# Patient Record
Sex: FEMALE | Race: BLACK OR AFRICAN AMERICAN
[De-identification: names, ages, dates, MRNs, and addresses within clinical notes are randomized per-mention and may not be internally consistent; named-entity substitution may affect disease eponyms.]

---

## 2018-03-19 ENCOUNTER — HOSPITAL ENCOUNTER (EMERGENCY)
Dept: HOSPITAL 97 - ER | Age: 43
Discharge: HOME | End: 2018-03-19
Payer: COMMERCIAL

## 2018-03-19 VITALS — SYSTOLIC BLOOD PRESSURE: 117 MMHG | DIASTOLIC BLOOD PRESSURE: 78 MMHG

## 2018-03-19 VITALS — OXYGEN SATURATION: 99 %

## 2018-03-19 VITALS — TEMPERATURE: 98.3 F

## 2018-03-19 DIAGNOSIS — F41.9: ICD-10-CM

## 2018-03-19 DIAGNOSIS — F31.9: ICD-10-CM

## 2018-03-19 DIAGNOSIS — G89.21: Primary | ICD-10-CM

## 2018-03-19 LAB
BUN BLD-MCNC: 7 MG/DL (ref 6–20)
GLUCOSE SERPLBLD-MCNC: 111 MG/DL (ref 65–120)
HCT VFR BLD CALC: 39.3 % (ref 36–45)
LYMPHOCYTES # SPEC AUTO: 1.3 K/UL (ref 0.7–4.9)
MCH RBC QN AUTO: 24.8 PG (ref 27–35)
MCV RBC: 77.8 FL (ref 80–100)
MORPHOLOGY BLD-IMP: (no result)
PMV BLD: 9 FL (ref 7.6–11.3)
POTASSIUM SERPL-SCNC: 3.4 MEQ/L (ref 3.6–5)
RBC # BLD: 5.05 M/UL (ref 3.86–4.86)

## 2018-03-19 PROCEDURE — 80048 BASIC METABOLIC PNL TOTAL CA: CPT

## 2018-03-19 PROCEDURE — 96374 THER/PROPH/DIAG INJ IV PUSH: CPT

## 2018-03-19 PROCEDURE — 36415 COLL VENOUS BLD VENIPUNCTURE: CPT

## 2018-03-19 PROCEDURE — 96372 THER/PROPH/DIAG INJ SC/IM: CPT

## 2018-03-19 PROCEDURE — 96375 TX/PRO/DX INJ NEW DRUG ADDON: CPT

## 2018-03-19 PROCEDURE — 85025 COMPLETE CBC W/AUTO DIFF WBC: CPT

## 2018-03-19 PROCEDURE — 93005 ELECTROCARDIOGRAM TRACING: CPT

## 2018-03-19 PROCEDURE — 99284 EMERGENCY DEPT VISIT MOD MDM: CPT

## 2018-03-19 NOTE — EKG
Test Date:    2018-03-19               Test Time:    11:19:15

Technician:   SHELLY                                     

                                                     

MEASUREMENT RESULTS:                                       

Intervals:                                           

Rate:         114                                    

CA:           120                                    

QRSD:         66                                     

QT:           348                                    

QTc:          479                                    

Axis:                                                

P:            77                                     

CA:           120                                    

QRS:          43                                     

T:            49                                     

                                                     

INTERPRETIVE STATEMENTS:                                       

                                                     

Sinus tachycardia

Otherwise normal ECG

Compared to ECG 06/03/2017 12:35:41

Atrial abnormality no longer present

Left ventricular hypertrophy no longer present

ST (T wave) deviation no longer present



Electronically Signed On 03-19-18 15:37:16 CDT by Doug Ren
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
## 2018-08-19 ENCOUNTER — HOSPITAL ENCOUNTER (EMERGENCY)
Dept: HOSPITAL 97 - ER | Age: 43
Discharge: ANOTHER HOSPITAL | End: 2018-08-19
Payer: COMMERCIAL

## 2018-08-19 VITALS — OXYGEN SATURATION: 100 % | TEMPERATURE: 98.2 F

## 2018-08-19 DIAGNOSIS — T81.31XA: Primary | ICD-10-CM

## 2018-08-19 DIAGNOSIS — F10.129: ICD-10-CM
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
## 2018-08-19 LAB
ALBUMIN SERPL BCP-MCNC: 2.8 G/DL (ref 3.4–5)
ALCOHOL SERUM/PLASMA: 148 MG/DL (ref ?–3)
ALP SERPL-CCNC: 172 U/L (ref 45–117)
ALT SERPL W P-5'-P-CCNC: 50 U/L (ref 12–78)
AST SERPL W P-5'-P-CCNC: 101 U/L (ref 15–37)
BUN BLD-MCNC: 3 MG/DL (ref 7–18)
GLUCOSE SERPLBLD-MCNC: 134 MG/DL (ref 74–106)
HCT VFR BLD CALC: 28.7 % (ref 36–45)
INR BLD: 1.18
LYMPHOCYTES # SPEC AUTO: 4.4 K/UL (ref 0.7–4.9)
MCH RBC QN AUTO: 26.2 PG (ref 27–35)
MCV RBC: 81.9 FL (ref 80–100)
PMV BLD: 7.7 FL (ref 7.6–11.3)
POTASSIUM SERPL-SCNC: 3.7 MMOL/L (ref 3.5–5.1)
RBC # BLD: 3.5 M/UL (ref 3.86–4.86)

## 2018-08-19 PROCEDURE — 80048 BASIC METABOLIC PNL TOTAL CA: CPT

## 2018-08-19 PROCEDURE — 80329 ANALGESICS NON-OPIOID 1 OR 2: CPT

## 2018-08-19 PROCEDURE — 85025 COMPLETE CBC W/AUTO DIFF WBC: CPT

## 2018-08-19 PROCEDURE — 80076 HEPATIC FUNCTION PANEL: CPT

## 2018-08-19 PROCEDURE — 36415 COLL VENOUS BLD VENIPUNCTURE: CPT

## 2018-08-19 PROCEDURE — 85610 PROTHROMBIN TIME: CPT

## 2018-08-19 PROCEDURE — 85730 THROMBOPLASTIN TIME PARTIAL: CPT

## 2018-08-19 PROCEDURE — 80320 DRUG SCREEN QUANTALCOHOLS: CPT

## 2018-08-19 PROCEDURE — 99285 EMERGENCY DEPT VISIT HI MDM: CPT

## 2018-08-19 NOTE — XMS REPORT
Summary of Care

 Created on:May 31, 2018



Patient:BLAYNE MEDLEY

Sex:Female

:1975

External Reference #:[id number]





Demographics







 Address  [address] [address]

 

 Phone  Unavailable

 

 Preferred Language  English

 

 Marital Status  Unknown

 

 [employer] Affiliation  Unknown

 

 Race  Other Race

 

 Ethnic Group  Not  or 









Author







 Name  Tamra Amado M.A.

 

 Address  Unavailable



   Unavailable



   ,









Care Team Providers







 Name  Role  Phone

 

 WILMER SALINAS  Unavailable  Unavailable

 

 ANANT CHAVES, NAILA  Unavailable  Unavailable

 

 JR COLUNGA, KALYN  Unavailable  Unavailable

 

 Unavailable  Unavailable  Unavailable









Functional Status







 Name  Dates  Details

 

 Functional status health issues are not documented    Status:









 Name  Dates  Details

 

 Cognitive status health issues are not documented    Status:







Problems







 Name  Dates  Details

 

 Closed fracture of neck of right talus (825.21, S92.111A)    Status: Active

 

 Closed displaced fracture of navicular bone of left foot, initial encounter (
825.22, S92.252A)    Status: Active

 

 Closed fracture of superior ramus of left pubis, initial encounter (808.2, 
S32.512A)    Status: Active

 

 Closed fracture of right superior pubic ramus (808.2, S32.511A)    Status: 
Active

 

 Closed nondisplaced fracture of body of right calcaneus, initial encounter (
825.0, S92.014A)    Status: Active

 

 Closed fracture of tibial plateau with routine healing, left (V54.16, S82.142D
)    Status: Active

 

 Closed displaced fracture of second metatarsal bone of right foot, initial 
encounter (825.25, S92.321A)    Status: Active

 

 Closed nondisplaced fracture of fourth metatarsal bone of right foot (825.25, 
S92.344A)    Status: Active

 

 Closed nondisplaced fracture of third metatarsal bone of right foot (825.25, 
S92.334A)    Status: Active

 

 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

 

 Displaced supracondylar fracture with intracondylar extension of lower end of 
right femur, subsequent encounter for open fracture type IIIA, IIIB, or IIIC 
with routine healing (V54.15, S72.461F)    Status: Active

 

 Laceration of knee with foreign body, left, subsequent encounter (V58.89, 
S81.022D)    Status: Active

 

 Avulsion fracture of lateral malleolus of right fibula, open type III, with 
routine healing, subsequent encounter (V54.19, S82.61XF)    Status: Active

 

 Laceration of lower leg with foreign body, right, subsequent encounter (V58.89
, S81.821D)    Status: Active

 

 Fracture of right inferior pubic ramus, closed, initial encounter (808.2, 
S32.591A)    Status: Active

 

 Closed avulsion fracture of navicular bone of foot, right, initial encounter (
825.22, S92.251A)    Status: Active

 

 Fracture of left inferior pubic ramus, closed, initial encounter (808.2, 
S32.592A)    Status: Active

 

 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    
Status: Active







Medications







 Name  Dates  Details









 Gabapentin 300 MG Oral Capsule



 TAKE 1 CAPSULE 3 TIMES DAILY









    Quantity: 60   Refills: 0







ANANT M.D.NAILA





  Start : 30-Mar-2017



Active

Methocarbamol 500 MG Oral Tablet

TAKE 1 TABLET 3 TIMES DAILY PRN muscle spasm







  Quantity: 30   Refills: 0







ANANT M.D.NAILA





  Start : 30-Mar-2017



Active

Acetaminophen-Codeine #3 300-30 MG Oral Tablet

TAKE 1 TO 2 TABLETS EVERY 6 HOURS AS NEEDED FOR PAIN.







  Quantity: 50   Refills: 0







ANANT M.D.NAILA





  Start : 3-May-2017



Active

TraMADol HCl - 50 MG Oral Tablet

TAKE 1 TO 2 TABLETS EVERY 6 HOURS AS NEEDED FOR PAIN.







  Quantity: 50   Refills: 0







ANANT M.D.NAILA





  Start : 3-May-2017



Active

Vitamin D (Ergocalciferol) 21034 UNIT Oral Capsule

TAKE 1 CAPSULE WEEKLY.







  Quantity: 8   Refills: 0







BROWN N.P., KALYN





  Start : 2-Aug-2017



Active

Methocarbamol 500 MG Oral Tablet

TAKE 1 TABLET TWICE DAILY







  Quantity: 30   Refills: 0







WILMER SALINAS





  Start : 2018



Active

TraMADol HCl - 50 MG Oral Tablet

TAKE 1 TABLET ONCE







  Quantity: 30   Refills: 0







WILMER SALINAS





  Start : 2018



Active





Allergies and Adverse Reactions







 Name  Dates  Details

 

 No Known Drug Allergies (Allergy)    Status: Active







Past Medical History







 Name  Dates  Details

 

 History of backache (V13.59, Z87.39)    Status: Resolved

 

 History of depression (V11.8, Z86.59)    Status: Resolved

 

 History of hepatitis (V12.09, Z86.19)    Status: Resolved







Procedures







 Procedure  Dates  Details

 

 [U] XRAY FEMUR 2 VWS RIGHT 06060  Date: 11-May-2018  







Immunization







 Name  Dates  Details

 

 Immunizations not documented    







Family History







 Name  Dates  Details

 

 Family history of cardiac disorder (V17.49, Z82.49)    Comments: Family History



     Status: Active

 

 Family history of essential hypertension (V17.49, Z82.49)    Comments: Family 
History



     Status: Active

 

 Family history of arthritis (V17.7, Z82.61)    Comments: Family History



     Status: Active

 

 Family history of malignant neoplasm (V16.9, Z80.9)    Comments: Family History



     Status: Active







Social History







 Name  Dates  Details

 

 Unknown if ever smoked    







Vital Signs







 Date  Test  Result  Details

 

       

 

   No Known Vitals to report    



       







Results







 Date  Description  Value  Details

 

   Results not documented    



       

 

       







Plan of Care







 Name  Dates  Details









 Planned Observations









 Planned Goals not documented    









 Planned Encounters









 Appointment; WILMER MUJICA PA  On: 2018 12:45







Instructions







 Name  Dates  Details

 

 Instructions not documented    







Encounters







 Appointment; NAILA NY M.D.  On: 2017 12:30



 Encounter Diagnosis: Problem not documented  

 

 Appointment; NAILA NY M.D.  On: 3-May-2017 8:30



 Encounter Diagnosis: Problem not documented  

 

 Appointment; ABIMAEL BULLARD M.D.  On: 3-May-2017 10:00



 Encounter Diagnosis: Problem not documented  

 

 Appointment; NAILA NY M.D.  On: 2017 11:15



 Encounter Diagnosis: Problem not documented  

 

 Appointment; KALYN NORRIS NP  On: 2-Aug-2017 10:15



 Encounter Diagnosis: Problem not documented  

 

 Appointment; ABIMAEL BULLARD M.D.  On: 2-Aug-2017 11:00



 Encounter Diagnosis: Problem not documented  

 

 Appointment; KALYN NORRIS NP  On: 18-Oct-2017 12:45



 Encounter Diagnosis: Problem not documented  

 

 Appointment; EZEKIEL ONEAL NP  On: 18-Oct-2017 13:30



 Encounter Diagnosis: Problem not documented  

 

 Appointment; WILMER MUJICA PA  On: 2018 9:45



 Encounter Diagnosis: Problem not documented  

 

 Appointment; WILMER MUJICA PA  On: 21-May-2018 9:45



 Encounter Diagnosis: Problem not documented
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
## 2018-08-19 NOTE — RAD REPORT
EXAM DESCRIPTION:  RAD - Femur Right - 8/19/2018 9:52 pm

 

CLINICAL HISTORY:  Recent trauma, chronic draining wound

 

COMPARISON:  CT study February 2018

 

FINDINGS:  No acute fracture changes are present. Extensive surgical hardware in place within the int
ramedullary canal and along the lateral femur. No fracture of hardware. Chronic ununited superior and
 inferior pubic rami fractures are noted similar to February. Extensive remodeling of the bone of the
 distal femur. No clearly destructive bone process seen. Soft tissue wound is evident anterior to the
 knee and along the lateral thigh. No foreign body. Air is seen in the soft tissues along the muscle 
fat interface of the lateral thigh.

 

IMPRESSION:  Soft tissue wound changes are present with air density at the lateral thigh.

 

No acute bone or hardware finding identifiable.
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
## 2018-08-20 VITALS — DIASTOLIC BLOOD PRESSURE: 75 MMHG | SYSTOLIC BLOOD PRESSURE: 105 MMHG

## 2019-10-02 ENCOUNTER — HOSPITAL ENCOUNTER (INPATIENT)
Dept: HOSPITAL 97 - ER | Age: 44
LOS: 2 days | Discharge: HOME | DRG: 190 | End: 2019-10-04
Attending: FAMILY MEDICINE | Admitting: FAMILY MEDICINE
Payer: COMMERCIAL

## 2019-10-02 VITALS — BODY MASS INDEX: 45.7 KG/M2

## 2019-10-02 DIAGNOSIS — J44.1: ICD-10-CM

## 2019-10-02 DIAGNOSIS — E66.01: ICD-10-CM

## 2019-10-02 DIAGNOSIS — J18.9: ICD-10-CM

## 2019-10-02 DIAGNOSIS — B18.2: ICD-10-CM

## 2019-10-02 DIAGNOSIS — G89.29: ICD-10-CM

## 2019-10-02 DIAGNOSIS — F31.9: ICD-10-CM

## 2019-10-02 DIAGNOSIS — J44.0: Primary | ICD-10-CM

## 2019-10-02 DIAGNOSIS — F17.210: ICD-10-CM

## 2019-10-02 LAB
ALBUMIN SERPL BCP-MCNC: 3 G/DL (ref 3.4–5)
ALP SERPL-CCNC: 120 U/L (ref 45–117)
ALT SERPL W P-5'-P-CCNC: 83 U/L (ref 12–78)
AST SERPL W P-5'-P-CCNC: 62 U/L (ref 15–37)
BUN BLD-MCNC: 6 MG/DL (ref 7–18)
GLUCOSE SERPLBLD-MCNC: 106 MG/DL (ref 74–106)
HCT VFR BLD CALC: 38.3 % (ref 36–45)
INR BLD: 1.15
LYMPHOCYTES # SPEC AUTO: 1.3 K/UL (ref 0.7–4.9)
MAGNESIUM SERPL-MCNC: 1.6 MG/DL (ref 1.8–2.4)
METHAMPHET UR QL SCN: NEGATIVE
NT-PROBNP SERPL-MCNC: 182 PG/ML (ref ?–125)
PMV BLD: 8.6 FL (ref 7.6–11.3)
POTASSIUM SERPL-SCNC: 4 MMOL/L (ref 3.5–5.1)
RBC # BLD: 5.13 M/UL (ref 3.86–4.86)
THC SERPL-MCNC: POSITIVE NG/ML
TROPONIN (EMERG DEPT USE ONLY): < 0.02 NG/ML (ref 0–0.04)
TSH SERPL DL<=0.05 MIU/L-ACNC: 0.96 UIU/ML (ref 0.36–3.74)
UA DIPSTICK W REFLEX MICRO PNL UR: (no result)

## 2019-10-02 PROCEDURE — 96375 TX/PRO/DX INJ NEW DRUG ADDON: CPT

## 2019-10-02 PROCEDURE — 87205 SMEAR GRAM STAIN: CPT

## 2019-10-02 PROCEDURE — 80053 COMPREHEN METABOLIC PANEL: CPT

## 2019-10-02 PROCEDURE — 80307 DRUG TEST PRSMV CHEM ANLYZR: CPT

## 2019-10-02 PROCEDURE — 96365 THER/PROPH/DIAG IV INF INIT: CPT

## 2019-10-02 PROCEDURE — 84443 ASSAY THYROID STIM HORMONE: CPT

## 2019-10-02 PROCEDURE — 93005 ELECTROCARDIOGRAM TRACING: CPT

## 2019-10-02 PROCEDURE — 93306 TTE W/DOPPLER COMPLETE: CPT

## 2019-10-02 PROCEDURE — 36415 COLL VENOUS BLD VENIPUNCTURE: CPT

## 2019-10-02 PROCEDURE — 84145 PROCALCITONIN (PCT): CPT

## 2019-10-02 PROCEDURE — 85610 PROTHROMBIN TIME: CPT

## 2019-10-02 PROCEDURE — 96367 TX/PROPH/DG ADDL SEQ IV INF: CPT

## 2019-10-02 PROCEDURE — 94640 AIRWAY INHALATION TREATMENT: CPT

## 2019-10-02 PROCEDURE — 87077 CULTURE AEROBIC IDENTIFY: CPT

## 2019-10-02 PROCEDURE — 99285 EMERGENCY DEPT VISIT HI MDM: CPT

## 2019-10-02 PROCEDURE — 71275 CT ANGIOGRAPHY CHEST: CPT

## 2019-10-02 PROCEDURE — 81003 URINALYSIS AUTO W/O SCOPE: CPT

## 2019-10-02 PROCEDURE — 83735 ASSAY OF MAGNESIUM: CPT

## 2019-10-02 PROCEDURE — 84439 ASSAY OF FREE THYROXINE: CPT

## 2019-10-02 PROCEDURE — 80048 BASIC METABOLIC PNL TOTAL CA: CPT

## 2019-10-02 PROCEDURE — 83880 ASSAY OF NATRIURETIC PEPTIDE: CPT

## 2019-10-02 PROCEDURE — 80076 HEPATIC FUNCTION PANEL: CPT

## 2019-10-02 PROCEDURE — 87804 INFLUENZA ASSAY W/OPTIC: CPT

## 2019-10-02 PROCEDURE — 84484 ASSAY OF TROPONIN QUANT: CPT

## 2019-10-02 PROCEDURE — 87186 SC STD MICRODIL/AGAR DIL: CPT

## 2019-10-02 PROCEDURE — 85025 COMPLETE CBC W/AUTO DIFF WBC: CPT

## 2019-10-02 PROCEDURE — 87040 BLOOD CULTURE FOR BACTERIA: CPT

## 2019-10-02 PROCEDURE — 71046 X-RAY EXAM CHEST 2 VIEWS: CPT

## 2019-10-02 RX ADMIN — QUETIAPINE FUMARATE SCH MG: 100 TABLET, FILM COATED ORAL at 13:05

## 2019-10-02 RX ADMIN — Medication SCH ML: at 20:28

## 2019-10-02 RX ADMIN — HYDROCODONE BITARTRATE AND ACETAMINOPHEN PRN TAB: 7.5; 325 TABLET ORAL at 10:06

## 2019-10-02 RX ADMIN — HYDROCODONE BITARTRATE AND ACETAMINOPHEN PRN TAB: 7.5; 325 TABLET ORAL at 18:13

## 2019-10-02 RX ADMIN — PREGABALIN SCH MG: 50 CAPSULE ORAL at 20:28

## 2019-10-02 RX ADMIN — QUETIAPINE FUMARATE SCH MG: 100 TABLET, FILM COATED ORAL at 16:18

## 2019-10-02 RX ADMIN — TRAMADOL HYDROCHLORIDE PRN MG: 50 TABLET, COATED ORAL at 22:25

## 2019-10-02 RX ADMIN — BENZONATATE PRN MG: 100 CAPSULE ORAL at 13:05

## 2019-10-02 RX ADMIN — FAMOTIDINE SCH MG: 20 TABLET, FILM COATED ORAL at 08:28

## 2019-10-02 RX ADMIN — QUETIAPINE FUMARATE SCH MG: 100 TABLET, FILM COATED ORAL at 20:28

## 2019-10-02 RX ADMIN — TRAMADOL HYDROCHLORIDE PRN MG: 50 TABLET, COATED ORAL at 16:13

## 2019-10-02 RX ADMIN — BENZONATATE PRN MG: 100 CAPSULE ORAL at 04:17

## 2019-10-02 RX ADMIN — ARFORMOTEROL TARTRATE SCH MCG: 15 SOLUTION RESPIRATORY (INHALATION) at 20:05

## 2019-10-02 RX ADMIN — ENOXAPARIN SODIUM SCH MG: 40 INJECTION SUBCUTANEOUS at 08:28

## 2019-10-02 RX ADMIN — IPRATROPIUM BROMIDE PRN MG: 0.5 SOLUTION RESPIRATORY (INHALATION) at 20:05

## 2019-10-02 RX ADMIN — ALBUTEROL SULFATE PRN MG: 2.5 SOLUTION RESPIRATORY (INHALATION) at 20:05

## 2019-10-02 RX ADMIN — NICOTINE SCH MG: 21 PATCH TRANSDERMAL at 08:28

## 2019-10-02 RX ADMIN — ARFORMOTEROL TARTRATE SCH MCG: 15 SOLUTION RESPIRATORY (INHALATION) at 10:55

## 2019-10-02 RX ADMIN — TRAMADOL HYDROCHLORIDE PRN MG: 50 TABLET, COATED ORAL at 08:33

## 2019-10-02 RX ADMIN — GUAIFENESIN SCH MG: 600 TABLET, EXTENDED RELEASE ORAL at 08:29

## 2019-10-02 RX ADMIN — HYDROCODONE BITARTRATE AND ACETAMINOPHEN PRN TAB: 7.5; 325 TABLET ORAL at 04:18

## 2019-10-02 RX ADMIN — FAMOTIDINE SCH MG: 20 TABLET, FILM COATED ORAL at 20:27

## 2019-10-02 RX ADMIN — Medication SCH ML: at 08:29

## 2019-10-02 RX ADMIN — GUAIFENESIN SCH MG: 600 TABLET, EXTENDED RELEASE ORAL at 20:28
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
## 2019-10-02 NOTE — RAD REPORT
EXAM DESCRIPTION:  Manoj Sheikh (2 Views)10/2/2019 1:24 am

 

CLINICAL HISTORY:  Cough

 

COMPARISON:  2018

 

FINDINGS:   The lungs appear clear of acute infiltrate. The heart is normal size

 

IMPRESSION:   No acute abnormalities displayed
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
## 2019-10-02 NOTE — ECHO
HEIGHT: 5 ft 2 in   WEIGHT: 250 lb 0.067 oz   DATE OF STUDY: 10/2/19   REFER DR: Timothy Vickers DO

2-DIMENSIONAL: YES

     M.MODE: YES

 DOPPLER: YES

COLOR FLOW: YES



                    TDS:  NO

PORTABLE: NO

 DEFINITY:  NO

BUBBLE STUDY: NO





DIAGNOSIS:  SHORTNESS OF BREATH



CARDIAC HISTORY:  

CATHERIZATION: 

SURGERY: 

PROSTHETIC VALVE: 

PACEMAKER: 





MEASUREMENTS (cm)

    DIASTOLIC (NORMALS)                 SYSTOLIC (NORMALS)

IVSd                 1.0 (0.6-1.2)                    LA Diam 2.9 (1.9-4.0)     LVEF       
  60-69%  

LVIDd               4.0 (3.5-5.7)                        LVIDs      2.6 (2.0-3.5)     %FS  
               %

LVPWd             1.2 (0.6-1.2)

Ao Diam           2.4 (2.0-3.7)



2 DIMENSIONAL ASSESSMENT:

RIGHT ATRIUM:                   NORMAL

LEFT ATRIUM:       NORMAL



RIGHT VENTRICLE:            NORMAL

LEFT VENTRICLE: NORMAL



TRICUSPID VALVE:             NORMAL

MITRAL VALVE:     NORMAL



PULMONIC VALVE:             NORMAL

AORTIC VALVE:     NORMAL



PERICARDIAL EFFUSION: NONE

AORTIC ROOT:      NORMAL





LEFT VENTRICULAR WALL MOTION:     NORMAL.



DOPPLER/COLOR FLOW:     MILD TRICUSPID REGURGITATION. NORMAL RIGHT VENTRICULAR SYSTOLIC 
PRESSURE.



COMMENTS:      NORMAL 2D ECHO. MILD TRICUSPID REGURGITATION.



TECHNOLOGIST:   JEAN CARLOS CARCAMO
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
## 2019-10-02 NOTE — EKG
Test Date:    2019-10-02               Test Time:    01:34:47

Technician:   NICOLE                                    

                                                     

MEASUREMENT RESULTS:                                       

Intervals:                                           

Rate:         106                                    

AR:           148                                    

QRSD:         66                                     

QT:           320                                    

QTc:          425                                    

Axis:                                                

P:            66                                     

AR:           148                                    

QRS:          17                                     

T:            42                                     

                                                     

INTERPRETIVE STATEMENTS:                                       

                                                     

Sinus tachycardia

Possible Left atrial enlargement

Borderline ECG

Compared to ECG 03/19/2018 11:19:15

No significant changes



Electronically Signed On 10-02-19 11:37:33 CDT by Doug Ren
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
## 2019-10-02 NOTE — RAD REPORT
EXAM DESCRIPTION:  CT - Chest For Pe Angio - 10/2/2019 5:17 am

 

CLINICAL HISTORY:  44-year-old female with cough, dyspnea and COPD.

 

TECHNIQUE:  Following the administration of intravenous contrast, multiple high-resolution axial imag
es of the chest were performed followed by sagittal and coronal reconstructed images. The CT study is
 performed according to ALARA (as low as reasonably achievable) or ALARA/IMAGE GENTLY, with automatic
 adjustment of mA and/or kV according to patient size.

Performed on: 10/2/2019 at 3:43 AM

 

COMPARISON:  None

 

FINDINGS:  There is   satisfactory visualization and contrast opacification of pulmonary arteries. No
 definite intra-arterial filling defects are identified to suggest acute or chronic pulmonary embolis
m. The thoracic aorta is normal in caliber and contour without evidence of aneurysm or dissection.

The lungs are well expanded. There is mild parenchymal opacification in the anterior aspects of both 
upper lobes, greater on the left which may be due to fibrosis. There are also a few scattered centril
obular nodules as well as scattered tree-in-bud opacities predominantly involving the superior segmen
ts of both lower lobes and inferior left upper lobe. An infectious or inflammatory bronchiolitis is n
ot excluded. There is no evidence of a pneumothorax. There are no pleural effusions.

The heart is normal in size. There is no pericardial effusion.

There is no evidence of hilar, mediastinal or axillary lymphadenopathy. There are a few calcified rig
ht hilar lymph nodes and there is a calcified granuloma in the right lower lobe consistent with prior
 granulomatous disease

No acute osseous abnormality is identified.

The visualized upper abdominal structures are unremarkable.

The gallbladder is surgically absent.

 

IMPRESSION:  1. No CT evidence to suggest acute or chronic pulmonary embolism, aortic aneurysm or aor
tic dissection.

2. There are a few scattered centrilobular nodules and scattered tree-in-bud opacities as described a
sanford suggesting an infectious or inflammatory bronchiolitis.

3. Evidence of prior granulomatous disease.

 

Electronically signed by:   Tootie Espinoza DO   10/2/2019 4:12 AM CDT Workstation: [id number]

 

 

 

Due to temporary technical issues with the PACS/Fluency reporting system, reports are being signed by
 the in house radiologist as a courtesy to ensure prompt reporting. The interpreting radiologist is f
ully responsible for the content of the report.

## 2019-10-03 LAB
ALBUMIN SERPL BCP-MCNC: 2.6 G/DL (ref 3.4–5)
ALP SERPL-CCNC: 96 U/L (ref 45–117)
ALT SERPL W P-5'-P-CCNC: 59 U/L (ref 12–78)
AST SERPL W P-5'-P-CCNC: 41 U/L (ref 15–37)
BUN BLD-MCNC: 11 MG/DL (ref 7–18)
GLUCOSE SERPLBLD-MCNC: 129 MG/DL (ref 74–106)
HCT VFR BLD CALC: 34.6 % (ref 36–45)
LYMPHOCYTES # SPEC AUTO: 2 K/UL (ref 0.7–4.9)
MAGNESIUM SERPL-MCNC: 2.1 MG/DL (ref 1.8–2.4)
PMV BLD: 8.6 FL (ref 7.6–11.3)
POTASSIUM SERPL-SCNC: 4.4 MMOL/L (ref 3.5–5.1)
RBC # BLD: 4.55 M/UL (ref 3.86–4.86)

## 2019-10-03 RX ADMIN — ARFORMOTEROL TARTRATE SCH MCG: 15 SOLUTION RESPIRATORY (INHALATION) at 20:00

## 2019-10-03 RX ADMIN — GUAIFENESIN SCH MG: 600 TABLET, EXTENDED RELEASE ORAL at 20:28

## 2019-10-03 RX ADMIN — ENOXAPARIN SODIUM SCH MG: 40 INJECTION SUBCUTANEOUS at 10:14

## 2019-10-03 RX ADMIN — HYDROCODONE BITARTRATE AND ACETAMINOPHEN PRN TAB: 10; 325 TABLET ORAL at 21:59

## 2019-10-03 RX ADMIN — SODIUM CHLORIDE SCH MLS: 9 INJECTION, SOLUTION INTRAVENOUS at 13:27

## 2019-10-03 RX ADMIN — IPRATROPIUM BROMIDE PRN MG: 0.5 SOLUTION RESPIRATORY (INHALATION) at 19:30

## 2019-10-03 RX ADMIN — HYDROCODONE BITARTRATE AND ACETAMINOPHEN PRN TAB: 7.5; 325 TABLET ORAL at 13:26

## 2019-10-03 RX ADMIN — QUETIAPINE FUMARATE SCH MG: 100 TABLET, FILM COATED ORAL at 20:28

## 2019-10-03 RX ADMIN — HYDROCODONE BITARTRATE AND ACETAMINOPHEN PRN TAB: 7.5; 325 TABLET ORAL at 00:24

## 2019-10-03 RX ADMIN — IPRATROPIUM BROMIDE PRN MG: 0.5 SOLUTION RESPIRATORY (INHALATION) at 08:35

## 2019-10-03 RX ADMIN — QUETIAPINE FUMARATE SCH MG: 100 TABLET, FILM COATED ORAL at 17:05

## 2019-10-03 RX ADMIN — QUETIAPINE FUMARATE SCH MG: 100 TABLET, FILM COATED ORAL at 10:16

## 2019-10-03 RX ADMIN — ARFORMOTEROL TARTRATE SCH MCG: 15 SOLUTION RESPIRATORY (INHALATION) at 08:35

## 2019-10-03 RX ADMIN — TRAMADOL HYDROCHLORIDE PRN MG: 50 TABLET, COATED ORAL at 04:25

## 2019-10-03 RX ADMIN — Medication SCH ML: at 20:29

## 2019-10-03 RX ADMIN — FAMOTIDINE SCH MG: 20 TABLET, FILM COATED ORAL at 20:28

## 2019-10-03 RX ADMIN — QUETIAPINE FUMARATE SCH MG: 100 TABLET, FILM COATED ORAL at 13:23

## 2019-10-03 RX ADMIN — Medication SCH ML: at 10:17

## 2019-10-03 RX ADMIN — PREGABALIN SCH MG: 50 CAPSULE ORAL at 20:29

## 2019-10-03 RX ADMIN — HYDROCODONE BITARTRATE AND ACETAMINOPHEN PRN TAB: 7.5; 325 TABLET ORAL at 09:33

## 2019-10-03 RX ADMIN — IPRATROPIUM BROMIDE PRN MG: 0.5 SOLUTION RESPIRATORY (INHALATION) at 14:35

## 2019-10-03 RX ADMIN — ALBUTEROL SULFATE PRN MG: 2.5 SOLUTION RESPIRATORY (INHALATION) at 08:35

## 2019-10-03 RX ADMIN — FAMOTIDINE SCH MG: 20 TABLET, FILM COATED ORAL at 10:16

## 2019-10-03 RX ADMIN — NICOTINE SCH MG: 21 PATCH TRANSDERMAL at 10:16

## 2019-10-03 RX ADMIN — GUAIFENESIN SCH MG: 600 TABLET, EXTENDED RELEASE ORAL at 10:15

## 2019-10-03 RX ADMIN — HYDROCODONE BITARTRATE AND ACETAMINOPHEN PRN TAB: 10; 325 TABLET ORAL at 17:42

## 2019-10-03 RX ADMIN — PREGABALIN SCH MG: 50 CAPSULE ORAL at 10:16

## 2019-10-03 RX ADMIN — TRAMADOL HYDROCHLORIDE PRN MG: 50 TABLET, COATED ORAL at 21:50

## 2019-10-03 RX ADMIN — ALBUTEROL SULFATE PRN MG: 2.5 SOLUTION RESPIRATORY (INHALATION) at 14:35

## 2019-10-03 NOTE — P.PN
Subjective


Date of Service: 10/03/19


Primary Care Provider: Reji Castro; BERTHA


Chief Complaint: COPD exacerbation





Patient is still short of breath complaining of coughing and wheezing slightly 

better from yesterday





Review of Systems


General: Weakness


Respiratory: Cough, Shortness of Breath





Physical Examination





- Vital Signs


Temperature: 97.2 F


Blood Pressure: 157/86


Pulse: 84


Respirations: 18


Pulse Ox (%): 97





- Physical Exam


General: Alert, Mild distress


Respiratory: Expiratory wheezes


Cardiovascular: No edema, Regular rate/rhythm





Assessment & Plan





- Problems (Diagnosis)


(1) COPD exacerbation


Current Visit: Yes   Status: Acute   


Plan: 


Patient admitted with COPD exacerbation doing slightly better vital signs are 

stable oxygenation satisfactory patient does not qualify for home O2 recommend 

discharge tomorrow on prednisone 10 mg twice a day for 10 days and Advair 251 

puff twice a day to follow up with me in 2 weeks patient has been advised to 

quit smoking patient's echocardiogram is normal white count mildly elevated 

blood cultures likely contaminant

## 2019-10-03 NOTE — RAD REPORT
EXAM DESCRIPTION:  RAD - Chest Pa And Lat (2 Views) - 10/3/2019 6:48 am

 

CLINICAL HISTORY:  follow up pneumonia

Chest pain.

 

COMPARISON:  Chest Pa And Lat (2 Views) dated 10/2/2019; Chest Single View dated 3/20/2018; CHEST SIN
GLE VIEW dated 1/27/2016; CHEST SINGLE VIEW dated 7/8/2011; Chest For Pe Angio dated 10/2/2019

 

FINDINGS:  Mild interstitial prominence is seen in the right lower lung. No focal consolidation typic
al of pneumonia seen. The heart is upper limit of normal in size. No displaced fractures.

## 2019-10-04 VITALS — OXYGEN SATURATION: 97 %

## 2019-10-04 VITALS — DIASTOLIC BLOOD PRESSURE: 74 MMHG | TEMPERATURE: 98.2 F | SYSTOLIC BLOOD PRESSURE: 118 MMHG

## 2019-10-04 RX ADMIN — IPRATROPIUM BROMIDE PRN MG: 0.5 SOLUTION RESPIRATORY (INHALATION) at 01:21

## 2019-10-04 RX ADMIN — HYDROCODONE BITARTRATE AND ACETAMINOPHEN PRN TAB: 10; 325 TABLET ORAL at 12:39

## 2019-10-04 RX ADMIN — PREGABALIN SCH MG: 50 CAPSULE ORAL at 09:11

## 2019-10-04 RX ADMIN — ALBUTEROL SULFATE PRN MG: 2.5 SOLUTION RESPIRATORY (INHALATION) at 07:53

## 2019-10-04 RX ADMIN — BENZONATATE PRN MG: 100 CAPSULE ORAL at 12:39

## 2019-10-04 RX ADMIN — ENOXAPARIN SODIUM SCH MG: 40 INJECTION SUBCUTANEOUS at 09:10

## 2019-10-04 RX ADMIN — HYDROCODONE BITARTRATE AND ACETAMINOPHEN PRN TAB: 10; 325 TABLET ORAL at 05:16

## 2019-10-04 RX ADMIN — ARFORMOTEROL TARTRATE SCH MCG: 15 SOLUTION RESPIRATORY (INHALATION) at 07:53

## 2019-10-04 RX ADMIN — HYDROCODONE BITARTRATE AND ACETAMINOPHEN PRN TAB: 10; 325 TABLET ORAL at 09:09

## 2019-10-04 RX ADMIN — IPRATROPIUM BROMIDE PRN MG: 0.5 SOLUTION RESPIRATORY (INHALATION) at 07:53

## 2019-10-04 RX ADMIN — SODIUM CHLORIDE SCH MLS: 9 INJECTION, SOLUTION INTRAVENOUS at 12:36

## 2019-10-04 RX ADMIN — QUETIAPINE FUMARATE SCH MG: 100 TABLET, FILM COATED ORAL at 09:10

## 2019-10-04 RX ADMIN — ALBUTEROL SULFATE PRN MG: 2.5 SOLUTION RESPIRATORY (INHALATION) at 13:26

## 2019-10-04 RX ADMIN — GUAIFENESIN SCH MG: 600 TABLET, EXTENDED RELEASE ORAL at 09:10

## 2019-10-04 RX ADMIN — IPRATROPIUM BROMIDE PRN MG: 0.5 SOLUTION RESPIRATORY (INHALATION) at 13:26

## 2019-10-04 RX ADMIN — FAMOTIDINE SCH MG: 20 TABLET, FILM COATED ORAL at 09:10

## 2019-10-04 RX ADMIN — HYDROCODONE BITARTRATE AND ACETAMINOPHEN PRN TAB: 10; 325 TABLET ORAL at 01:32

## 2019-10-04 RX ADMIN — NICOTINE SCH MG: 21 PATCH TRANSDERMAL at 09:10

## 2019-10-04 RX ADMIN — Medication SCH ML: at 09:11

## 2019-10-04 RX ADMIN — QUETIAPINE FUMARATE SCH MG: 100 TABLET, FILM COATED ORAL at 12:36

## 2019-10-04 RX ADMIN — SODIUM CHLORIDE SCH MLS: 9 INJECTION, SOLUTION INTRAVENOUS at 01:32

## 2019-10-04 RX ADMIN — ALBUTEROL SULFATE PRN MG: 2.5 SOLUTION RESPIRATORY (INHALATION) at 01:21
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
## 2019-12-02 ENCOUNTER — HOSPITAL ENCOUNTER (EMERGENCY)
Dept: HOSPITAL 97 - ER | Age: 44
Discharge: HOME | End: 2019-12-02
Payer: COMMERCIAL

## 2019-12-02 VITALS — TEMPERATURE: 98.2 F | OXYGEN SATURATION: 99 %

## 2019-12-02 VITALS — SYSTOLIC BLOOD PRESSURE: 130 MMHG | DIASTOLIC BLOOD PRESSURE: 72 MMHG

## 2019-12-02 DIAGNOSIS — F41.9: ICD-10-CM

## 2019-12-02 DIAGNOSIS — M79.604: Primary | ICD-10-CM

## 2019-12-02 DIAGNOSIS — F31.9: ICD-10-CM

## 2019-12-02 PROCEDURE — 96372 THER/PROPH/DIAG INJ SC/IM: CPT

## 2019-12-02 PROCEDURE — 99284 EMERGENCY DEPT VISIT MOD MDM: CPT

## 2019-12-02 PROCEDURE — 72100 X-RAY EXAM L-S SPINE 2/3 VWS: CPT

## 2019-12-02 NOTE — RAD REPORT
EXAM DESCRIPTION:  RAD - Knee Right 3 View - 12/2/2019 11:13 am

 

CLINICAL HISTORY:  PAIN

 

COMPARISON:  Knee Right 3 View dated 3/20/2018

 

FINDINGS:  Hardware is present involving the distal femur. No hardware loosening or infection. Arthri
tic changes are present tiny. No joint effusion.

 

IMPRESSION:  No acute finding is evident.

## 2019-12-02 NOTE — RAD REPORT
EXAM DESCRIPTION:  RAD - Lumbar Spine 3 Views - 12/2/2019 11:13 am

 

CLINICAL HISTORY:  PAIN

Radiculopathy

 

COMPARISON:  No comparisons

 

FINDINGS:  Vertebral body heights appear maintained. No compression fracture noted. Disc thinning wit
h small posterior osteophytes noted at L4-5 and L5-S1 compatible with mild spondylosis. No spondyloly
sis or spondylolisthesis.

 

 

IMPRESSION:  Mild lower lumbar spondylosis.

## 2019-12-02 NOTE — XMS REPORT
Patient Summary Document

 Created on:2019



Patient:BLAYNE MEDLEY

Sex:Female

:1975

External Reference #:[id number]





Demographics







 Address   [place] [address]

 

 Home Phone  [phone]

 

 Email Address  NONE

 

 Preferred Language  Unknown

 

 Marital Status  Unknown

 

 Adventist Affiliation  Unknown

 

 Race  Unknown

 

 Additional Race(s)  Unavailable

 

 Ethnic Group  Unknown









Author







 Organization  [facility]

 

 Address  [address] [place] [address] Dr. Dickerson [address] [place] [id number]

 

 Phone  [phone]









Care Team Providers







 Name  Role  Phone

 

 Unavailable  Unavailable  Unavailable









Problems

This patient has no known problems.



Allergies, Adverse Reactions, Alerts

This patient has no known allergies or adverse reactions.



Medications

This patient has no known medications.

## 2019-12-02 NOTE — RAD REPORT
EXAM DESCRIPTION:  RAD - Hip Right 2 View - 12/2/2019 11:13 am

 

CLINICAL HISTORY:  fall;Pain

 

COMPARISON:  No comparisons

 

FINDINGS:  Ununited fractures of the right pubic rami seen. Left pubic rami fractures also present, i
ncompletely visualized. Hardware is present in the proximal right femur. No hip fracture evident.
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
## 2020-03-09 ENCOUNTER — HOSPITAL ENCOUNTER (EMERGENCY)
Dept: HOSPITAL 97 - ER | Age: 45
Discharge: HOME | End: 2020-03-09
Payer: COMMERCIAL

## 2020-03-09 VITALS — TEMPERATURE: 97.8 F

## 2020-03-09 VITALS — OXYGEN SATURATION: 97 %

## 2020-03-09 VITALS — SYSTOLIC BLOOD PRESSURE: 156 MMHG | DIASTOLIC BLOOD PRESSURE: 98 MMHG

## 2020-03-09 DIAGNOSIS — F41.9: ICD-10-CM

## 2020-03-09 DIAGNOSIS — G89.21: Primary | ICD-10-CM
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
## 2020-03-09 PROCEDURE — 99284 EMERGENCY DEPT VISIT MOD MDM: CPT

## 2020-03-09 PROCEDURE — 96372 THER/PROPH/DIAG INJ SC/IM: CPT

## 2020-03-09 PROCEDURE — 73502 X-RAY EXAM HIP UNI 2-3 VIEWS: CPT

## 2020-03-09 NOTE — RAD REPORT
EXAM DESCRIPTION:  RAD - Hip Right 2 View - 3/9/2020 5:53 pm

 

CLINICAL HISTORY:  Right hip pain

 

FINDINGS:  Old ununited fractures involve the pubic bones.

 

No acute fracture or dislocation is seen

## 2020-03-09 NOTE — XMS REPORT
Summary of Care

 Created on:2020



Patient:BLAYNE PERLA

Sex:Female

:1975

External Reference #:[id number]





Demographics







 Address   [place] [address]

 

 Phone  Unavailable

 

 Preferred Language  English

 

 Marital Status  Unknown

 

 Adventism Affiliation  Unknown

 

 Race  Other Race

 

 Ethnic Group  Not  or 









Author







 Name  Jayme JOHNSTON Arcelia

 

 Address  Unavailable



   Unavailable



   ,









Care Team Providers







 Name  Role  Phone

 

 SIL CAMEJO, WILMER  Unavailable  Unavailable

 

 ANANT CHAVES, NAILA  Unavailable  Unavailable

 

 JR HOLCOMB.GRUPO, KALYN  Unavailable  Unavailable

 

 SAMMY CHAVES, ARGENIS  Unavailable  Unavailable

 

 JEANNIE CHAVES, KULWINDER  Unavailable  Unavailable

 

 Samaria ARANA, Abdiel  Unavailable  Unavailable

 

 SIL CAMEJO, WILMER  Unavailable  Unavailable

 

 FERN ARANA, TONY  Unavailable  Unavailable

 

 Unavailable  Unavailable  Unavailable









Functional Status







 Name  Dates  Details

 

 Functional status health issues are not documented    Status:









 Name  Dates  Details

 

 Cognitive status health issues are not documented    Status:







Problems







 Name  Dates  Details

 

 Closed fracture of neck of right talus (825.21, S92.111A)    Status: Active

 

 Closed displaced fracture of navicular bone of left foot, initial encounter (
825.22, S92.252A)    Status: Active

 

 Closed fracture of superior ramus of left pubis, initial encounter (808.2, 
S32.512A)    Status: Active

 

 Closed fracture of tibial plateau with routine healing, left (V54.16, S82.142D
)    Status: Active

 

 Closed nondisplaced fracture of body of right calcaneus, initial encounter (
825.0, S92.014A)    Status: Active

 

 Closed displaced fracture of second metatarsal bone of right foot, initial 
encounter (825.25, S92.321A)    Status: Active

 

 Closed nondisplaced fracture of third metatarsal bone of right foot (825.25, 
S92.334A)    Status: Active

 

 Closed nondisplaced fracture of fourth metatarsal bone of right foot (825.25, 
S92.344A)    Status: Active

 

 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

 

 Displaced supracondylar fracture with intracondylar extension of lower end of 
right femur, subsequent encounter for open fracture type IIIA, IIIB, or IIIC 
with routine healing (V54.15, S72.461F)    Status: Active

 

 Laceration of knee with foreign body, left, subsequent encounter (V58.89, 
S81.022D)    Status: Active

 

 Avulsion fracture of lateral malleolus of right fibula, open type III, with 
routine healing, subsequent encounter (V54.19, S82.61XF)    Status: Active

 

 Laceration of lower leg with foreign body, right, subsequent encounter (V58.89
, S81.821D)    Status: Active

 

 Fracture of right inferior pubic ramus, closed, initial encounter (808.2, 
S32.591A)    Status: Active

 

 Closed avulsion fracture of navicular bone of foot, right, initial encounter (
825.22, S92.251A)    Status: Active

 

 Fracture of left inferior pubic ramus, closed, initial encounter (808.2, 
S32.592A)    Status: Active

 

 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    
Status: Active

 

 Dehiscence of external operation wound (998.32, T81.31XA)    Status: Active

 

 Avulsion of leg, right, initial encounter (894.0, S81.801A)    Status: Active

 

 Cellulitis of drainage site, post-operative (998.59, T81.49XA)    Status: 
Active

 

 Infection and inflammatory reaction due to internal fixation device of right 
femur, initial encounter (996.67, T84.620A)    Status: Active

 

 Closed comminuted intra-articular fracture of distal end of femur with nonunion
, right (733.82, S72.491K)    Status: Active

 

 Other fracture of shaft of unspecified tibia, initial encounter for closed 
fracture (823.20, S82.299A)    Status: Active

 

 Bipolar disorder, in partial remission, most recent episode mixed (296.65, 
F31.77)    Status: Active

 

 Anxiety (300.00, F41.9)    Status: Active

 

 Closed fracture of right superior pubic ramus (808.2, S32.511A)    Status: 
Active







Medications







 Name  Dates  Details









 Gabapentin 300 MG Oral Capsule



 TAKE 1 CAPSULE 3 TIMES DAILY









    Quantity: 60   Refills: 0







NAILA NY M.D.





  Start : 30-Mar-2017



Active

Methocarbamol 500 MG Oral Tablet

TAKE 1 TABLET 3 TIMES DAILY PRN muscle spasm







  Quantity: 30   Refills: 0







NAILA NY M.D.





  Start : 30-Mar-2017



Active

Acetaminophen-Codeine #3 300-30 MG Oral Tablet

TAKE 1 TO 2 TABLETS EVERY 6 HOURS AS NEEDED FOR PAIN.







  Quantity: 50   Refills: 0







NAILA NY M.D.





  Start : 3-May-2017



Active

traMADol HCl - 50 MG Oral Tablet

TAKE 1 TO 2 TABLETS EVERY 6 HOURS AS NEEDED FOR PAIN.







  Quantity: 50   Refills: 0







NAILA NY M.D.





  Start : 3-May-2017



Active

Vitamin D (Ergocalciferol) 1.25 MG (70444 UT) Oral Capsule

TAKE 1 CAPSULE WEEKLY.







  Quantity: 8   Refills: 0







JR N.PKALYN Carroll





  Start : 2-Aug-2017



Active

traMADol HCl - 50 MG Oral Tablet

TAKE 1 TABLET ONCE







  Quantity: 30   Refills: 0







WILMER SALINAS





  Start : 2018



Active

Methocarbamol 500 MG Oral Tablet

TAKE 1 TABLET TWICE DAILY







  Quantity: 30   Refills: 0







WILMER SALINAS





  Start : 2018



Active

Methocarbamol 750 MG Oral Tablet

TAKE 1 TABLET TWICE DAILY.







  Quantity: 30   Refills: 0







WILMER SALINAS





  Start : 2018



Active

Acetaminophen-Codeine #3 300-30 MG Oral Tablet

TAKE 1 TABLET EVERY 6 HOURS







  Quantity: 30   Refills: 0







WILMER SALINAS





  Start : 21-Sep-2018



Active

Melatonin 5 MG Oral Tablet

TAKE 1 TABLET BEDTIME







  Quantity: 60   Refills: 2







KULWINDER DENNIS M.D.





  Start : 2018



Active

Sertraline HCl - 100 MG Oral Tablet

TAKE 1 TABLET DAILY.







  Quantity: 30   Refills: 0







ARGENIS CHRISTIANSON M.D.





  Start : 2018



Active

Propranolol HCl - 10 MG Oral Tablet

TAKE 1 TABLET TWICE DAILY.







  Quantity: 60   Refills: 0







ARGENIS CHRISTIANSON M.D.





  Start : 2019



Active

QUEtiapine Fumarate 100 MG Oral Tablet

TAKE 1 TABLET 3 TIMES DAILY







  Quantity: 90   Refills: 0







ARGENIS CHRISTIANSON M.D.





  Start : 2018



Active

QUEtiapine Fumarate 400 MG Oral Tablet

TAKE 1 TABLET AT BEDTIME.







  Quantity: 30   Refills: 0







ARGENIS CHRISTIANSON M.D.





  Start : 2018



Active

Cyclobenzaprine HCl - 10 MG Oral Tablet

TAKE 1 TABLET TWICE DAILY







  Quantity: 30   Refills: 0







WILMER SALINAS





  Start : 2020



Active





Allergies and Adverse Reactions







 Name  Dates  Details

 

 No Known Drug Allergies (Allergy)    Status: Active







Past Medical History







 Name  Dates  Details

 

 History of backache (V13.59, Z87.39)    Status: Resolved

 

 History of depression (V11.8, Z86.59)    Status: Resolved

 

 History of hepatitis (V12.09, Z86.19)    Status: Resolved







Procedures







 Procedure  Dates  Details

 

 CT Femur without contrast 44058  Date: 23-Dec-2019  

 

 CT Pelvis wo contrast [id number]  Date: 23-Dec-2019  

 

 History of Leg surgery    Completed







Immunization







 Name  Dates  Details

 

 Immunizations not documented    







Family History







 Name  Dates  Details

 

 Family history of cardiac disorder (V17.49, Z82.49)    Comments: Family History



     Status: Active

 

 Family history of essential hypertension (V17.49, Z82.49)    Comments: Family 
History



     Status: Active

 

 Family history of arthritis (V17.7, Z82.61)    Comments: Family History



     Status: Active

 

 Family history of malignant neoplasm (V16.9, Z80.9)    Comments: Family History



     Status: Active







Social History







 Name  Dates  Details

 

 -    Status:









 Name  Dates  Details

 

 Current every day smoker    







Vital Signs







 Date  Test  Result  Details

 

       

 

   No Known Vitals to report    



       







Results







 Date  Description  Value  Details









 23-Dec-201914:24  [U] XRAY FEMUR 2 VWS RIGHT [id number]  









   XR FEMUR 2 VWS RIGHT  Images acquired, not reported on this accession 
number.  



       







Plan of Care







 Name  Dates  Details









 Planned Observations









 Planned Goals not documented    









 Planned Encounters









 Appointment; EZEKIEL ONEAL NP  On: 2020 11:30







Interventions Provided

Medication ChangesCyclobenzaprine HCl - 10 MG Oral Tablet - Start



Instructions







 Name  Dates  Details

 

 Instructions not documented    







Encounters







 Appointment; WLIMER MUJICA PA  On: 2018 9:45



 Encounter Diagnosis: Problem not documented  

 

 Appointment; WILMER MUJICA PA  On: 21-May-2018 9:45



 Encounter Diagnosis: Problem not documented  

 

 Appointment; WILMER MUJICA PA  On: 2018 12:30



 Encounter Diagnosis: Problem not documented  

 

 Appointment; WILMER MUJICA PA  On: 2018 12:00



 Encounter Diagnosis: Problem not documented  

 

 Appointment; WILMER MUJICA PA  On: 2018 12:00



 Encounter Diagnosis: Problem not documented  

 

 Appointment; Tony Hendricks M.D.  On: 9-Aug-2018 8:00



 Encounter Diagnosis: Problem not documented  

 

 Appointment; WILMER MUJICA PA  On: 27-Aug-2018 11:30



 Encounter Diagnosis: Problem not documented  

 

 Appointment; WILMER MUJICA PA  On: 8-Oct-2018 11:45



 Encounter Diagnosis: Problem not documented  

 

 Appointment; WILMER MUJICA PA  On: 29-Oct-2018 13:30



 Encounter Diagnosis: Problem not documented  

 

 Appointment; KULWINDER DENNIS M.D.  On: 2018 10:00



 Encounter Diagnosis: Problem not documented  

 

 Appointment; WILMER MUJICA PA  On: 10-Dec-2018 11:00



 Encounter Diagnosis: Problem not documented  

 

 Appointment; KULWINDER DENNIS M.D.  On: 2019 11:00



 Encounter Diagnosis: Problem not documented  

 

 Appointment; KULWINDER DENNIS M.D.  On: 2019 10:15



 Encounter Diagnosis: Problem not documented  

 

 Appointment; WILMER MUJICA PA  On: 18-Mar-2019 11:00



 Encounter Diagnosis: Problem not documented  

 

 Appointment; KULWINDER DENNIS M.D.  On: 2019 11:00



 Encounter Diagnosis: Problem not documented  

 

 Appointment; KULWINDER DENNIS M.D.  On: 2019 9:45



 Encounter Diagnosis: Problem not documented  

 

 Appointment; WILMER MUJICA PA  On: 23-Dec-2019 14:15



 Encounter Diagnosis: Problem not documented  

 

 Appointment; WILMER MUJICA PA  On: 2020 11:30



 Encounter Diagnosis: Problem not documented
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
## 2020-03-09 NOTE — XMS REPORT
Summary of Care

 Created on:2020



Patient:BLAYNE PERLA

Sex:Female

:1975

External Reference #:[id number]





Demographics







 [place] [address]

 

 Phone  Unavailable

 

 Preferred Language  English

 

 Marital Status  Unknown

 

 Cheondoism Affiliation  Unknown

 

 Race  Other Race

 

 Ethnic Group  Not  or 









Author







 Name  Kavya Andrew M.A.

 

 Address  Unavailable



   Unavailable



   ,









Care Team Providers







 Name  Role  Phone

 

 SIL CAMEJO, WILMER  Unavailable  Unavailable

 

 ANANT CHAVES, NAILA  Unavailable  Unavailable

 

 KIMMY CRUM, EZEKIEL  Unavailable  Unavailable

 

 SAMMY CHAVES, ARGENIS  Unavailable  Unavailable

 

 JEANNIE CHAVES, KULWINDER  Unavailable  Unavailable

 

 Samaria ARANA, Abdiel  Unavailable  Unavailable

 

 SIL CAMEJO, WILMER  Unavailable  Unavailable

 

 FERN ARANA, TONY  Unavailable  Unavailable

 

 KIMMY HOLCOMB, EZEKIEL H  Unavailable  Unavailable

 

 Unavailable  Unavailable  Unavailable









Functional Status







 Name  Dates  Details

 

 Functional status health issues are not documented    Status:









 Name  Dates  Details

 

 Cognitive status health issues are not documented    Status:







Problems







 Name  Dates  Details

 

 Closed fracture of neck of right talus (825.21, S92.111A)    Status: Active

 

 Closed displaced fracture of navicular bone of left foot, initial encounter (
825.22, S92.252A)    Status: Active

 

 Closed fracture of superior ramus of left pubis, initial encounter (808.2, 
S32.512A)    Status: Active

 

 Closed nondisplaced fracture of body of right calcaneus, initial encounter (
825.0, S92.014A)    Status: Active

 

 Closed fracture of tibial plateau with routine healing, left (V54.16, S82.142D
)    Status: Active

 

 Closed displaced fracture of second metatarsal bone of right foot, initial 
encounter (825.25, S92.321A)    Status: Active

 

 Closed nondisplaced fracture of fourth metatarsal bone of right foot (825.25, 
S92.344A)    Status: Active

 

 Closed nondisplaced fracture of third metatarsal bone of right foot (825.25, 
S92.334A)    Status: Active

 

 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

 

 Displaced supracondylar fracture with intracondylar extension of lower end of 
right femur, subsequent encounter for open fracture type IIIA, IIIB, or IIIC 
with routine healing (V54.15, S72.461F)    Status: Active

 

 Laceration of knee with foreign body, left, subsequent encounter (V58.89, 
S81.022D)    Status: Active

 

 Avulsion fracture of lateral malleolus of right fibula, open type III, with 
routine healing, subsequent encounter (V54.19, S82.61XF)    Status: Active

 

 Laceration of lower leg with foreign body, right, subsequent encounter (V58.89
, S81.821D)    Status: Active

 

 Fracture of right inferior pubic ramus, closed, initial encounter (808.2, 
S32.591A)    Status: Active

 

 Closed avulsion fracture of navicular bone of foot, right, initial encounter (
825.22, S92.251A)    Status: Active

 

 Fracture of left inferior pubic ramus, closed, initial encounter (808.2, 
S32.592A)    Status: Active

 

 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    
Status: Active

 

 Dehiscence of external operation wound (998.32, T81.31XA)    Status: Active

 

 Avulsion of leg, right, initial encounter (894.0, S81.801A)    Status: Active

 

 Cellulitis of drainage site, post-operative (998.59, T81.49XA)    Status: 
Active

 

 Infection and inflammatory reaction due to internal fixation device of right 
femur, initial encounter (996.67, T84.620A)    Status: Active

 

 Closed comminuted intra-articular fracture of distal end of femur with nonunion
, right (733.82, S72.491K)    Status: Active

 

 Other fracture of shaft of unspecified tibia, initial encounter for closed 
fracture (823.20, S82.299A)    Status: Active

 

 Bipolar disorder, in partial remission, most recent episode mixed (296.65, 
F31.77)    Status: Active

 

 Anxiety (300.00, F41.9)    Status: Active

 

 Closed fracture of right superior pubic ramus (808.2, S32.511A)    Status: 
Active







Medications







 Name  Dates  Details









 Gabapentin 300 MG Oral Capsule



 TAKE 1 CAPSULE 3 TIMES DAILY









    Quantity: 60   Refills: 0







NAILA [place] M.D.





  Start : 30-Mar-2017



Active

Methocarbamol 500 MG Oral Tablet

TAKE 1 TABLET 3 TIMES DAILY PRN muscle spasm







  Quantity: 30   Refills: 0







NAILA [place] M.D.





  Start : 30-Mar-2017



Active

Acetaminophen-Codeine #3 300-30 MG Oral Tablet

TAKE 1 TO 2 TABLETS EVERY 6 HOURS AS NEEDED FOR PAIN.







  Quantity: 50   Refills: 0







NAILA [place] M.D.





  Start : 3-May-2017



Active

traMADol HCl - 50 MG Oral Tablet

TAKE 1 TO 2 TABLETS EVERY 6 HOURS AS NEEDED FOR PAIN.







  Quantity: 50   Refills: 0







ANANT CHAVES NAILA





  Start : 3-May-2017



Active

Vitamin D (Ergocalciferol) 1.25 MG (95235 UT) Oral Capsule

Take 1 capsule twice a week, Tuesday and Thursday







  Quantity: 24   Refills: 0







KIMMY APRZHANGEZEKIEL





  Start : 2-Aug-2017



Active

Methocarbamol 500 MG Oral Tablet

TAKE 1 TABLET TWICE DAILY







  Quantity: 30   Refills: 0







WILMER SALINAS





  Start : 2018



Active

traMADol HCl - 50 MG Oral Tablet

TAKE 1 TABLET ONCE







  Quantity: 30   Refills: 0







WILMER SALINAS





  Start : 2018



Active

Methocarbamol 750 MG Oral Tablet

TAKE 1 TABLET TWICE DAILY.







  Quantity: 30   Refills: 0







WILMER SALINAS





  Start : 2018



Active

Acetaminophen-Codeine #3 300-30 MG Oral Tablet

TAKE 1 TABLET EVERY 6 HOURS







  Quantity: 30   Refills: 0







WILMER SALINAS





  Start : 21-Sep-2018



Active

Melatonin 5 MG Oral Tablet

TAKE 1 TABLET BEDTIME







  Quantity: 60   Refills: 2







KULWINDER DENNIS M.D.





  Start : 2018



Active

Sertraline HCl - 100 MG Oral Tablet

TAKE 1 TABLET DAILY.







  Quantity: 30   Refills: 0







ARGENIS CHRISTIANSON M.D.





  Start : 2018



Active

QUEtiapine Fumarate 400 MG Oral Tablet

TAKE 1 TABLET AT BEDTIME.







  Quantity: 30   Refills: 0







ARGENIS CHRISTIANSON M.D.





  Start : 2018



Active

QUEtiapine Fumarate 100 MG Oral Tablet

TAKE 1 TABLET 3 TIMES DAILY







  Quantity: 90   Refills: 0







ARGENIS CHRISTIANSON M.D.





  Start : 2018



Active

Propranolol HCl - 10 MG Oral Tablet

TAKE 1 TABLET TWICE DAILY.







  Quantity: 60   Refills: 0







ARGENIS CHRISTIANSON M.D.





  Start : 2019



Active

Cyclobenzaprine HCl - 10 MG Oral Tablet

TAKE 1 TABLET TWICE DAILY







  Quantity: 30   Refills: 0







WILMER SALINAS





  Start : 2020



Active

Naproxen 500 MG Oral Tablet

TAKE 1 TABLET TWICE DAILY WITH MEALS.







  Quantity: 60   Refills: 0







EZEKIEL MIJARES





  Start : 2020



Active





Allergies and Adverse Reactions







 Name  Dates  Details

 

 No Known Drug Allergies (Allergy)    Status: Active







Past Medical History







 Name  Dates  Details

 

 History of backache (V13.59, Z87.39)    Status: Resolved

 

 History of depression (V11.8, Z86.59)    Status: Resolved

 

 History of hepatitis (V12.09, Z86.19)    Status: Resolved







Procedures







 Procedure  Dates  Details

 

 CT Femur without contrast 86493  Date: 23-Dec-2019  

 

 CT Pelvis wo contrast 87629  Date: 23-Dec-2019  

 

 History of Leg surgery    Completed







Immunization







 Name  Dates  Details

 

 Immunizations not documented    







Family History







 Name  Dates  Details

 

 Family history of cardiac disorder (V17.49, Z82.49)    Comments: Family History



     Status: Active

 

 Family history of essential hypertension (V17.49, Z82.49)    Comments: Family 
History



     Status: Active

 

 Family history of arthritis (V17.7, Z82.61)    Comments: Family History



     Status: Active

 

 Family history of malignant neoplasm (V16.9, Z80.9)    Comments: Family History



     Status: Active







Social History







 Name  Dates  Details

 

 -    Status:









 Name  Dates  Details

 

 Current every day smoker    







Vital Signs







 Date  Test  Result  Details

 

       

 

   No Known Vitals to report    



       







Results







 Date  Description  Value  Details

 

   Results not documented    



       

 

       







Plan of Care







 Name  Dates  Details









 Planned Observations









 Planned Goals not documented    









 Planned Encounters









 Appointment; EZEKIEL ONEAL APRN  On: 15-Apr-2020 11:00







Interventions Provided

Medication ChangesNaproxen 500 MG Oral Tablet - StartVitamin D (Ergocalciferol) 
1.25 MG (37180 UT) Oral Capsule - Renew with Changes



Instructions







 Name  Dates  Details

 

 Instructions not documented    







Encounters







 Appointment; WILMER MUJICA PA  On: 2018 9:45



 Encounter Diagnosis: Problem not documented  

 

 Appointment; WILMER MUJICA PA  On: 21-May-2018 9:45



 Encounter Diagnosis: Problem not documented  

 

 Appointment; WILMER MUJICA PA  On: 2018 12:30



 Encounter Diagnosis: Problem not documented  

 

 Appointment; WILMER MUJICA PA  On: 2018 12:00



 Encounter Diagnosis: Problem not documented  

 

 Appointment; WILMER MUJICA PA  On: 2018 12:00



 Encounter Diagnosis: Problem not documented  

 

 Appointment; Tony Hendricks M.D.  On: 9-Aug-2018 8:00



 Encounter Diagnosis: Problem not documented  

 

 Appointment; WILMER MUJICA PA  On: 27-Aug-2018 11:30



 Encounter Diagnosis: Problem not documented  

 

 Appointment; WILMER MUJICA PA  On: 8-Oct-2018 11:45



 Encounter Diagnosis: Problem not documented  

 

 Appointment; WILMER MUJICA PA  On: 29-Oct-2018 13:30



 Encounter Diagnosis: Problem not documented  

 

 Appointment; KULWINDER DENNIS M.D.  On: 2018 10:00



 Encounter Diagnosis: Problem not documented  

 

 Appointment; WILMER MUJICA PA  On: 10-Dec-2018 11:00



 Encounter Diagnosis: Problem not documented  

 

 Appointment; KULWINDER DENNIS M.D.  On: 2019 11:00



 Encounter Diagnosis: Problem not documented  

 

 Appointment; KULWINDER DENNIS M.D.  On: 2019 10:15



 Encounter Diagnosis: Problem not documented  

 

 Appointment; WILMER MUJICA PA  On: 18-Mar-2019 11:00



 Encounter Diagnosis: Problem not documented  

 

 Appointment; KULWINDER DENNIS M.D.  On: 2019 11:00



 Encounter Diagnosis: Problem not documented  

 

 Appointment; KULWINDER DENNIS M.D.  On: 2019 9:45



 Encounter Diagnosis: Problem not documented  

 

 Appointment; WILMER MUJICA PA  On: 23-Dec-2019 14:15



 Encounter Diagnosis: Problem not documented  

 

 Appointment; WILMER MUJICA PA  On: 2020 11:30



 Encounter Diagnosis: Problem not documented  

 

 Appointment; EZEKIEL ONEAL APRN  On: 2020 11:30



 Encounter Diagnosis: Problem not documented  

 

 Appointment; EZEKIEL ONEAL APRN  On: 2020 11:00



 Encounter Diagnosis: Problem not documented

## 2020-03-09 NOTE — XMS REPORT
Patient Summary Document

 Created on:2020



Patient:BLAYNE MEDLEY

Sex:Female

:1975

External Reference #:[id number]





Demographics







 Address   [place] [address]

 

 Home Phone  [phone]

 

 Preferred Language  Unknown

 

 Marital Status  Unknown

 

 Moravian Affiliation  Unknown

 

 Race  Unknown

 

 Additional Race(s)  Unavailable

 

 Ethnic Group  Unknown









Author







 Organization  [facility]

 

 Address  [address] [place] [address] Dr. Dickerson [address] [place] [id number]

 

 Phone  [phone]









Care Team Providers







 Name  Role  Phone

 

 Unavailable  Unavailable  Unavailable









Problems

This patient has no known problems.



Allergies, Adverse Reactions, Alerts

This patient has no known allergies or adverse reactions.



Medications

This patient has no known medications.

## 2020-07-31 ENCOUNTER — HOSPITAL ENCOUNTER (EMERGENCY)
Dept: HOSPITAL 97 - ER | Age: 45
Discharge: HOME | End: 2020-07-31
Payer: COMMERCIAL

## 2020-07-31 VITALS — TEMPERATURE: 97.2 F | DIASTOLIC BLOOD PRESSURE: 78 MMHG | SYSTOLIC BLOOD PRESSURE: 120 MMHG | OXYGEN SATURATION: 98 %

## 2020-07-31 DIAGNOSIS — F31.9: ICD-10-CM

## 2020-07-31 DIAGNOSIS — S32.502A: ICD-10-CM

## 2020-07-31 DIAGNOSIS — Z02.89: Primary | ICD-10-CM

## 2020-07-31 DIAGNOSIS — S32.501A: ICD-10-CM

## 2020-07-31 PROCEDURE — 81025 URINE PREGNANCY TEST: CPT

## 2020-07-31 PROCEDURE — 72170 X-RAY EXAM OF PELVIS: CPT

## 2020-07-31 PROCEDURE — 81003 URINALYSIS AUTO W/O SCOPE: CPT

## 2020-07-31 PROCEDURE — 99284 EMERGENCY DEPT VISIT MOD MDM: CPT

## 2020-07-31 NOTE — RAD REPORT
EXAM DESCRIPTION:  RAD - Femur Right - 7/31/2020 8:11 am

 

CLINICAL HISTORY:  Leg pain

 

FINDINGS:  Intramedullary chrissy, sideplate and screws affix an old right femoral fracture. No acute fem
oral fracture noted

 

No bony destructive lesions seen the femur. Heterotopic bone formation is present within distal femur
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
## 2020-07-31 NOTE — XMS REPORT
Summary of Care

                            Created on:2020



Patient:BLAYNE MEDLEY

Sex:Female

:1975

External Reference #:[id number]





Demographics







                          [place] [address]

 

                          Phone                     Unavailable

 

                          Preferred Language        English

 

                          Marital Status            Unknown

 

                          [employer] Affiliation     Unknown

 

                          Race                      Other Race

 

                          Ethnic Group              Not  or 









Author







                          Name                      William

 

                          Address                   Unavailable



                                                    Unavailable



                                                    ,









Care Team Providers







                    Name                Role                Phone

 

                    SIL CAMEJO           Unavailable         Unavailable

 

                    ANANT CHAVES           Unavailable         Unavailable

 

                    KIMMY CRUM      Unavailable         Unavailable

 

                    SAMMY CHAVES        Unavailable         Unavailable

 

                    JEANNIE CHAVES       Unavailable         Unavailable

 

                    William           Unavailable         Kurt Mera MD              Unavailable         Unavailable

 

                    SIL CAMEJO           Unavailable         Kurt SHIRLEY MD            Unavailable         Unavailable

 

                    KIMMY FNP       Unavailable         Unavailable

 

                    COURTNEY HERRERA MD         Unavailable         Unavailable

 

                    Unavailable         Unavailable         Unavailable









Functional Status







                    Name                Dates               Details

 

                    Functional status health issues are not documented          

           Status:









                    Name                Dates               Details

 

                    Cognitive status health issues are not documented           

          Status:







Problems







                    Name                Dates               Details

 

                    Closed fracture of neck of right talus (825.21, S92.111A)   

                  Status: Active

 

                                        Closed displaced fracture of navicular b

one of left foot, initial encounter 

(825.22, S92.252A)                                  Status: Active

 

                                        Closed fracture of superior ramus of lef

t pubis, initial encounter (808.2, 

S32.512A)                                           Status: Active

 

                                        Closed nondisplaced fracture of body of 

right calcaneus, initial encounter 

(825.0, S92.014A)                                   Status: Active

 

                          Closed fracture of tibial plateau with routine healing

, left (V54.16, S82.142D) 

                                        Status: Active

 

                                        Closed displaced fracture of second meta

tarsal bone of right foot, initial 

encounter (825.25, S92.321A)                           Status: Active

 

                                        Closed nondisplaced fracture of fourth m

etatarsal bone of right foot (825.25, 

S92.344A)                                           Status: Active

 

                                        Closed nondisplaced fracture of third me

tatarsal bone of right foot (825.25, 

S92.334A)                                           Status: Active

 

                    Blues (311, F32.9)                      Status: Active

 

                                        Displaced supracondylar fracture with in

tracondylar extension of lower end of 

right femur, subsequent encounter for open fracture type IIIA, IIIB, or IIIC 
with routine healing (V54.15, S72.461F)                           Status: Active

 

                                        Laceration of knee with foreign body, le

ft, subsequent encounter (V58.89, 

S81.022D)                                           Status: Active

 

                                        Avulsion fracture of lateral malleolus o

f right fibula, open type III, with 

routine healing, subsequent encounter (V54.19, S82.61XF)                        

   Status: Active

 

                                        Laceration of lower leg with foreign bod

y, right, subsequent encounter (V58.89, 

S81.821D)                                           Status: Active

 

                                        Fracture of right inferior pubic ramus, 

closed, initial encounter (808.2, 

S32.591A)                                           Status: Active

 

                                        Closed avulsion fracture of navicular mariola

ne of foot, right, initial encounter 

(825.22, S92.251A)                                  Status: Active

 

                                        Fracture of left inferior pubic ramus, c

losed, initial encounter (808.2, 

S32.592A)                                           Status: Active

 

                    Fracture of distal end of right femur with nonunion (733.82,

 S72.401K)                     Status: 

Active

 

                    Dehiscence of external operation wound (998.32, T81.31XA)   

                  Status: Active

 

                    Avulsion of leg, right, initial encounter (894.0, S81.801A) 

                    Status: Active

 

                    Cellulitis of drainage site, post-operative (998.59, T81.49X

A)                     Status: Active

 

                                        Infection and inflammatory reaction due 

to internal fixation device of right 

femur, initial encounter (996.67, T84.620A)                           Status: Ac

tive

 

                                        Closed comminuted intra-articular fractu

re of distal end of femur with nonunion,

right (733.82, S72.491K)                            Status: Active

 

                                        Other fracture of shaft of unspecified t

ibia, initial encounter for closed 

fracture (823.20, S82.299A)                           Status: Active

 

                                        Bipolar disorder, in partial remission, 

most recent episode mixed (296.65, 

F31.77)                                             Status: Active

 

                    Anxiety (300.00, F41.9)                     Status: Active

 

                    Closed fracture of right superior pubic ramus (808.2, S32.51

1A)                     Status: Active







Medications







                    Name                Dates               Details









                                        Gabapentin 300 MG Oral Capsule



                                        TAKE 1 CAPSULE 3 TIMES DAILY









                                         Quantity: 60        Refills: 0







NAILA NY M.D.





                                         Start : 30-Mar-2017



Active

Methocarbamol 500 MG Oral Tablet

TAKE 1 TABLET 3 TIMES DAILY PRN muscle spasm







                           Quantity: 30              Refills: 0







NAILA NY M.D.





                                         Start : 30-Mar-2017



Active

Acetaminophen-Codeine #3 300-30 MG Oral Tablet

TAKE 1 TO 2 TABLETS EVERY 6 HOURS AS NEEDED FOR PAIN.







                           Quantity: 50              Refills: 0







NAILA NY M.D.





                                         Start : 3-May-2017



Active

traMADol HCl - 50 MG Oral Tablet

TAKE 1 TO 2 TABLETS EVERY 6 HOURS AS NEEDED FOR PAIN.







                           Quantity: 50              Refills: 0







NAILA NY M.D.





                                         Start : 3-May-2017



Active

Vitamin D (Ergocalciferol) 1.25 MG (64594 UT) Oral Capsule

Take 1 capsule twice a week, Tuesday and Thursday







                           Quantity: 24              Refills: 0







KIMMY APRZHANGEZEKIEL





                                         Start : 2-Aug-2017



Active

Methocarbamol 500 MG Oral Tablet

TAKE 1 TABLET TWICE DAILY







                           Quantity: 30              Refills: 0







WILMER SALINAS





                                         Start : 2018



Active

traMADol HCl - 50 MG Oral Tablet

TAKE 1 TABLET ONCE







                           Quantity: 30              Refills: 0







WILMER SALINAS





                                         Start : 2018



Active

Methocarbamol 750 MG Oral Tablet

TAKE 1 TABLET TWICE DAILY.







                           Quantity: 30              Refills: 0







WILMER SALINAS





                                         Start : 2018



Active

Acetaminophen-Codeine #3 300-30 MG Oral Tablet

TAKE 1 TABLET EVERY 6 HOURS







                           Quantity: 30              Refills: 0







WILMER SALINAS





                                         Start : 21-Sep-2018



Active

Melatonin 5 MG Oral Tablet

TAKE 1 TABLET BEDTIME







                           Quantity: 60              Refills: 2







KULWINDER DENNIS M.D.





                                         Start : 2018



Active

Sertraline HCl - 100 MG Oral Tablet

TAKE 1 TABLET DAILY.







                           Quantity: 30              Refills: 0







ARGENIS CHRISTIANSON M.D.





                                         Start : 2018



Active

QUEtiapine Fumarate 400 MG Oral Tablet

TAKE 1 TABLET AT BEDTIME.







                           Quantity: 30              Refills: 0







ARGENIS CHRISTIANSON M.D.





                                         Start : 2018



Active

QUEtiapine Fumarate 100 MG Oral Tablet

TAKE 1 TABLET 3 TIMES DAILY







                           Quantity: 90              Refills: 0







ARGENIS CHRISTIANSON M.D.





                                         Start : 2018



Active

Propranolol HCl - 10 MG Oral Tablet

TAKE 1 TABLET TWICE DAILY.







                           Quantity: 60              Refills: 0







ARGENIS CHRISTIANSON M.D.





                                         Start : 2019



Active

Cyclobenzaprine HCl - 10 MG Oral Tablet

TAKE 1 TABLET TWICE DAILY







                           Quantity: 30              Refills: 0







WILMER SALINAS





                                         Start : 2020



Active

Naproxen 500 MG Oral Tablet

TAKE 1 TABLET TWICE DAILY WITH MEALS.







                           Quantity: 60              Refills: 0







EZEKIEL MIJARES





                                         Start : 2020



Active

Cyclobenzaprine HCl - 5 MG Oral Tablet

TAKE 1 TABLET 3 TIMES DAILY.







                           Quantity: 9               Refills: 0







EZEKIEL MIJARES





                                         Start : 15-Apr-2020



Active

Lidocaine 5 % External Patch

APPLY 1 PATCH TO THE AFFECTED AREA AND LEAVE IN PLACE FOR 12 HOURS, THEN REMOVE 
AND LEAVE OFF FOR 12HOURS.







                           Quantity: 7               Refills: 1







EZEKIEL MIJARES





                                         Start : 2020



Active





Allergies and Adverse Reactions







                    Name                Dates               Details

 

                    No Known Drug Allergies (Allergy)                     Status

: Active







Past Medical History







                    Name                Dates               Details

 

                    History of backache (V13.59, Z87.39)                     Sta

tus: Resolved

 

                    History of depression (V11.8, Z86.59)                     St

atus: Resolved

 

                    History of hepatitis (V12.09, Z86.19)                     St

atus: Resolved







Procedures







                    Procedure           Dates               Details

 

                    History of Leg surgery                     Completed







Immunization







                    Name                Dates               Details

 

                    Immunizations not documented                     







Family History







                    Name                Dates               Details

 

                    Family history of cardiac disorder (V17.49, Z82.49)         

            Comments: Family History



                                                            Status: Active

 

                    Family history of essential hypertension (V17.49, Z82.49)   

                  Comments: Family 

History



                                                            Status: Active

 

                    Family history of arthritis (V17.7, Z82.61)                 

    Comments: Family History



                                                            Status: Active

 

                    Family history of malignant neoplasm (V16.9, Z80.9)         

            Comments: Family History



                                                            Status: Active







Social History







                    Name                Dates               Details

 

                    -                                       Status:









                    Name                Dates               Details

 

                    Smokes tobacco daily (finding)                     







Vital Signs







                Date            Test            Result          Details

 

                                                                

 

                                No Known Vitals to report                 



                                                                







Results







                Date            Description     Value           Details

 

                                Results not documented                 



                                                                

 

                                                                







Plan of Care







                    Name                Dates               Details









                                        Planned Observations









                    Planned Goals not documented                     









                                        Planned Encounters









                          Appointment; EZEKIEL ONEAL APRN On: 2020 8:00







Instructions







                    Name                Dates               Details

 

                    Instructions not documented                     







Encounters







                          Appointment; WILMER MUJICA PA On: 2018 12:30



                          Encounter Diagnosis: Problem not documented 

 

                          Appointment; WILMER MUJICA PA On: 2018 12:00



                          Encounter Diagnosis: Problem not documented 

 

                          Appointment; WILMER MUJICA PA On: 2018 12:00



                          Encounter Diagnosis: Problem not documented 

 

                          Appointment; Tony Shirley M.D. On: 9-Aug-2018 8:00



                          Encounter Diagnosis: Problem not documented 

 

                          Appointment; WILMER MUJICA PA On: 27-Aug-2018 11:30



                          Encounter Diagnosis: Problem not documented 

 

                          Appointment; WILMER MUJICA PA On: 8-Oct-2018 11:45



                          Encounter Diagnosis: Problem not documented 

 

                          Appointment; WILMER MUJICA PA On: 29-Oct-2018 13:30



                          Encounter Diagnosis: Problem not documented 

 

                          Appointment; KULWINDER DENNIS M.D. On: 2018 10:0

0



                          Encounter Diagnosis: Problem not documented 

 

                          Appointment; WILMER MUJICA PA On: 10-Dec-2018 11:00



                          Encounter Diagnosis: Problem not documented 

 

                          Appointment; KULWINDER DENNIS M.D. On: 2019 11:0

0



                          Encounter Diagnosis: Problem not documented 

 

                          Appointment; KULWINDER DENNIS M.D. On: 2019 10:1

5



                          Encounter Diagnosis: Problem not documented 

 

                          Appointment; WILMER MUJICA PA On: 18-Mar-2019 11:00



                          Encounter Diagnosis: Problem not documented 

 

                          Appointment; KULWINDER DENNIS M.D. On: 2019 11:0

0



                          Encounter Diagnosis: Problem not documented 

 

                          Appointment; KULWINDER DENNIS M.D. On: 2019 9:45



                          Encounter Diagnosis: Problem not documented 

 

                          Appointment; WILMER MUJICA PA On: 23-Dec-2019 14:15



                          Encounter Diagnosis: Problem not documented 

 

                          Appointment; WILMER MUJICA PA On: 2020 11:30



                          Encounter Diagnosis: Problem not documented 

 

                          Appointment; EZEKIEL ONEAL APRN On: 2020 11:3

0



                          Encounter Diagnosis: Problem not documented 

 

                          Appointment; EZEKIEL ONEAL APRN On: 2020 11:0

0



                          Encounter Diagnosis: Problem not documented 

 

                          Appointment; EZEKIEL ONEAL APRN On: 15-Apr-2020 11:0

0



                          Encounter Diagnosis: Problem not documented 

 

                          Appointment; EZEKIEL ONEAL APRN On: 15-Apr-2020 11:0

0



                          Encounter Diagnosis: Problem not documented

## 2020-07-31 NOTE — XMS REPORT
Summary of Care

                            Created on:2020



Patient:BLAYNE MEDLEY

Sex:Female

:1975

External Reference #:[id number]





Demographics







                          [place] [address]

 

                          Phone                     Unavailable

 

                          Preferred Language        English

 

                          Marital Status            Unknown

 

                          Christian Affiliation     Unknown

 

                          Race                      Other Race

 

                          Ethnic Group              Not  or 









Author







                          Name                      Lorrie FINNEYGlen

 

                          Address                   Unavailable



                                                    Unavailable



                                                    ,









Care Team Providers







                    Name                Role                Phone

 

                    SIL CAMEJO           Unavailable         Unavailable

 

                    ANANT CHAVES           Unavailable         Unavailable

 

                    KIMMY CRUM      Unavailable         Unavailable

 

                    SAMMY CHAVES        Unavailable         Unavailable

 

                    JEANNIE CHAVES       Unavailable         Kurt Mera MD              Unavailable         Unavailable

 

                    SIL CAMEJO           Unavailable         Unavailable

 

                    FERN ARANA            Unavailable         Unavailable

 

                    KIMMY ARIZMENDIP       Unavailable         Unavailable

 

                    COURTNEY HERRERA MD         Unavailable         Unavailable

 

                    Unavailable         Unavailable         Unavailable









Functional Status







                    Name                Dates               Details

 

                    Functional status health issues are not documented          

           Status:









                    Name                Dates               Details

 

                    Cognitive status health issues are not documented           

          Status:







Problems







                    Name                Dates               Details

 

                    Closed fracture of neck of right talus (825.21, S92.111A)   

                  Status: Active

 

                                        Closed displaced fracture of navicular b

one of left foot, initial encounter 

(825.22, S92.252A)                                  Status: Active

 

                                        Closed fracture of superior ramus of lef

t pubis, initial encounter (808.2, 

S32.512A)                                           Status: Active

 

                                        Closed nondisplaced fracture of body of 

right calcaneus, initial encounter 

(825.0, S92.014A)                                   Status: Active

 

                          Closed fracture of tibial plateau with routine healing

, left (V54.16, S82.142D) 

                                        Status: Active

 

                                        Closed displaced fracture of second meta

tarsal bone of right foot, initial 

encounter (825.25, S92.321A)                           Status: Active

 

                                        Closed nondisplaced fracture of fourth m

etatarsal bone of right foot (825.25, 

S92.344A)                                           Status: Active

 

                                        Closed nondisplaced fracture of third me

tatarsal bone of right foot (825.25, 

S92.334A)                                           Status: Active

 

                    Blues (311, F32.9)                      Status: Active

 

                                        Displaced supracondylar fracture with in

tracondylar extension of lower end of 

right femur, subsequent encounter for open fracture type IIIA, IIIB, or IIIC 
with routine healing (V54.15, S72.461F)                           Status: Active

 

                                        Laceration of knee with foreign body, le

ft, subsequent encounter (V58.89, 

S81.022D)                                           Status: Active

 

                                        Avulsion fracture of lateral malleolus o

f right fibula, open type III, with 

routine healing, subsequent encounter (V54.19, S82.61XF)                        

   Status: Active

 

                                        Laceration of lower leg with foreign bod

y, right, subsequent encounter (V58.89, 

S81.821D)                                           Status: Active

 

                                        Fracture of right inferior pubic ramus, 

closed, initial encounter (808.2, 

S32.591A)                                           Status: Active

 

                                        Closed avulsion fracture of navicular mariola

ne of foot, right, initial encounter 

(825.22, S92.251A)                                  Status: Active

 

                                        Fracture of left inferior pubic ramus, c

losed, initial encounter (808.2, 

S32.592A)                                           Status: Active

 

                    Fracture of distal end of right femur with nonunion (733.82,

 S72.401K)                     Status: 

Active

 

                    Dehiscence of external operation wound (998.32, T81.31XA)   

                  Status: Active

 

                    Avulsion of leg, right, initial encounter (894.0, S81.801A) 

                    Status: Active

 

                    Cellulitis of drainage site, post-operative (998.59, T81.49X

A)                     Status: Active

 

                                        Infection and inflammatory reaction due 

to internal fixation device of right 

femur, initial encounter (996.67, T84.620A)                           Status: Ac

tive

 

                                        Closed comminuted intra-articular fractu

re of distal end of femur with nonunion,

right (733.82, S72.491K)                            Status: Active

 

                                        Other fracture of shaft of unspecified t

ibia, initial encounter for closed 

fracture (823.20, S82.299A)                           Status: Active

 

                                        Bipolar disorder, in partial remission, 

most recent episode mixed (296.65, 

F31.77)                                             Status: Active

 

                    Anxiety (300.00, F41.9)                     Status: Active

 

                    Closed fracture of right superior pubic ramus (808.2, S32.51

1A)                     Status: Active







Medications







                    Name                Dates               Details









                                        Gabapentin 300 MG Oral Capsule



                                        TAKE 1 CAPSULE 3 TIMES DAILY









                                         Quantity: 60        Refills: 0







NAILA NY M.D.





                                         Start : 30-Mar-2017



Active

Methocarbamol 500 MG Oral Tablet

TAKE 1 TABLET 3 TIMES DAILY PRN muscle spasm







                           Quantity: 30              Refills: 0







NAILA NY M.D.





                                         Start : 30-Mar-2017



Active

Acetaminophen-Codeine #3 300-30 MG Oral Tablet

TAKE 1 TO 2 TABLETS EVERY 6 HOURS AS NEEDED FOR PAIN.







                           Quantity: 50              Refills: 0







NAILA NY M.D.





                                         Start : 3-May-2017



Active

traMADol HCl - 50 MG Oral Tablet

TAKE 1 TO 2 TABLETS EVERY 6 HOURS AS NEEDED FOR PAIN.







                           Quantity: 50              Refills: 0







NAILA NY M.D.





                                         Start : 3-May-2017



Active

Vitamin D (Ergocalciferol) 1.25 MG (54998 UT) Oral Capsule

Take 1 capsule twice a week, Tuesday and Thursday







                           Quantity: 24              Refills: 0







KIMMY APRZHANGEZEKIEL





                                         Start : 2-Aug-2017



Active

Methocarbamol 500 MG Oral Tablet

TAKE 1 TABLET TWICE DAILY







                           Quantity: 30              Refills: 0







WILMER SALINAS





                                         Start : 2018



Active

traMADol HCl - 50 MG Oral Tablet

TAKE 1 TABLET ONCE







                           Quantity: 30              Refills: 0







WILMER SALINAS





                                         Start : 2018



Active

Methocarbamol 750 MG Oral Tablet

TAKE 1 TABLET TWICE DAILY.







                           Quantity: 30              Refills: 0







WILMER SALINAS





                                         Start : 2018



Active

Acetaminophen-Codeine #3 300-30 MG Oral Tablet

TAKE 1 TABLET EVERY 6 HOURS







                           Quantity: 30              Refills: 0







WILMER SALINAS





                                         Start : 21-Sep-2018



Active

Melatonin 5 MG Oral Tablet

TAKE 1 TABLET BEDTIME







                           Quantity: 60              Refills: 2







KULWINDER DENNIS M.D.





                                         Start : 2018



Active

Sertraline HCl - 100 MG Oral Tablet

TAKE 1 TABLET DAILY.







                           Quantity: 30              Refills: 0







ARGENIS CHRISTIANSON M.D.





                                         Start : 2018



Active

QUEtiapine Fumarate 400 MG Oral Tablet

TAKE 1 TABLET AT BEDTIME.







                           Quantity: 30              Refills: 0







ARGENIS CHRISTIANSON M.D.





                                         Start : 2018



Active

QUEtiapine Fumarate 100 MG Oral Tablet

TAKE 1 TABLET 3 TIMES DAILY







                           Quantity: 90              Refills: 0







ARGENIS CHRISTIANSON M.D.





                                         Start : 2018



Active

Propranolol HCl - 10 MG Oral Tablet

TAKE 1 TABLET TWICE DAILY.







                           Quantity: 60              Refills: 0







ARGENIS CHRISTIANSON M.D.





                                         Start : 2019



Active

Cyclobenzaprine HCl - 10 MG Oral Tablet

TAKE 1 TABLET TWICE DAILY







                           Quantity: 30              Refills: 0







WILMER SALINAS





                                         Start : 2020



Active

Naproxen 500 MG Oral Tablet

TAKE 1 TABLET TWICE DAILY WITH MEALS.







                           Quantity: 60              Refills: 0







EZEKIEL MIJARES





                                         Start : 2020



Active

Cyclobenzaprine HCl - 5 MG Oral Tablet

TAKE 1 TABLET 3 TIMES DAILY.







                           Quantity: 9               Refills: 0







EZEKIEL MIJARES





                                         Start : 15-Apr-2020



Active

Lidocaine 5 % External Patch

APPLY 1 PATCH TO THE AFFECTED AREA AND LEAVE IN PLACE FOR 12 HOURS, THEN REMOVE 
AND LEAVE OFF FOR 12HOURS.







                           Quantity: 7               Refills: 1







EZEKIEL MIJARES





                                         Start : 2020



Active





Allergies and Adverse Reactions







                    Name                Dates               Details

 

                    No Known Drug Allergies (Allergy)                     Status

: Active







Past Medical History







                    Name                Dates               Details

 

                    History of backache (V13.59, Z87.39)                     Sta

tus: Resolved

 

                    History of depression (V11.8, Z86.59)                     St

atus: Resolved

 

                    History of hepatitis (V12.09, Z86.19)                     St

atus: Resolved







Procedures







                    Procedure           Dates               Details

 

                    [U] XRAY PELVIS MIN 3 VWS [id number] Date: 2020   

 

                    History of Leg surgery                     Completed







Immunization







                    Name                Dates               Details

 

                    Immunizations not documented                     







Family History







                    Name                Dates               Details

 

                    Family history of cardiac disorder (V17.49, Z82.49)         

            Comments: Family History



                                                            Status: Active

 

                    Family history of essential hypertension (V17.49, Z82.49)   

                  Comments: Family 

History



                                                            Status: Active

 

                    Family history of arthritis (V17.7, Z82.61)                 

    Comments: Family History



                                                            Status: Active

 

                    Family history of malignant neoplasm (V16.9, Z80.9)         

            Comments: Family History



                                                            Status: Active







Social History







                    Name                Dates               Details

 

                    -                                       Status:









                    Name                Dates               Details

 

                    Smokes tobacco daily (finding)                     







Vital Signs







                Date            Test            Result          Details

 

                                                                

 

                                No Known Vitals to report                 



                                                                







Results







                Date            Description     Value           Details

 

                                Results not documented                 



                                                                

 

                                                                







Plan of Care







                    Name                Dates               Details









                                        Planned Observations









                    Planned Goals not documented                     









                                        Planned Encounters









                          Appointment; EZEKIEL ONEAL APRN On: 2020 8:00







Interventions Provided

Medication ChangesLidocaine 5 % External Patch - StartVitamin D (Ergocalciferol)
 1.25 MG (54423 UT) Oral Capsule - [facility]/Procedures/Imaging[U] XRAY PELVIS 
MIN 3 VWS 18002; To Be Done: 2020



Instructions







                    Name                Dates               Details

 

                    Instructions not documented                     







Encounters







                          Appointment; WILMER MUJICA PA On: 2018 12:30



                          Encounter Diagnosis: Problem not documented 

 

                          Appointment; WILMER MUJICA PA On: 2018 12:00



                          Encounter Diagnosis: Problem not documented 

 

                          Appointment; WILMER MUJICA PA On: 2018 12:00



                          Encounter Diagnosis: Problem not documented 

 

                          Appointment; Tony Hendricks M.D. On: 9-Aug-2018 8:00



                          Encounter Diagnosis: Problem not documented 

 

                          Appointment; WILMER MUJICA PA On: 27-Aug-2018 11:30



                          Encounter Diagnosis: Problem not documented 

 

                          Appointment; WILMER MUJICA PA On: 8-Oct-2018 11:45



                          Encounter Diagnosis: Problem not documented 

 

                          Appointment; WILMER MUJICA PA On: 29-Oct-2018 13:30



                          Encounter Diagnosis: Problem not documented 

 

                          Appointment; KULWINDER DENNIS M.D. On: 2018 10:0

0



                          Encounter Diagnosis: Problem not documented 

 

                          Appointment; WILMER MUJICA PA On: 10-Dec-2018 11:00



                          Encounter Diagnosis: Problem not documented 

 

                          Appointment; KULWINDER DENNIS M.D. On: 2019 11:0

0



                          Encounter Diagnosis: Problem not documented 

 

                          Appointment; KULWINDER DENNIS M.D. On: 2019 10:1

5



                          Encounter Diagnosis: Problem not documented 

 

                          Appointment; WILMER MUJICA PA On: 18-Mar-2019 11:00



                          Encounter Diagnosis: Problem not documented 

 

                          Appointment; KULWINDER DENNIS M.D. On: 2019 11:0

0



                          Encounter Diagnosis: Problem not documented 

 

                          Appointment; KULWINDER DENNIS M.D. On: 2019 9:45



                          Encounter Diagnosis: Problem not documented 

 

                          Appointment; WILMER MUJICA PA On: 23-Dec-2019 14:15



                          Encounter Diagnosis: Problem not documented 

 

                          Appointment; WILMER MUJICA PA On: 2020 11:30



                          Encounter Diagnosis: Problem not documented 

 

                          Appointment; EZEKIEL ONEAL APRN On: 2020 11:3

0



                          Encounter Diagnosis: Problem not documented 

 

                          Appointment; EZEKIEL ONEAL APRN On: 2020 11:0

0



                          Encounter Diagnosis: Problem not documented 

 

                          Appointment; EZEKIEL ONEAL APRN On: 15-Apr-2020 11:0

0



                          Encounter Diagnosis: Problem not documented 

 

                          Appointment; EZEKIEL ONEAL APRN On: 15-Apr-2020 11:0

0



                          Encounter Diagnosis: Problem not documented 

 

                          Appointment; EZEKIEL ONEAL APRN On: 2020 8:00



                          Encounter Diagnosis: Problem not documented
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
## 2020-07-31 NOTE — RAD REPORT
EXAM DESCRIPTION:  RAD - Pelvis - 7/31/2020 8:11 am

 

CLINICAL HISTORY:  Pelvic pain

 

FINDINGS:  old ununited fractures involve the right superior and inferior pubic rami as well as the l
eft inferior pubic ramus.

 

An old fracture with heterotopic bone formation involves the left superior pubic ramus.

 

The bones are osteoporotic.

 

No acute fracture visualized

 

Chronic erosion involves the right iliac crest

## 2020-07-31 NOTE — RAD REPORT
EXAM DESCRIPTION:  RAD - Hip Right 2 View - 7/31/2020 8:11 am

 

CLINICAL HISTORY:  Right hip pain

 

FINDINGS:  old ununited fractures involve the right superior and inferior pubic rami as well as the l
eft inferior pubic ramus.

 

An old fracture with heterotopic bone formation involves the left superior pubic ramus.

 

The bones are osteoporotic.

 

No acute fracture visualized

 

Chronic erosion involves the right iliac crest

 

Intramedullary chrissy, sideplate and screws affix an old right femoral fracture. No acute femoral fractu
re noted

 

No bony destructive lesions seen the femur. Heterotopic bone formation is present within distal femur

## 2020-07-31 NOTE — XMS REPORT
Continuity of Care Document

                            Created on:2020



Patient:BLAYNE MEDLEY

Sex:Female

:1975

External Reference #:[id number]





Demographics







                          Address                    [address]



                                                    [place] [address]

 

                          Home Phone                [phone]

 

                          Mobile Phone              [phone]

 

                          Email Address             NONE

 

                          Preferred Language        English

 

                          Marital Status            Unknown

 

                          Jain Affiliation     Unknown

 

                          Race                      Unknown

 

                          Additional Race(s)        Unavailable



                                                    Unavailable



                                                    Black or 

 

                          Ethnic Group              Unknown









Author







                          Organization              [facility]

t

 

                          Address                   [address]



                                                    [place] [address]

 

                          Phone                     [phone]









Support







                Name            Relationship    Address         Phone

 

                Callies         Spouse          [address]  +1

[phone]



                                                [place] [address] 

 

                Green           Mother          [address]   [phone]



                                                [place] [address] 









Care Team Providers







                    Name                Role                Phone

 

                    KIMMY           Attending Clinician Unavailable

 

                    SIL              Attending Clinician Unavailable

 

                    Sanjay HOLCOMB         Attending Clinician [phone]

 

                    JEANNIE            Attending Clinician Unavailable

 

                    Ruthie               Attending Clinician Unavailable

 

                    KIMMY           Attending Clinician Unavailable

 

                    JR               Attending Clinician Unavailable

 

                    ALEAH               Attending Clinician Unavailable

 

                    ANANT                Attending Clinician Unavailable









Problems







       Condition Condition Condition Status Onset  Resolution Last   Treating Co

mments 

Source



       Name   Details Category        Date   Date   Treatment Clinician        



                                                 Date                 

 

       History of History of Problem Resolve                                    

[facility]



       backache backache        d                                         ity of



                                                                      [place]



                                                                      [facility]



                                                                      ans

 

       History of History of Problem Resolve                                    

[facility]



       depression depression        d                                         it

y of



                                                                      [place]



                                                                      [facility]



                                                                      ans

 

       History of History of Problem Resolve                                    

[facility]



       hepatitis hepatitis        d                                         ity 

of



                                                                      [place]



                                                                      [facility]



                                                                      ans

 

       Closed Closed Problem Active                                    [facility]



       fracture fracture                                                  ity of



       of neck of of neck of                                                  [place]

[place]



       right  right                                                   [facility]



       talus  talus                                                   ans

 

       Closed Closed Problem Active                                    [facility]



       displaced displaced                                                  ity 

of



       fracture fracture                                                  [place]



       of     of                                                      [facility]



       navicular navicular                                                  ans



       bone of bone of                                                  



       left foot, left foot,                                                  



       initial initial                                                  



       encounter encounter                                                  

 

       Closed Closed Problem Active                                    [facility]



       fracture fracture                                                  ity of



       of     of                                                      [place]



       superior superior                                                  Physic

i



       ramus of ramus of                                                  ans



       left   left                                                    



       pubis, pubis,                                                  



       initial initial                                                  



       encounter encounter                                                  

 

       Closed Closed Problem Active                                    [facility]



       nondisplac nondisplac                                                  it

y of



       ed     ed                                                      [place]



       fracture fracture                                                  Physic

i



       of body of of body of                                                  an

s



       right  right                                                   



       calcaneus, calcaneus,                                                  



       initial initial                                                  



       encounter encounter                                                  

 

       Closed Closed Problem Active                                    [facility]



       fracture fracture                                                  ity of



       of tibial of tibial                                                  [place]

[place]



       plateau plateau                                                  [facility]



       with   with                                                    ans



       routine routine                                                  



       healing, healing,                                                  



       left   left                                                    

 

       Closed Closed Problem Active                                    [facility]



       displaced displaced                                                  ity 

of



       fracture fracture                                                  [place]



       of second of second                                                  Phys

ici



       metatarsal metatarsal                                                  an

s



       bone of bone of                                                  



       right  right                                                   



       foot,  foot,                                                   



       initial initial                                                  



       encounter encounter                                                  

 

       Closed Closed Problem Active                                    [facility]



       nondisplac nondisplac                                                  it

y of



       ed     ed                                                      [place]



       fracture fracture                                                  Physic

i



       of fourth of fourth                                                  ans



       metatarsal metatarsal                                                  



       bone of bone of                                                  



       right foot right foot                                                  

 

       Closed Closed Problem Active                                    [facility]



       nondisplac nondisplac                                                  it

y of



       ed     ed                                                      [place]



       fracture fracture                                                  Physic

i



       of third of third                                                  ans



       metatarsal metatarsal                                                  



       bone of bone of                                                  



       right foot right foot                                                  

 

       Blues  Blues  Problem Active                                    [facility]



                                                                      ity of



                                                                      [place]



                                                                      [facility]



                                                                      ans

 

       Displaced Displaced Problem Active                                    Uni

vers



       supracondy supracondy                                                  it

y of



       lar    lar                                                     [place]



       fracture fracture                                                  Physic

i



       with   with                                                    ans



       intracondy intracondy                                                  



       lar    lar                                                     



       extension extension                                                  



       of lower of lower                                                  



       end of end of                                                  



       right  right                                                   



       femur, femur,                                                  



       subsequent subsequent                                                  



       encounter encounter                                                  



       for open for open                                                  



       fracture fracture                                                  



       type IIIA, type IIIA,                                                  



       IIIB, or IIIB, or                                                  



       IIIC with IIIC with                                                  



       routine routine                                                  



       healing healing                                                  

 

       Laceration Laceration Problem Active                                    U

nivers



       of knee of knee                                                  ity of



       with   with                                                    [place]



       foreign foreign                                                  Physici



       body,  body,                                                   ans



       left,  left,                                                   



       subsequent subsequent                                                  



       encounter encounter                                                  

 

       Avulsion Avulsion Problem Active                                    Unive

rs



       fracture fracture                                                  ity of



       of lateral of lateral                                                  Te

[place]



       malleolus malleolus                                                  Phys

ici



       of right of right                                                  ans



       fibula, fibula,                                                  



       open type open type                                                  



       III, with III, with                                                  



       routine routine                                                  



       healing, healing,                                                  



       subsequent subsequent                                                  



       encounter encounter                                                  

 

       Laceration Laceration Problem Active                                    U

nivers



       of lower of lower                                                  ity of



       leg with leg with                                                  [place]



       foreign foreign                                                  Physici



       body,  body,                                                   ans



       right, right,                                                  



       subsequent subsequent                                                  



       encounter encounter                                                  

 

       Fracture Fracture Problem Active                                    Unive

rs



       of right of right                                                  ity of



       inferior inferior                                                  [place]



       pubic  pubic                                                   Physici



       ramus, ramus,                                                  ans



       closed, closed,                                                  



       initial initial                                                  



       encounter encounter                                                  

 

       Closed Closed Problem Active                                    [facility]



       avulsion avulsion                                                  ity of



       fracture fracture                                                  [place]



       of     of                                                      Physici



       navicular navicular                                                  ans



       bone of bone of                                                  



       foot,  foot,                                                   



       right, right,                                                  



       initial initial                                                  



       encounter encounter                                                  

 

       Fracture Fracture Problem Active                                    Unive

rs



       of left of left                                                  ity of



       inferior inferior                                                  [place]



       pubic  pubic                                                   Physici



       ramus, ramus,                                                  ans



       closed, closed,                                                  



       initial initial                                                  



       encounter encounter                                                  

 

       Fracture Fracture Problem Active                                    Unive

rs



       of distal of distal                                                  ity 

of



       end of end of                                                  [place]



       right  right                                                   [facility]



       femur with femur with                                                  an

s



       nonunion nonunion                                                  

 

       Dehiscence Dehiscence Problem Active                                    U

nivers



       of     of                                                      ity of



       external external                                                  [place]



       operation operation                                                  Phys

ici



       wound  wound                                                   ans

 

       Avulsion Avulsion Problem Active                                    Unive

rs



       of leg, of leg,                                                  ity of



       right, right,                                                  [place]



       initial initial                                                  Physici



       encounter encounter                                                  ans

 

       Cellulitis Cellulitis Problem Active                                    U

nivers



       of     of                                                      ity of



       drainage drainage                                                  [place]



       site,  site,                                                   Physici



       post-opera post-opera                                                  an

s



       tive   tive                                                    

 

       Infection Infection Problem Active                                    Uni

vers



       and    and                                                     ity of



       inflammato inflammato                                                  [place]

[place]



       ry     ry                                                      [facility]



       reaction reaction                                                  ans



       due to due to                                                  



       internal internal                                                  



       fixation fixation                                                  



       device of device of                                                  



       right  right                                                   



       femur, femur,                                                  



       initial initial                                                  



       encounter encounter                                                  

 

       Closed Closed Problem Active                                    [facility]



       comminuted comminuted                                                  it

y of



       intra-varun intra-varun                                                  [place]

[place]



       cular  cular                                                   Physici



       fracture fracture                                                  ans



       of distal of distal                                                  



       end of end of                                                  



       femur with femur with                                                  



       nonunion, nonunion,                                                  



       right  right                                                   

 

       Other  Other  Problem Active                                    [facility]



       fracture fracture                                                  ity of



       of shaft of shaft                                                  [place]



       of                                                           Physici



       unspecifie unspecifie                                                  an

s



       d tibia, d tibia,                                                  



       initial initial                                                  



       encounter encounter                                                  



       for closed for closed                                                  



       fracture fracture                                                  

 

       Bipolar Bipolar Problem Active                                    [facility]



       disorder, disorder,                                                  ity 

of



       in partial in partial                                                  [place]

[place]



       remission, remission,                                                  Ph

ysici



       most   most                                                    ans



       recent recent                                                  



       episode episode                                                  



       mixed  mixed                                                   

 

       Anxiety Anxiety Problem Active                                    [facility]



                                                                      ity of



                                                                      [place]



                                                                      [facility]



                                                                      ans

 

       Closed Closed Problem Active                                    [facility]



       fracture fracture                                                  ity of



       of right of right                                                  [place]



       superior superior                                                  Physic

i



       pubic  pubic                                                   ans



       ramus  ramus                                                   







Allergies, Adverse Reactions, Alerts

This patient has no known allergies or adverse reactions.



Family History







           Family Member Diagnosis  Comments   Start Date Stop Date  Source

 

           Unknown Family Family history of Family History                      

 [facility]



           Member     cardiac disorder                                  [place] [facility]

[facility]

 

           Unknown Family Family history of Family History                      

 [facility]



           Member     essential                                   [place] [facility]

ns



                      hypertension                                  

 

           Unknown Family Family history of Family History                      

 [facility]



           Member     arthritis                                   [place] [facility]

ns

 

           Unknown Family Family history of Family History                      

 [facility]



           Member     malignant neoplasm                                  [place] 

[facility]







Social History







                Smoking Status  Start Date      Stop Date       Source

 

                [facility] daily (finding)                                 [facility]







Medications







       Ordered Filled Start  Stop   Current Ordering Indication Dosage Frequency

 Signature

                    Comments            Components          Source



     Medication Medication Date Date Medication? Clinician                (SIG) 

          



     Name Name                                                   

 

     Lidocaine 5 Lidocaine 5 2020-0      Yes  EZEKIEL                 APPLY 1       

    [facility]



     % External % External 6-17           KIMMY                PATCH TO     

      ity of



     Patch Patch 00:00:           APRN                THE            [place]



               00                                 AFFECTED           [facility]



                                                  AREA AND           ans



                                                  LEAVE IN           



                                                  PLACE FOR           



                                                  12 HOURS,           



                                                  THEN           



                                                  REMOVE AND           



                                                  LEAVE OFF           



                                                  FOR 12           



                                                  HOURS.           

 

     Cyclobenzap Cyclobenzap       Yes  EZEKIEL            Q0.3333D TAKE 1    

       [facility]



     rine HCl - rine HCl - 4-15           KIMMY                TABLET 3     

      ity of



     5 MG Oral 5 MG Oral 00:00:           APRN                TIMES           [place]

[place]



     Tablet Tablet 00                                 DAILY.           Physici



                                                                 ans

 

     Naproxen Naproxen       Yes  EZEKIEL            Q0.5D TAKE 1           Un

david



     500 MG Oral 500 MG Oral 2-19           KIMMY                TABLET     

      ity of



     Tablet Tablet 00:00:           APRN                TWICE           [place]



               00                                 DAILY WITH           Physici



                                                  MEALS.           ans

 

     Cyclobenzap Cyclobenzap       Yes  WILMER      1    Q0.5D TAKE 1     

      [facility]



     rine HCl - rine HCl - 1-13           MUJICA PA                TABLET       

    ity of



     10 MG Oral 10 MG Oral 00:00:                               TWICE           

[place]



     Tablet Tablet 00                                 DAILY           Physici



                                                                 ans

 

     Propranolol Propranolol       Yes  ARGENIS           Q0.5D TAKE 1   

        [facility]



     HCl - 10 MG HCl - 10 MG 2-13           SAMMY                TABLET       

    ity of



     Oral Tablet Oral Tablet 00:00:           M.D.                TWICE         

  [place]



               00                                 DAILY.           Physici



                                                                 ans

 

     Melatonin 5 Melatonin 5 2018      Yes  KULWINDER      1         TAKE 1        

   Univers



     MG Oral MG Oral 1-14           DENNIS                TABLET           ity

 of



     Tablet Tablet 00:00:           M.D.                BEDTIME           [place]



               00                                                Physici



                                                                 ans

 

     Sertraline Sertraline 2018      Yes  ARGENIS      1    QD   TAKE 1      

     Univers



     HCl - 100 HCl - 100 1-14           SAMMY                TABLET           

ity of



     MG Oral MG Oral 00:00:           M.D.                DAILY.           [place]



     Tablet Tablet 00                                                Physici



                                                                 ans

 

     QUEtiapine QUEtiapine 2018      Yes  ARGENIS      1         TAKE 1      

     [facility]



     Fumarate Fumarate 1-14           SAMMY                TABLET AT          

 ity of



     400 MG Oral 400 MG Oral 00:00:           M.D.                BEDTIME.      

     [place]



     Tablet Tablet 00                                                Physici



                                                                 ans

 

     QUEtiapine QUEtiapine 2018      Yes  ARGENIS      1    Q0.3333D TAKE 1  

         Univers



     Fumarate Fumarate 1-14           SAMMY                TABLET 3           

ity of



     100 MG Oral 100 MG Oral 00:00:           M.D.                TIMES         

  [place]



     Tablet Tablet 00                                 DAILY           Physici



                                                                 ans

 

     Acetaminoph Acetaminoph       Yes  WILMER      1    Q6H  TAKE 1      

     Univers



     en-Codeine en-Codeine 9-21           MUJICA PA                TABLET       

    ity of



     #3 300-30 #3 300-30 00:00:                               EVERY 6           

[place]



     MG Oral MG Oral 00                                 HOURS           Physici



     Tablet Tablet                                                   ans

 

     Methocarbam Methocarbam       Yes  WILMER           Q0.5D TAKE 1     

      [facility]



     ol 750 MG ol 750 MG 7-30           MUJICA PA                TABLET         

  ity of



     Oral Tablet Oral Tablet 00:00:                               TWICE         

  [place]



               00                                 DAILY.           Physici



                                                                 ans

 

     Methocarbam Methocarbam       Yes  WILMER      1    Q0.5D TAKE 1     

      Univers



     ol 500 MG ol 500 MG 4-09           MUJICA PA                TABLET         

  ity of



     Oral Tablet Oral Tablet 00:00:                               TWICE         

  [place]



               00                                 DAILY           Physici



                                                                 ans

 

     traMADol traMADol       Yes  WILMER      1         TAKE 1           U

nivers



     HCl - 50 MG HCl - 50 MG 4-09           MUJICA PA                TABLET     

      ity of



     Oral Tablet Oral Tablet 00:00:                               ONCE          

 [place]



               00                                                Physici



                                                                 ans

 

     Vitamin D Vitamin D       Yes  EZEKIEL                 Take 1           U

nivers



     (Ergocalcif (Ergocalcif 8-02           KIMMY                capsule    

       ity of



     alisia) 1.25 alisia) 1.25 00:00:           APRN                twice a         

  [place]



     MG (84338 MG (41036 00                                 week,           Phys

ici



     UT) Oral UT) Oral                                    Tuesday           ans



     Capsule Capsule                                    and            



                                                  Thursday           

 

     Acetaminoph Acetaminoph       Yes  NAILA                TAKE 1 TO   

        Univers



     en-Codeine en-Codeine 5-03           ANANT M.D.                2 TABLETS    

       ity of



     #3 300-30 #3 300-30 00:00:                               EVERY 6           

[place]



     MG Oral MG Oral 00                                 HOURS AS           Physi

ci



     Tablet Tablet                                    NEEDED FOR           ans



                                                  PAIN.           

 

     traMADol traMADol       Yes  NAILA                TAKE 1 TO         

  Univers



     HCl - 50 MG HCl - 50 MG 5-03           ANANT M.D.                2 TABLETS  

         ity of



     Oral Tablet Oral Tablet 00:00:                               EVERY 6       

    [place]



               00                                 HOURS AS           Physici



                                                  NEEDED FOR           ans



                                                  PAIN.           

 

     Gabapentin Gabapentin       Yes  NAILA      1    Q0.3333D TAKE 1    

       Univers



     300 MG Oral 300 MG Oral 3-30           NAANT M.D.                CAPSULE 3  

         ity of



     Capsule Capsule 00:00:                               TIMES           [place]



               00                                 DAILY           Physici



                                                                 ans

 

     Methocarbam Methocarbam       Yes  NAILA      1    Q0.3333D TAKE 1  

         Univers



     ol 500 MG ol 500 MG 3-30           ANANT M.D.                TABLET 3       

    ity of



     Oral Tablet Oral Tablet 00:00:                               TIMES         

  [place]



               00                                 DAILY PRN           Physici



                                                  muscle           ans



                                                  spasm           







Procedures







                Procedure       Date / Time Performed Performing Clinician Sourc

e

 

                [U] XRAY PELVIS MIN 3 2020 00:00:00                 [facility] [id number]                                       Physicians

 

                CT Femur without 2019 00:00:00                 [facility] 

[facility] [place]



                contrast [id number]                                  Physicians

 

                CT Pelvis wo contrast 2019 00:00:00                 [facility]

[facility] [place]



                [id number]                                           Physicians

 

                [U] XRAY FEMUR 2 [facility] 2018 00:00:00                 Univers

ity [facility]



                RIGHT [id number]                                     Physicians

 

                [U] XRAY FEMUR 2 [facility] 2018 00:00:00                 Univers

ity [facility]



                RIGHT [id number]                                     Physicians

 

                History of Leg                                  [facility]

[place]



                surgery                                         Physicians







Plan of Care







             Planned Activity Planned Date Details      Comments     Source

 

             Future Appointment 2020   RADAMES FALCON                 [facility]



                          08:00:00     KIMMY,                Physicians







Encounters







        Start   End     Encounter Admission Attending Care    Care    Encounter 

Source



        Date/Time Date/Time Type    Type    Clinicians Facility Department ID   

   

 

        2020 Appointdinesh ONEAL [facility]     Orthopedics 

[id number] Univers



        08:00:00 08:00:00 t;              RADAMES FALCON         Trauma          ity 

of



                        [facility]

s



                        RADAMES FALCON                         [place]           Physic





                                                        Medical         ans



                                                        Center          

 

        2020-04-15 2020-04-15 Appointmen         KIMMY, [facility]     Orthopedics 

[id number] Univers



        11:00:00 11:00:00 t;              RADAMES FALCON         Trauma          ity 

of



                        [facility]

s



                        RADAMES FALCON                         [place]           Physic





                                                        Medical         ans



                                                        Center          

 

        2020-04-15 2020[facility]04-15 [id number]

[id number] Univers



        11:00:00 11:00:00 t;              RADAEMS FALCON                         ity 

[place], [place]



                        RADAMES FALCON                                         Physic

i



                                                                        ans

 

        [facility]     Orthopedics 

[id number] Univers



        11:00:00 11:00:00 t;              RADAMES FALCON         Trauma          ity 

of



                        [facility]

s



                        RADAMES FALCON                         [facility]



                                                        Medical         ans



                                                        Center          

 

        2020 [facility]     [id number]

[id number] Univers



        11:30:00 11:30:00 t;              RADAMES FALCON                         ity 

[place], [place]



                        RADAMES FALCON                                         Physic

i



                                                                        ans

 

        2020 Appointdinesh MUJICA [facility]     Orthopedics [id number]

[id number] Univers



        11:30:00 11:30:00 t; WILMER MUJICA PA         Trauma          

ity of



                        NELL GUILLEN                         [facility]          

 

        2019 AppointEUSEBIO [place]     Orthopedics [id number]

[id number] Univers



        14:15:00 14:15:00 t; WILMER MUJICA PA         Trauma          

ity of



                        NELL GUILLEN                         [facility]          

 

        2019 Emergency         Vincent, TRAUMA  [id number]

[id number] 



        15:23:02 17:14:00                 [facility]  [id number]         



                                                        [id number]         



                                                        [id number]         



                                                        [id number]             

 

        2019 Appointdinesh DENNIS [facility]     [id number]

716 Univers



        09:45:00 09:45:00 t;              ANDIE MIRAMONTES

 of



                        [place], [place]



                        ANDIE MIRAMONTES                                         Physi

ci



                                                                        ans

 

        [address] [facility]     [id number]

102 Univers



        11:00:00 11:00:00 t;              ANDIE MIRAMONTES

 of



                        [place], [place]



                        ANDIE MIRAMONTES                                         Physi

ci



                                                                        ans

 

        2019 Rupa MUJICA [facility]     [id number]

9 Univers



        11:00:00 11:00:00 t; WILMER MUJICA PA                         

ity of



                        NELL GUILLEN                                         [place]



                                                                        Physici



                                                                        ans

 

        [facility]     [id number]

206 Univers



        10:15:00 10:15:00 t;              ANDIE MIRAMONTES

 [place], [place]



                        ANDIE MIRAMONTES                                         Physi

ci



                                                                        ans

 

        [facility]     [id number]

762 Univers



        11:00:00 11:00:00 t;              ANDIE MIRAMONTES

 of



                        [place], [place]



                        ANDIE MIRAMONTES                                         Physi

ci



                                                                        ans

 

        2018-12-10 2018-12-10 AppointEUSEBIO [place]     UTP     [id number]

6 Univers



        11:00:00 11:00:00 t; WILMER MUJICA PA                         

ity of



                        NELL GUILLEN                                         [place]



                                                                        Physici



                                                                        ans

 

        2018 [facility]     [id number]

036 Univers



        10:00:00 10:00:00 t;              ANDIE MIRAMONTES [place] OMAR, M.D.                                         Physi

ci



                                                                        ans

 

        2018-10-29 2018-10-29 Appointmen         [facility]     [id number]

1 Univers



        13:30:00 13:30:00 t; WILMER MUJICA PA                         

ity of



                        WILMER, PA                                         [place]



                                                                        Physici



                                                                        ans

 

        2018-10-08 2018-10-08 Appointmen         [facility]     [id number]

2 Univers



        11:45:00 11:45:00 t; WILMER MUJICA PA                         

ity of



                        WILMER, PA                                         [place]



                                                                        Physici



                                                                        ans

 

        2018 [facility]ointmen         [facility]     [id number]

1 Univers



        11:30:00 11:30:00 t; WILMER MUJICA PA                         

ity of



                        [place], PA                                         [place]



                                                                        Physici



                                                                        ans

 

        2018 [facility]ointmen         [facility]     [id number]

3 Univers



        08:00:00 08:00:00 t; Tony Hendricks,                         itANDIE Ricks M.D.                                            Physici



                                                                        ans

 

        2018 Appointmen         [facility]     [id number]

0 Univers



        12:00:00 12:00:00 t; WILMER MUJICA PA                         

ity of



                        W[place], PA                                         [place]



                                                                        Physici



                                                                        ans

 

        2018 Appointmen         [facility]     [id number]

6 Univers



        12:00:00 12:00:00 t; WILMER MUJICA PA                         

ity of



                        [place], PA                                         [place]



                                                                        Physici



                                                                        ans

 

        [facility]     [id number]

2 Univers



        12:30:00 12:30:00 t; WILMER MUJICA PA                         

ity of



                        WILMER, PA                                         [place]



                                                                        Physici



                                                                        ans

 

        2018 Appointmen         SIL [facility]     Orthopedics [id number]

[id number] Univers



        09:45:00 09:45:00 t; WILMER MUJICA PA                         

ity of



                        WILMER, PA                                         [place]



                                                                        Physici



                                                                        ans

 

        2018 Appointmen         SIL [facility]     Orthopedics [id number]

[id number] Univers



        09:45:00 09:45:00 t; MUJICA,         WILMER, PA                         

ity of



                        [place], PA                                         [place]



                                                                        Physici



                                                                        ans

 

        2017-10-18 2017-10-18 Appointmen         KIMMY, [facility]     UTP     [id number]

6551 Univers



        13:30:00 13:30:00 t;              DARCIE FALCON                         ity [place]



                        [place]



                        EZEKIEL, NP                                         Physici



                                                                        ans

 

        2017-10-18 2017-10-18 Appointmen         JR,  [facility]     UTP     [id number]

9 Univers



        12:45:00 12:45:00 t; KALYN NORRIS NP                         ity

 of



                        KALYN, NP                                         [place]



                                                                        Physici



                                                                        ans

 

        2017 Appointmen         ALEAH,  [facility]     UTP     [id number]

8 Univers



        11:00:00 11:00:00 t; ABIMAEL BULLARD ity of JOCELYN, M.D.                            [place]



                        M.D.                                            Physici



                                                                        ans

 

        2017 Appointmen         JR,  [facility]     UTP     [id number]

9 Univers



        10:15:00 10:15:00 t; KALYN NORRIS NP                         ity

 of



                        KALYN, NP                                         [place]



                                                                        Physici



                                                                        ans

 

        2017 Appointmen         ANANT,   [facility]     UTP     [id number]

3 Univers



        11:15:00 11:15:00 t; NAILA [place] ity o f ANDREW, M.D.                            [place]



                        M.D.                                            Physici



                                                                        ans

 

        2017 Appointmen         ALEAH,  [facility]     UTP     [id number]

1 Univers



        10:00:00 10:00:00 t; ABIMAEL BULLARD ity of JOCELYN, M.D.                            [place]



                        M.D.                                            Physici



                                                                        ans

 

        2017 Appointmen         ANANT,   [facility]     UTP     [id number]

2 Univers



        08:30:00 08:30:00 t; NAILA [place] ity o f ANDREW, M.D.                            [place]



                        M.D.                                            Physici



                                                                        ans

 

        2017 Appointmen         ANANT,   [facility]     UTP     [id number]

6 Univers



        12:30:00 12:30:00 t; NAILA [place] ity o f ANDREW, M.D.                            [place]



                        M.D.                                            Physici



                                                                        ans







Results







           Test Description Test Time  Test Comments Results    Result     Sour

e



                                                       Comments   

 

           [U] XRAY FEMUR 2 2019            Images                Universi

ty of



           S RIGHT [id number] 14:24:00              acquired, not            [place]



                                            reported on            Physicians



                                            this accession            



                                            number.

## 2021-01-02 ENCOUNTER — HOSPITAL ENCOUNTER (INPATIENT)
Dept: HOSPITAL 97 - ER | Age: 46
LOS: 13 days | Discharge: HOME | DRG: 871 | End: 2021-01-15
Attending: HOSPITALIST | Admitting: FAMILY MEDICINE
Payer: COMMERCIAL

## 2021-01-02 VITALS — BODY MASS INDEX: 33.3 KG/M2

## 2021-01-02 DIAGNOSIS — G93.41: ICD-10-CM

## 2021-01-02 DIAGNOSIS — Z20.822: ICD-10-CM

## 2021-01-02 DIAGNOSIS — B96.3: ICD-10-CM

## 2021-01-02 DIAGNOSIS — Z16.29: ICD-10-CM

## 2021-01-02 DIAGNOSIS — Z79.899: ICD-10-CM

## 2021-01-02 DIAGNOSIS — G00.0: ICD-10-CM

## 2021-01-02 DIAGNOSIS — Z88.8: ICD-10-CM

## 2021-01-02 DIAGNOSIS — N39.0: ICD-10-CM

## 2021-01-02 DIAGNOSIS — B96.89: ICD-10-CM

## 2021-01-02 DIAGNOSIS — E87.6: ICD-10-CM

## 2021-01-02 DIAGNOSIS — Z79.52: ICD-10-CM

## 2021-01-02 DIAGNOSIS — I10: ICD-10-CM

## 2021-01-02 DIAGNOSIS — F31.9: ICD-10-CM

## 2021-01-02 DIAGNOSIS — A41.3: Primary | ICD-10-CM

## 2021-01-02 LAB
ALBUMIN SERPL BCP-MCNC: 3.4 G/DL (ref 3.4–5)
ALP SERPL-CCNC: 146 U/L (ref 45–117)
ALT SERPL W P-5'-P-CCNC: 105 U/L (ref 12–78)
AST SERPL W P-5'-P-CCNC: 98 U/L (ref 15–37)
BLD SMEAR INTERP: (no result)
BUN BLD-MCNC: 5 MG/DL (ref 7–18)
GLUCOSE SERPLBLD-MCNC: 113 MG/DL (ref 74–106)
HCT VFR BLD CALC: 39 % (ref 36–45)
INR BLD: 1.16
LYMPHOCYTES # SPEC AUTO: 1.1 K/UL (ref 0.7–4.9)
METHAMPHET UR QL SCN: NEGATIVE
MORPHOLOGY BLD-IMP: (no result)
PMV BLD: 9.1 FL (ref 7.6–11.3)
POTASSIUM SERPL-SCNC: 3.2 MMOL/L (ref 3.5–5.1)
RBC # BLD: 5.36 M/UL (ref 3.86–4.86)
THC SERPL-MCNC: POSITIVE NG/ML

## 2021-01-02 PROCEDURE — 80202 ASSAY OF VANCOMYCIN: CPT

## 2021-01-02 PROCEDURE — 85610 PROTHROMBIN TIME: CPT

## 2021-01-02 PROCEDURE — 85025 COMPLETE CBC W/AUTO DIFF WBC: CPT

## 2021-01-02 PROCEDURE — 87088 URINE BACTERIA CULTURE: CPT

## 2021-01-02 PROCEDURE — 83735 ASSAY OF MAGNESIUM: CPT

## 2021-01-02 PROCEDURE — 87070 CULTURE OTHR SPECIMN AEROBIC: CPT

## 2021-01-02 PROCEDURE — 81003 URINALYSIS AUTO W/O SCOPE: CPT

## 2021-01-02 PROCEDURE — 87086 URINE CULTURE/COLONY COUNT: CPT

## 2021-01-02 PROCEDURE — 87205 SMEAR GRAM STAIN: CPT

## 2021-01-02 PROCEDURE — 76705 ECHO EXAM OF ABDOMEN: CPT

## 2021-01-02 PROCEDURE — 87184 SC STD DISK METHOD PER PLATE: CPT

## 2021-01-02 PROCEDURE — 80307 DRUG TEST PRSMV CHEM ANLYZR: CPT

## 2021-01-02 PROCEDURE — 74177 CT ABD & PELVIS W/CONTRAST: CPT

## 2021-01-02 PROCEDURE — 89050 BODY FLUID CELL COUNT: CPT

## 2021-01-02 PROCEDURE — 36569 INSJ PICC 5 YR+ W/O IMAGING: CPT

## 2021-01-02 PROCEDURE — 96372 THER/PROPH/DIAG INJ SC/IM: CPT

## 2021-01-02 PROCEDURE — 80053 COMPREHEN METABOLIC PANEL: CPT

## 2021-01-02 PROCEDURE — 82945 GLUCOSE OTHER FLUID: CPT

## 2021-01-02 PROCEDURE — 70450 CT HEAD/BRAIN W/O DYE: CPT

## 2021-01-02 PROCEDURE — 84132 ASSAY OF SERUM POTASSIUM: CPT

## 2021-01-02 PROCEDURE — 36415 COLL VENOUS BLD VENIPUNCTURE: CPT

## 2021-01-02 PROCEDURE — 80048 BASIC METABOLIC PNL TOTAL CA: CPT

## 2021-01-02 PROCEDURE — 99284 EMERGENCY DEPT VISIT MOD MDM: CPT

## 2021-01-02 PROCEDURE — 51702 INSERT TEMP BLADDER CATH: CPT

## 2021-01-02 PROCEDURE — 80320 DRUG SCREEN QUANTALCOHOLS: CPT

## 2021-01-02 PROCEDURE — 81025 URINE PREGNANCY TEST: CPT

## 2021-01-02 PROCEDURE — 71045 X-RAY EXAM CHEST 1 VIEW: CPT

## 2021-01-02 PROCEDURE — 82550 ASSAY OF CK (CPK): CPT

## 2021-01-02 PROCEDURE — 84145 PROCALCITONIN (PCT): CPT

## 2021-01-02 PROCEDURE — 84157 ASSAY OF PROTEIN OTHER: CPT

## 2021-01-02 PROCEDURE — 93306 TTE W/DOPPLER COMPLETE: CPT

## 2021-01-02 PROCEDURE — 87389 HIV-1 AG W/HIV-1&-2 AB AG IA: CPT

## 2021-01-02 PROCEDURE — 87040 BLOOD CULTURE FOR BACTERIA: CPT

## 2021-01-02 PROCEDURE — 82947 ASSAY GLUCOSE BLOOD QUANT: CPT

## 2021-01-02 PROCEDURE — 85730 THROMBOPLASTIN TIME PARTIAL: CPT

## 2021-01-02 PROCEDURE — 80329 ANALGESICS NON-OPIOID 1 OR 2: CPT

## 2021-01-02 PROCEDURE — 77003 FLUOROGUIDE FOR SPINE INJECT: CPT

## 2021-01-02 PROCEDURE — 80076 HEPATIC FUNCTION PANEL: CPT

## 2021-01-02 PROCEDURE — 81015 MICROSCOPIC EXAM OF URINE: CPT

## 2021-01-02 PROCEDURE — 83605 ASSAY OF LACTIC ACID: CPT

## 2021-01-02 PROCEDURE — 84100 ASSAY OF PHOSPHORUS: CPT

## 2021-01-02 NOTE — XMS REPORT
Continuity of Care Document

                           Created on:2021



Patient:BLAYNE MEDLEY

Sex:Female

:1975

External Reference #:[id number]





Demographics







                          Address                    [address]



                                                    [place] [address]

 

                          Home Phone                [phone]

 

                          Work Phone                [phone]

 

                          Mobile Phone              [phone]

 

                          Email Address             NONE

 

                          Preferred Language        English

 

                          Marital Status            Unknown

 

                          Buddhist Affiliation     Unknown

 

                          Race                      Unknown

 

                          Additional Race(s)        Unavailable



                                                    Black or 

 

                          Ethnic Group              Unknown









Author







                          Organization              [facility]

t

 

                          Address                   [address]



                                                    [place] [address]

 

                          Phone                     [phone]









Support







                Name            Relationship    Address         Phone

 

                Callies         Spouse          [address]  +1

[phone]



                                                [place] [address] 

 

                Green           Mother          [address]   [phone]



                                                [place] [address] 









Care Team Providers







                    Name                Role                Phone

 

                    KIMMY           Attending Clinician Unavailable

 

                    SIL              Attending Clinician Unavailable

 

                    Sanjay HOLCOMB         Attending Clinician [phone]

 

                    JEANNIE            Attending Clinician Unavailable

 

                    Ruthie               Attending Clinician Unavailable

 

                    KIMMY           Attending Clinician Unavailable

 

                    JR               Attending Clinician Unavailable

 

                    ALEAH               Attending Clinician Unavailable

 

                    ANANT                Attending Clinician Unavailable









Problems







       Condition Condition Condition Status Onset  Resolution Last   Treating Co

mments 

Source



       Name   Details Category        Date   Date   Treatment Clinician        



                                                 Date                 

 

       History of History of Problem Resolve                                    

[facility]



       backache backache        d                                         ity of



                                                                      [place]



                                                                      [facility]



                                                                      ans

 

       History of History of Problem Resolve                                    

[facility]



       depression depression        d                                         it

y of



                                                                      [place]



                                                                      [facility]



                                                                      ans

 

       History of History of Problem Resolve                                    

[facility]



       hepatitis hepatitis        d                                         ity 

of



                                                                      [place]



                                                                      [facility]



                                                                      ans

 

       Closed Closed Problem Active                                    [facility]



       fracture fracture                                                  ity of



       of neck of of neck of                                                  [place]

[place]



       right  right                                                   [facility]



       talus  talus                                                   ans

 

       Closed Closed Problem Active                                    [facility]



       displaced displaced                                                  ity 

of



       fracture fracture                                                  [place]



       of     of                                                      [facility]



       navicular navicular                                                  ans



       bone of bone of                                                  



       left foot, left foot,                                                  



       initial initial                                                  



       encounter encounter                                                  

 

       Closed Closed Problem Active                                    [facility]



       fracture fracture                                                  ity of



       of     of                                                      [place]



       superior superior                                                  Physic

i



       ramus of ramus of                                                  ans



       left   left                                                    



       pubis, pubis,                                                  



       initial initial                                                  



       encounter encounter                                                  

 

       Closed Closed Problem Active                                    [facility]



       nondisplac nondisplac                                                  it

y of



       ed     ed                                                      [place]



       fracture fracture                                                  Physic

i



       of body of of body of                                                  an

s



       right  right                                                   



       calcaneus, calcaneus,                                                  



       initial initial                                                  



       encounter encounter                                                  

 

       Closed Closed Problem Active                                    [facility]



       fracture fracture                                                  ity of



       of tibial of tibial                                                  [place]

[place]



       plateau plateau                                                  Physici



       with   with                                                    ans



       routine routine                                                  



       healing, healing,                                                  



       left   left                                                    

 

       Closed Closed Problem Active                                    [facility]



       displaced displaced                                                  ity 

of



       fracture fracture                                                  [place]



       of second of second                                                  Phys

ici



       metatarsal metatarsal                                                  an

s



       bone of bone of                                                  



       right  right                                                   



       foot,  foot,                                                   



       initial initial                                                  



       encounter encounter                                                  

 

       Closed Closed Problem Active                                    [facility]



       nondisplac nondisplac                                                  it

y of



       ed     ed                                                      [place]



       fracture fracture                                                  Physic

i



       of fourth of fourth                                                  ans



       metatarsal metatarsal                                                  



       bone of bone of                                                  



       right foot right foot                                                  

 

       Closed Closed Problem Active                                    [facility]



       nondisplac nondisplac                                                  it

y of



       ed     ed                                                      [place]



       fracture fracture                                                  Physic

i



       of third of third                                                  ans



       metatarsal metatarsal                                                  



       bone of bone of                                                  



       right foot right foot                                                  

 

       Blues  Blues  Problem Active                                    [facility]



                                                                      ity of



                                                                      [place]



                                                                      [facility]



                                                                      ans

 

       Displaced Displaced Problem Active                                    Uni

vers



       supracondy supracondy                                                  it

y of



       lar    lar                                                     [place]



       fracture fracture                                                  Physic

i



       with   with                                                    ans



       intracondy intracondy                                                  



       lar    lar                                                     



       extension extension                                                  



       of lower of lower                                                  



       end of end of                                                  



       right  right                                                   



       femur, femur,                                                  



       subsequent subsequent                                                  



       encounter encounter                                                  



       for open for open                                                  



       fracture fracture                                                  



       type IIIA, type IIIA,                                                  



       IIIB, or IIIB, or                                                  



       IIIC with IIIC with                                                  



       routine routine                                                  



       healing healing                                                  

 

       Laceration Laceration Problem Active                                    U

nivers



       of knee of knee                                                  ity of



       with   with                                                    [place]



       foreign foreign                                                  [facility]ici



       body,  body,                                                   ans



       left,  left,                                                   



       subsequent subsequent                                                  



       encounter encounter                                                  

 

       Avulsion Avulsion Problem Active                                    Unive

rs



       fracture fracture                                                  ity of



       of lateral of lateral                                                  Te

[place]



       malleolus malleolus                                                  Phys

ici



       of right of right                                                  ans



       fibula, fibula,                                                  



       open type open type                                                  



       III, with III, with                                                  



       routine routine                                                  



       healing, healing,                                                  



       subsequent subsequent                                                  



       encounter encounter                                                  

 

       Laceration Laceration Problem Active                                    U

nivers



       of lower of lower                                                  ity of



       leg with leg with                                                  [place]



       foreign foreign                                                  Physici



       body,  body,                                                   ans



       right, right,                                                  



       subsequent subsequent                                                  



       encounter encounter                                                  

 

       Fracture Fracture Problem Active                                    Unive

rs



       of right of right                                                  ity of



       inferior inferior                                                  [place]



       pubic  pubic                                                   Physici



       ramus, ramus,                                                  ans



       closed, closed,                                                  



       initial initial                                                  



       encounter encounter                                                  

 

       Closed Closed Problem Active                                    [facility]



       avulsion avulsion                                                  ity of



       fracture fracture                                                  [place]



       of     of                                                      Physici



       navicular navicular                                                  ans



       bone of bone of                                                  



       foot,  foot,                                                   



       right, right,                                                  



       initial initial                                                  



       encounter encounter                                                  

 

       Fracture Fracture Problem Active                                    Unive

rs



       of left of left                                                  ity of



       inferior inferior                                                  [place]



       pubic  pubic                                                   Physici



       ramus, ramus,                                                  ans



       closed, closed,                                                  



       initial initial                                                  



       encounter encounter                                                  

 

       Fracture Fracture Problem Active                                    Unive

rs



       of distal of distal                                                  ity 

of



       end of end of                                                  [place]



       right  right                                                   [facility]



       femur with femur with                                                  an

s



       nonunion nonunion                                                  

 

       Dehiscence Dehiscence Problem Active                                    U

nivers



       of     of                                                      ity of



       external external                                                  [place]



       operation operation                                                  Phys

ici



       wound  wound                                                   ans

 

       Avulsion Avulsion Problem Active                                    Unive

rs



       of leg, of leg,                                                  ity of



       right, right,                                                  [place]



       initial initial                                                  [facility]ici



       encounter encounter                                                  ans

 

       Cellulitis Cellulitis Problem Active                                    U

nivers



       of     of                                                      ity of



       drainage drainage                                                  [place]



       site,  site,                                                   Physici



       post-opera post-opera                                                  an

s



       tive   tive                                                    

 

       Infection Infection Problem Active                                    Uni

vers



       and    and                                                     ity of



       inflammato inflammato                                                  [place]

[place]



       ry     ry                                                      Phys[facility]



       reaction reaction                                                  ans



       due to due to                                                  



       internal internal                                                  



       fixation fixation                                                  



       device of device of                                                  



       right  right                                                   



       femur, femur,                                                  



       initial initial                                                  



       encounter encounter                                                  

 

       Closed Closed Problem Active                                    [facility]



       comminuted comminuted                                                  it

y of



       intra-varun intra-varun                                                  Te

[place]



       cular  cular                                                   Physici



       fracture fracture                                                  ans



       of distal of distal                                                  



       end of end of                                                  



       femur with femur with                                                  



       nonunion, nonunion,                                                  



       right  right                                                   

 

       Other  Other  Problem Active                                    [facility]



       fracture fracture                                                  ity of



       of shaft of shaft                                                  [place]



       of     of                                                      Physici



       unspecifie unspecifie                                                  an

s



       d tibia, d tibia,                                                  



       initial initial                                                  



       encounter encounter                                                  



       for closed for closed                                                  



       fracture fracture                                                  

 

       Bipolar Bipolar Problem Active                                    [facility]



       disorder, disorder,                                                  ity 

of



       in partial in partial                                                  Te

[place]



       remission, remission,                                                  Ph

ysici



       most   most                                                    ans



       recent recent                                                  



       episode episode                                                  



       mixed  mixed                                                   

 

       Anxiety Anxiety Problem Active                                    [facility]



                                                                      ity of



                                                                      [place]



                                                                      [facility]



                                                                      ans

 

       Closed Closed Problem Active                                    [facility]



       fracture fracture                                                  ity of



       of right of right                                                  [place]



       superior superior                                                  Physic

i



       pubic  pubic                                                   ans



       ramus  ramus                                                   







Allergies, Adverse Reactions, Alerts

This patient has no known allergies or adverse reactions.



Family History







           Family Member Diagnosis  Comments   Start Date Stop Date  Source

 

           Unknown Family Family history of Family History                      

 [facility]



           Member     cardiac disorder                                  [place] [facility]

[facility]

 

           Unknown Family Family history of Family History                      

 [facility]



           Member     essential                                   [place] [facility]

ns



                      hypertension                                  

 

           Unknown Family Family history of Family History                      

 [facility]



           Member     arthritis                                   [place] [facility]

ns

 

           Unknown Family Family history of Family History                      

 [facility]



           Member     malignant neoplasm                                  [place] 

[facility]







Social History







                Smoking Status  Start Date      Stop Date       Source

 

                Smokes tobacco daily (finding)                                 [facility]







Medications







       Ordered Filled Start  Stop   Current Ordering Indication Dosage Frequency

 Signature

                    Comments            Components          Source



     Medication Medication Date Date Medication? Clinician                (SIG) 

          



     Name Name                                                   

 

     Lidocaine 5 Lidocaine 5 2020-0      Yes  EZEKIEL                 APPLY 1       

    [facility]



     % External % External 6-17           KIMMY                PATCH TO     

      ity of



     Patch Patch 00:00:           APRN                THE            [place]



               00                                 AFFECTED           [facility]



                                                  AREA AND           ans



                                                  LEAVE IN           



                                                  PLACE FOR           



                                                  12 HOURS,           



                                                  THEN           



                                                  REMOVE AND           



                                                  LEAVE OFF           



                                                  FOR 12           



                                                  HOURS.           

 

     Cyclobenzap Cyclobenzap -0      Yes  EZEKIEL            Q0.3333D TAKE 1    

       [facility]



     rine HCl - rine HCl - 4-15           KIMMY                TABLET 3     

      ity of



     5 MG Oral 5 MG Oral 00:00:           APRN                TIMES           [place]



     Tablet Tablet 00                                 DAILY.           Physici



                                                                 ans

 

     Naproxen Naproxen       Yes  EZEKIEL            Q0.5D TAKE 1           Un

david



     500 MG Oral 500 MG Oral 2-19           KIMMY                TABLET     

      ity of



     Tablet Tablet 00:00:           APRN                TWICE           [place]



               00                                 DAILY WITH           Physici



                                                  MEALS.           ans

 

     Cyclobenzap Cyclobenzap       Yes  WILMER      1    Q0.5D TAKE 1     

      [facility]



     rine HCl - rine HCl - 1-13           MUJICA PA                TABLET       

    ity of



     10 MG Oral 10 MG Oral 00:00:                               TWICE           

[place]



     Tablet Tablet 00                                 DAILY           Physici



                                                                 ans

 

     Propranolol Propranolol -0      Yes  ARGENIS           Q0.5D TAKE 1   

        [facility]



     HCl - 10 MG HCl - 10 MG 2-13           SAMMY                TABLET       

    ity of



     Oral Tablet Oral Tablet 00:00:           M.D.                TWICE         

  [place]



               00                                 DAILY.           Physici



                                                                 ans

 

     Melatonin 5 Melatonin 5 2018      Yes  KULWINDER      1         TAKE 1        

   [facility]



     MG Oral MG Oral 1-14           DENNIS                TABLET           ity

 of



     Tablet Tablet 00:00:           M.D.                BEDTIME           [place]



               00                                                Physici



                                                                 ans

 

     Sertraline Sertraline 2018      Yes  ARGENIS      1    QD   TAKE 1      

     [facility]



     HCl - 100 HCl - 100 1-14           SAMMY                TABLET           

ity of



     MG Oral MG Oral 00:00:           M.D.                DAILY.           [place]



     Tablet Tablet 00                                                Physici



                                                                 ans

 

     QUEtiapine QUEtiapine 2018      Yes  ARGENIS      1         TAKE 1      

     [facility]



     Fumarate Fumarate 1-14           SAMMY                TABLET AT          

 ity of



     400 MG Oral 400 MG Oral 00:00:           M.D.                BEDTIME.      

     [place]



     Tablet Tablet 00                                                Physici



                                                                 ans

 

     QUEtiapine QUEtiapine 2018      Yes  ARGENIS      1    Q0.3333D TAKE 1  

         [facility]



     Fumarate Fumarate 1-14           SAMMY                TABLET 3           

ity of



     100 MG Oral 100 MG Oral 00:00:           M.D.                TIMES         

  [place]



     Tablet Tablet 00                                 DAILY           Physici



                                                                 ans

 

     Acetaminoph Acetaminoph       Yes  WILMER      1    Q6H  TAKE 1      

     [facility]



     en-Codeine en-Codeine 9-21           MUJICA PA                TABLET       

    ity of



     #3 300-30 #3 300-30 00:00:                               EVERY 6           

[place]



     MG Oral MG Oral 00                                 HOURS           Physici



     Tablet Tablet                                                   ans

 

     Methocarbam Methocarbam       Yes  WILMER           Q0.5D TAKE 1     

      [facility]



     ol 750 MG ol 750 MG 7-30           MUJICA PA                TABLET         

  ity of



     Oral Tablet Oral Tablet 00:00:                               TWICE         

  [place]



               00                                 DAILY.           Physici



                                                                 ans

 

     Methocarbam Methocarbam       Yes  WILMER      1    Q0.5D TAKE 1     

      [facility]



     ol 500 MG ol 500 MG 4-09           MUJICA PA                TABLET         

  ity of



     Oral Tablet Oral Tablet 00:00:                               TWICE         

  [place]



               00                                 DAILY           Physici



                                                                 ans

 

     traMADol traMADol       Yes  WILMER      1         TAKE 1           U

nivers



     HCl - 50 MG HCl - 50 MG 4-09           MUJICA PA                TABLET     

      ity of



     Oral Tablet Oral Tablet 00:00:                               ONCE          

 [place]



               00                                                Physici



                                                                 ans

 

     Vitamin D Vitamin D       Yes  EZEKIEL                 Take 1           U

nivers



     (Ergocalcif (Ergocalcif 8-02           IKMMY                capsule    

       ity of



     alisia) 1.25 alisia) 1.25 00:00:           APRN                twice a         

  [place]



     MG (30170 MG (23512 00                                 week,           Phys

ici



     UT) Oral UT) Oral                                    Tuesday           ans



     Capsule Capsule                                    and            



                                                  Thursday           

 

     Acetaminoph Acetaminoph       Yes  NAILA                TAKE 1 TO   

        [facility]



     en-Codeine en-Codeine 5-03           ANANT M.D.                2 TABLETS    

       ity of



     #3 300-30 #3 300-30 00:00:                               EVERY 6           

[place]



     MG Oral MG Oral 00                                 HOURS AS           Physi

ci



     Tablet Tablet                                    NEEDED FOR           ans



                                                  PAIN.           

 

     traMADol traMADol       Yes  NAILA                TAKE 1 TO         

  [facility]



     HCl - 50 MG HCl - 50 MG 5-03           ANANT M.D.                2 TABLETS  

         ity of



     Oral Tablet Oral Tablet 00:00:                               EVERY 6       

    [place]



               00                                 HOURS AS           Physici



                                                  NEEDED FOR           ans



                                                  PAIN.           

 

     Gabapentin Gabapentin       Yes  NAILA      1    Q0.3333D TAKE 1    

       Univers



     300 MG Oral 300 MG Oral 3-30           ANANT M.D.                CAPSULE 3  

         ity of



     Capsule Capsule 00:00:                               TIMES           [place]



               00                                 DAILY           Physici



                                                                 ans

 

     Methocarbam Methocarbam       Yes  NAILA      1    Q0.3333D TAKE 1  

         [facility]



     ol 500 MG ol 500 MG 3-30           ANANT M.D.                TABLET 3       

    ity of



     Oral Tablet Oral Tablet 00:00:                               TIMES         

  [place]



               00                                 DAILY PRN           Physici



                                                  muscle           ans



                                                  spasm           







Procedures







                Procedure       Date / Time Performed Performing Clinician Sourc

e

 

                [U] XRAY PELVIS MIN 3 2020 00:00:00                 [facility] [id number]                                       Physicians

 

                CT Femur without 2019 00:00:00                 [facility] [place]



                contrast [id number]                                  Physicians

 

                CT Pelvis wo contrast 2019 00:00:00                 [facility]

[facility] [place]



                [id number]                                           Physicians

 

                [U] XRAY FEMUR 2 [facility] 2018 00:00:00                 [facility]



                [facility] [id number]                                     Physicians

 

                [U] XRAY FEMUR 2 [facility] 2018 00:00:00                 Univers

[facility]



                RIGHT [id number]                                     Physicians

 

                History of Leg                                  [facility]



                surgery                                         Physicians







Encounters







        Start   End     Encounter Admission Attending Care    Care    Encounter 

Source



        Date/Time Date/Time Type    Type    Clinicians Facility Department ID   

   

 

        2020 AppointEUSEBIO D[facility]     Orthopedics 

[id number] Univers



        08:00:00 08:00:00 t;              RADAMES FALCON         Trauma          ity 

of



                        [facility]

s



                        RADAMES FALCON                         [place]           Physic





                                                        Medical         ans



                                                        Center          

 

        2020-04-15 2020-04-15 Appointdinesh ONEAL [facility]     Orthopedics 

[id number] Univers



        11:00:00 11:00:00 t;              RADAMES FALCON         Trauma          ity 

of



                        [facility]

s



                        RADAMES FALCON                         [place]           Physic





                                                        Medical         ans



                                                        Center          

 

        2020-04-15 2020-04-15 Appointdinesh ONEAL [facility]     [id number]

[id number] Univers



        11:00:00 11:00:00 t;              ANA FALCONN                         ity 

of



                        [place], [place]



                        RADAMES FALCON                                         Physic

i



                                                                        ans

 

        2020 Appointdinesh ONEAL [facility]     Orthopedics 

[id number] Univers



        11:00:00 11:00:00 t;              RADAMES FALCON         Trauma          ity 

of



                        [facility]

s



                        RADAMES FALCON                         [place]           Physic





                                                        Medical         ans



                                                        Center          

 

        2020 Appointdinesh ONEAL [facility]     [id number]

[id number] Univers



        11:30:00 11:30:00 t;              ANA FALCONN                         ity 

of



                        [place], [place]



                        ANA FALCONN                                         Physic

i



                                                                        ans

 

        2020 AppointEUSEBIO [place]     Orthopedics [id number]

[id number] Univers



        11:30:00 11:30:00 t; WILMER MUJICA PA         Trauma          

ity of



                        NELL GUILLEN                         [facility]          

 

        2019 AppointEUSEBIO [place]     Orthopedics [id number]

[id number] Univers



        14:15:00 14:15:00 t; WILMER MUJICA PA         Trauma          

ity of



                        NELL GUILLEN                         [facility]          

 

        2019 Emergency         Vincent, TRAUMA  [id number]

[id number] 



        15:23:02 17:14:00                 [facility]  [id number]         



                                                        [id number]         



                                                        [id number]         



                                                        [id number]             

 

        2019 Appointdinesh DENNIS [facility]     [id number]

716 Univers



        09:45:00 09:45:00 t;              ANDIE MIRAMONTES

 of



                        [place], [place]



                        ANDIE MIRAMONTES                                         Physi

ci



                                                                        ans

 

        2019 Rupa DENNIS [facility]     UTP     [id number]

102 Univers



        11:00:00 11:00:00 t;              ANDIE MIRAMONTES

 of



                        [place], [place]



                        ANDIE MIRAMONTES                                         Physi

ci



                                                                        ans

 

        2019 Rupa MUJICA [facility]     [id number]

9 Univers



        11:00:00 11:00:00 t; WILMER MUJICA PA                         

ity of



                        NELL GUILLEN                                         [place]



                                                                        Physici



                                                                        ans

 

        2019 Appointdinesh DENNIS, [facility]     [id number]

206 Univers



        10:15:00 10:15:00 t;              ANDIE MIRAMONTES

 of



                        [place], [place]



                        ANDIE MIRAMONTES                                         Physi

ci



                                                                        ans

 

        [facility]     [id number]

[facility] Univers



        11:00:00 11:00:00 t;              ANDIE MIRAMONTES

 of



                        [place], [place]



                        ANDIE MIRAMONTES                                         Physi

ci



                                                                        ans

 

        2018-12-10 2018-12-10 AppointEUSEBIO [place]     UTP     [id number]

6 Univers



        11:00:00 11:00:00 t; WILMER MUJICA PA                         

ity of



                        NELL GUILLEN                                         [place]



                                                                        Physici



                                                                        ans

 

        2018 Appointdinesh DENNIS [facility]     [id number]

036 Univers



        10:00:00 10:00:00 t;              ANDIE MIRAMONTES

 of



                        [place], [place]



                        ANDIE MIRAMONTES                                         Physi

ci



                                                                        ans

 

        2018-10-29 2018-10-29 Appointmen         SIL, [facility]     [id number]

1 Univers



        13:30:00 13:30:00 t; WILMER MUJICA PA                         

ity of



                        WILMER, PA                                         [place]



                                                                        Physici



                                                                        ans

 

        2018-10-08 2018-10-08 Appointmen         SIL, [facility]     [id number]

2 Univers



        11:45:00 11:45:00 t; WILMER MUJICA PA                         

ity of



                        WILMER, PA                                         [place]



                                                                        Physici



                                                                        ans

 

        2018 Appointmen         SIL, [facility]     [id number]

1 Univers



        11:30:00 11:30:00 t; WILMER MUJICA PA                         

ity of



                        [place], [place]                                         [place]



                                                                        Physici



                                                                        ans

 

        2018 Appointmen         Ruthie  [facility]     [id number]

3 Univers



        08:00:00 08:00:00 t; Tony Hendricks,                         ity

 dai Jimenez M.D.                            [place]



                        M.D.                                            Physici



                                                                        ans

 

        2018 Appointmen         SIL, [facility]     [id number]

0 Univers



        12:00:00 12:00:00 t; WILMER MUJICA PA                         

ity of



                        [place], [place]                                         [place]



                                                                        Physici



                                                                        ans

 

        2018 Appointmen         SIL, [facility]     [id number]

6 Univers



        12:00:00 12:00:00 t; WILMER MUJICA PA                         

ity of



                        [place], PA                                         [place]



                                                                        Physici



                                                                        ans

 

        2018 [facility]     [id number]

2 Univers



        12:30:00 12:30:00 t; WILMER MUJICA PA                         

ity of



                        [place], PA                                         [place]



                                                                        Physici



                                                                        ans

 

        2018 Appointmen         SIL [facility]     Orthopedics [id number]

[id number] Univers



        09:45:00 09:45:00 t; WILMER MUJICA PA                         

ity of



                        [place], PA                                         [place]



                                                                        Physici



                                                                        ans

 

        2018 Appointmen         SIL [facility]     Orthopedics [id number]

[id number] Univers



        09:45:00 09:45:00 t; WILMER UMJICA PA                         

ity of



                        [place], PA                                         [place]



                                                                        Physici



                                                                        ans

 

        2017-10-18 2017-10-18 Appointmen         KIMMY, [facility]     [id number]

[id number] Univers



        13:30:00 13:30:00 t;              EZEKIEL, NP                         ity of



                        [place], [place]



                        EZEKIEL, NP                                         Physici



                                                                        ans

 

        2017-10-18 2017-10-18 Appointmen         EUSEBIO NORRIS     UTP     [id number]

9 Univers



        12:45:00 12:45:00 t; KALYN NORRIS, NP                         ity

 of



                        KALYN, NP                                         [place]



                                                                        Physici



                                                                        ans

 

        2017 Appointmen         EUSEBIO BULLARD     UTP     [id number]

8 Univers



        11:00:00 11:00:00 t; ABIMAEL BULLARD ity of JOCELYN, M.D.                            [place]



                        M.D.                                            Physici



                                                                        ans

 

        2017 Appointmen         EUSEBIO NORRIS     UTP     [id number]

9 Univers



        10:15:00 10:15:00 t; KALYN NORRIS, NP                         ity

 of



                        KALYN, NP                                         [place]



                                                                        Physici



                                                                        ans

 

        2017 Appointmen         EUSEBIO [place]     UTP     [id number]

3 Univers



        11:15:00 11:15:00 t; NAILA [place] ity o f ANDREW, M.D.                            [place]



                        M.D.                                            Physici



                                                                        ans

 

        2017 Appointmen         ALEAH,  EUSEBIO     UTP     [id number]

1 Univers



        10:00:00 10:00:00 t; ABIMAEL BULLARD ity of JOCELYN, M.D.                            [place]



                        M.D.                                            Physici



                                                                        ans

 

        2017 Appointmen         EUSEBIO [place]     UTP     [id number]

2 Univers



        08:30:00 08:30:00 t; NAILA [place] ity o f ANDREW, M.D.                            [place]



                        M.D.                                            Physici



                                                                        ans

 

        2017 Appointmen         EUSEBIO [place]     UTP     [id number]

6 Univers



        12:30:00 12:30:00 t; NAILA [place] ity o f ANDREW, M.D.                            [place]



                        M.D.                                            Physici



                                                                        ans







Results







           Test Description Test Time  Test Comments Results    Result     [facility]

e



                                                       Comments   

 

           [U] XRAY FEMUR 2 2019            Images                Universi

ty of



           VWS RIGHT [id number] 14:24:00              acquired, not            [place]



                                            reported on            Physicians



                                            this accession            



                                            number.

## 2021-01-03 RX ADMIN — Medication SCH ML: at 21:00

## 2021-01-03 RX ADMIN — Medication SCH ML: at 09:00

## 2021-01-03 RX ADMIN — CEFTRIAXONE SCH MLS: 1 INJECTION, SOLUTION INTRAVENOUS at 23:00

## 2021-01-03 NOTE — RAD REPORT
EXAM DESCRIPTION:  Head Brain Wo Cont

 

CLINICAL HISTORY:  45-year-old female with mental status change.

 

COMPARISON:  None.

 

TECHNIQUE:  CT brain without contrast. This exam was performed according to our departmental dose opt
imization program which includes use of automated exposure control, adjustment of the mA and/or kV ac
cording to patient size and/or use of iterative reconstruction technique.

 

FINDINGS:  The ventricles, sulci, and cisterns are within normal limits.   The gray-white matter diff
erentiation is preserved.   There is no mass effect, midline shift, intra- or extra-axial fluid colle
ction/acute hemorrhage.   The osseous structures are unremarkable.   The paranasal sinuses and mastoi
d air cells are clear.

 

IMPRESSION:  No acute intracranial abnormalities.

 

Electronically signed by:   Marybel Hinton MD   1/2/2021 11:48 PM CST Workstation: [id number]

 

Due to temporary technical issues with the PACS/Fluency reporting system, reports are being signed by
 the in house radiologists without review as a courtesy to insure prompt reporting. The interpreting 
radiologist is fully responsible for the content of the report.
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
## 2021-01-03 NOTE — EDPHYS
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                             

01:41 00:52 2021 00:28 Hospitalization Ordered by TimothyPattie CAZARES for Inpatient          

      Admission. Preliminary diagnosis is Altered mental status, unspecified; Urinary tract       

      infection, site not specified; Other sepsis. Bed requested for Telemetry/MedSurg            

      (observation). Status is Inpatient Admission. Condition is Stable. Problem is new.          

      Symptoms are unchanged.                                                                   

13:18 00:55 Data reviewed: vital signs, nurses notes, lab test result(s), radiologic studies, cp  

      CT scan, and as a result, I will cp                                                         

                                                                                             

16:53 01 01:41 2021 00:28 Hospitalization Ordered by Timothy Vickers DO for Inpatient    

      Admission. Preliminary diagnosis is Altered mental status, unspecified; Urinary tract       

      infection, site not specified; Other sepsis. Bed requested for [facility]. Status is      

      Inpatient Admission. Condition is Stable. Problem is new. Symptoms are unchanged.         

                                                                                             

18:53 16:53 2021 00:28 Hospitalization Ordered by Timothy Alee CAZARES for Inpatient        [id number] 

      Admission. Preliminary diagnosis is Altered mental status, unspecified; Urinary tract       

      infection, site not specified; Other sepsis. Bed requested for Telemetry/MedSurg            

      (Inpatient). Status is Inpatient Admission. Condition is Stable. Problem is new.            

      Symptoms are unchanged.                                                                   

                                                                                                  

**************************************************************************************************
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
## 2021-01-03 NOTE — P.PN
Date of Service: 01/03/21


Patient was obtunded and I saw her this morning.


Vital signs are stable.


Urine culture is pending.


Blood culture results are pending.





Plan:


Continue antibiotics.


IV Ativan and p.r.n. for agitation.


Follow cultures.


Psychiatric consult once stable.

## 2021-01-04 RX ADMIN — OXYCODONE AND ACETAMINOPHEN PRN TAB: 5; 325 TABLET ORAL at 10:33

## 2021-01-04 RX ADMIN — QUETIAPINE FUMARATE SCH MG: 100 TABLET, FILM COATED ORAL at 21:42

## 2021-01-04 RX ADMIN — CEFTRIAXONE SCH MLS: 1 INJECTION, SOLUTION INTRAVENOUS at 21:42

## 2021-01-04 RX ADMIN — OXYCODONE AND ACETAMINOPHEN PRN TAB: 5; 325 TABLET ORAL at 00:40

## 2021-01-04 RX ADMIN — OXYCODONE AND ACETAMINOPHEN PRN TAB: 5; 325 TABLET ORAL at 18:58

## 2021-01-04 RX ADMIN — Medication SCH: at 09:00

## 2021-01-04 RX ADMIN — PREGABALIN SCH MG: 50 CAPSULE ORAL at 21:42

## 2021-01-04 RX ADMIN — Medication SCH ML: at 21:43

## 2021-01-04 NOTE — P.PN
Subjective


Date of Service: 01/04/21


Chief Complaint: UTI, AMS.


Patient is more awake and interactive.


She is emotionally labile.











Physical Examination





- Vital Signs


Temperature: 99.8 F


Blood Pressure: 144/74


Pulse: 74


Respirations: 18


Pulse Ox (%): 98





- Physical Exam


General: Oriented x3


HEENT: Mucous membr. moist/pink


Respiratory: Clear to auscultation bilaterally, Normal air movement


Cardiovascular: No edema, Regular rate/rhythm, Normal S1 S2


Gastrointestinal: Normal bowel sounds, Soft and benign, No tenderness


Musculoskeletal: No swelling, No tenderness


Integumentary: No rashes, No erythema


Neurological: Normal speech, Normal strength at 5/5 x4 extr





- Studies


Microbiology Data (last 24 hrs): 








01/02/21 23:56   Blood  - Blood   Anaerobic Blood Culture - Final


01/03/21 00:03   Blood  - Blood   Anaerobic Blood Culture - Final








Assessment And Plan





- Current Problems (Diagnosis)


(1) UTI (urinary tract infection)


Current Visit: Yes   Status: Acute   





(2) Metabolic encephalopathy


Current Visit: Yes   Status: Acute   





(3) Acute delirium


Onset Date: 06/05/17   Current Visit: No   Status: Acute   





(4) Tetrahydrocannabinol (THC) use disorder, mild, abuse


Onset Date: 06/05/17   Current Visit: No   Status: Acute   





(5) Bipolar disorder


Current Visit: Yes   Status: Acute   





- Plan


Urine culture still pending.


Both blood culture bottles in a set are growing Gram negative coccobacilli.


Noted elevated pro calcitonin.


Continue IV Rocephin


Contain IV hydration


Pain management as needed.


Follow urine culture.


Repeat blood cultures.


I suspect patient has psychotic features along with her bipolar disorder.


Resume home medications

## 2021-01-05 LAB
ALBUMIN SERPL BCP-MCNC: 2.7 G/DL (ref 3.4–5)
ALP SERPL-CCNC: 104 U/L (ref 45–117)
ALT SERPL W P-5'-P-CCNC: 168 U/L (ref 12–78)
ANISOCYTOSIS BLD QL: SLIGHT
AST SERPL W P-5'-P-CCNC: 210 U/L (ref 15–37)
BUN BLD-MCNC: 7 MG/DL (ref 7–18)
GLUCOSE SERPLBLD-MCNC: 99 MG/DL (ref 74–106)
HCT VFR BLD CALC: 34.2 % (ref 36–45)
HYPOCHROMIA BLD QL: (no result)
MAGNESIUM SERPL-MCNC: 2 MG/DL (ref 1.8–2.4)
MORPHOLOGY BLD-IMP: (no result)
PMV BLD: 9.5 FL (ref 7.6–11.3)
POTASSIUM SERPL-SCNC: 3.3 MMOL/L (ref 3.5–5.1)
RBC # BLD: 4.79 M/UL (ref 3.86–4.86)
TARGETS BLD QL SMEAR: (no result)

## 2021-01-05 RX ADMIN — QUETIAPINE FUMARATE SCH MG: 100 TABLET, FILM COATED ORAL at 20:14

## 2021-01-05 RX ADMIN — OXYCODONE AND ACETAMINOPHEN PRN TAB: 5; 325 TABLET ORAL at 14:00

## 2021-01-05 RX ADMIN — SODIUM CHLORIDE SCH MLS: 9 INJECTION, SOLUTION INTRAVENOUS at 20:13

## 2021-01-05 RX ADMIN — QUETIAPINE FUMARATE SCH MG: 100 TABLET, FILM COATED ORAL at 17:40

## 2021-01-05 RX ADMIN — QUETIAPINE FUMARATE SCH MG: 100 TABLET, FILM COATED ORAL at 13:57

## 2021-01-05 RX ADMIN — PREGABALIN SCH MG: 50 CAPSULE ORAL at 20:14

## 2021-01-05 RX ADMIN — QUETIAPINE FUMARATE SCH MG: 100 TABLET, FILM COATED ORAL at 11:01

## 2021-01-05 RX ADMIN — OXYCODONE AND ACETAMINOPHEN PRN TAB: 5; 325 TABLET ORAL at 22:07

## 2021-01-05 RX ADMIN — NICOTINE SCH MG: 21 PATCH TRANSDERMAL at 11:00

## 2021-01-05 RX ADMIN — PREGABALIN SCH MG: 50 CAPSULE ORAL at 11:01

## 2021-01-05 RX ADMIN — Medication SCH PATCH: at 10:59

## 2021-01-05 RX ADMIN — Medication SCH ML: at 20:14

## 2021-01-05 RX ADMIN — Medication SCH ML: at 11:02

## 2021-01-05 NOTE — P.PN
Subjective


Date of Service: 01/05/21


Chief Complaint: UTI, AMS.


Subjective: No new changes (feels slightly better. review of symptoms - pt has 

been having moderate-severe headache over the past few days, +fever, blood 

culture: h flu. denies sinusitis, +dental cavities. denies urinary complaints)





Review of Systems


10-point ROS is otherwise unremarkable





Physical Examination





- Vital Signs


Temperature: 97 F


Blood Pressure: 117/75


Pulse: 98


Respirations: 18


Pulse Ox (%): 94





- Physical Exam


General: Alert, In no apparent distress, Oriented x3


HEENT: Other (dental caries), Sclerae nonicteric


Neck: Other (slight TTP in posterior neck)


Respiratory: Clear to auscultation bilaterally


Cardiovascular: No edema, Regular rate/rhythm


Gastrointestinal: Soft and benign, Non-distended, No tenderness


Integumentary: No rashes, No significant lesion


Neurological: Normal speech, Normal strength at 5/5 x4 extr, Cranial nerves 3-12

intact, Normal affect





- Studies


Microbiology Data (last 24 hrs): 








01/02/21 23:56   Blood  - Blood   Anaerobic Blood Culture - Final








Assessment & Plan


Physician Review Additional Text: 





H. Flu bacteremia


Metabolic encephalopathy


Acute delirium


THC use


Bipolar disorder








blood cx: h. flu, urine cx: mixed anju. 


pt denies urinary symptoms, unclear if truly has UTI


given symptoms and bacteremia, concern for possible meningitis


CT head negative in ED, ID consulted


given rise in LFTs, will switch rocephin to meropenem


will obtain LP, CSF labs ordered, has already received antibiotics x 48hrs


gallbladder noted to be thickened on CT, will f/u with RUQ U/S


toradol for headache x 1 today


Pain management as needed.


procal downtrending


repeat blood cultures





Dispo: pending further workup, anticipate dc home in ~2 days





Time Spent Managing Pts Care (In Minutes): 45

## 2021-01-06 LAB
ALBUMIN SERPL BCP-MCNC: 2.7 G/DL (ref 3.4–5)
ALP SERPL-CCNC: 99 U/L (ref 45–117)
ALT SERPL W P-5'-P-CCNC: 176 U/L (ref 12–78)
AST SERPL W P-5'-P-CCNC: 178 U/L (ref 15–37)
BUN BLD-MCNC: 15 MG/DL (ref 7–18)
COLOR FLD: (no result)
COLOR FLD: (no result)
GLUCOSE SERPLBLD-MCNC: 112 MG/DL (ref 74–106)
HCT VFR BLD CALC: 35.8 % (ref 36–45)
LYMPHOCYTES # SPEC AUTO: 3.1 K/UL (ref 0.7–4.9)
MAGNESIUM SERPL-MCNC: 2.1 MG/DL (ref 1.8–2.4)
PMV BLD: 9.1 FL (ref 7.6–11.3)
POTASSIUM SERPL-SCNC: 2.9 MMOL/L (ref 3.5–5.1)
RBC # BLD: 4.92 M/UL (ref 3.86–4.86)
SPECIMEN VOL FLD: 7 ML

## 2021-01-06 PROCEDURE — 009U3ZX DRAINAGE OF SPINAL CANAL, PERCUTANEOUS APPROACH, DIAGNOSTIC: ICD-10-PCS

## 2021-01-06 RX ADMIN — POTASSIUM CHLORIDE SCH MLS: 200 INJECTION, SOLUTION INTRAVENOUS at 20:23

## 2021-01-06 RX ADMIN — QUETIAPINE FUMARATE SCH MG: 100 TABLET, FILM COATED ORAL at 20:24

## 2021-01-06 RX ADMIN — PREGABALIN SCH: 50 CAPSULE ORAL at 09:00

## 2021-01-06 RX ADMIN — Medication SCH: at 09:00

## 2021-01-06 RX ADMIN — POTASSIUM CHLORIDE SCH MLS: 200 INJECTION, SOLUTION INTRAVENOUS at 10:38

## 2021-01-06 RX ADMIN — POTASSIUM CHLORIDE SCH MLS: 200 INJECTION, SOLUTION INTRAVENOUS at 07:21

## 2021-01-06 RX ADMIN — Medication SCH ML: at 20:26

## 2021-01-06 RX ADMIN — PREGABALIN SCH MG: 50 CAPSULE ORAL at 20:24

## 2021-01-06 RX ADMIN — OXYCODONE AND ACETAMINOPHEN PRN TAB: 5; 325 TABLET ORAL at 18:01

## 2021-01-06 RX ADMIN — Medication SCH ML: at 09:39

## 2021-01-06 RX ADMIN — SODIUM CHLORIDE SCH MLS: 9 INJECTION, SOLUTION INTRAVENOUS at 01:32

## 2021-01-06 RX ADMIN — SODIUM CHLORIDE SCH MLS: 9 INJECTION, SOLUTION INTRAVENOUS at 18:01

## 2021-01-06 RX ADMIN — QUETIAPINE FUMARATE SCH: 100 TABLET, FILM COATED ORAL at 12:00

## 2021-01-06 RX ADMIN — OXYCODONE AND ACETAMINOPHEN PRN TAB: 5; 325 TABLET ORAL at 05:48

## 2021-01-06 RX ADMIN — QUETIAPINE FUMARATE SCH MG: 100 TABLET, FILM COATED ORAL at 18:01

## 2021-01-06 RX ADMIN — SODIUM CHLORIDE SCH MLS: 9 INJECTION, SOLUTION INTRAVENOUS at 09:38

## 2021-01-06 RX ADMIN — NICOTINE SCH MG: 21 PATCH TRANSDERMAL at 09:38

## 2021-01-06 RX ADMIN — Medication SCH PATCH: at 09:38

## 2021-01-06 RX ADMIN — QUETIAPINE FUMARATE SCH: 100 TABLET, FILM COATED ORAL at 08:00

## 2021-01-06 NOTE — RAD REPORT
EXAM DESCRIPTION:  RAD - Lumbar Puncture For Dx - 1/6/2021 1:33 pm

 

CLINICAL HISTORY:  Meningitis, headache

 

COMPARISON:  Lumbar spine December 2, 2019, CT abdomen January 3, 2021

 

TECHNIQUE:  The procedure, risks and alternatives to the procedure were discussed with the patient in
 detail. After answering all questions, both oral and written consent were obtained.  Time-out proced
ure was performed.

 

The patient was placed in an oblique prone position on the fluoroscopic table. The skin of the lower 
back was prepped and draped in the usual sterile fashion. After anesthetizing the skin and deeper sof
t tissues with 1% lidocaine, a 7 inch long 22 gauge needle was advanced into the thecal sac at the L3
 level.

 

Intrathecal placement was confirmed with clear colorless CSF observed. Approximately 8-9 mm of CSF ob
tained.

 

At the conclusion of the procedure, the needle was withdrawn and a sterile bandage placed over the pu
ncture site.  The patient tolerated the procedure well without immediate complications.  Post-procedu
re care and precaution instructions were discussed with the patient before the LP procedure. Patient 
was transferred back to the floor for continued care.

 

Lateral lumbar spine film obtained along with retaining a single fluoroscopic spot image.

Fluoro time was 2.4 minutes.

 

IMPRESSION:  Successful fluoroscopic guided lumbar puncture.  All obtained fluid was sent to the lab 
for studies requested by the referring physician.

## 2021-01-06 NOTE — P.PN
Subjective


Date of Service: 01/06/21


Chief Complaint: UTI, AMS.


Subjective: No new changes (feeling about the same, continues with headache, 

with some r sided neck pain as well)





Review of Systems


10-point ROS is otherwise unremarkable





Physical Examination





- Vital Signs


Temperature: 98.0 F


Blood Pressure: 104/70


Pulse: 96


Respirations: 18


Pulse Ox (%): 95





- Physical Exam


General: Alert, Oriented x3


HEENT: Sclerae nonicteric


Neck: Other (TTP along R SCM, no tenderness along spine. no nuchal rigidity)


Respiratory: Clear to auscultation bilaterally


Cardiovascular: No edema, Regular rate/rhythm


Gastrointestinal: Soft and benign, No tenderness


Integumentary: No rashes


Neurological: Normal speech, Cranial nerves 3-12 intact, Normal affect





- Studies


Microbiology Data (last 24 hrs): 








01/02/21 23:10   Clean Catch Urine   [place] Count - Final


                            >100,000 CFU/ML.


01/02/21 23:10   Clean Catch Urine    - Final


                            MIXED ANJU.


01/03/21 00:03   Blood  - Blood   Aerobic Blood Culture - Final


                            Haemophilus Influenza Iv


01/03/21 00:03   Blood  - Blood   Blood Culture Gram Stain - Final


01/03/21 00:03   Blood  - Blood   Anaerobic Blood Culture - Final


01/02/21 23:56   Blood  - Blood   Aerobic Blood Culture - Final


                            Haemophilus Influenza Iv


01/02/21 23:56   Blood  - Blood   Blood Culture Gram Stain - Final


01/02/21 23:56   Blood  - Blood   Anaerobic Blood Culture - Final








Assessment & Plan


Physician Review Additional Text: 





H. Flu bacteremia


Metabolic encephalopathy


Acute delirium


THC use


Bipolar disorder


hypokalemia








blood cx: h. flu, urine cx: mixed anju. 


pt reported having some urinary frequency on day prior to admission


concern for meningitis, to have LP today, radiology unavailable yesterday


CT head neg in ED, ID consulted, switch to meropenem on 1/5 due to rise in LFTs 

(was on rocephin)


RUQ U/S ok


continue home pain management, PDMP reviewed - for chronic pain, no red flags


repeat blood cultures


replace potassium





Dispo: pending further workup, anticipate hospitalization > 2 days





Time Spent Managing Pts Care (In Minutes): 35

## 2021-01-06 NOTE — RAD REPORT
EXAM DESCRIPTION:  US - Abdomen Exam Limited - 1/6/2021 12:07 pm

 

CLINICAL HISTORY:  eval gallbladder, thickened on CT, bacteremia

 

COMPARISON:  ABDOMINAL EXAM LIMITED dated 1/27/2016

 

FINDINGS:  The gallbladder demonstrates significant contraction, limiting assessment. Grossly no ston
es are visualized. No pericholecystic fluid or gallbladder wall thickening. The common bile duct is n
ormal measuring 2 mm.

 

The liver demonstrates no findings of intrahepatic biliary dilatation.

 

IMPRESSION:  Contracted gallbladder limiting evaluation. No gross gallstones evident.

## 2021-01-06 NOTE — PN
Subjective:  Patient lying in bed, continue to have headaches.  Otherwise, no 
other complaints.



Objective:  Vital Signs:  Temperature 98.8, pulse 90, respirations 16, blood 
pressure 95/55. 

Lungs:  Clear to auscultation. 

Heart:  S1, S2.  Regular. 

Abdomen:  Soft.  Bowel sounds present. 

Extremities:  No edema.



Laboratory Data:  Shows WBC 9, hemoglobin 11.2, platelets normal.  Chemistry 
shows sodium of 140, potassium 2.9, chloride 105, bicarb 30, BUN 15, creatinine 
0.8, glucose 112.  Blood cultures growing Hemophilus influenza.  Repeat cultures
done yesterday are negative up-to-date.  Lumbar puncture is pending.



Assessment And Plan:  Bacteremia secondary to Hemophilus influenza.  Urinary 
tract infection with headaches.  Awaiting lumbar puncture.  We will follow the 
patient as needed.  Patient is afebrile.  Continue empiric antibiotic with 
meropenem.





NF/MODL

DD:  01/06/2021 12:36:06   Voice ID:  [id number]

DT:  01/06/2021 12:53:44   Report ID:  [id number]

[facility]

## 2021-01-07 LAB
ALBUMIN SERPL BCP-MCNC: 2.2 G/DL (ref 3.4–5)
ALP SERPL-CCNC: 82 U/L (ref 45–117)
ALT SERPL W P-5'-P-CCNC: 118 U/L (ref 12–78)
ANISOCYTOSIS BLD QL: (no result)
AST SERPL W P-5'-P-CCNC: 96 U/L (ref 15–37)
BUN BLD-MCNC: 8 MG/DL (ref 7–18)
GLUCOSE SERPLBLD-MCNC: 90 MG/DL (ref 74–106)
HCT VFR BLD CALC: 31.4 % (ref 36–45)
LYMPHOCYTES # SPEC AUTO: 2.5 K/UL (ref 0.7–4.9)
MAGNESIUM SERPL-MCNC: 2.2 MG/DL (ref 1.8–2.4)
MORPHOLOGY BLD-IMP: (no result)
PMV BLD: 8.8 FL (ref 7.6–11.3)
POTASSIUM SERPL-SCNC: 3.6 MMOL/L (ref 3.5–5.1)
RBC # BLD: 4.4 M/UL (ref 3.86–4.86)
TARGETS BLD QL SMEAR: (no result)

## 2021-01-07 RX ADMIN — Medication SCH ML: at 10:05

## 2021-01-07 RX ADMIN — CEFTRIAXONE SCH MLS: 1 INJECTION, SOLUTION INTRAVENOUS at 09:57

## 2021-01-07 RX ADMIN — Medication SCH PATCH: at 09:59

## 2021-01-07 RX ADMIN — Medication SCH ML: at 20:48

## 2021-01-07 RX ADMIN — QUETIAPINE FUMARATE SCH MG: 100 TABLET, FILM COATED ORAL at 11:25

## 2021-01-07 RX ADMIN — QUETIAPINE FUMARATE SCH MG: 100 TABLET, FILM COATED ORAL at 20:48

## 2021-01-07 RX ADMIN — MORPHINE SULFATE PRN MG: 4 INJECTION, SOLUTION INTRAMUSCULAR; INTRAVENOUS at 13:00

## 2021-01-07 RX ADMIN — PREGABALIN SCH MG: 50 CAPSULE ORAL at 20:48

## 2021-01-07 RX ADMIN — OXYCODONE AND ACETAMINOPHEN PRN TAB: 5; 325 TABLET ORAL at 10:03

## 2021-01-07 RX ADMIN — MORPHINE SULFATE PRN MG: 4 INJECTION, SOLUTION INTRAMUSCULAR; INTRAVENOUS at 20:56

## 2021-01-07 RX ADMIN — CEFTRIAXONE SCH MLS: 1 INJECTION, SOLUTION INTRAVENOUS at 20:48

## 2021-01-07 RX ADMIN — OXYCODONE AND ACETAMINOPHEN PRN TAB: 5; 325 TABLET ORAL at 01:54

## 2021-01-07 RX ADMIN — NICOTINE SCH MG: 21 PATCH TRANSDERMAL at 09:58

## 2021-01-07 RX ADMIN — PREGABALIN SCH MG: 50 CAPSULE ORAL at 09:58

## 2021-01-07 RX ADMIN — SODIUM CHLORIDE SCH MLS: 9 INJECTION, SOLUTION INTRAVENOUS at 23:34

## 2021-01-07 RX ADMIN — QUETIAPINE FUMARATE SCH MG: 100 TABLET, FILM COATED ORAL at 17:15

## 2021-01-07 RX ADMIN — SODIUM CHLORIDE SCH MLS: 9 INJECTION, SOLUTION INTRAVENOUS at 11:26

## 2021-01-07 RX ADMIN — QUETIAPINE FUMARATE SCH MG: 100 TABLET, FILM COATED ORAL at 09:59

## 2021-01-07 NOTE — P.PN
Subjective


Date of Service: 01/07/21


Chief Complaint: UTI, AMS.


Subjective: Other (continues wit headache, neck pain, R leg pain, doesn't feel 

home pain medications are helping much. no appetite. walking ok, voiding ok, no 

diarrhea)





Review of Systems


10-point ROS is otherwise unremarkable





Physical Examination





- Vital Signs


Temperature: 97.1 F


Blood Pressure: 124/68


Pulse: 79


Respirations: 16


Pulse Ox (%): 97





- Physical Exam


General: Alert, In no apparent distress, Oriented x3


HEENT: PERRLA, Sclerae nonicteric


Respiratory: Clear to auscultation bilaterally


Cardiovascular: No edema, Regular rate/rhythm


Gastrointestinal: Soft and benign, No tenderness


Musculoskeletal: Tenderness (RLE, more at L lower hip / lateral thigh)


Integumentary: No rashes


Neurological: Normal speech, Normal strength at 5/5 x4 extr, Normal affect





- Studies


Microbiology Data (last 24 hrs): 








01/02/21 23:10   Clean Catch Urine   [place] Count - Final


                            >100,000 CFU/ML.


01/02/21 23:10   Clean Catch Urine    - Final


                            MIXED ANJU.


01/03/21 00:03   Blood  - Blood   Aerobic Blood Culture - Final


                            Haemophilus Influenza Iv


01/03/21 00:03   Blood  - Blood   Blood Culture Gram Stain - Final


01/03/21 00:03   Blood  - Blood   Anaerobic Blood Culture - Final


01/02/21 23:56   Blood  - Blood   Aerobic Blood Culture - Final


                            Haemophilus Influenza Iv


01/02/21 23:56   Blood  - Blood   Blood Culture Gram Stain - Final


01/02/21 23:56   Blood  - Blood   Anaerobic Blood Culture - Final








Assessment & Plan


Physician Review Additional Text: 





H. Flu bacteremia


Metabolic encephalopathy secondary to bacterial meningitis


UTI


THC use


Bipolar disorder


hypokalemia








blood cx: h. flu, urine cx: mixed anju. 


LP done yesterday - consistent with bacterial meningitis, awaiting gram 

stain/culture - however pt was on abx for ~3 days prior to LP


pt reported having some urinary frequency on day prior to admission


CT head neg in ED, ID consulted, switch to meropenem on 1/5 due to rise in LFTs 

(was on rocephin), due to concern for meningitis, switched back to rocephin and 

dose increased to 2g BID 


RUQ U/S ok


continue home pain management, PDMP reviewed - for chronic pain, no red flags


add morphine for breakthrough


repeat blood culture (1/5): no growth so far


replace potassium





Dispo: hospitalization for several days for bacterial meningitis - isolation 

precautions





Time Spent Managing Pts Care (In Minutes): 35

## 2021-01-08 LAB
BUN BLD-MCNC: 7 MG/DL (ref 7–18)
GLUCOSE SERPLBLD-MCNC: 88 MG/DL (ref 74–106)
POTASSIUM SERPL-SCNC: 4 MMOL/L (ref 3.5–5.1)

## 2021-01-08 RX ADMIN — QUETIAPINE FUMARATE SCH MG: 100 TABLET, FILM COATED ORAL at 19:55

## 2021-01-08 RX ADMIN — Medication SCH ML: at 08:28

## 2021-01-08 RX ADMIN — CEFTRIAXONE SCH MLS: 1 INJECTION, SOLUTION INTRAVENOUS at 08:28

## 2021-01-08 RX ADMIN — OXYCODONE AND ACETAMINOPHEN PRN TAB: 5; 325 TABLET ORAL at 16:48

## 2021-01-08 RX ADMIN — SODIUM CHLORIDE SCH MLS: 0.9 INJECTION, SOLUTION INTRAVENOUS at 19:54

## 2021-01-08 RX ADMIN — MEPERIDINE HYDROCHLORIDE SCH MG: 25 INJECTION, SOLUTION INTRAMUSCULAR; INTRAVENOUS; SUBCUTANEOUS at 19:56

## 2021-01-08 RX ADMIN — PREGABALIN SCH MG: 50 CAPSULE ORAL at 19:55

## 2021-01-08 RX ADMIN — SODIUM CHLORIDE SCH MLS: 9 INJECTION, SOLUTION INTRAVENOUS at 22:48

## 2021-01-08 RX ADMIN — QUETIAPINE FUMARATE SCH MG: 100 TABLET, FILM COATED ORAL at 16:48

## 2021-01-08 RX ADMIN — MORPHINE SULFATE PRN MG: 4 INJECTION, SOLUTION INTRAMUSCULAR; INTRAVENOUS at 12:54

## 2021-01-08 RX ADMIN — PREGABALIN SCH MG: 50 CAPSULE ORAL at 08:27

## 2021-01-08 RX ADMIN — NICOTINE SCH MG: 21 PATCH TRANSDERMAL at 08:26

## 2021-01-08 RX ADMIN — CEFTRIAXONE SCH MLS: 1 INJECTION, SOLUTION INTRAVENOUS at 19:54

## 2021-01-08 RX ADMIN — SODIUM CHLORIDE SCH MLS: 9 INJECTION, SOLUTION INTRAVENOUS at 11:58

## 2021-01-08 RX ADMIN — TOPIRAMATE SCH MG: 25 TABLET, COATED ORAL at 19:55

## 2021-01-08 RX ADMIN — Medication SCH ML: at 19:56

## 2021-01-08 RX ADMIN — OXYCODONE AND ACETAMINOPHEN PRN TAB: 5; 325 TABLET ORAL at 08:27

## 2021-01-08 RX ADMIN — QUETIAPINE FUMARATE SCH MG: 100 TABLET, FILM COATED ORAL at 11:58

## 2021-01-08 RX ADMIN — MORPHINE SULFATE PRN MG: 4 INJECTION, SOLUTION INTRAMUSCULAR; INTRAVENOUS at 05:07

## 2021-01-08 RX ADMIN — Medication SCH PATCH: at 08:26

## 2021-01-08 RX ADMIN — QUETIAPINE FUMARATE SCH MG: 100 TABLET, FILM COATED ORAL at 08:27

## 2021-01-08 NOTE — P.PN
Subjective


Date of Service: 01/08/21


Chief Complaint: UTI, AMS.


Subjective: Other (Pt reports feeling slightly worse today, headache is worse, 

neck pain and stiffness is slightly worse as well)





Review of Systems


10-point ROS is otherwise unremarkable





Physical Examination





- Vital Signs


Temperature: 97.3 F


Blood Pressure: 138/65


Pulse: 73


Respirations: 18


Pulse Ox (%): 96





- Physical Exam


General: Alert, Mild distress


HEENT: PERRLA, Sclerae nonicteric


Respiratory: Clear to auscultation bilaterally


Cardiovascular: No edema, Regular rate/rhythm


Gastrointestinal: Soft and benign, No tenderness


Musculoskeletal: Other (R neck tenderness along SCM)


Integumentary: No rashes


Neurological: Normal speech, Normal strength at 5/5 x4 extr





Assessment & Plan


Physician Review Additional Text: 





H. Flu bacteremia


Metabolic encephalopathy secondary to bacterial meningitis


UTI


THC use


Bipolar disorder


hypokalemia








blood cx: h. flu, urine cx: mixed anju. CSF cx: no growth (was on Abx for 3 

days)


LP done 1/6 - consistent with bacterial meningitis, gram stain/ cx negative - 

however was on abx for 3 days


CT head neg in ED, ID consulted, switch to meropenem on 1/5 due to rise in LFTs 

(was on rocephin), however due to concern for meningitis, switched back to 

rocephin and dose increased to 2g BID on 1/7


HIV ag ordered, echo ordered


continue home pain management, PDMP reviewed - for chronic pain, no red flags


continues with significant pain - headache, back pain, R thigh pain


neurology consulted given worsening of symptoms


add scheduled demerol, topamax


repeat blood culture (1/5): no growth so far





Dispo: hospitalization for several days for bacterial meningitis - isolation 

precautions, 5-7 days on appropriate therapy





Time Spent Managing Pts Care (In Minutes): 35

## 2021-01-09 RX ADMIN — OXYCODONE AND ACETAMINOPHEN PRN TAB: 5; 325 TABLET ORAL at 15:44

## 2021-01-09 RX ADMIN — SODIUM CHLORIDE SCH MLS: 9 INJECTION, SOLUTION INTRAVENOUS at 11:11

## 2021-01-09 RX ADMIN — PREGABALIN SCH MG: 50 CAPSULE ORAL at 08:32

## 2021-01-09 RX ADMIN — SODIUM CHLORIDE SCH MLS: 9 INJECTION, SOLUTION INTRAVENOUS at 22:50

## 2021-01-09 RX ADMIN — Medication SCH ML: at 08:33

## 2021-01-09 RX ADMIN — CEFTRIAXONE SCH MLS: 1 INJECTION, SOLUTION INTRAVENOUS at 08:32

## 2021-01-09 RX ADMIN — MEPERIDINE HYDROCHLORIDE SCH MG: 25 INJECTION, SOLUTION INTRAMUSCULAR; INTRAVENOUS; SUBCUTANEOUS at 19:14

## 2021-01-09 RX ADMIN — NICOTINE SCH MG: 21 PATCH TRANSDERMAL at 08:33

## 2021-01-09 RX ADMIN — TOPIRAMATE SCH MG: 25 TABLET, COATED ORAL at 20:43

## 2021-01-09 RX ADMIN — TOPIRAMATE SCH MG: 25 TABLET, COATED ORAL at 08:32

## 2021-01-09 RX ADMIN — Medication SCH PATCH: at 08:33

## 2021-01-09 RX ADMIN — DICLOFENAC SODIUM SCH MG: 75 TABLET, DELAYED RELEASE ORAL at 11:58

## 2021-01-09 RX ADMIN — SODIUM CHLORIDE SCH MLS: 9 INJECTION, SOLUTION INTRAVENOUS at 23:55

## 2021-01-09 RX ADMIN — MEPERIDINE HYDROCHLORIDE SCH MG: 25 INJECTION, SOLUTION INTRAMUSCULAR; INTRAVENOUS; SUBCUTANEOUS at 11:11

## 2021-01-09 RX ADMIN — SODIUM CHLORIDE SCH MLS: 0.9 INJECTION, SOLUTION INTRAVENOUS at 19:13

## 2021-01-09 RX ADMIN — QUETIAPINE FUMARATE SCH MG: 100 TABLET, FILM COATED ORAL at 11:57

## 2021-01-09 RX ADMIN — CEFTRIAXONE SCH MLS: 1 INJECTION, SOLUTION INTRAVENOUS at 20:42

## 2021-01-09 RX ADMIN — QUETIAPINE FUMARATE SCH MG: 100 TABLET, FILM COATED ORAL at 17:04

## 2021-01-09 RX ADMIN — SODIUM CHLORIDE SCH: 0.9 INJECTION, SOLUTION INTRAVENOUS at 02:00

## 2021-01-09 RX ADMIN — QUETIAPINE FUMARATE SCH MG: 100 TABLET, FILM COATED ORAL at 20:44

## 2021-01-09 RX ADMIN — Medication SCH ML: at 22:36

## 2021-01-09 RX ADMIN — QUETIAPINE FUMARATE SCH MG: 100 TABLET, FILM COATED ORAL at 08:32

## 2021-01-09 RX ADMIN — OXYCODONE AND ACETAMINOPHEN PRN TAB: 5; 325 TABLET ORAL at 08:42

## 2021-01-09 RX ADMIN — PREGABALIN SCH MG: 50 CAPSULE ORAL at 20:41

## 2021-01-09 RX ADMIN — MEPERIDINE HYDROCHLORIDE SCH MG: 25 INJECTION, SOLUTION INTRAMUSCULAR; INTRAVENOUS; SUBCUTANEOUS at 03:14

## 2021-01-09 NOTE — P.PN
Subjective


Date of Service: 01/09/21


Chief Complaint: UTI, AMS.


Subjective: Other (feels about the same, continues with moderate-severe 

headache, neck pain, lower back pain. frustrated that she didn't get good sleep 

last night. lots of beeping noises worsening her headache)





Review of Systems


10-point ROS is otherwise unremarkable





Physical Examination





- Vital Signs


Temperature: 97.2 F


Blood Pressure: 121/72


Pulse: 71


Respirations: 18


Pulse Ox (%): 98





- Physical Exam


General: Alert, In no apparent distress, Oriented x3


HEENT: Sclerae nonicteric


Respiratory: Clear to auscultation bilaterally


Cardiovascular: No edema, Regular rate/rhythm


Gastrointestinal: Soft and benign, No tenderness


Musculoskeletal: Tenderness (R neck, R upper extremity, R flank)


Integumentary: No rashes, No significant lesion


Neurological: Normal speech, Cranial nerves 3-12 intact, Normal affect





Assessment & Plan


Physician Review Additional Text: 





H. Flu bacteremia


Metabolic encephalopathy secondary to bacterial meningitis


UTI


THC use


Bipolar disorder


hypokalemia








blood cx: h. flu, urine cx: mixed anju. CSF cx: no growth (was on Abx for 3 

days)


LP done 1/6 - consistent with bacterial meningitis, gram stain/ cx negative - 

however was on abx for 3 days


CT head neg in ED, ID consulted, was on rocephin 1g daily for UtI but switched 

to meropenem on 1/5 due to rise in LFTs and to cover for possible meningitis(was

on rocephin), however due to confirmation of meningitis, switched back to 

rocephin and dose increased to 2g BID on 1/7


HIV ag ordered, echo ordered


continue home pain management, PDMP reviewed - for chronic pain, no red flags


continues with significant pain - headache, back pain, R thigh pain


neurology consulted given worsening of symptoms


add scheduled demerol, topamax (increased dose), will add diclofenac


repeat blood culture (1/5): no growth





Dispo: hospitalization for several days for bacterial meningitis - isolation 

precautions, 5-7 days on appropriate therapy





Time Spent Managing Pts Care (In Minutes): 35

## 2021-01-10 LAB
ALBUMIN SERPL BCP-MCNC: 2.4 G/DL (ref 3.4–5)
ALP SERPL-CCNC: 94 U/L (ref 45–117)
ALT SERPL W P-5'-P-CCNC: 89 U/L (ref 12–78)
AST SERPL W P-5'-P-CCNC: 75 U/L (ref 15–37)
BUN BLD-MCNC: 8 MG/DL (ref 7–18)
GLUCOSE SERPLBLD-MCNC: 94 MG/DL (ref 74–106)
HCT VFR BLD CALC: 32.1 % (ref 36–45)
LYMPHOCYTES # SPEC AUTO: 1.8 K/UL (ref 0.7–4.9)
MAGNESIUM SERPL-MCNC: 2.1 MG/DL (ref 1.8–2.4)
PMV BLD: 8.9 FL (ref 7.6–11.3)
POTASSIUM SERPL-SCNC: 4 MMOL/L (ref 3.5–5.1)
RBC # BLD: 4.46 M/UL (ref 3.86–4.86)
UA DIPSTICK W REFLEX MICRO PNL UR: (no result)

## 2021-01-10 RX ADMIN — OXYCODONE AND ACETAMINOPHEN PRN TAB: 5; 325 TABLET ORAL at 17:53

## 2021-01-10 RX ADMIN — CEFTRIAXONE SCH MLS: 1 INJECTION, SOLUTION INTRAVENOUS at 09:23

## 2021-01-10 RX ADMIN — MEPERIDINE HYDROCHLORIDE SCH: 25 INJECTION, SOLUTION INTRAMUSCULAR; INTRAVENOUS; SUBCUTANEOUS at 03:00

## 2021-01-10 RX ADMIN — SODIUM CHLORIDE SCH: 9 INJECTION, SOLUTION INTRAVENOUS at 11:00

## 2021-01-10 RX ADMIN — CEFTRIAXONE SCH MLS: 1 INJECTION, SOLUTION INTRAVENOUS at 20:13

## 2021-01-10 RX ADMIN — SODIUM CHLORIDE SCH: 9 INJECTION, SOLUTION INTRAVENOUS at 20:42

## 2021-01-10 RX ADMIN — DOCUSATE SODIUM SCH: 100 CAPSULE, LIQUID FILLED ORAL at 20:14

## 2021-01-10 RX ADMIN — PREGABALIN SCH MG: 50 CAPSULE ORAL at 20:17

## 2021-01-10 RX ADMIN — QUETIAPINE FUMARATE SCH MG: 100 TABLET, FILM COATED ORAL at 12:28

## 2021-01-10 RX ADMIN — SODIUM CHLORIDE SCH MLS: 9 INJECTION, SOLUTION INTRAVENOUS at 12:29

## 2021-01-10 RX ADMIN — Medication SCH ML: at 20:15

## 2021-01-10 RX ADMIN — QUETIAPINE FUMARATE SCH MG: 100 TABLET, FILM COATED ORAL at 17:16

## 2021-01-10 RX ADMIN — MEPERIDINE HYDROCHLORIDE SCH MG: 25 INJECTION, SOLUTION INTRAMUSCULAR; INTRAVENOUS; SUBCUTANEOUS at 12:30

## 2021-01-10 RX ADMIN — MORPHINE SULFATE PRN MG: 4 INJECTION, SOLUTION INTRAMUSCULAR; INTRAVENOUS at 07:57

## 2021-01-10 RX ADMIN — TOPIRAMATE SCH MG: 25 TABLET, COATED ORAL at 09:23

## 2021-01-10 RX ADMIN — SODIUM CHLORIDE SCH: 9 INJECTION, SOLUTION INTRAVENOUS at 22:55

## 2021-01-10 RX ADMIN — SODIUM CHLORIDE SCH MLS: 9 INJECTION, SOLUTION INTRAVENOUS at 15:00

## 2021-01-10 RX ADMIN — DICLOFENAC SODIUM SCH MG: 75 TABLET, DELAYED RELEASE ORAL at 09:22

## 2021-01-10 RX ADMIN — MEPERIDINE HYDROCHLORIDE SCH MG: 25 INJECTION, SOLUTION INTRAMUSCULAR; INTRAVENOUS; SUBCUTANEOUS at 04:21

## 2021-01-10 RX ADMIN — Medication SCH PATCH: at 09:22

## 2021-01-10 RX ADMIN — PREGABALIN SCH MG: 50 CAPSULE ORAL at 09:23

## 2021-01-10 RX ADMIN — Medication SCH ML: at 09:23

## 2021-01-10 RX ADMIN — QUETIAPINE FUMARATE SCH MG: 100 TABLET, FILM COATED ORAL at 09:23

## 2021-01-10 RX ADMIN — NICOTINE SCH MG: 21 PATCH TRANSDERMAL at 09:22

## 2021-01-10 RX ADMIN — MEPERIDINE HYDROCHLORIDE SCH MG: 25 INJECTION, SOLUTION INTRAMUSCULAR; INTRAVENOUS; SUBCUTANEOUS at 20:13

## 2021-01-10 RX ADMIN — QUETIAPINE FUMARATE SCH MG: 100 TABLET, FILM COATED ORAL at 20:14

## 2021-01-10 RX ADMIN — TOPIRAMATE SCH MG: 25 TABLET, COATED ORAL at 20:15

## 2021-01-10 NOTE — P.PN
Subjective


Date of Service: 01/10/21


Chief Complaint: UTI, AMS.


Subjective: Improving (awake/alert, continues to report headache and neck pain, 

states she slept about 4 hr, remains afebrile)





Review of Systems


10-point ROS is otherwise unremarkable





Physical Examination





- Vital Signs


Temperature: 97.6 F


Blood Pressure: 108/77


Pulse: 83


Respirations: 20


Pulse Ox (%): 98





- Physical Exam


General: Alert, Oriented x3


HEENT: PERRLA, Sclerae nonicteric


Respiratory: Clear to auscultation bilaterally


Cardiovascular: No edema, Regular rate/rhythm


Gastrointestinal: Soft and benign, No tenderness


Musculoskeletal: No tenderness, Other (R neck tenderness. +nuchal rigidity)


Integumentary: No significant lesion


Neurological: Normal speech, Other (+photophobia)





Assessment & Plan


Physician Review Additional Text: 





H. Flu bacteremia


Metabolic encephalopathy secondary to bacterial meningitis


UTI


THC use


Bipolar disorder


hypokalemia








blood cx: h. flu, urine cx: mixed anju. CSF cx: no growth (was on Abx for 3 

days)


LP done 1/6 - consistent with bacterial meningitis, gram stain/ cx negative - 

however was on abx for 3 days


started rocephin at 2g BID on 1/7 - was previously on lower dose and on merrem 

for a day due to elevated LFTs on rocephin


HIV ag ordered, echo ordered


continue home pain management, PDMP reviewed - for chronic pain, no red flags


continues with significant pain - headache, back pain, R thigh pain


neurology consulted given worsening of symptoms


continue scheduled demerol, topamax, diclofenac


repeat blood culture (1/5): no growth





Dispo: hospitalization for several days for bacterial meningitis - isolation 

precautions, 5-7 days on appropriate therapy


anticipate at least another 2 days





Time Spent Managing Pts Care (In Minutes): 35

## 2021-01-11 RX ADMIN — OXYCODONE AND ACETAMINOPHEN PRN TAB: 5; 325 TABLET ORAL at 09:10

## 2021-01-11 RX ADMIN — Medication SCH ML: at 09:13

## 2021-01-11 RX ADMIN — PREGABALIN SCH MG: 50 CAPSULE ORAL at 21:14

## 2021-01-11 RX ADMIN — QUETIAPINE FUMARATE SCH MG: 100 TABLET, FILM COATED ORAL at 09:13

## 2021-01-11 RX ADMIN — NICOTINE SCH MG: 21 PATCH TRANSDERMAL at 09:13

## 2021-01-11 RX ADMIN — MEPERIDINE HYDROCHLORIDE SCH MG: 25 INJECTION, SOLUTION INTRAMUSCULAR; INTRAVENOUS; SUBCUTANEOUS at 06:08

## 2021-01-11 RX ADMIN — SODIUM CHLORIDE SCH: 9 INJECTION, SOLUTION INTRAVENOUS at 21:00

## 2021-01-11 RX ADMIN — CEFTRIAXONE SCH MLS: 1 INJECTION, SOLUTION INTRAVENOUS at 21:10

## 2021-01-11 RX ADMIN — QUETIAPINE FUMARATE SCH MG: 100 TABLET, FILM COATED ORAL at 21:13

## 2021-01-11 RX ADMIN — PREGABALIN SCH MG: 50 CAPSULE ORAL at 09:12

## 2021-01-11 RX ADMIN — MEPERIDINE HYDROCHLORIDE SCH MG: 25 INJECTION, SOLUTION INTRAMUSCULAR; INTRAVENOUS; SUBCUTANEOUS at 21:23

## 2021-01-11 RX ADMIN — SODIUM CHLORIDE SCH MLS: 9 INJECTION, SOLUTION INTRAVENOUS at 08:00

## 2021-01-11 RX ADMIN — DOCUSATE SODIUM SCH MG: 100 CAPSULE, LIQUID FILLED ORAL at 09:13

## 2021-01-11 RX ADMIN — MEPERIDINE HYDROCHLORIDE SCH: 25 INJECTION, SOLUTION INTRAMUSCULAR; INTRAVENOUS; SUBCUTANEOUS at 03:00

## 2021-01-11 RX ADMIN — QUETIAPINE FUMARATE SCH MG: 100 TABLET, FILM COATED ORAL at 16:18

## 2021-01-11 RX ADMIN — MEPERIDINE HYDROCHLORIDE SCH MG: 25 INJECTION, SOLUTION INTRAMUSCULAR; INTRAVENOUS; SUBCUTANEOUS at 13:00

## 2021-01-11 RX ADMIN — QUETIAPINE FUMARATE SCH MG: 100 TABLET, FILM COATED ORAL at 12:26

## 2021-01-11 RX ADMIN — TOPIRAMATE SCH MG: 25 TABLET, COATED ORAL at 09:13

## 2021-01-11 RX ADMIN — Medication SCH ML: at 21:12

## 2021-01-11 RX ADMIN — DICLOFENAC SODIUM SCH MG: 75 TABLET, DELAYED RELEASE ORAL at 09:13

## 2021-01-11 RX ADMIN — CEFTRIAXONE SCH MLS: 1 INJECTION, SOLUTION INTRAVENOUS at 09:14

## 2021-01-11 RX ADMIN — TOPIRAMATE SCH MG: 25 TABLET, COATED ORAL at 21:13

## 2021-01-11 RX ADMIN — OXYCODONE AND ACETAMINOPHEN PRN TAB: 5; 325 TABLET ORAL at 17:24

## 2021-01-11 RX ADMIN — DOCUSATE SODIUM SCH MG: 100 CAPSULE, LIQUID FILLED ORAL at 21:12

## 2021-01-11 RX ADMIN — Medication SCH PATCH: at 09:11

## 2021-01-11 NOTE — ECHO
HEIGHT: 5 ft 5 in   WEIGHT: 200 lb 0 oz   DATE OF STUDY: 01/08/2021   REFER DR: Gonzales Gunderson MD

2-DIMENSIONAL: YES

     M.MODE: YES

 DOPPLER: YES

COLOR FLOW: YES



                    TDS:  NO

PORTABLE: NO

 DEFINITY:  NO

BUBBLE STUDY: NO





DIAGNOSIS:  HISTORY FLU BACTEREMIA, ELAVUATE FOR VEGETATION



CARDIAC HISTORY:  

CATHERIZATION: NO

SURGERY: NO

PROSTHETIC VALVE: NO

PACEMAKER: NO





MEASUREMENTS (cm)

    DIASTOLIC (NORMALS)                 SYSTOLIC (NORMALS)

IVSd                 1.1 (0.6-1.2)                    LA Diam 3.1 (1.9-4.0)     LVEF       
  68%  

LVIDd               4.1 (3.5-5.7)                        LVIDs      2.6 (2.0-3.5)     %FS  
        37%

LVPWd             1.2 (0.6-1.2)

Ao Diam           2.6 (2.0-3.7)



2 DIMENSIONAL ASSESSMENT:

RIGHT ATRIUM:                   NORMAL

LEFT ATRIUM:       NORMAL



RIGHT VENTRICLE:            NORMAL

LEFT VENTRICLE: NORMAL



TRICUSPID VALVE:             NORMAL

MITRAL VALVE:     NORMAL



PULMONIC VALVE:             NORMAL

AORTIC VALVE:     NORMAL



PERICARDIAL EFFUSION: NONE

AORTIC ROOT:      NORMAL





LEFT VENTRICULAR WALL MOTION:     NORMAL



DOPPLER/COLOR FLOW:     NORMAL



COMMENTS:      NORMAL 2D ECHOCARDIOGRAM WITH DOPPLER. NO CLEAR EVIDENCE OF VEGETATION. 
NORMAL WALL MOTION ABNORMALITY. NO EFFUSION.



TECHNOLOGIST:   AMANDA CARCAMO

## 2021-01-11 NOTE — P.PN
Subjective


Date of Service: 01/11/21


Chief Complaint: UTI, AMS.


Subjective: Improving (overall appears to be improving, patient seems more 

comfortable when I've walked by her room. Once entering, she reports severe 

headache / neck pain / back pain. had good BM yesterday)





Review of Systems


10-point ROS is otherwise unremarkable





Physical Examination





- Vital Signs


Temperature: 96.9 F


Blood Pressure: 114/66


Pulse: 86


Respirations: 18


Pulse Ox (%): 94





- Physical Exam


General: Alert, In no apparent distress, Oriented x3


HEENT: Sclerae nonicteric


Respiratory: Clear to auscultation bilaterally


Cardiovascular: No edema, Regular rate/rhythm


Gastrointestinal: Soft and benign, No tenderness


Musculoskeletal: Tenderness (R neck along SCM, R flank, RLE)


Integumentary: No rashes


Neurological: Normal speech





Assessment & Plan


Physician Review Additional Text: 





H. Flu bacteremia


Metabolic encephalopathy secondary to bacterial meningitis


UTI


THC use


Bipolar disorder


hypokalemia








blood cx: h. flu, urine cx: mixed anju. CSF cx: no growth (was on Abx for 3 

days)


LP done 1/6 - consistent with bacterial meningitis, gram stain/ cx negative - 

however was on abx for 3 days


started rocephin at 2g BID on 1/7 - was previously on lower dose and on merrem 

for a day due to elevated LFTs on rocephin


Today will be 5 days of appropriate IV therapy. recommended 5-7 days. Can 

possibly switch to PO tomorrow


HIV ag ordered, echo ordered


continue home pain management, PDMP reviewed - for chronic pain, no red flags


continues with significant pain - headache, back pain, R thigh pain


Patient is possibly using this to get more pain medications - appears more 

comfortable when I walk past room compared to when i go in


morphine dc'd 


continue scheduled demerol, topamax, diclofenac


repeat blood culture (1/5): no growth





Dispo: anticipate dc home in 24-48hrs, pending further improvement of pain. 





hospitalization for several days for bacterial meningitis - isolation 

precautions, 5-7 days on appropriate therapy


anticipate at least another 2 days





Time Spent Managing Pts Care (In Minutes): 35

## 2021-01-11 NOTE — PN
Subjective:  The patient is sitting in bed comfortably, talking on the phone, but when asked question
, complains of severe headaches.  Demerol has not been helping her.



Objective:  Vital Signs:  Temperature 98, pulse 82, respirations 18, blood pressure 140/82. 

Lungs:  Clear to auscultation. 

Heart:  S1, S2.  Regular. 

Abdomen:  Soft, nontender.  Bowel sounds present. 

Extremities:  No edema.



Laboratory Data:  WBC 5.7, hemoglobin 10.1, platelets are 399.  Chemistry shows sodium 140, potassium
 4, chloride 110, bicarb 26, BUN 8, creatinine 0.6, glucose 94, albumin is 2.4.  Micro data, blood cu
ltures, Haemophilus influenzae, lumbar puncture, no growth for any bacteria.



Assessment And Plan:  A 45-year-old female with meningitis, negative bacterial growth, bacteremia sec
ondary to Haemophilus influenzae, urinary tract infection, possible painkiller drug-seeking behavior.
  Continue antibiotic and supportive care.  Consider repeating lumbar puncture if the patient's heada
ches does not resolve and getting a CT scan of the head also.  We will follow the patient as needed.





NF/MODL

DD:  01/11/2021 13:49:15Voice ID:  [id number]

DT:  01/11/2021 14:17:27Report ID:  [id number]

## 2021-01-12 LAB
ALBUMIN SERPL BCP-MCNC: 2.6 G/DL (ref 3.4–5)
ALP SERPL-CCNC: 90 U/L (ref 45–117)
ALT SERPL W P-5'-P-CCNC: 68 U/L (ref 12–78)
ANISOCYTOSIS BLD QL: (no result)
AST SERPL W P-5'-P-CCNC: 51 U/L (ref 15–37)
BLD SMEAR INTERP: (no result)
BUN BLD-MCNC: 10 MG/DL (ref 7–18)
GIANT PLATELETS BLD QL SMEAR: PRESENT
GLUCOSE SERPLBLD-MCNC: 91 MG/DL (ref 74–106)
HCT VFR BLD CALC: 33.7 % (ref 36–45)
LYMPHOCYTES # SPEC AUTO: 2.1 K/UL (ref 0.7–4.9)
MAGNESIUM SERPL-MCNC: 2 MG/DL (ref 1.8–2.4)
MORPHOLOGY BLD-IMP: (no result)
PMV BLD: 8.7 FL (ref 7.6–11.3)
POTASSIUM SERPL-SCNC: 4.1 MMOL/L (ref 3.5–5.1)
RBC # BLD: 4.62 M/UL (ref 3.86–4.86)
TARGETS BLD QL SMEAR: (no result)

## 2021-01-12 RX ADMIN — Medication SCH ML: at 10:42

## 2021-01-12 RX ADMIN — DOCUSATE SODIUM SCH MG: 100 CAPSULE, LIQUID FILLED ORAL at 20:18

## 2021-01-12 RX ADMIN — OXYCODONE AND ACETAMINOPHEN PRN TAB: 5; 325 TABLET ORAL at 10:39

## 2021-01-12 RX ADMIN — Medication SCH ML: at 20:18

## 2021-01-12 RX ADMIN — QUETIAPINE FUMARATE SCH MG: 100 TABLET, FILM COATED ORAL at 10:39

## 2021-01-12 RX ADMIN — DOCUSATE SODIUM SCH MG: 100 CAPSULE, LIQUID FILLED ORAL at 10:39

## 2021-01-12 RX ADMIN — QUETIAPINE FUMARATE SCH MG: 100 TABLET, FILM COATED ORAL at 13:13

## 2021-01-12 RX ADMIN — CEFTRIAXONE SCH MLS: 1 INJECTION, SOLUTION INTRAVENOUS at 20:17

## 2021-01-12 RX ADMIN — QUETIAPINE FUMARATE SCH MG: 100 TABLET, FILM COATED ORAL at 17:10

## 2021-01-12 RX ADMIN — SODIUM CHLORIDE SCH MLS: 9 INJECTION, SOLUTION INTRAVENOUS at 13:12

## 2021-01-12 RX ADMIN — PREGABALIN SCH MG: 50 CAPSULE ORAL at 20:18

## 2021-01-12 RX ADMIN — DICLOFENAC SODIUM SCH MG: 75 TABLET, DELAYED RELEASE ORAL at 09:00

## 2021-01-12 RX ADMIN — OXYCODONE AND ACETAMINOPHEN PRN TAB: 5; 325 TABLET ORAL at 17:53

## 2021-01-12 RX ADMIN — QUETIAPINE FUMARATE SCH MG: 100 TABLET, FILM COATED ORAL at 20:18

## 2021-01-12 RX ADMIN — MEPERIDINE HYDROCHLORIDE SCH MG: 25 INJECTION, SOLUTION INTRAMUSCULAR; INTRAVENOUS; SUBCUTANEOUS at 13:56

## 2021-01-12 RX ADMIN — PREGABALIN SCH MG: 50 CAPSULE ORAL at 10:39

## 2021-01-12 RX ADMIN — Medication SCH PATCH: at 10:38

## 2021-01-12 RX ADMIN — MEPERIDINE HYDROCHLORIDE SCH MG: 25 INJECTION, SOLUTION INTRAMUSCULAR; INTRAVENOUS; SUBCUTANEOUS at 05:45

## 2021-01-12 RX ADMIN — MEPERIDINE HYDROCHLORIDE SCH: 25 INJECTION, SOLUTION INTRAMUSCULAR; INTRAVENOUS; SUBCUTANEOUS at 14:00

## 2021-01-12 RX ADMIN — NICOTINE SCH MG: 21 PATCH TRANSDERMAL at 10:37

## 2021-01-12 RX ADMIN — TOPIRAMATE SCH MG: 25 TABLET, COATED ORAL at 10:39

## 2021-01-12 RX ADMIN — MEPERIDINE HYDROCHLORIDE SCH: 25 INJECTION, SOLUTION INTRAMUSCULAR; INTRAVENOUS; SUBCUTANEOUS at 22:00

## 2021-01-12 RX ADMIN — CEFTRIAXONE SCH MLS: 1 INJECTION, SOLUTION INTRAVENOUS at 13:10

## 2021-01-12 RX ADMIN — TOPIRAMATE SCH MG: 25 TABLET, COATED ORAL at 20:17

## 2021-01-12 RX ADMIN — SODIUM CHLORIDE SCH: 9 INJECTION, SOLUTION INTRAVENOUS at 09:00

## 2021-01-12 NOTE — RAD REPORT
EXAM DESCRIPTION:  CT - Head Brain W/Wo Con - 1/12/2021 1:39 pm

 

CLINICAL HISTORY:  Meningitis/alteration consciousness

 

COMPARISON:  January 2, 2021

 

TECHNIQUE:  Computed axial tomography of the head was obtained. Unenhanced and enhanced images obtain
ed. 50 cc Isovue-300 administered intravenously.

 

All CT scans are performed using dose optimization technique as appropriate and may include automated
 exposure control or mA/KV adjustment according to patient size.

 

FINDINGS:  An intracranial  bleed is not seen .

 

The ventricles are normal in caliber.

 

No extra-axial fluid collection is noted.

 

No abnormal enhancement seen

 

Fluid within the sinuses/ mastoids is not seen.

 

IMPRESSION:  No acute intracranial abnormality is seen. If patient's symptoms persist  MRI of the bra
in would be recommended.

## 2021-01-12 NOTE — P.PN
Subjective


Date of Service: 01/12/21


Chief Complaint: UTI, AMS.


Patient continue to complain of headache and photophobia otherwise feels much 

better.


She consumes all her meals.


She has been afebrile.














Physical Examination





- Vital Signs


Temperature: 97.8 F


Blood Pressure: 114/74


Pulse: 84


Respirations: 18


Pulse Ox (%): 98





- Physical Exam


General: Alert, In no apparent distress, Oriented x3


HEENT: Mucous membr. moist/pink


Neck: Supple, JVD not distended


Respiratory: Clear to auscultation bilaterally, Normal air movement


Cardiovascular: No edema, Regular rate/rhythm, Normal S1 S2


Gastrointestinal: Normal bowel sounds, Soft and benign, Non-distended, No 

tenderness


Musculoskeletal: No swelling, No tenderness


Integumentary: No rashes


Neurological: Normal speech, Normal strength at 5/5 x4 extr, Cranial nerves 3-12

intact





Assessment And Plan





- Current Problems (Diagnosis)


(1) UTI (urinary tract infection)


Current Visit: Yes   Status: Acute   





(2) Metabolic encephalopathy


Current Visit: Yes   Status: Acute   





(3) Acute delirium


Onset Date: 06/05/17   Current Visit: No   Status: Acute   





(4) Tetrahydrocannabinol (THC) use disorder, mild, abuse


Onset Date: 06/05/17   Current Visit: No   Status: Acute   





(5) Bipolar disorder


Current Visit: Yes   Status: Acute   


Physician Review Additional Text: 





H. Flu bacteremia


Metabolic encephalopathy secondary to bacterial meningitis


UTI


THC use


Bipolar disorder


hypokalemia








blood cx: h. flu, urine cx: mixed anju. CSF cx: no growth (was on Abx for 3 

days)


LP done 1/6 - consistent with bacterial meningitis, gram stain/ cx negative - 

however was on abx for 3 days


Continue rocephin at 2g BID on 1/7 and vancomycin.


Patient to complete 7 days of IV antibiotic and switch to PO.


Infectious disease is following.


HIV:  Nonreactive.


continue home pain management, PDMP reviewed - for chronic pain, no red flags


continues with significant pain - headache, back pain, R thigh pain


High risk for aberrant opioid use.


continue topamax, diclofenac.  Discontinue Demerol.


repeat blood culture (1/5): no growth





Dispo: anticipate dc home in 48hrs, pending further improvement of pain.

## 2021-01-13 PROCEDURE — 02HV33Z INSERTION OF INFUSION DEVICE INTO SUPERIOR VENA CAVA, PERCUTANEOUS APPROACH: ICD-10-PCS

## 2021-01-13 RX ADMIN — QUETIAPINE FUMARATE SCH MG: 100 TABLET, FILM COATED ORAL at 18:26

## 2021-01-13 RX ADMIN — MORPHINE SULFATE PRN MG: 2 INJECTION, SOLUTION INTRAMUSCULAR; INTRAVENOUS at 18:26

## 2021-01-13 RX ADMIN — Medication SCH ML: at 20:16

## 2021-01-13 RX ADMIN — QUETIAPINE FUMARATE SCH MG: 100 TABLET, FILM COATED ORAL at 08:33

## 2021-01-13 RX ADMIN — OXYCODONE AND ACETAMINOPHEN PRN TAB: 5; 325 TABLET ORAL at 20:20

## 2021-01-13 RX ADMIN — QUETIAPINE FUMARATE SCH MG: 100 TABLET, FILM COATED ORAL at 12:52

## 2021-01-13 RX ADMIN — DOCUSATE SODIUM SCH MG: 100 CAPSULE, LIQUID FILLED ORAL at 20:16

## 2021-01-13 RX ADMIN — MEPERIDINE HYDROCHLORIDE SCH MG: 25 INJECTION, SOLUTION INTRAMUSCULAR; INTRAVENOUS; SUBCUTANEOUS at 12:52

## 2021-01-13 RX ADMIN — PREGABALIN SCH MG: 50 CAPSULE ORAL at 08:34

## 2021-01-13 RX ADMIN — SODIUM CHLORIDE SCH MLS: 9 INJECTION, SOLUTION INTRAVENOUS at 07:45

## 2021-01-13 RX ADMIN — OXYCODONE AND ACETAMINOPHEN PRN TAB: 5; 325 TABLET ORAL at 08:42

## 2021-01-13 RX ADMIN — TOPIRAMATE SCH MG: 25 TABLET, COATED ORAL at 20:16

## 2021-01-13 RX ADMIN — Medication SCH PATCH: at 08:33

## 2021-01-13 RX ADMIN — CEFTRIAXONE SCH MLS: 1 INJECTION, SOLUTION INTRAVENOUS at 20:15

## 2021-01-13 RX ADMIN — PREGABALIN SCH MG: 50 CAPSULE ORAL at 20:15

## 2021-01-13 RX ADMIN — TOPIRAMATE SCH MG: 25 TABLET, COATED ORAL at 08:36

## 2021-01-13 RX ADMIN — DICLOFENAC SODIUM SCH MG: 75 TABLET, DELAYED RELEASE ORAL at 08:34

## 2021-01-13 RX ADMIN — MEPERIDINE HYDROCHLORIDE SCH MG: 25 INJECTION, SOLUTION INTRAMUSCULAR; INTRAVENOUS; SUBCUTANEOUS at 04:58

## 2021-01-13 RX ADMIN — Medication SCH ML: at 08:35

## 2021-01-13 RX ADMIN — DOCUSATE SODIUM SCH MG: 100 CAPSULE, LIQUID FILLED ORAL at 08:34

## 2021-01-13 RX ADMIN — CEFTRIAXONE SCH MLS: 1 INJECTION, SOLUTION INTRAVENOUS at 11:03

## 2021-01-13 RX ADMIN — NICOTINE SCH MG: 21 PATCH TRANSDERMAL at 08:35

## 2021-01-13 RX ADMIN — QUETIAPINE FUMARATE SCH MG: 100 TABLET, FILM COATED ORAL at 20:15

## 2021-01-13 RX ADMIN — MEPERIDINE HYDROCHLORIDE SCH MG: 25 INJECTION, SOLUTION INTRAMUSCULAR; INTRAVENOUS; SUBCUTANEOUS at 04:50

## 2021-01-13 NOTE — P.PN
Subjective


Date of Service: 01/13/21


Chief Complaint: UTI, AMS.


Patient continue to complain of intermittent headache and photophobia otherwise 

feels much better.


She has been afebrile.














Physical Examination





- Vital Signs


Temperature: 97.8 F


Blood Pressure: 104/59


Pulse: 81


Respirations: 20


Pulse Ox (%): 98





- Physical Exam


General: Alert, In no apparent distress


Neck: Supple


Respiratory: Clear to auscultation bilaterally, Normal air movement


Cardiovascular: No edema, Regular rate/rhythm, Normal S1 S2


Gastrointestinal: Normal bowel sounds, Soft and benign, Non-distended


Musculoskeletal: No swelling, No tenderness


Integumentary: No rashes, No erythema


Neurological: Normal speech, Normal strength at 5/5 x4 extr





Assessment And Plan





- Current Problems (Diagnosis)


(1) UTI (urinary tract infection)


Current Visit: Yes   Status: Acute   





(2) Metabolic encephalopathy


Current Visit: Yes   Status: Acute   





(3) Acute delirium


Onset Date: 06/05/17   Current Visit: No   Status: Acute   





(4) Tetrahydrocannabinol (THC) use disorder, mild, abuse


Onset Date: 06/05/17   Current Visit: No   Status: Acute   





(5) Bipolar disorder


Current Visit: Yes   Status: Acute   


Physician Review Additional Text: 





H. Flu bacteremia


Metabolic encephalopathy secondary to bacterial meningitis


UTI


THC use


Bipolar disorder


hypokalemia








blood cx: h. flu, urine cx: mixed anju. CSF cx: no growth (was on Abx for 3 

days)


LP done 1/6 - consistent with bacterial meningitis, gram stain/ cx negative - 

however was on abx for 3 days


Continue rocephin at 2g BID on 1/7 and vancomycin.  Antibiotics  day 6 today.


Patient to complete 7 days of IV antibiotic and switch to PO.


Discussed with Infectious disease-Dr. Nicholas.  May consider repeat lumbar 

puncture the patient continued to complain of headache.  Not ruling out the fact

that her complains of headache may be related to abberrant opioid use.


HIV:  Nonreactive.


continue home pain management.


continue topamax, diclofenac. 


repeat blood culture (1/5): no growth

## 2021-01-13 NOTE — PN
Subjective:  The patient is sitting in bed without any new complaints, except headache which has been
 going on since from very beginning.  The patient also complains of photophobia, unable to eat food w
ithout any nausea and vomiting.  No diarrhea.  No problems with antibiotic.



Objective:  Vital Signs:  Temperature 97.8, pulse 81, respirations 20, blood pressure 140/59. 

HEENT:  Unremarkable. 

Neck:  Supple. 

Lungs:  Clear to auscultation. 

Heart:  S1, S2.  Regular. 

Abdomen:  Soft.  Bowel sounds present. 

Extremities:  No edema.



Laboratory Data:  WBC 6, hemoglobin 10.3, platelets are 534.  Chemistry shows sodium 142, potassium 4
.1, chloride 109, bicarb 29, BUN 10, creatinine 0.7, glucose is 91.  Albumin level is 2.6.  Procalcit
onin value is normal 2.05.  Repeat blood cultures from January 5th are negative.  The patient is curr
ently being treated with Rocephin and vancomycin.



Assessment And Plan:  Bacteremia secondary to Haemophilus influenzae, meningitis, most likely Hemophi
anita influenza even though cultures are negative.  The patient is resistant to penicillin and Bactrim,
 sensitive to Rocephin and cefuroxime.  We will recommend to continue Rocephin and stop the patient o
ff vancomycin.  We will follow the patient closely.  Total course should be 2 to 3 weeks.  If not imp
roves, repeat lumbar puncture.





NF/MODL

DD:  01/13/2021 13:14:21Voice ID:  [id number]

DT:  01/13/2021 13:26:53Report ID:  [id number]

## 2021-01-13 NOTE — RAD REPORT
EXAM DESCRIPTION:  XR Chest, 1 View

 

CLINICAL HISTORY:  The patient is 45 years old and is Female; PICC LINE PLACEMENT

 

TECHNIQUE:  Single view of the chest.

 

COMPARISON:  No relevant prior studies available.

 

FINDINGS:  Lungs:   Unremarkable.   No consolidation.

   Pleural space:   Unremarkable.   No pneumothorax.

   Heart:   The cardiac silhouette is enlarged versus artifact of AP technique.

   Mediastinum:   Unremarkable.

   Bones/joints:   No acute fracture visualized.

   Tubes, lines and devices:   Right PICC line is in the SVC.

   Upper abdomen:   No free air in the visualized upper abdomen.

 

IMPRESSION:  Right PICC line is in the SVC.

 

Electronically signed by:   Arcelia Smyth MD   1/13/2021 4:24 AM CST Workstation: [id number]

 

 

Due to temporary technical issues with the PACS/Fluency reporting system, reports are being signed by
 the in house radiologist without review as a courtesy to ensure prompt reporting. The interpreting r
adiologist is fully responsible for the content of the report.

## 2021-01-14 RX ADMIN — QUETIAPINE FUMARATE SCH MG: 100 TABLET, FILM COATED ORAL at 16:00

## 2021-01-14 RX ADMIN — QUETIAPINE FUMARATE SCH MG: 100 TABLET, FILM COATED ORAL at 21:28

## 2021-01-14 RX ADMIN — NICOTINE SCH MG: 21 PATCH TRANSDERMAL at 08:23

## 2021-01-14 RX ADMIN — OXYCODONE AND ACETAMINOPHEN PRN TAB: 5; 325 TABLET ORAL at 14:19

## 2021-01-14 RX ADMIN — DOCUSATE SODIUM SCH MG: 100 CAPSULE, LIQUID FILLED ORAL at 21:28

## 2021-01-14 RX ADMIN — PREGABALIN SCH MG: 50 CAPSULE ORAL at 21:29

## 2021-01-14 RX ADMIN — Medication SCH ML: at 21:34

## 2021-01-14 RX ADMIN — QUETIAPINE FUMARATE SCH MG: 100 TABLET, FILM COATED ORAL at 08:22

## 2021-01-14 RX ADMIN — MORPHINE SULFATE PRN MG: 2 INJECTION, SOLUTION INTRAMUSCULAR; INTRAVENOUS at 22:06

## 2021-01-14 RX ADMIN — QUETIAPINE FUMARATE SCH MG: 100 TABLET, FILM COATED ORAL at 12:43

## 2021-01-14 RX ADMIN — PREGABALIN SCH MG: 50 CAPSULE ORAL at 08:22

## 2021-01-14 RX ADMIN — TOPIRAMATE SCH MG: 25 TABLET, COATED ORAL at 08:23

## 2021-01-14 RX ADMIN — Medication SCH PATCH: at 08:24

## 2021-01-14 RX ADMIN — DICLOFENAC SODIUM SCH MG: 75 TABLET, DELAYED RELEASE ORAL at 08:22

## 2021-01-14 RX ADMIN — TOPIRAMATE SCH MG: 25 TABLET, COATED ORAL at 21:29

## 2021-01-14 RX ADMIN — MORPHINE SULFATE PRN MG: 2 INJECTION, SOLUTION INTRAMUSCULAR; INTRAVENOUS at 00:11

## 2021-01-14 RX ADMIN — CEFTRIAXONE SCH MLS: 1 INJECTION, SOLUTION INTRAVENOUS at 21:28

## 2021-01-14 RX ADMIN — Medication SCH ML: at 09:00

## 2021-01-14 RX ADMIN — OXYCODONE AND ACETAMINOPHEN PRN TAB: 5; 325 TABLET ORAL at 06:08

## 2021-01-14 RX ADMIN — CEFTRIAXONE SCH MLS: 1 INJECTION, SOLUTION INTRAVENOUS at 08:22

## 2021-01-14 RX ADMIN — MORPHINE SULFATE PRN MG: 2 INJECTION, SOLUTION INTRAMUSCULAR; INTRAVENOUS at 08:23

## 2021-01-14 RX ADMIN — DOCUSATE SODIUM SCH MG: 100 CAPSULE, LIQUID FILLED ORAL at 08:22

## 2021-01-14 RX ADMIN — MORPHINE SULFATE PRN MG: 2 INJECTION, SOLUTION INTRAMUSCULAR; INTRAVENOUS at 15:59

## 2021-01-14 NOTE — P.PN
Subjective


Date of Service: 01/14/21


Chief Complaint: UTI, AMS.


Patient continue to complain of intermittent headache and photophobia.


She is seen lying in bed comfortably, making a phone calls, consumes all her 

meals.


No observed functional limitation.














Physical Examination





- Vital Signs


Temperature: 97.8 F


Blood Pressure: 107/64


Pulse: 98


Respirations: 20


Pulse Ox (%): 98





- Physical Exam


General: Alert, In no apparent distress, Oriented x3


Neck: Supple


Respiratory: Clear to auscultation bilaterally, Normal air movement


Cardiovascular: No edema, Regular rate/rhythm, Normal S1 S2


Gastrointestinal: Normal bowel sounds, Soft and benign


Musculoskeletal: No swelling


Integumentary: No rashes


Neurological: Normal speech, Normal strength at 5/5 x4 extr, Cranial nerves 3-12

intact





Assessment And Plan





- Current Problems (Diagnosis)


(1) UTI (urinary tract infection)


Current Visit: Yes   Status: Acute   





(2) Metabolic encephalopathy


Current Visit: Yes   Status: Acute   





(3) Acute delirium


Onset Date: 06/05/17   Current Visit: No   Status: Acute   





(4) Tetrahydrocannabinol (THC) use disorder, mild, abuse


Onset Date: 06/05/17   Current Visit: No   Status: Acute   





(5) Bipolar disorder


Current Visit: Yes   Status: Acute   


Physician Review Additional Text: 





H. Flu bacteremia


Metabolic encephalopathy secondary to bacterial meningitis


UTI


THC use


Bipolar disorder


hypokalemia








Blood cx: h. flu, urine cx: mixed anju. CSF cx: no growth (was on Abx for 3 

days)


LP done 1/6 - consistent with bacterial meningitis, gram stain/ cx negative - 

however was on abx for 3 days


Continue rocephin at 2g BID on 1/7 and vancomycin.  Antibiotics  day 7 today.


She will complete 7 days of IV antibiotics today.


Repeat lumbar puncture in a.m. given persistent headache.


Discussed with Infectious disease-Dr. Nicholas. Not ruling out the fact that her 

complains of headache may be related to abberrant opioid use.


HIV:  Nonreactive.


continue home pain management.


continue topamax, diclofenac. 


Repeat blood culture (1/5): no growth

## 2021-01-15 VITALS — SYSTOLIC BLOOD PRESSURE: 120 MMHG | DIASTOLIC BLOOD PRESSURE: 80 MMHG | TEMPERATURE: 97 F

## 2021-01-15 VITALS — OXYGEN SATURATION: 98 %

## 2021-01-15 LAB
BUN BLD-MCNC: 13 MG/DL (ref 7–18)
GLUCOSE SERPLBLD-MCNC: 89 MG/DL (ref 74–106)
HCT VFR BLD CALC: 33.3 % (ref 36–45)
LYMPHOCYTES # SPEC AUTO: 1.7 K/UL (ref 0.7–4.9)
PMV BLD: 8.5 FL (ref 7.6–11.3)
POTASSIUM SERPL-SCNC: 4.3 MMOL/L (ref 3.5–5.1)
RBC # BLD: 4.63 M/UL (ref 3.86–4.86)

## 2021-01-15 RX ADMIN — TOPIRAMATE SCH MG: 25 TABLET, COATED ORAL at 10:56

## 2021-01-15 RX ADMIN — PREGABALIN SCH MG: 50 CAPSULE ORAL at 11:03

## 2021-01-15 RX ADMIN — OXYCODONE AND ACETAMINOPHEN PRN TAB: 5; 325 TABLET ORAL at 11:03

## 2021-01-15 RX ADMIN — DICLOFENAC SODIUM SCH MG: 75 TABLET, DELAYED RELEASE ORAL at 11:05

## 2021-01-15 RX ADMIN — Medication SCH PATCH: at 09:00

## 2021-01-15 RX ADMIN — QUETIAPINE FUMARATE SCH MG: 100 TABLET, FILM COATED ORAL at 10:57

## 2021-01-15 RX ADMIN — MORPHINE SULFATE PRN MG: 2 INJECTION, SOLUTION INTRAMUSCULAR; INTRAVENOUS at 11:48

## 2021-01-15 RX ADMIN — OXYCODONE AND ACETAMINOPHEN PRN TAB: 5; 325 TABLET ORAL at 14:49

## 2021-01-15 RX ADMIN — Medication SCH ML: at 11:03

## 2021-01-15 RX ADMIN — CEFTRIAXONE SCH MLS: 1 INJECTION, SOLUTION INTRAVENOUS at 10:56

## 2021-01-15 RX ADMIN — DOCUSATE SODIUM SCH MG: 100 CAPSULE, LIQUID FILLED ORAL at 10:57

## 2021-01-15 RX ADMIN — NICOTINE SCH MG: 21 PATCH TRANSDERMAL at 10:55

## 2021-01-15 RX ADMIN — QUETIAPINE FUMARATE SCH MG: 100 TABLET, FILM COATED ORAL at 14:47

## 2021-01-15 NOTE — P.DS
Admission Date: 01/04/21


Discharge Date: 01/15/21


Disposition: ROUTINE DISCHARGE


Discharge Condition: FAIR


Reason for Admission: UTI, AMS.





- Problems


(1) UTI (urinary tract infection)


Current Visit: Yes   Status: Acute   





(2) Metabolic encephalopathy


Current Visit: Yes   Status: Acute   





(3) Acute delirium


Onset Date: 06/05/17   Current Visit: No   Status: Acute   





(4) Tetrahydrocannabinol (THC) use disorder, mild, abuse


Onset Date: 06/05/17   Current Visit: No   Status: Acute   





(5) Bipolar disorder


Current Visit: Yes   Status: Acute   





(6) Haemophilus influenzae meningitis


Current Visit: Yes   Status: Acute   





(7) Sepsis due to hemophilus influenzae


Current Visit: Yes   Status: Acute   


Brief History of Present Illness: 


45-year-old  woman with a history of bipolar disorder was 

brought in to the emergency department course patient was agitated and altered. 

No report of fever patient had a CT scan done did not show any acute 

abnormality.  Her urine drug screen was positive for THC and benzodiazepines.  

Her urinalysis suggested the presence of UTI.  Patient was started on IV 

Rocephin and admitted for further management.





Hospital Course: 


Patient admitted to the medical floor and treated with IV Rocephin.  Her urine 

culture yielded mixed growth but her blood culture grew Hemophilus influenza.  

There was a concern for meningitis given her altered mental status.  Lumbar 

puncture was performed and CSF was cloudy with significant amount of WBC 

suggesting meningitis.  CSF culture yielded no growth.  Infectious disease-Dr. Nicholas was consulted, patient was treated with IV Rocephin-meningitis doses for 

Hemophilus influenzae.  She received a total of 7 days of IV antibiotics.  

Patient clinically improved.  She was complaining of headache.  Repeat CT head 

showed no acute abnormality.  Patient with a history of illicit drug use, 

baseline bipolar disorder and high risk for aberrant opioid.  Her pain was 

managed with opioid.  She continued to complain of headache and was requesting 

for IV opioid frequently even though she had no functional limitations.  Althou

gh she was noted to be sleeping soundly or was on the phone every time I see her

for examination, she kept asking for the IV opioids.  She has been adequately 

treated for the meningitis.  Case discussed with Dr. Nicholas.  Patient is deemed 

stable for discharge on oral antibiotics.  He is discharged with 2 more weeks of

Cefdinir.





Vital Signs/Physical Exam: 














Temp Pulse Resp BP Pulse Ox


 


 98.1 F   82   18   112/68   97 


 


 01/15/21 08:00  01/15/21 08:00  01/15/21 11:48  01/15/21 08:00  01/15/21 11:48








General: Alert, In no apparent distress, Oriented x3


HEENT: Mucous membr. moist/pink


Neck: Supple


Respiratory: Clear to auscultation bilaterally, Normal air movement


Cardiovascular: Regular rate/rhythm, Normal S1 S2


Gastrointestinal: Normal bowel sounds, Soft and benign, Non-distended, No 

tenderness


Musculoskeletal: No swelling, No tenderness


Integumentary: No rashes


Neurological: Normal speech, Normal strength at 5/5 x4 extr, Cranial nerves 3-12

intact


Laboratory Data at Discharge: 














WBC  6.4 K/uL (4.3-10.9)   01/15/21  12:02    


 


Hgb  10.5 g/dL (12.0-15.0)  L  01/15/21  12:02    


 


Hct  33.3 % (36.0-45.0)  L  01/15/21  12:02    


 


Plt Count  569 K/uL (152-406)  H  01/15/21  12:02    


 


PT  13.6 SECONDS (9.5-12.5)  H  01/02/21  19:37    


 


INR  1.16   01/02/21  19:37    


 


APTT  24.2 SECONDS (24.3-36.9)  L  01/02/21  19:37    


 


Sodium  140 mmol/L (136-145)   01/15/21  05:35    


 


Potassium  4.3 mmol/L (3.5-5.1)   01/15/21  05:35    


 


BUN  13 mg/dL (7-18)   01/15/21  05:35    


 


Creatinine  0.78 mg/dL (0.55-1.3)   01/15/21  05:35    


 


Glucose  89 mg/dL ()   01/15/21  05:35    


 


Phosphorus  4.3 mg/dL (2.5-4.9)   01/10/21  08:08    


 


Magnesium  2.0 mg/dL (1.8-2.4)   01/12/21  07:23    


 


Total Bilirubin  0.4 mg/dL (0.2-1.0)   01/12/21  07:23    


 


AST  51 U/L (15-37)  H  01/12/21  07:23    


 


ALT  68 U/L (12-78)   01/12/21  07:23    


 


Alkaline Phosphatase  90 U/L ()   01/12/21  07:23    








Home Medications: 








Quetiapine Fumarate [Seroquel] 100 mg PO TID 03/21/18 


Amitriptyline [Elavil*] 25 mg PO BEDTIME 10/02/19 


Oxycodone HCl/Acetaminophen [Oxycodon-Acetaminophen 7.5-325] 2 each PO DAILY 

10/02/19 


Pregabalin [Lyrica*] 50 mg PO BID 10/02/19 


Quetiapine Fumarate [Seroquel] 1 tab PO BEDTIME 10/02/19 


Fluticasone/Salmeterol [Advair 250-50 Diskus] 1 each IH BID #60 blst.w.dev 

10/04/19 


Lidocaine 4% Patch [Lidoderm 5% Patch*] 1 patch TD DAILY #3 patch 10/04/19 


predniSONE [Deltasone*] 10 mg PO BID #20 tab 10/04/19 


Cefdinir [Omnicef] 300 mg PO BID #28 capsule 01/15/21 


Docusate [Colace Cap*] 100 mg PO BID #60 cap 01/15/21 


Pharmacy Consult 1 ea XX DAILYPRN PRN #30 each 01/15/21 


Polyethyl Gly 3350 [Glycolax*] 17 gm PO DAILYPRN PRN #30 packet 01/15/21 





New Medications: 


Docusate [Colace Cap*] 100 mg PO BID #60 cap


Polyethyl Gly 3350 [Glycolax*] 17 gm PO DAILYPRN PRN #30 packet


 PRN Reason: Constipation


Cefdinir [Omnicef] 300 mg PO BID #28 capsule


Pharmacy Consult 1 ea XX DAILYPRN PRN #30 each


 PRN Reason: Constipation


Diet: AHA


Activity: Ad alfredo


Followup: 


Unknown,U [Primary Care Provider] - 1-2 Weeks


Time spent managing pt's care (in minutes): 40

## 2022-02-12 ENCOUNTER — HOSPITAL ENCOUNTER (EMERGENCY)
Dept: HOSPITAL 97 - ER | Age: 47
Discharge: ANOTHER HOSPITAL | End: 2022-02-12
Payer: COMMERCIAL

## 2022-02-12 VITALS — SYSTOLIC BLOOD PRESSURE: 98 MMHG | DIASTOLIC BLOOD PRESSURE: 71 MMHG | OXYGEN SATURATION: 96 %

## 2022-02-12 VITALS — TEMPERATURE: 99 F

## 2022-02-12 DIAGNOSIS — A41.9: Primary | ICD-10-CM

## 2022-02-12 DIAGNOSIS — J12.82: ICD-10-CM

## 2022-02-12 DIAGNOSIS — U07.1: ICD-10-CM

## 2022-02-12 DIAGNOSIS — K72.90: ICD-10-CM

## 2022-02-12 DIAGNOSIS — N17.9: ICD-10-CM

## 2022-02-12 LAB
ALBUMIN SERPL BCP-MCNC: 2.2 G/DL (ref 3.4–5)
ALP SERPL-CCNC: 120 U/L (ref 45–117)
AMYLASE SERPL-CCNC: 16 U/L (ref 25–115)
ANISOCYTOSIS BLD QL: (no result)
AST SERPL W P-5'-P-CCNC: 1976 U/L (ref 15–37)
BUN BLD-MCNC: 63 MG/DL (ref 7–18)
CKMB CREATINE KINASE MB: 2.2 NG/ML (ref 1–3.6)
COHGB MFR BLDA: 1.9 % (ref 0–1.5)
GLUCOSE SERPLBLD-MCNC: 140 MG/DL (ref 74–106)
HCT VFR BLD CALC: 36.5 % (ref 36–45)
HYPOCHROMIA BLD QL: (no result)
INR BLD: 2.57
LIPASE SERPL-CCNC: 40 U/L (ref 73–393)
MACROCYTES BLD QL: (no result)
METHAMPHET UR QL SCN: NEGATIVE
MORPHOLOGY BLD-IMP: (no result)
OXYHGB MFR BLDA: 93.8 % (ref 94–97)
PMV BLD: 8.7 FL (ref 7.6–11.3)
POTASSIUM SERPL-SCNC: 4.9 MMOL/L (ref 3.5–5.1)
RBC # BLD: 4.57 M/UL (ref 3.86–4.86)
SAO2 % BLDA: 97 % (ref 92–98.5)
SARS-COV-2 RNA RESP QL NAA+PROBE: POSITIVE
TARGETS BLD QL SMEAR: (no result)
THC SERPL-MCNC: POSITIVE NG/ML
TROPONIN I SERPL HS-MCNC: 21.6 PG/ML (ref ?–58.9)

## 2022-02-12 PROCEDURE — 72125 CT NECK SPINE W/O DYE: CPT

## 2022-02-12 PROCEDURE — 87077 CULTURE AEROBIC IDENTIFY: CPT

## 2022-02-12 PROCEDURE — 84145 PROCALCITONIN (PCT): CPT

## 2022-02-12 PROCEDURE — 0241U: CPT

## 2022-02-12 PROCEDURE — 93005 ELECTROCARDIOGRAM TRACING: CPT

## 2022-02-12 PROCEDURE — 83690 ASSAY OF LIPASE: CPT

## 2022-02-12 PROCEDURE — 70450 CT HEAD/BRAIN W/O DYE: CPT

## 2022-02-12 PROCEDURE — 85025 COMPLETE CBC W/AUTO DIFF WBC: CPT

## 2022-02-12 PROCEDURE — 80320 DRUG SCREEN QUANTALCOHOLS: CPT

## 2022-02-12 PROCEDURE — 84484 ASSAY OF TROPONIN QUANT: CPT

## 2022-02-12 PROCEDURE — 80048 BASIC METABOLIC PNL TOTAL CA: CPT

## 2022-02-12 PROCEDURE — 87186 SC STD MICRODIL/AGAR DIL: CPT

## 2022-02-12 PROCEDURE — 51702 INSERT TEMP BLADDER CATH: CPT

## 2022-02-12 PROCEDURE — 80076 HEPATIC FUNCTION PANEL: CPT

## 2022-02-12 PROCEDURE — 87088 URINE BACTERIA CULTURE: CPT

## 2022-02-12 PROCEDURE — 87086 URINE CULTURE/COLONY COUNT: CPT

## 2022-02-12 PROCEDURE — 83605 ASSAY OF LACTIC ACID: CPT

## 2022-02-12 PROCEDURE — 99292 CRITICAL CARE ADDL 30 MIN: CPT

## 2022-02-12 PROCEDURE — 82805 BLOOD GASES W/O2 SATURATION: CPT

## 2022-02-12 PROCEDURE — 87205 SMEAR GRAM STAIN: CPT

## 2022-02-12 PROCEDURE — 71250 CT THORAX DX C-: CPT

## 2022-02-12 PROCEDURE — 81015 MICROSCOPIC EXAM OF URINE: CPT

## 2022-02-12 PROCEDURE — 94003 VENT MGMT INPAT SUBQ DAY: CPT

## 2022-02-12 PROCEDURE — 82550 ASSAY OF CK (CPK): CPT

## 2022-02-12 PROCEDURE — 80307 DRUG TEST PRSMV CHEM ANLYZR: CPT

## 2022-02-12 PROCEDURE — 36415 COLL VENOUS BLD VENIPUNCTURE: CPT

## 2022-02-12 PROCEDURE — 87040 BLOOD CULTURE FOR BACTERIA: CPT

## 2022-02-12 PROCEDURE — 85730 THROMBOPLASTIN TIME PARTIAL: CPT

## 2022-02-12 PROCEDURE — 82553 CREATINE MB FRACTION: CPT

## 2022-02-12 PROCEDURE — 94760 N-INVAS EAR/PLS OXIMETRY 1: CPT

## 2022-02-12 PROCEDURE — 82140 ASSAY OF AMMONIA: CPT

## 2022-02-12 PROCEDURE — 31500 INSERT EMERGENCY AIRWAY: CPT

## 2022-02-12 PROCEDURE — 85610 PROTHROMBIN TIME: CPT

## 2022-02-12 PROCEDURE — 94002 VENT MGMT INPAT INIT DAY: CPT

## 2022-02-12 PROCEDURE — 99291 CRITICAL CARE FIRST HOUR: CPT

## 2022-02-12 PROCEDURE — 80329 ANALGESICS NON-OPIOID 1 OR 2: CPT

## 2022-02-12 PROCEDURE — 71045 X-RAY EXAM CHEST 1 VIEW: CPT

## 2022-02-12 PROCEDURE — 82150 ASSAY OF AMYLASE: CPT

## 2022-02-12 NOTE — RAD REPORT
EXAM DESCRIPTION:  RAD - Chest Single View - 2/12/2022 4:17 am

 

CLINICAL HISTORY:  The patient is 46 years old and is Female; post intubation

 

TECHNIQUE:  Single view of the chest.

 

COMPARISON:  February 12, 2022 3:18 AM.

 

FINDINGS:  Lungs:   Diffuse bilateral pulmonary opacities, slightly increased compared with the prior
 exam.

  Pleural space:   Unremarkable.   No pneumothorax.

  Heart:   Cardiomediastinal silhouette is not significantly changed given the differences in techniq
ue.

  Mediastinum:   See above.

  Bones/joints:   The bones and joints are unchanged as visualized.

  Tubes, lines and devices:   ET tube is 1.2 cm above the ena.

  Upper abdomen:   No free air in the visualized upper abdomen.

 

IMPRESSION:  1.   ET tube is 1.2 cm above the ena.

2.   Diffuse bilateral pulmonary opacities, slightly increased compared with the prior exam.

 

Electronically signed by:   Arcelia Smyth MD   2/12/2022 4:32 AM CST Workstation: [id number]

 

 

Due to temporary technical issues with the PACS/Fluency reporting system, reports are being signed by
 the in house radiologists without review as a courtesy to insure prompt reporting. The interpreting 
radiologist is fully responsible for the content of the report.
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
## 2022-02-12 NOTE — RAD REPORT
EXAM DESCRIPTION:  RAD - Chest Single View - 2/12/2022 3:24 am

 

CLINICAL HISTORY:  The patient is 45 years old and is Female; PICC LINE PLACEMENT

 

TECHNIQUE:  Single view of the chest.

 

COMPARISON:  No relevant prior studies available.

 

FINDINGS:  Lungs:   Unremarkable.   No consolidation.

  Pleural space:   Unremarkable.   No pneumothorax.

  Heart:   The cardiac silhouette is enlarged versus artifact of AP technique.

  Mediastinum:   Unremarkable.

  Bones/joints:   No acute fracture visualized.

  Tubes, lines and devices:   Right PICC line is in the SVC.

  Upper abdomen:   No free air in the visualized upper abdomen.

 

IMPRESSION:  Right PICC line is in the SVC.

 

Electronically signed by:   Arcelia Smyth MD   1/13/2021 4:24 AM CST Workstation: [id number]

 

 

 

Due to temporary technical issues with the PACS/Fluency reporting system, reports are being signed by
 the in house radiologists without review as a courtesy to insure prompt reporting. The interpreting 
radiologist is fully responsible for the content of the report.
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
## 2022-02-12 NOTE — RAD REPORT
EXAM DESCRIPTION:  CT - Head C Spine Cap Wo Con - 2/12/2022 7:16 am

******** ADDENDUM #1 ********

THIS REPORT CONTAINS FINDINGS THAT MAY BE CRITICAL TO PATIENT CARE: The findings were verbally discus
sed via telephone conference with Dr. Dante Rodriguez   by Dr. Augustine Boyd on 2/12/2022 6:51 AM 
CST .The results were acknowledged and understood.

Electronically signed by:   Augustine Boyd MD   2/12/2022 7:18 AM CST Workstation: [id number]

*** End of Addendum ***

EXAM:   CT Head and Cervical Spine Without Intravenous Contrast

 

CLINICAL HISTORY:  The patient is 46 years old and is Female; AMS

 

TECHNIQUE:  Axial computed tomography images of the head/brain and cervical spine without intravenous
 contrast.   Sagittal and coronal reformatted images were created and reviewed.   This CT exam was pe
rformed using one or more of the following dose reduction techniques:   automated exposure control, a
djustment of the mA and/or kV according to patient size, and/or use of iterative reconstruction techn
ique.

 

COMPARISON:  No relevant prior studies available.

 

FINDINGS:  Artifacts:   Hyperdensity is noted along the left frontal convexity, image 16 series 201, 
image 20 series 203.

  Brain:   Unremarkable.   No hemorrhage.   No significant white matter disease.   No edema.

  Ventricles:   Unremarkable.   No ventriculomegaly.

  Skull:   No acute fracture.

  Sinuses:   Maxillary sinus mucosal thickening.

  Mastoid air cells:   Unremarkable as visualized.   No mastoid effusion.

  Vertebrae:   Reversal of the normal cervical lordosis.

        No acute fracture.

  Discs/spinal canal/neural foramina:   No acute findings.   No spinal canal stenosis.

  Soft tissues:   Unremarkable.

* A single impression for all exams can be found at the end of this report

_______________________________________________

 

EXAM DESCRIPTION:  CT Chest, Abdomen and Pelvis Without Intravenous Contrast

 

CLINICAL HISTORY:  The patient is 46 years old and is Female; AMS

 

TECHNIQUE:  Axial computed tomography images of the chest, abdomen and pelvis without intravenous con
trast.   Sagittal and coronal reformatted images were created and reviewed.   This CT exam was perfor
med using one or more of the following dose reduction techniques:   automated exposure control, adjus
tment of the mA and/or kV according to patient size, and/or use of iterative reconstruction technique
.

 

COMPARISON:  CT abdomen and pelvis with contrast January 3, 2021.

 

FINDINGS:  CHEST:

  Lungs:   Multifocal consolidation involving the posterior right upper/lower lobe, and posterior lef
t lower lobe.

  Pleural space:   Unremarkable.   No significant effusion.   No pneumothorax.

  Heart:   Unremarkable.   No cardiomegaly.   No significant pericardial effusion.   No significant c
oronary artery calcifications.

  Mediastinum:   Calcified granulomas in the right lung. Calcified right hilar lymph nodes.

ABDOMEN:

  Liver:   Linear metallic density in the liver which may represent metallic coils. Correlate with pr
ocedural history.

  Gallbladder and bile ducts:   Unremarkable.   No calcified stones.   No ductal dilation.

  Pancreas:   Unremarkable.   No ductal dilation.

  Spleen:   Unremarkable.   No splenomegaly.

  Adrenals:   Unremarkable.   No mass.

  Kidneys and ureters:   Unremarkable.   No obstructing stones.   No hydronephrosis.

  Stomach and bowel:   Unremarkable.   No obstruction.   No mucosal thickening.

PELVIS:

  Appendix:   No findings to suggest acute appendicitis.

  Bladder:   Mcclendon catheter in the bladder.

        No stones.

  Reproductive:   Unremarkable as visualized.

CHEST, ABDOMEN and PELVIS:

  Intraperitoneal space:   Unremarkable.   No significant fluid collection.   No free air.

  Bones/joints:   Chronic traumatic changes in the bony pelvis.

        Partially visualized intramedullary chrissy in the right proximal femur.

        No acute fracture.   No dislocation.

  Soft tissues:   Unremarkable.

  Vasculature:   Unremarkable.   No aortic aneurysm.

  Lymph nodes:   See above.

  Tubes, lines and devices:   Endotracheal tube with tip 3.5 cm above the ena.

        Nasogastric tube with tip in stomach.

* A single impression for all exams can be found at the end of this report

_______________________________________________

 

IMPRESSION:  CT Head and Cervical Spine Without Intravenous Contrast:

  Hyperdensity is noted along the left frontal convexity, image 16 series 201, image 20 series 203.  
 Findings may represent artifact due to technique/positioning, but intracranial hemorrhage is not exc
luded. Recommend follow-up head CT.

CT Chest, Abdomen and Pelvis Without Intravenous Contrast:

  Multifocal consolidation involving the posterior right upper/lower lobe, and posterior left lower l
obe.

 

Electronically signed by:   Augustine Boyd MD   2/12/2022 6:46 AM CST Workstation: [id number]

 

 

 

Due to temporary technical issues with the PACS/Fluency reporting system, reports are being signed by
 the in house radiologists without review as a courtesy to insure prompt reporting. The interpreting 
radiologist is fully responsible for the content of the report.

## 2022-02-12 NOTE — XMS REPORT
Continuity of Care Document

                          Created on:2022



Patient:BLAYNE MEDLEY

Sex:Female

:1975

External Reference #:[id number]





Demographics







                          Address                    [address]



                                                    [place] [address]

 

                          Home Phone                [phone]

 

                          Mobile Phone              [phone]

 

                          Email Address             NONE

 

                          Preferred Language        English

 

                          Marital Status            Unknown

 

                          Jew Affiliation     Unknown

 

                          Race                      Unknown

 

                          Additional Race(s)        Black or 



                                                    Unavailable

 

                          Ethnic Group              Unknown









Author







                          Organization              [facility]

t

 

                          Address                   [address]



                                                    [place] [address]

 

                          Phone                     [phone]









Support







                Name            Relationship    Address         Phone

 

                Callies         Spouse          [address]  [address]1

[phone]



                                                [place] [address] 

 

                Green           Mother          [address]   [phone]



                                                [place] [address] 









Care Team Providers







                    Name                Role                Phone

 

                    KIMMY           Attending Clinician Unavailable

 

                    SIL              Attending Clinician Unavailable

 

                    Sanjay HOLCOMB         Attending Clinician [phone]

 

                    JEANNIE            Attending Clinician Unavailable

 

                    Ruthie               Attending Clinician Unavailable

 

                    KIMMY           Attending Clinician Unavailable

 

                    JR               Attending Clinician Unavailable

 

                    ALEAH               Attending Clinician Unavailable

 

                    ANANT                Attending Clinician Unavailable









Payers







           Payer Name Policy Type Policy Number Effective Date Expiration Date S

ource







Problems







       Condition Condition Condition Status Onset  Resolution Last   Treating Co

mments 

Source



       Name   Details Category        Date   Date   Treatment Clinician        



                                                 Date                 

 

       Pre-eclamp Pre-eclamp Disease Active 2016                             U

nivers



       brittany in brittany in               2-12                               ity of



       postpartum postpartum               00:00:                             [place]



       period period                                                [facility]

 

       Liveborn Liveborn Disease Active 2016                             Unive

rs



       infant, of infant, of               -09                               it

y of



       [place]               00[place]00[place]                             [place]

[place]



       pregnancy, pregnancy,               00                                 Me

dical



       born   born                                                    [place]



       outside outside                                                  



       hospital hospital                                                  

 

       Normal Normal Disease Active 2016                             [facility]



       labor  labor                -                               ity of



                                   00:00:                             [place]



                                   00                                 [facility]

 

       Hepatitis Hepatitis Disease Active 2016                             Uni

vers



       C antibody C antibody               1-13                               it

y of



       test   test                 00:00:                             [place]



       positive positive               00                                 Med[facility]

l



                                                                      [facility]

 

       Multiparit Multiparit Disease Active                              U

nivers



       y      y                    5-12                               ity of



                                   00:00:                             [place]



                                   00                                 [facility]

 

       Supervisio Supervisio Disease Active                              U

nivers



       n of   n of                 5-12                               ity of



       high-risk high-risk               00:00:                             [place]

[place]



       pregnancy pregnancy               00                                 Medi

zack



       of elderly of elderly                                                  Br

michele



       multigravi multigravi                                                  



       da     da                                                      

 

       AMA    AMA    Disease Active                              [facility]



       (advanced (advanced               5-12                               ity 

of



       maternal maternal               00:00:                             [place]



       age)   age)                 00                                 Medical



       multigravi multigravi                                                  Br

michele



       da 35+ da 35+                                                  

 

       Tobacco Tobacco Disease Active                              [facility]



       smoking smoking               5-12                               ity of



       affecting affecting               00:00:                             [place]

[place]



       pregnancy pregnancy               00                                 Medi

zack



                                                                      Branch

 

       History of History of Disease Active                              U

nivers



                       5-12                               ity of



       delivery, delivery,               00:00:                             [place]

[place]



       currently currently               00                                 Medi

zack



       pregnant pregnant                                                  [facility]

 

       FH: breast FH: breast Disease Active                       Overview

: [facility]



       cancer in cancer in               3-20                        Mother at i

ty of



       first  first                00:00:                      age 57. [place]



       degree degree               00                                 Medical



       relative relative                                                  Branch

 

       Obesity Obesity Disease Active                              [facility]



       affecting affecting               3-20                               ity 

of



       pregnancy pregnancy               00:00:                             [place]

[place]



                                   00                                 Medical



                                                                      Br[facility]

 

       History of History of Problem Resolve                                    

[facility]



       backache backache        d                                         ity of



                                                                      [place]



                                                                      [facility]



                                                                      ans

 

       History of History of Problem Resolve                                    

[facility]



       depression depression        d                                         it

y of



                                                                      [place]



                                                                      [facility]



                                                                      ans

 

       History of History of Problem Resolve                                    

Univers



       hepatitis hepatitis        d                                         ity 

of



                                                                      [place]



                                                                      [facility]



                                                                      ans

 

       Closed Closed Problem Active                                    [facility]



       fracture fracture                                                  ity of



       of neck of of neck of                                                  [place]

[place]



       right  right                                                   Physici



       talus  talus                                                   ans

 

       Closed Closed Problem Active                                    [facility]



       displaced displaced                                                  ity 

of



       fracture fracture                                                  [place]



       of     of                                                      Physici



       navicular navicular                                                  ans



       bone of bone of                                                  



       left foot, left foot,                                                  



       initial initial                                                  



       encounter encounter                                                  

 

       Closed Closed Problem Active                                    [facility]



       fracture fracture                                                  ity of



       of     of                                                      [place]



       superior superior                                                  Physic

i



       ramus of ramus of                                                  ans



       left   left                                                    



       pubis, pubis,                                                  



       initial initial                                                  



       encounter encounter                                                  

 

       Closed Closed Problem Active                                    [facility]



       nondisplac nondisplac                                                  it

y of



       ed     ed                                                      [place]



       fracture fracture                                                  Physic

i



       of body of of body of                                                  an

s



       right  right                                                   



       calcaneus, calcaneus,                                                  



       initial initial                                                  



       encounter encounter                                                  

 

       Closed Closed Problem Active                                    [facility]



       fracture fracture                                                  ity of



       of tibial of tibial                                                  [place]

[place]



       plateau plateau                                                  Phys[facility]



       with   with                                                    ans



       routine routine                                                  



       healing, healing,                                                  



       left   left                                                    

 

       Closed Closed Problem Active                                    [facility]



       displaced displaced                                                  ity 

of



       fracture fracture                                                  [place]



       of second of second                                                  Phys

ici



       metatarsal metatarsal                                                  an

s



       bone of bone of                                                  



       right  right                                                   



       foot,  foot,                                                   



       initial initial                                                  



       encounter encounter                                                  

 

       Closed Closed Problem Active                                    [facility]



       nondisplac nondisplac                                                  it

y of



       ed     ed                                                      [place]



       fracture fracture                                                  Physic

i



       of fourth of fourth                                                  ans



       metatarsal metatarsal                                                  



       bone of bone of                                                  



       right foot right foot                                                  

 

       Closed Closed Problem Active                                    [facility]



       nondisplac nondisplac                                                  it

y of



       ed     ed                                                      [place]



       fracture fracture                                                  Physic

i



       of third of third                                                  ans



       metatarsal metatarsal                                                  



       bone of bone of                                                  



       right foot right foot                                                  

 

       Blues  Blues  Problem Active                                    [facility]



                                                                      ity of



                                                                      [place]



                                                                      [facility]



                                                                      ans

 

       Displaced Displaced Problem Active                                    Uni

vers



       supracondy supracondy                                                  it

y of



       lar    lar                                                     [place]



       fracture fracture                                                  Physic

i



       with   with                                                    ans



       intracondy intracondy                                                  



       lar    lar                                                     



       extension extension                                                  



       of lower of lower                                                  



       end of end of                                                  



       right  right                                                   



       femur, femur,                                                  



       subsequent subsequent                                                  



       encounter encounter                                                  



       for open for open                                                  



       fracture fracture                                                  



       type IIIA, type IIIA,                                                  



       IIIB, or IIIB, or                                                  



       IIIC with IIIC with                                                  



       routine routine                                                  



       healing healing                                                  

 

       Laceration Laceration Problem Active                                    U

nivers



       of knee of knee                                                  ity of



       with   with                                                    [place]



       foreign foreign                                                  Physici



       body,  body,                                                   ans



       left,  left,                                                   



       subsequent subsequent                                                  



       encounter encounter                                                  

 

       Avulsion Avulsion Problem Active                                    Unive

rs



       fracture fracture                                                  ity of



       of lateral of lateral                                                  [place]

[place]



       malleolus malleolus                                                  Phys

ici



       of right of right                                                  ans



       fibula, fibula,                                                  



       open type open type                                                  



       III, with III, with                                                  



       routine routine                                                  



       healing, healing,                                                  



       subsequent subsequent                                                  



       encounter encounter                                                  

 

       Laceration Laceration Problem Active                                    U

nivers



       of lower of lower                                                  ity of



       leg with leg with                                                  [place]



       foreign foreign                                                  Physici



       body,  body,                                                   ans



       right, right,                                                  



       subsequent subsequent                                                  



       encounter encounter                                                  

 

       Fracture Fracture Problem Active                                    Unive

rs



       of right of right                                                  ity of



       inferior inferior                                                  [place]



       pubic  pubic                                                   Physici



       ramus, ramus,                                                  ans



       closed, closed,                                                  



       initial initial                                                  



       encounter encounter                                                  

 

       Closed Closed Problem Active                                    [facility]



       avulsion avulsion                                                  ity of



       fracture fracture                                                  [place]



       of     of                                                      Physici



       navicular navicular                                                  ans



       bone of bone of                                                  



       foot,  foot,                                                   



       right, right,                                                  



       initial initial                                                  



       encounter encounter                                                  

 

       Fracture Fracture Problem Active                                    Unive

rs



       of left of left                                                  ity of



       inferior inferior                                                  [place]



       pubic  pubic                                                   Physici



       ramus, ramus,                                                  ans



       closed, closed,                                                  



       initial initial                                                  



       encounter encounter                                                  

 

       Fracture Fracture Problem Active                                    Unive

rs



       of distal of distal                                                  ity 

of



       end of end of                                                  [place]



       right  right                                                   Physici



       femur with femur with                                                  an

s



       nonunion nonunion                                                  

 

       Dehiscence Dehiscence Problem Active                                    U

nivers



       of     of                                                      ity of



       external external                                                  [place]



       operation operation                                                  Phys

ici



       wound  wound                                                   ans

 

       Avulsion Avulsion Problem Active                                    Unive

rs



       of leg, of leg,                                                  ity of



       right, right,                                                  [place]



       initial initial                                                  [facility]



       encounter encounter                                                  ans

 

       Cellulitis Cellulitis Problem Active                                    U

nivers



       of     of                                                      ity of



       drainage drainage                                                  [place]



       site,  site,                                                   Physici



       post-opera post-opera                                                  an

s



       tive   tive                                                    

 

       Infection Infection Problem Active                                    Uni

vers



       and    and                                                     ity of



       inflammato inflammato                                                  [place]

[place]



       ry     ry                                                      [facility]



       reaction reaction                                                  ans



       due to due to                                                  



       internal internal                                                  



       fixation fixation                                                  



       device of device of                                                  



       right  right                                                   



       femur, femur,                                                  



       initial initial                                                  



       encounter encounter                                                  

 

       Closed Closed Problem Active                                    [facility]



       comminuted comminuted                                                  it

y of



       intra-varun intra-varun                                                  [place]

[place]



       cular  cular                                                   [facility]



       fracture fracture                                                  ans



       of distal of distal                                                  



       end of end of                                                  



       femur with femur with                                                  



       nonunion, nonunion,                                                  



       right  right                                                   

 

       Other  Other  Problem Active                                    Univers



       fracture fracture                                                  ity of



       of shaft of shaft                                                  [place]



       of     of                                                      Physici



       unspecifie unspecifie                                                  an

s



       d tibia, d tibia,                                                  



       initial initial                                                  



       encounter encounter                                                  



       for closed for closed                                                  



       fracture fracture                                                  

 

       Bipolar Bipolar Problem Active                                    Univers



       disorder, disorder,                                                  ity 

of



       in partial in partial                                                  [place]

[place]



       remission, remission,                                                  Ph

ysici



       most   most                                                    ans



       recent recent                                                  



       episode episode                                                  



       mixed  mixed                                                   

 

       Anxiety Anxiety Problem Active                                    Univers



                                                                      ity of



                                                                      [place]



                                                                      Physici



                                                                      ans

 

       Closed Closed Problem Active                                    Univers



       fracture fracture                                                  ity of



       of right of right                                                  [place]



       superior superior                                                  Physic

i



       pubic  pubic                                                   ans



       ramus  ramus                                                   







Allergies, Adverse Reactions, Alerts

This patient has no known allergies or adverse reactions.



Family History







           Family Member Diagnosis  Comments   Start Date Stop Date  Source

 

           Unknown Family Family history of Family History                      

 [facility]



           Member     cardiac disorder                                  [place] Ph

ysicians

 

           Unknown Family Family history of Family History                      

 [facility]



           Member     essential                                   [place] [facility]

ns



                      hypertension                                  

 

           Unknown Family Family history of Family History                      

 [facility]



           Member     arthritis                                   [place] [facility]

ns

 

           Unknown Family Family history of Family History                      

 [facility]



           Member     malignant neoplasm                                  [place] 

Phys[facility]







Social History







           Social Habit Start Date Stop Date  Quantity   Comments   Source

 

           History of tobacco                       Cigarette Smoker            

[facility] of



           use                                                    [facility]

 

           Alcohol intake                                             [facility] [place] [facility]

 

           Sex Assigned At                                             Universit

y of



           Birth                                                  [facility]

 

           Cigarettes smoked 2019                       Univers

ity of



           current (pack per 00:00:00   00:00:00                         [place] M

edical



           day) - Reported                                             Branch

 

           Cigarette  2019                       University of



           pack-years 00:00:00   00:00:00                         [facility]

 

           Alcohol Comment 2016 Daily- 1-2            Universit

y of



                      00:00:00   00:00:00   beers, quit with            [place] Me

dical



                                            pregnancy             Branch









                Smoking Status  Start Date      Stop Date       Source

 

                Current every day smoker 2019 00:00:00                 Uni

versity [facility]







Medications







       Ordered Filled Start  Stop   Current Ordering Indication Dosage Frequency

 Signature

                    Comments            Components          Source



     Medication Medication Date Date Medication? Clinician                (SIG) 

          



     Name Name                                                   

 

     Lidocaine 5 Lidocaine 5       Yes  EZEKIEL                 APPLY 1       

    Univers



     % External % External 6-17           KIMMY                PATCH TO     

      ity of



     Patch Patch 00:00:           APRN                THE            [place]



               00                                 AFFECTED           Physici



                                                  AREA AND           ans



                                                  LEAVE IN           



                                                  PLACE FOR           



                                                  12 HOURS,           



                                                  THEN           



                                                  REMOVE AND           



                                                  LEAVE OFF           



                                                  FOR 12           



                                                  HOURS.           

 

     Cyclobenzap Cyclobenzap       Yes  EZEKIEL            Q0.3333D TAKE 1    

       Univers



     rine HCl - rine HCl - 4-15           KIMMY                TABLET 3     

      ity of



     5 MG Oral 5 MG Oral 00:00:           APRN                TIMES           Te

[place]



     Tablet Tablet 00                                 DAILY.           Physici



                                                                 ans

 

     Naproxen Naproxen       Yes  EZEKIEL            Q0.5D TAKE 1           Un

david



     500 MG Oral 500 MG Oral 2-19           KIMMY                TABLET     

      ity of



     Tablet Tablet 00:00:           APRN                TWICE           [place]



               00                                 DAILY WITH           Physici



                                                  MEALS.           ans

 

     Cyclobenzap Cyclobenzap       Yes  WILMER      1    Q0.5D TAKE 1     

      Univers



     rine HCl - rine HCl - 1-13           MUJICA PA                TABLET       

    ity of



     10 MG Oral 10 MG Oral 00:00:                               TWICE           

[place]



     Tablet Tablet 00                                 DAILY           Physici



                                                                 ans

 

     quetiapine            Yes                      Take  by           Uni

vers



     fumarate                                     mouth.           ity of



     (SEROQUEL      21:48:                                              [place]



     ORAL)      [address] [place] [address]

 

     amitriptyli            Yes                      Take  by           Un

david



     ne HCl                                     mouth.           ity of



     (AMITRIPTYL      21:48:                                              [place]



     INE ORAL)                                                      [facility]

 

     ketorolac      2019- No             30mg      30 mg,           Unive

rs



     (TORADOL)                                Intramuscu           ity

 of



     injection      21:00: 21:00                          lar, ONCE,           T

[place]



     30 mg      00   :00                           1 dose,           Medical



                                                  Thu            Branch



                                                  19 at           



                                                  1600,           



                                                  ASAP<br>Fa           



                                                  culty           



                                                  member           



                                                  approving           



                                                  Restricted           



                                                  medication           



                                                  : LAKESHIA GRACE           

 

     FENTanyl PF      2019- No             50ug      50 mcg,           Un

david



     (SUBLIMAZE                                Slow IV           ity o

f



     (PF))      20:45: 21:15                          Push,           [place]



     injection      00   :00                           ONCE, 1           Medical



     50 mcg                                         dose, Thu           Branch



                                                  19 at           



                                                  1600, STAT           

 

     naproxen      -      Yes       [id number] 375mg      Take 1           U

nivers



     375 mg      9-19                               tablet by           ity of



     tablet      00:00:                               mouth 2           [place]



               00                                 (two)           Medical



                                                  times           Branch



                                                  daily as           



                                                  needed for           



                                                  Pain           



                                                  (scale           



                                                  4-6).           

 

     acetaminoph            Yes       [id number] 1{tbl}      Take 1        

   Univers



     en-codeine      6-22                               tablet by           ity 

of



     (TYLENOL-CO      00:00:                               mouth           [place]



     DEINE #3)      00                                 every 6           Medical



     300-30 mg                                         (six)           Branch



     tablet                                         hours as           



                                                  needed for           



                                                  Pain           



                                                  (scale           



                                                  4-6) (for           



                                                  cough).           

 

     Propranolol Propranolol       Yes  ARGENIS           Q0.5D TAKE 1   

        Univers



     HCl - 10 MG HCl - 10 MG 2-13           SAMMY                TABLET       

    ity of



     Oral Tablet Oral Tablet 00:00:           M.D.                TWICE         

  [place]



               00                                 DAILY.           Physici



                                                                 ans

 

     Melatonin 5 Melatonin 5 2018      Yes  KULWINDER      1         TAKE 1        

   Univers



     MG Oral MG Oral 1-14           DENNIS                TABLET           ity

 of



     Tablet Tablet 00:00:           M.D.                BEDTIME           [place]



               00                                                Physici



                                                                 ans

 

     Sertraline Sertraline 2018      Yes  ARGENIS      1    QD   TAKE 1      

     Univers



     HCl - 100 HCl - 100 1-14           SAMMY                TABLET           

ity of



     MG Oral MG Oral 00:00:           M.D.                DAILY.           [place]



     Tablet Tablet 00                                                Physici



                                                                 ans

 

     QUEtiapine QUEtiapine 2018      Yes  ARGENIS      1         TAKE 1      

     Univers



     Fumarate Fumarate 1-14           SAMMY                TABLET AT          

 ity of



     400 MG Oral 400 MG Oral 00:00:           M.D.                BEDTIME.      

     [place]



     Tablet Tablet 00                                                Physici



                                                                 ans

 

     QUEtiapine QUEtiapine 2018      Yes  ARGENIS      1    Q0.3333D TAKE 1  

         Univers



     Fumarate Fumarate 1-14           SAMMY                TABLET 3           

ity of



     100 MG Oral 100 MG Oral 00:00:           M.D.                TIMES         

  [place]



     Tablet Tablet 00                                 DAILY           Physici



                                                                 ans

 

     Acetaminoph Acetaminoph       Yes  WILMER      1    Q6H  TAKE 1      

     Univers



     en-Codeine en-Codeine 9-21           MUJICA PA                TABLET       

    ity of



     #3 300-30 #3 300-30 00:00:                               EVERY 6           

[place]



     MG Oral MG Oral 00                                 HOURS           Physici



     Tablet Tablet                                                   ans

 

     Methocarbam Methocarbam       Yes  WILMER           Q0.5D TAKE 1     

      Univers



     ol 750 MG ol 750 MG 7-30           MUJICA PA                TABLET         

  ity of



     Oral Tablet Oral Tablet 00:00:                               TWICE         

  [place]



               00                                 DAILY.           Physici



                                                                 ans

 

     Methocarbam Methocarbam       Yes  WILMER      1    Q0.5D TAKE 1     

      Univers



     ol 500 MG ol 500 MG 4-09           MUJICA PA                TABLET         

  ity of



     Oral Tablet Oral Tablet 00:00:                               TWICE         

  [place]



               00                                 DAILY           Physici



                                                                 ans

 

     traMADol traMADol       Yes  WILMER      1         TAKE 1           U

nivers



     HCl - 50 MG HCl - 50 MG 4-           MUJICA PA                TABLET     

      ity of



     Oral Tablet Oral Tablet 00:00:                               ONCE          

 [place]



               00                                                Physici



                                                                 ans

 

     Vitamin D Vitamin D       Yes  EZEKIEL                 Take 1           U

nivers



     (Ergocalcif (Ergocalcif 8-02           KIMMY                capsule    

       ity of



     alisia) 1.25 alisia) 1.25 00:00:           APRN                twice a         

  [place]



     MG (40407 MG (94250 00                                 week,           Phys

ici



     UT) Oral UT) Oral                                    Tuesday           ans



     Capsule Capsule                                    and            



                                                  Thursday           

 

     Acetaminoph Acetaminoph       Yes  NAILA                TAKE 1 TO   

        Univers



     en-Codeine en-Codeine 5-03           ANANT M.D.                2 TABLETS    

       ity of



     #3 300-30 #3 300-30 00:00:                               EVERY 6           

[place]



     MG Oral MG Oral 00                                 HOURS AS           Physi

ci



     Tablet Tablet                                    NEEDED FOR           ans



                                                  PAIN.           

 

     traMADol traMADol       Yes  NAILA                TAKE 1 TO         

  Univers



     HCl - 50 MG HCl - 50 MG 5-03           ANANT M.D.                2 TABLETS  

         ity of



     Oral Tablet Oral Tablet 00:00:                               EVERY 6       

    [place]



               00                                 HOURS AS           Physici



                                                  NEEDED FOR           ans



                                                  PAIN.           

 

     Gabapentin Gabapentin       Yes  NAILA      1    Q0.3333D TAKE 1    

       Univers



     300 MG Oral 300 MG Oral 3-30           ANANT M.D.                CAPSULE 3  

         ity of



     Capsule Capsule 00:00:                               TIMES           [place]



               00                                 DAILY           Physici



                                                                 ans

 

     Methocarbam Methocarbam       Yes  NAILA      1    Q0.3333D TAKE 1  

         Univers



     ol 500 MG ol 500 MG 3-30           ANANT M.D.                TABLET 3       

    ity of



     Oral Tablet Oral Tablet 00:00:                               TIMES         

  [place]



               00                                 DAILY PRN           Physici



                                                  muscle           ans



                                                  spasm           

 

     hydroCHLORO      2016      Yes            12.5mg      Take 1           Un

david



     thiazide      2-11                               tablet by           ity of



     (HYDRODIURI      00:00:                               mouth           [place]



     L) 12.5 mg      00                                 daily.           Medical



     tablet                                                        Branch

 

     PNV without            Yes       [id number] 1{each}      Take 1        

   Univers



     Ca-Iron      5-25                               Each by           ity of



     PsCmplx-FA      00:00:                               mouth           [place]



     (SELECT-OB,      00                                 daily.           Medica

l



     FOLIC                                                        Branch



     ACID,) 29-1                                                        



     mg Chew                                                        







Immunizations







           Ordered    Filled Immunization Date       Status     Comments   Acosta

e



           Immunization Name Name                                        

 

           Td                    2006 Completed             [facility]



                                 00:00:00                         [facility]







Vital Signs







             Vital Name   Observation Time Observation Value Comments     Source

 

             Systolic blood 2019 22:13:58 128 mm[Hg]                Univer

sity of



             pressure                                            [facility]

 

             Diastolic blood 2019 22:13:58 72 mm[Hg]                 Unive

rsity of



             [facility]

 

             Heart rate   2019 22:13:58 89 /min                   Universi

ty of



                                                                 [facility]

 

             Respiratory rate 2019 22:13:58 18 /min                   Univ

ersity of



                                                                 [facility]

 

             Oxygen saturation in 2019 22:13:58 98 /min                   

University of



             Arterial blood by                                        [place] Medi

zack



             Pulse oximetry                                        Branch

 

             Body temperature 2019 20:22:00 36.78 Tessa                 Univ

ersity of



                                                                 [facility]

 

             Body weight  2019 20:22:00 113.399 kg                Universi

ty of



                                                                 [facility]

 

             BMI          2019 20:22:00 45.73 kg/m2               Universi

ty of



                                                                 [facility]

 

             Systolic blood 2019 22:13:58 128 mm[Hg]                Univer

sity of



             [facility]

 

             Diastolic blood 2019 22:13:58 72 mm[Hg]                 Unive

rsity of



             [facility]

 

             Heart rate   2019 22:13:58 89 /min                   Universi

ty of



                                                                 [facility]

 

             Respiratory rate 2019 22:13:58 18 /min                   Univ

ersity of



                                                                 [facility]



                                                                 Branch

 

             Oxygen saturation in 2019 22:13:58 98 /min                   

University of



             Arterial blood by                                        [place] Medi

zack



             Pulse oximetry                                        Branch

 

             Body temperature 2019 20:22:00 36.78 Tessa                 Univ

ersity of



                                                                 [facility]

 

             Body weight  2019 20:22:00 113.399 kg                Universi

ty of



                                                                 [facility]

 

             BMI          2019 20:22:00 45.73 kg/m2               Universi

[facility]







Procedures







                Procedure       Date / Time Performed Performing Clinician Acosta elliott

 

                [U] XRAY PELVIS MIN 3 2020 00:00:00                 [facility]

[facility]ity of [place]



                VWS [id number]                                       Physicians

 

                CT Femur without 2019 00:00:00                 [facility] 

[facility] [place]



                contrast [id number]                                  Physicians

 

                CT Pelvis wo contrast 2019 00:00:00                 [facility]

[facility] [place]



                [id number]                                           Physicians

 

                XR KNEE 3 VW LEFT 2019 21:12:48 Lakeshia Grace [facility] [place] [facility]

 

                POCT PREGNANCY TEST 2019 21:01:00 [address] [facility]

 

                [U] XRAY FEMUR 2 VWS 2018 00:00:00                 [facility]



                RIGHT [id number]                                     Physicians

 

                [U] XRAY FEMUR 2 VWS 2018 00:00:00                 [facility]



                RIGHT [id number]                                     Physicians

 

                History of Leg                                  [facility]



                surgery                                         Physicians







Encounters







        Start   End     Encounter Admission Attending Care    Care    Encounter 

Source



        Date/Time Date/Time Type    Type    Clinicians Facility Department ID   

   

 

        2020 Appointdinesh ONEAL [facility]     Orthopedics 

[id number] Univers



        08:00:00 08:00:00 t;              RADAMES FALCON         Trauma          ity 

of



                        [facility]

s



                        RADAMES FALCON                         [place]           Physic

i



                                                        Medical         ans



                                                        Center          

 

        2020-04-15 2020[facility]04[facility]15 [facility] 

[id number] [facility]



        11:00:00 11:00:00 t;              RADAMES FALCON         Trauma          ity 

of



                        [facility]

s



                        RADAMES FALCON                         [place]           Physic

i



                                                        Medical         ans



                                                        Center          

 

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

## 2023-06-22 ENCOUNTER — HOSPITAL ENCOUNTER (INPATIENT)
Dept: HOSPITAL 97 - ER | Age: 48
LOS: 6 days | Discharge: NURSING, REHAB OR LONG-TERM CARE | DRG: 558 | End: 2023-06-28
Attending: HOSPITALIST | Admitting: STUDENT IN AN ORGANIZED HEALTH CARE EDUCATION/TRAINING PROGRAM
Payer: COMMERCIAL

## 2023-06-22 VITALS — BODY MASS INDEX: 38.9 KG/M2

## 2023-06-22 DIAGNOSIS — I10: ICD-10-CM

## 2023-06-22 DIAGNOSIS — Z79.899: ICD-10-CM

## 2023-06-22 DIAGNOSIS — F41.9: ICD-10-CM

## 2023-06-22 DIAGNOSIS — F17.200: ICD-10-CM

## 2023-06-22 DIAGNOSIS — F31.9: ICD-10-CM

## 2023-06-22 DIAGNOSIS — M75.100: Primary | ICD-10-CM

## 2023-06-22 DIAGNOSIS — Z86.16: ICD-10-CM

## 2023-06-22 DIAGNOSIS — G89.4: ICD-10-CM

## 2023-06-22 DIAGNOSIS — Z79.52: ICD-10-CM

## 2023-06-22 DIAGNOSIS — B18.2: ICD-10-CM

## 2023-06-22 DIAGNOSIS — D50.9: ICD-10-CM

## 2023-06-22 LAB
ALBUMIN SERPL BCP-MCNC: 2.8 G/DL (ref 3.4–5)
ALP SERPL-CCNC: 99 U/L (ref 45–117)
ALT SERPL W P-5'-P-CCNC: 40 U/L (ref 13–56)
ANISOCYTOSIS BLD QL: (no result)
AST SERPL W P-5'-P-CCNC: 46 U/L (ref 15–37)
BILIRUB INDIRECT SERPL-MCNC: 0.2 MG/DL (ref 0.2–0.8)
BUN BLD-MCNC: 13 MG/DL (ref 7–18)
GLUCOSE SERPLBLD-MCNC: 94 MG/DL (ref 74–106)
HCT VFR BLD CALC: 31.9 % (ref 36–45)
INR BLD: 1.15
MAGNESIUM SERPL-MCNC: 1.5 MG/DL (ref 1.6–2.4)
MCV RBC: 73 FL (ref 80–100)
MORPHOLOGY BLD-IMP: (no result)
PMV BLD: 7.7 FL (ref 7.6–11.3)
POTASSIUM SERPL-SCNC: 3.3 MEQ/L (ref 3.5–5.1)
RBC # BLD: 4.36 M/UL (ref 3.86–4.86)
TARGETS BLD QL SMEAR: (no result)
TROPONIN I SERPL HS-MCNC: 20.4 PG/ML (ref ?–58.9)
TSH SERPL DL<=0.05 MIU/L-ACNC: 2.24 UIU/ML (ref 0.36–3.74)

## 2023-06-22 PROCEDURE — 80307 DRUG TEST PRSMV CHEM ANLYZR: CPT

## 2023-06-22 PROCEDURE — 80053 COMPREHEN METABOLIC PANEL: CPT

## 2023-06-22 PROCEDURE — 97110 THERAPEUTIC EXERCISES: CPT

## 2023-06-22 PROCEDURE — 70450 CT HEAD/BRAIN W/O DYE: CPT

## 2023-06-22 PROCEDURE — 70553 MRI BRAIN STEM W/O & W/DYE: CPT

## 2023-06-22 PROCEDURE — 83735 ASSAY OF MAGNESIUM: CPT

## 2023-06-22 PROCEDURE — 97161 PT EVAL LOW COMPLEX 20 MIN: CPT

## 2023-06-22 PROCEDURE — 70549 MR ANGIOGRAPH NECK W/O&W/DYE: CPT

## 2023-06-22 PROCEDURE — 97116 GAIT TRAINING THERAPY: CPT

## 2023-06-22 PROCEDURE — 94762 N-INVAS EAR/PLS OXIMTRY CONT: CPT

## 2023-06-22 PROCEDURE — 85730 THROMBOPLASTIN TIME PARTIAL: CPT

## 2023-06-22 PROCEDURE — 84439 ASSAY OF FREE THYROXINE: CPT

## 2023-06-22 PROCEDURE — 85610 PROTHROMBIN TIME: CPT

## 2023-06-22 PROCEDURE — 99285 EMERGENCY DEPT VISIT HI MDM: CPT

## 2023-06-22 PROCEDURE — 80048 BASIC METABOLIC PNL TOTAL CA: CPT

## 2023-06-22 PROCEDURE — 85025 COMPLETE CBC W/AUTO DIFF WBC: CPT

## 2023-06-22 PROCEDURE — 96374 THER/PROPH/DIAG INJ IV PUSH: CPT

## 2023-06-22 PROCEDURE — 36415 COLL VENOUS BLD VENIPUNCTURE: CPT

## 2023-06-22 PROCEDURE — 80076 HEPATIC FUNCTION PANEL: CPT

## 2023-06-22 PROCEDURE — 81001 URINALYSIS AUTO W/SCOPE: CPT

## 2023-06-22 PROCEDURE — 97165 OT EVAL LOW COMPLEX 30 MIN: CPT

## 2023-06-22 PROCEDURE — 93005 ELECTROCARDIOGRAM TRACING: CPT

## 2023-06-22 PROCEDURE — 84443 ASSAY THYROID STIM HORMONE: CPT

## 2023-06-22 PROCEDURE — 71045 X-RAY EXAM CHEST 1 VIEW: CPT

## 2023-06-22 PROCEDURE — 84100 ASSAY OF PHOSPHORUS: CPT

## 2023-06-22 PROCEDURE — 84484 ASSAY OF TROPONIN QUANT: CPT

## 2023-06-22 PROCEDURE — 70544 MR ANGIOGRAPHY HEAD W/O DYE: CPT

## 2023-06-22 PROCEDURE — 97530 THERAPEUTIC ACTIVITIES: CPT

## 2023-06-22 PROCEDURE — 94760 N-INVAS EAR/PLS OXIMETRY 1: CPT

## 2023-06-22 RX ADMIN — PREGABALIN SCH MG: 150 CAPSULE ORAL at 20:35

## 2023-06-22 RX ADMIN — PREGABALIN SCH MG: 150 CAPSULE ORAL at 16:42

## 2023-06-22 RX ADMIN — NICOTINE SCH MG: 21 PATCH TRANSDERMAL at 20:45

## 2023-06-22 RX ADMIN — OXYCODONE AND ACETAMINOPHEN PRN TAB: 5; 325 TABLET ORAL at 20:35

## 2023-06-22 RX ADMIN — Medication SCH ML: at 20:36

## 2023-06-22 RX ADMIN — ENOXAPARIN SODIUM SCH MG: 40 INJECTION SUBCUTANEOUS at 16:42
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
## 2023-06-22 NOTE — RAD REPORT
EXAM DESCRIPTION:  CT - Ct Stroke Brain Wo Cont - 6/22/2023 11:25 am

 

CLINICAL HISTORY:  Right arm numbness

 

COMPARISON:  2021

 

TECHNIQUE:  Computed axial tomography of the head was obtained.

 

All CT scans are performed using dose optimization technique as appropriate and may include automated
 exposure control or mA/KV adjustment according to patient size.

 

FINDINGS:  An intracranial  bleed is not seen .

 

The ventricles are normal in caliber.

 

No extra-axial fluid collection is noted.

 

No significant hypodensity within the brain noted

 

Fluid within the sinuses/ mastoids is not seen.

 

IMPRESSION:  No acute intracranial abnormality is seen. If patient's symptoms persist  MRI of the bra
in would be recommended

 

Dr Wong of the emergency room was notified at 11:27 a.m. June 22, 2023

## 2023-06-22 NOTE — XMS REPORT
Continuity of Care Document

                            Created on:2023



Patient:BLAYNE MEDLEY

Sex:Female

:1975

External Reference #:[id number]





Demographics







                          Address                    [address]



                                                    [place] [address]

 

                          Home Phone                [phone]

 

                          Work Phone                [phone]

 

                          Mobile Phone              [phone] )

 

                          Email Address             PH

 

                          Preferred Language        English

 

                          Marital Status            Unknown

 

                          Amish Affiliation     Unknown

 

                          Race                      Unknown

 

                          Additional Race(s)        Unavailable



                                                    Black or 

 

                          Ethnic Group              Unknown









Author







                          Organization              [facility]

t

 

                          Address                   [address]



                                                    [place] [address]

 

                          Phone                     [phone]









Support







                Name            Relationship    Address         Phone

 

                CALLIES, NIURKA P               [address] 

 [phone]



                                                [place] [address] 

 

                Amanda Vergara  Mother          [address]   [phone]



                                                [place] [address] 









Care Team Providers







                    Name                Role                Phone

 

                    Cruz Shin     Primary Care Physician [phone]

 

                    JAIME WHITMAN        Attending Clinician Unavailable

 

                    ARTEMIO KAUFFMAN Attending Clinician Unavailable

 

                    MEENA ERVIN Attending Clinician Unavailable

 

                    CHRISTINE ONEAL APRN Attending Clinician Unavailable

 

                    WILMER MUJICA PA  Attending Clinician Unavailable

 

                    Lakeshia Louie Attending Clinician [phone]

 

                    KULWINDER DENNIS M.D. Attending Clinician Unavailable

 

                    Tony Hendricks M.D. Attending Clinician Unavailable

 

                    CHRISTINE ONEAL NP  Attending Clinician Unavailable

 

                    KALYN NORRIS NP     Attending Clinician Unavailable

 

                    ABIMAEL BULLARD M.D. Attending Clinician Unavailable

 

                    NAILA NY M.D.  Attending Clinician Unavailable

 

                    MEENA ERVIN Admitting Clinician Unavailable









Payers







           Payer Name Policy Type Policy Number Effective Date Expiration Date STANISLAV BAUM             [id number]            



                                            00:00:00              

 

           [employer]            [id number]  [id number]            



                                            00:00:00              







Problems







       Condition Condition Condition Status Onset  Resolution Last   Treating Co

mments 

Source



       Name   Details Category        Date   Date   Treatment Clinician        



                                                 Date                 

 

       Laryngeal Laryngeal Disease Active                              [facility]



       edema  edema                2-21                               [facility]



       determined determined               00:00:                             Me

dical



       by     by                                                    Center



       laryngosco laryngosco                                                  



       py     py                                                      

 

       MSSA   MSSA   Disease Active                              [facility]



       bacteremia bacteremia               2-16                               [facility]

[facility]



                                   00:00:                             Medical



                                   00                                 Center

 

       Chronic Chronic Disease Recurre                              [facility]



       hepatitis hepatitis        nce    2-14                               [facility]

s



       C with C with               00:00:                             Medical



       hepatic hepatic               00                                 Center



       coma   coma                                                    

 

       Acute  Acute  Disease Active                              [facility]



       systolic systolic               2-14                               [facility]



       CHF    CHF                  00:00:                             Medical



       (congestiv (congestiv               00                                 Ce

nter



       e heart e heart                                                  



       failure) failure)                                                  

 

       Pneumonia Pneumonia Disease Active                              [facility]



       of right of right               2-13                               [facility]



       lung due lung due               00:00:                             Medica

l



       to     to                   00                                 Center



       Streptococ Streptococ                                                  



       cus    cus                                                     



       pneumoniae pneumoniae                                                  

 

       Staphyloco Staphyloco Disease Active                              [facility]

[facility]



       ccus   ccus                 2-13                               [facility]



       aureus aureus               00:00:                             Medical



       pneumonia pneumonia               00                                 Cent

er

 

       Syncope Syncope Disease Active                              [facility]



                                   2-12                               [facility]



                                   00:00:                             [facility]



                                   00                                 Center

 

       Acute  Acute  Disease Active                              [facility]



       renal  renal                2-12                               [facility]



       failure failure               00:00:                             Medical



       with   with                 00                                 Center



       tubular tubular                                                  



       necrosis necrosis                                                  

 

       Pneumonia Pneumonia Disease Active                              [facility]



       due to due to               2-12                               [facility]



       infectious infectious               00:00:                             Me

dical



       organism organism               00                                 Center

 

       Acute on Acute on Disease Active                              [facility]

t



       chronic chronic               2-12                               [facility]



       respirator respirator               00:00:                             Me

dical



       y failure y failure               00                                 Cent

er



       with   with                                                    



       hypoxemia hypoxemia                                                  

 

       Pneumonia Pneumonia Disease Active                              [facility]



       due to due to               2-12                               [facility]



       COVID-19 COVID-19               00:00:                             Medica

l



       virus  virus                00                                 Center

 

       Acute  Acute  Disease Active                              [facility]



       liver  liver                2-12                               [facility]



       failure failure               00:00:                             Medical



       with   with                 00                                 Center



       hepatic hepatic                                                  



       coma   coma                                                    

 

       Anemia Anemia Disease Active                              [facility]



                                   2-12                               [facility]



                                   00:00:                             [facility]



                                   00                                 Center

 

       Acute  Acute  Disease Active 0                             [facility]



       hepatic hepatic               2-12                               [facility]



       encephalop encephalop               00:00:                             Me

dical



       athy   athy                 00                                 Center

 

       Septic Septic Disease Active 0                             [facility]



       shock  shock                2-12                               [facility]



                                   00:00:                             [facility]



                                   00                                 Center

 

       Traumatic Traumatic Disease Active 0                             [facility]



       rhabdomyol rhabdomyol               2-12                               [facility]

[facility]



       ysis   ysis                 00:00:                             Medical



                                   00                                 Center

 

       Lactic Lactic Disease Active                              [facility]



       acidosis acidosis               2-12                               [facility]



                                   00:00:                             [facility]



                                   00                                 Center

 

       Acute  Acute  Disease Active                              [facility]



       hypoxemic hypoxemic               2-12                               [facility]

s



       respirator respirator               00:00:                             Me

dical



       y failure y failure               00                                 Cent

er



       due to due to                                                  



       COVID-19 COVID-19                                                  

 

       Pre-eclamp Pre-eclamp Disease Active 2016                             U

nivers



       brittany in brittany in               2-12                               ity of



       postpartum postpartum               00:00:                             [facility]

[place]



       period period               00                                 Medical



                                                                      Branch

 

       Liveborn Liveborn Disease Active 2016                             Un[facility]

rs



       infant, of infant, of               2-09                               it

y of



       [place]               00:00:                             [place]

[place]



       pregnancy, pregnancy,               00                                 Me

dical



       born   born                                                    Branch



       outside outside                                                  



       hospital hospital                                                  

 

       Normal Normal Disease Active 2016                             [facility]



       labor  labor                2-09                               ity of



                                   00:00:                             [place]



                                   00                                 Medical



                                                                      Branch

 

       Hepatitis Hepatitis Disease Active 2016                             [facility]

vers



       C antibody C antibody               -13                               it

y of



       test   test                 00:00:                             [place]



       positive positive               00                                 Medica

l



                                                                      Branch

 

       Multiparit Multiparit Disease Active                              U

nivers



       y      y                    5-12                               ity of



                                   00:00:                             [place]



                                   00                                 Medical



                                                                      Branch

 

       Supervisio Supervisio Disease Active                              U

nivers



       n of   n of                 5-12                               ity of



       high-risk high-risk               00:00:                             [place]

[place]



       pregnancy pregnancy               00                                 Medi

zack



       of elderly of elderly                                                  Br

anch



       multigravi multigravi                                                  



       da     da                                                      

 

       AMA    AMA    Disease Active                              [facility]



       (advanced (advanced               5-12                               ity 

of



       maternal maternal               00:00:                             [place]



       age)   age)                 00                                 Medical



       multigravi multigravi                                                  Br

anch



       da 35+ da 35+                                                  

 

       Tobacco Tobacco Disease Active                              [facility]



       smoking smoking               5-12                               ity of



       affecting affecting               00:00:                             [place]

[place]



       pregnancy pregnancy               00                                 Medi

zack



                                                                      Branch

 

       History of History of Disease Active                              U

nivers



                       5-12                               ity of



       delivery, delivery,               00:00:                             [place]

[place]



       currently currently               00                                 Medi

zack



       pregnant pregnant                                                  Branch

 

       FH: breast FH: breast Disease Active                       Overview

: [facility]



       cancer in cancer in               3-20                        Mother at i

ty of



       first  first                00:00:                      age 57. [place]



       degree degree               00                                 Medical



       relative relative                                                  Branch

 

       Obesity Obesity Disease Active                              [facility]



       affecting affecting               3-20                               ity 

of



       pregnancy pregnancy               00:00:                             [place]

[place]



                                   00                                 Medical



                                                                      Branch

 

       History of History of Problem Resolve                                    

[facility]



       backache backache        d                                         Physic

i



                                                                      ans

 

       History of History of Problem Resolve                                    

[facility]



       depression depression        d                                         Ph

ysici



                                                                      ans

 

       History of History of Problem Resolve                                    

[facility]



       hepatitis hepatitis        d                                         Phys

ici



                                                                      ans

 

       Closed Closed Problem Active                                    [facility]



       fracture fracture                                                  Physic

i



       of neck of of neck of                                                  an

s



       right  right                                                   



       talus  talus                                                   

 

       Closed Closed Problem Active                                    [facility]



       displaced displaced                                                  Phys

ici



       fracture fracture                                                  ans



       of     of                                                      



       navicular navicular                                                  



       bone of bone of                                                  



       left foot, left foot,                                                  



       initial initial                                                  



       encounter encounter                                                  

 

       Closed Closed Problem Active                                    [facility]



       fracture fracture                                                  Physic

i



       of     of                                                      ans



       superior superior                                                  



       ramus of ramus of                                                  



       left   left                                                    



       pubis, pubis,                                                  



       initial initial                                                  



       encounter encounter                                                  

 

       Closed Closed Problem Active                                    [facility]



       nondisplac nondisplac                                                  Ph

ysici



       ed     ed                                                      ans



       fracture fracture                                                  



       of body of of body of                                                  



       right  right                                                   



       calcaneus, calcaneus,                                                  



       initial initial                                                  



       encounter encounter                                                  

 

       Closed Closed Problem Active                                    [facility]



       fracture fracture                                                  Physic

i



       of tibial of tibial                                                  ans



       plateau plateau                                                  



       with   with                                                    



       routine routine                                                  



       healing, healing,                                                  



       left   left                                                    

 

       Closed Closed Problem Active                                    [facility]



       displaced displaced                                                  Phys

ici



       fracture fracture                                                  ans



       of second of second                                                  



       metatarsal metatarsal                                                  



       bone of bone of                                                  



       right  right                                                   



       foot,  foot,                                                   



       initial initial                                                  



       encounter encounter                                                  

 

       Closed Closed Problem Active                                    [facility]



       nondisplac nondisplac                                                  Ph

ysici



       ed     ed                                                      ans



       fracture fracture                                                  



       of fourth of fourth                                                  



       metatarsal metatarsal                                                  



       bone of bone of                                                  



       right foot right foot                                                  

 

       Closed Closed Problem Active                                    [facility]



       nondisplac nondisplac                                                  Ph

ysici



       ed     ed                                                      ans



       fracture fracture                                                  



       of third of third                                                  



       metatarsal metatarsal                                                  



       bone of bone of                                                  



       right foot right foot                                                  

 

       Blues  Blues  Problem Active                                    [facility]



                                                                      Physici



                                                                      ans

 

       Displaced Displaced Problem Active                                    [facility]



       supracondy supracondy                                                  Ph

ysici



       lar    lar                                                     ans



       fracture fracture                                                  



       with   with                                                    



       intracondy intracondy                                                  



       lar    lar                                                     



       extension extension                                                  



       of lower of lower                                                  



       end of end of                                                  



       right  right                                                   



       femur, femur,                                                  



       subsequent subsequent                                                  



       encounter encounter                                                  



       for open for open                                                  



       fracture fracture                                                  



       type IIIA, type IIIA,                                                  



       IIIB, or IIIB, or                                                  



       IIIC with IIIC with                                                  



       routine routine                                                  



       healing healing                                                  

 

       Laceration Laceration Problem Active                                    U

T



       of knee of knee                                                  Physici



       with   with                                                    ans



       foreign foreign                                                  



       body,  body,                                                   



       left,  left,                                                   



       subsequent subsequent                                                  



       encounter encounter                                                  

 

       Avulsion Avulsion Problem Active                                    [facility]



       fracture fracture                                                  Physic

i



       of lateral of lateral                                                  an

s



       malleolus malleolus                                                  



       of right of right                                                  



       fibula, fibula,                                                  



       open type open type                                                  



       III, with III, with                                                  



       routine routine                                                  



       healing, healing,                                                  



       subsequent subsequent                                                  



       encounter encounter                                                  

 

       Laceration Laceration Problem Active                                    U

T



       of lower of lower                                                  Physic

i



       leg with leg with                                                  ans



       foreign foreign                                                  



       body,  body,                                                   



       right, right,                                                  



       subsequent subsequent                                                  



       encounter encounter                                                  

 

       Fracture Fracture Problem Active                                    [facility]



       of right of right                                                  Physic

i



       inferior inferior                                                  ans



       pubic  pubic                                                   



       ramus, ramus,                                                  



       closed, closed,                                                  



       initial initial                                                  



       encounter encounter                                                  

 

       Closed Closed Problem Active                                    [facility]



       avulsion avulsion                                                  Physic

i



       fracture fracture                                                  ans



       of     of                                                      



       navicular navicular                                                  



       bone of bone of                                                  



       foot,  foot,                                                   



       right, right,                                                  



       initial initial                                                  



       encounter encounter                                                  

 

       Fracture Fracture Problem Active                                    [facility]



       of left of left                                                  Physici



       inferior inferior                                                  ans



       pubic  pubic                                                   



       ramus, ramus,                                                  



       closed, closed,                                                  



       initial initial                                                  



       encounter encounter                                                  

 

       Fracture Fracture Problem Active                                    [facility]



       of distal of distal                                                  Phys

ici



       end of end of                                                  ans



       right  right                                                   



       femur with femur with                                                  



       nonunion nonunion                                                  

 

       Dehiscence Dehiscence Problem Active                                    U

T



       of     of                                                      Physici



       external external                                                  ans



       operation operation                                                  



       wound  wound                                                   

 

       Avulsion Avulsion Problem Active                                    [facility]



       of leg, of leg,                                                  Physici



       right, right,                                                  ans



       initial initial                                                  



       encounter encounter                                                  

 

       Cellulitis Cellulitis Problem Active                                    U

T



       of     of                                                      Physici



       drainage drainage                                                  ans



       site,  site,                                                   



       post-opera post-opera                                                  



       tive   tive                                                    

 

       Infection Infection Problem Active                                    [facility]



       and    and                                                     Physici



       inflammato inflammato                                                  an

s



       ry     ry                                                      



       reaction reaction                                                  



       due to due to                                                  



       internal internal                                                  



       fixation fixation                                                  



       device of device of                                                  



       right  right                                                   



       femur, femur,                                                  



       initial initial                                                  



       encounter encounter                                                  

 

       Closed Closed Problem Active                                    [facility]



       comminuted comminuted                                                  Ph

ysici



       intra-varun intra-varun                                                  an

s



       cular  cular                                                   



       fracture fracture                                                  



       of distal of distal                                                  



       end of end of                                                  



       femur with femur with                                                  



       nonunion, nonunion,                                                  



       right  right                                                   

 

       Other  Other  Problem Active                                    [facility]



       fracture fracture                                                  Physic

i



       of shaft of shaft                                                  ans



       of     of                                                      



       unspecifie unspecifie                                                  



       d tibia, d tibia,                                                  



       initial initial                                                  



       encounter encounter                                                  



       for closed for closed                                                  



       fracture fracture                                                  

 

       Bipolar Bipolar Problem Active                                    [facility]



       disorder, disorder,                                                  Phys

ici



       in partial in partial                                                  an

s



       remission, remission,                                                  



       most   most                                                    



       recent recent                                                  



       episode episode                                                  



       mixed  mixed                                                   

 

       Anxiety Anxiety Problem Active                                    [facility]



                                                                      Physici



                                                                      ans

 

       Closed Closed Problem Active                                    [facility]



       fracture fracture                                                  Physic

i



       of right of right                                                  ans



       superior superior                                                  



       pubic  pubic                                                   



       ramus  ramus                                                   







Allergies, Adverse Reactions, Alerts







       Allergy Allergy Status Severity Reaction(s) Onset  Inactive Treating Comm

ents 

Source



       Name   Type                        Date   Date   Clinician        

 

       NO KNOWN Allergy Active                                           [facility]







Family History







           Family Member Diagnosis  Comments   Start Date Stop Date  Source

 

           Unknown Family Family history of Family History                      

 [facility]



           Member     cardiac disorder                                  

 

           Unknown Family Family history of Family History                      

 [facility]



           Member     essential                                   



                      hypertension                                  

 

           Unknown Family Family history of Family History                      

 [facility]



           Member     arthritis                                   

 

           Unknown Family Family history of Family History                      

 [facility]



           Member     malignant neoplasm                                  

 

           Natural mother Breast cancer                                  [facility]

 

           Natural mother Hyperlipidemia                                  [facility]

 

           Natural mother Arthritis                                   [facility]

 

           Natural    Asthma                                      [facility]

 

           Natural father Arthritis                                   [facility]

 

           Natural father Hyperlipidemia                                  [facility]







Social History







           Social Habit Start Date Stop Date  Quantity   Comments   Source

 

           History of tobacco                       Current smoker            [facility]

I [facility]



           use                                                    [facility]

 

           Alcohol intake 2022 Current drinker            [facility]



                      00:00:00   00:00:00   of alcohol            [facility]



                                            (finding)             

 

           Cigarettes smoked 2019                       [facility]

[facility] of



           current (pack per 00:00:00   00:00:00                         [facility]

edical



           day) - Reported                                             Branch

 

           Cigarette  2019                       [facility]



           pack-years 00:00:00   00:00:00                         [facility]

 

           Alcohol Comment 2016 Daily- 1-2            [facility]

y of



                      00:00:00   00:00:00   beers, quit with            [place] [facility]

d[facility]



                                            pregnancy             Branch

 

           Sex Assigned At 1975                       [facility]



           Birth      00:00:00   00:00:00                         [facility] [place]









                Smoking Status  Start Date      Stop Date       Source

 

                Ex-smoker       2022 00:00:00 2022 00:00:00 [facility]

 

                Current every day 2019 00:00:00                 [facility] [place]



                smoker                                          [facility]







Medications







       Ordered Filled Start  Stop   Current Ordering Indication Dosage Frequency

 Signature

                    Comments            Components          Source



     Medication Medication Date Date Medication? Clinician                (SIG) 

          



     Name Name                                                   

 

     TAKE 1            No                                      



     TABLET BY                                                    



     MOUTH THREE      00:00:                                              



     TIMES A DAY      00                                                



     AS NEEDED                                                        



     FOR ANXIETY                                                        

 

     Dose      0      No                                      



     Unknown                                                    



               00:00:                                              



               00                                                

 

     TAKE 1      0      No             50                       



     TABLET BY      9-19                                              



     MOUTH THREE      00:00:                                              



     TIMES A DAY      00                                                



     AS NEEDED                                                        



     FOR ANXIETY                                                        

 

     TAKE BY      0      No             800                      



     MOUTH 1                                                    



     TABLET      00:00:                                              



     EVERY 8      00                                                



     HOURS AS                                                        



     NEEDED FOR                                                        



     PAIN                                                        

 

     SERTRALINE      0      No             100                      



     HYDROCHLORI      9-05                                              



     DE 100MG      00:00:                                              



     TAB       00                                                

 

     SERTRALINE      -0      No             100                      



     HYDROCHLORI      9-05                                              



     DE 100MG      00:00:                                              



     TAB       00                                                

 

     BENZTROPINE      -0      No             2                        



     MESYLATE      8-24                                              



     2MG TAB      00:00:                                              



               00                                                

 

     BENZTROPINE      -0      No             2                        



     MESYLATE      8-24                                              



     2MG TAB      00:00:                                              



               00                                                

 

     BENZTROPINE      2022-0      No             2                        



     MESYLATE      8-24                                              



     2MG TAB      00:00:                                              



               00                                                

 

     BENZTROPINE      2022-0      No             2                        



     MESYLATE      8-24                                              



     2MG TAB      00:00:                                              



               00                                                

 

     Dose      2022-0      No                                      



     Unknown      816                                              



               00:00:                                              



               00                                                

 

     TAKE 1      2022-0      No                                      



     CAPSULE BY      8-16                                              



     MOUTH THREE      00:00:                                              



     TIMES A DAY      00                                                



     FOR 28 DAYS                                                        

 

     &lt       2022-0      No                                      



               8-16                                              



               00:00:                                              



               00                                                

 

     TAKE 1      2022-0      No             50                       



     CAPSULE BY      8-16                                              



     MOUTH 3      00:00:                                              



     (THREE)      00                                                



     TIMES DAILY                                                        



     FOR 30                                                        



     DAYS. MAX                                                        



     DAILY                                                        



     AMOUNT: 150                                                        



     MG                                                          

 

     &lt       2022-0      No                                      



               8-16                                              



               00:00:                                              



               00                                                

 

     TAKE 1      2022-0      No             50                       



     CAPSULE BY      8-16                                              



     MOUTH 3      00:00:                                              



     (THREE)      00                                                



     TIMES DAILY                                                        



     FOR 30                                                        



     DAYS. MAX                                                        



     DAILY                                                        



     AMOUNT: 150                                                        



     MG                                                          

 

     &lt       2022-0      No                                      



               8-16                                              



               00:00:                                              



               00                                                

 

     TAKE 1      2022-0      No             50                       



     CAPSULE BY      8-16                                              



     MOUTH 3      00:00:                                              



     (THREE)      00                                                



     TIMES DAILY                                                        



     FOR 30                                                        



     DAYS. MAX                                                        



     DAILY                                                        



     AMOUNT: 150                                                        



     MG                                                          

 

     QUETIAPINE      2-0      No                                      



     FUMARATE      8-15                                              



     300MG TAB      00:00:                                              



               00                                                

 

     &lt       2022-0      No             300                      



               8-15                                              



               00:00:                                              



               00                                                

 

     &lt       2022-0      No             300                      



               8-15                                              



               00:00:                                              



               00                                                

 

     QUETIAPINE      2022-0      No                                      



     FUMARATE      8-15                                              



     300MG TAB      00:00:                                              



               00                                                

 

     Dose      2022-0      No                                      



     Unknown                                                    



               00:00:                                              



               00                                                

 

     TAKE 1      2-0      No             20                       



     TABLET BY      8-09                                              



     MOUTH EVERY      00:00:                                              



     12 HOURS      00                                                



     FOR 15 DAYS                                                        

 

     TAKE 1      2-0      No             20                       



     TABLET BY      8-09                                              



     MOUTH EVERY      00:00:                                              



     12 HOURS      00                                                



     FOR 15 DAYS                                                        

 

     Dose      2022-0      No                                      



     Unknown                                                    



               00:00:                                              



               00                                                

 

     Zoloft 100      2022-0      No             2mg                      



     mg tablet                                                    



               00:00:                                              



               00                                                

 

     Dose      2022-0      No                                      



     Unknown                                                    



               00:00:                                              



               00                                                

 

     hydroxyzine      2022-0      No             1mg                      



     HCl 50 mg      8-                                              



     tablet      00:00:                                              



               00                                                

 

     Dose      2022-0      No                                      



     Unknown                                                    



               00:00:                                              



               00                                                

 

     &lt       2022-0      No             800                      



               -                                              



               00:00:                                              



               00                                                

 

     TAKE 3      2-0      No             300                      



     TABLETS BY      8-04                                              



     MOUTH AT      00:00:                                              



     BEDTIME      00                                                



     TAKE 1 TAB                                                        



     IN THE                                                        



     MORNING AND                                                        



     2 AT                                                        



     BEDTIME                                                        

 

     TAKE 1 AND      2022-0      No             100                      



     1/2 TABLETS      8-04                                              



     BY MOUTH      00:00:                                              



     EVERY DAY      00                                                

 

     Dose      2022-0      No                                      



     Unknown                                                    



               00:00:                                              



               00                                                

 

     &lt       2022-0      No             50                       



                                                             



               00:00:                                              



               00                                                

 

     Zoloft 100      2022-0      No             2mg                      



     mg tablet                                                    



               00:00:                                              



               00                                                

 

     trazodone      2022-0      No             1mg                      



     150 mg      8-04                                              



     tablet      00:00:                                              



               00                                                

 

     hydroxyzine      2022-0      No             1mg                      



     HCl 50 mg      8-04                                              



     tablet      00:00:                                              



               00                                                

 

     quetiapine      2022-0      No             3mg                      



     300 mg      8-04                                              



     tablet      00:00:                                              



               00                                                

 

     &lt       2022-0      No             800                      



                                                             



               00:00:                                              



               00                                                

 

     TAKE 3      2-0      No             300                      



     TABLETS BY      8-04                                              



     MOUTH AT      00:00:                                              



     BEDTIME      00                                                



     TAKE 1 TAB                                                        



     IN THE                                                        



     MORNING AND                                                        



     2 AT                                                        



     BEDTIME                                                        

 

     TAKE 1 AND      2-0      No             100                      



     1/2 TABLETS      -                                              



     BY MOUTH      00:00:                                              



     EVERY DAY      00                                                

 

     Dose      2022-0      No                                      



     Unknown                                                    



               00:00:                                              



               00                                                

 

     &lt       2022-0      No             50                       



                                                             



               00:00:                                              



               00                                                

 

     Zoloft 100      2-0      No             2mg                      



     mg tablet                                                    



               00:00:                                              



               00                                                

 

     trazodone      2-0      No             1mg                      



     150 mg      8-04                                              



     tablet      00:00:                                              



               00                                                

 

     hydroxyzine      2022-0      No             1mg                      



     HCl 50 mg      8-04                                              



     tablet      00:00:                                              



               00                                                

 

     quetiapine      2-0      No             3mg                      



     300 mg      8-04                                              



     tablet      00:00:                                              



               00                                                

 

     &lt       2022-0      No             800                      



                                                             



               00:00:                                              



               00                                                

 

     TAKE 3      2-0      No             300                      



     TABLETS BY      8-04                                              



     MOUTH AT      00:00:                                              



     BEDTIME      00                                                



     TAKE 1 TAB                                                        



     IN THE                                                        



     MORNING AND                                                        



     2 AT                                                        



     BEDTIME                                                        

 

     TAKE 1 AND      2-0      No             100                      



     1/2 TABLETS      8-04                                              



     BY MOUTH      00:00:                                              



     EVERY DAY      00                                                

 

     Dose      2022-0      No                                      



     Unknown                                                    



               00:00:                                              



               00                                                

 

     &lt       2022-0      No             50                       



               -                                              



               00:00:                                              



               00                                                

 

     Zoloft 100      2022-0      No             2mg                      



     mg tablet                                                    



               00:00:                                              



               00                                                

 

     trazodone      2022-0      No             1mg                      



     150 mg      8-04                                              



     tablet      00:00:                                              



               00                                                

 

     hydroxyzine      2022-0      No             1mg                      



     HCl 50 mg      8-04                                              



     tablet      00:00:                                              



               00                                                

 

     quetiapine      2022-0      No             3mg                      



     300 mg      8-04                                              



     tablet      00:00:                                              



               00                                                

 

     &lt       2022-0      No             800                      



               8-04                                              



               00:00:                                              



               00                                                

 

     TAKE 3      2022-0      No             300                      



     TABLETS BY      8-04                                              



     MOUTH AT      00:00:                                              



     BEDTIME      00                                                



     TAKE 1 TAB                                                        



     IN THE                                                        



     MORNING AND                                                        



     2 AT                                                        



     BEDTIME                                                        

 

     TAKE 1 AND      2022-0      No             100                      



     1/2 TABLETS      8-04                                              



     BY MOUTH      00:00:                                              



     EVERY DAY      00                                                

 

     Dose      2022-0      No                                      



     Unknown      8-04                                              



               00:00:                                              



               00                                                

 

     &lt       2022-0      No             50                       



               8-04                                              



               00:00:                                              



               00                                                

 

     Zoloft 100      2022-0      No             15mg                     



     mg tablet      7-05                                              



               00:00:                                              



               00                                                

 

     trazodone      2022-0      No             1mg                      



     150 mg      7-05                                              



     tablet      00:00:                                              



               00                                                

 

     Dose      2022-0      No                                      



     Unknown      7-05                                              



               00:00:                                              



               00                                                

 

     hydroxyzine      2022-0      No             1mg                      



     HCl 50 mg      7-05                                              



     tablet      00:00:                                              



               00                                                

 

     TAKE 1 AND      2-0      No             100                      



     1/2 TABLETS      7-05                                              



     BY MOUTH      00:00:                                              



     EVERY DAY      00                                                

 

     TAKE 3      2022-0      No             300                      



     TABLETS BY      7-05                                              



     MOUTH AT      00:00:                                              



     BEDTIME      00                                                



     TAKE 1 TAB                                                        



     IN THE                                                        



     MORNING AND                                                        



     2 AT                                                        



     BEDTIME                                                        

 

     &lt       2022-0      No             50                       



               7-05                                              



               00:00:                                              



               00                                                

 

     Zoloft 100      2022-0      No             15mg                     



     mg tablet      7-05                                              



               00:00:                                              



               00                                                

 

     trazodone      2022-0      No             1mg                      



     150 mg      7-05                                              



     tablet      00:00:                                              



               00                                                

 

     quetiapine      2022-0      No             3mg                      



     300 mg      7-05                                              



     tablet      00:00:                                              



               00                                                

 

     hydroxyzine      2022-0      No             1mg                      



     HCl 50 mg      7-05                                              



     tablet      00:00:                                              



               00                                                

 

     TAKE 1 AND      2022-0      No             100                      



     1/2 TABLETS      7-05                                              



     BY MOUTH      00:00:                                              



     EVERY DAY      00                                                

 

     TAKE 3      2022-0      No             300                      



     TABLETS BY      7-05                                              



     MOUTH AT      00:00:                                              



     BEDTIME      00                                                



     TAKE 1 TAB                                                        



     IN THE                                                        



     MORNING AND                                                        



     2 AT                                                        



     BEDTIME                                                        

 

     &lt       2022-0      No             50                       



               7-05                                              



               00:00:                                              



               00                                                

 

     Zoloft 100      2022-0      No             15mg                     



     mg tablet      7-05                                              



               00:00:                                              



               00                                                

 

     trazodone      2022-0      No             1mg                      



     150 mg      7-05                                              



     tablet      00:00:                                              



               00                                                

 

     quetiapine      2022-0      No             3mg                      



     300 mg      7-05                                              



     tablet      00:00:                                              



               00                                                

 

     hydroxyzine      2022-0      No             1mg                      



     HCl 50 mg      7-05                                              



     tablet      00:00:                                              



               00                                                

 

     TAKE 1 AND      2022-0      No             100                      



     1/2 TABLETS      7-05                                              



     BY MOUTH      00:00:                                              



     EVERY DAY      00                                                

 

     TAKE 3      2022-0      No             300                      



     TABLETS BY      7-05                                              



     MOUTH AT      00:00:                                              



     BEDTIME      00                                                



     TAKE 1 TAB                                                        



     IN THE                                                        



     MORNING AND                                                        



     2 AT                                                        



     BEDTIME                                                        

 

     &lt       2022-0      No             50                       



               7-05                                              



               00:00:                                              



               00                                                

 

     Zoloft 100      2022-0      No             15mg                     



     mg tablet      7-05                                              



               00:00:                                              



               00                                                

 

     trazodone      2022-0      No             1mg                      



     150 mg      7-05                                              



     tablet      00:00:                                              



               00                                                

 

     quetiapine      2022-0      No             3mg                      



     300 mg      7-05                                              



     tablet      00:00:                                              



               00                                                

 

     hydroxyzine      2022-0      No             1mg                      



     HCl 50 mg      7-05                                              



     tablet      00:00:                                              



               00                                                

 

     TAKE 1 AND      2022-0      No             100                      



     1/2 TABLETS      7-05                                              



     BY MOUTH      00:00:                                              



     EVERY DAY      00                                                

 

     TAKE 3      2022-0      No             300                      



     TABLETS BY      7-05                                              



     MOUTH AT      00:00:                                              



     BEDTIME      00                                                



     TAKE 1 TAB                                                        



     IN THE                                                        



     MORNING AND                                                        



     2 AT                                                        



     BEDTIME                                                        

 

     &lt       2022-0      No             50                       



               7-05                                              



               00:00:                                              



               00                                                

 

     &lt       2022-0      No                                      



               6-25                                              



               00:00:                                              



               00                                                

 

     &lt       2022-0      No                                      



               6-25                                              



               00:00:                                              



               00                                                

 

     &lt       2022-0      No                                      



               6-25                                              



               00:00:                                              



               00                                                

 

     &lt       2022-0      No                                      



               6-25                                              



               00:00:                                              



               00                                                

 

     Dose      2022-0      No                                      



     Unknown      6-21                                              



               00:00:                                              



               00                                                

 

     Dose      2022-0      No                                      



     Unknown      6-21                                              



               00:00:                                              



               00                                                

 

     &lt       2022-0      No                                      



               6-21                                              



               00:00:                                              



               00                                                

 

     trazodone      2022-0      No             1mg                      



     150 mg      6-21                                              



     tablet      00:00:                                              



               00                                                

 

     quetiapine      2022-0      No             3mg                      



     300 mg      6-21                                              



     tablet      00:00:                                              



               00                                                

 

     &lt       2022-0      No                                      



               6-21                                              



               00:00:                                              



               00                                                

 

     trazodone      2022-0      No             1mg                      



     150 mg      6-21                                              



     tablet      00:00:                                              



               00                                                

 

     quetiapine      2022-0      No             3mg                      



     300 mg      6-21                                              



     tablet      00:00:                                              



               00                                                

 

     &lt       2022-0      No                                      



               6-21                                              



               00:00:                                              



               00                                                

 

     trazodone      2022-0      No             1mg                      



     150 mg      6-21                                              



     tablet      00:00:                                              



               00                                                

 

     quetiapine      2022-0      No             3mg                      



     300 mg      6-21                                              



     tablet      00:00:                                              



               00                                                

 

     &lt       2022-0      No                                      



               6-21                                              



               00:00:                                              



               00                                                

 

     TAKE 1      2022-0      No                                      



     TABLET BY      6-20                                              



     MOUTH EVERY      00:00:                                              



     12 HOURS      00                                                



     FOR 15 DAYS                                                        

 

     TAKE 1 AND      2022-0      No                                      



     1/2 TABLETS      6-20                                              



     BY MOUTH      00:00:                                              



     EVERY DAY      00                                                

 

     TAKE 1      2022-0      No                                      



     TABLET BY      6-20                                              



     MOUTH      00:00:                                              



     EVERYDAY AT      00                                                



     BEDTIME                                                        

 

     TAKE 1      2022-0      No                                      



     TABLET BY      6-20                                              



     MOUTH EVERY      00:00:                                              



     12 HOURS      00                                                



     FOR 15 DAYS                                                        

 

     TAKE 1 AND      2022-0      No                                      



     1/2 TABLETS      6-20                                              



     BY MOUTH      00:00:                                              



     EVERY DAY      00                                                

 

     TAKE 1      2022-0      No                                      



     TABLET BY      6-20                                              



     MOUTH      00:00:                                              



     EVERYDAY AT      00                                                



     BEDTIME                                                        

 

     TAKE 1      2022-0      No                                      



     TABLET BY      6-20                                              



     MOUTH EVERY      00:00:                                              



     12 HOURS      00                                                



     FOR 15 DAYS                                                        

 

     TAKE 1 AND      2022-0      No                                      



     1/2 TABLETS      6-20                                              



     BY MOUTH      00:00:                                              



     EVERY DAY      00                                                

 

     TAKE 1      2022-0      No                                      



     TABLET BY      6-20                                              



     MOUTH      00:00:                                              



     EVERYDAY AT      00                                                



     BEDTIME                                                        

 

     TAKE 1      2022-0      No                                      



     TABLET BY      6-20                                              



     MOUTH EVERY      00:00:                                              



     12 HOURS      00                                                



     FOR 15 DAYS                                                        

 

     TAKE 1 AND      2022-0      No                                      



     1/2 TABLETS      6-20                                              



     BY MOUTH      00:00:                                              



     EVERY DAY      00                                                

 

     TAKE 1      2022-0      No                                      



     TABLET BY      6-20                                              



     MOUTH      00:00:                                              



     EVERYDAY AT      00                                                



     BEDTIME                                                        

 

     mupirocin 2      2022-0      No             1%                       



     % topical      5-31                                              



     ointment      00:00:                                              



               00                                                

 

     mupirocin 2      2022-0      No             1%                       



     % topical      5-31                                              



     ointment      00:00:                                              



               00                                                

 

     mupirocin 2      2022-0      No             1%                       



     % topical      5-31                                              



     ointment      00:00:                                              



               00                                                

 

     mupirocin 2      2022-0      No             1%                       



     % topical      5-31                                              



     ointment      00:00:                                              



               00                                                

 

     Zoloft 100      2022-0      No             15mg                     



     mg tablet      -12                                              



               00:00:                                              



               00                                                

 

     benztropine      2022-0      No             1mg                      



     2 mg tablet      5-12                                              



               00:00:                                              



               00                                                

 

     quetiapine      2022-0      No             3mg                      



     300 mg      5-12                                              



     tablet      00:00:                                              



               00                                                

 

     trazodone      2022-0      No             1mg                      



     150 mg      5-12                                              



     tablet      00:00:                                              



               00                                                

 

     hydroxyzine      2022-0      No             1mg                      



     HCl 50 mg      5-12                                              



     tablet      00:00:                                              



               00                                                

 

     Dose      2022-0      No                                      



     Unknown      5-12                                              



               00:00:                                              



               00                                                

 

     Dose      2022-0      No                                      



     Unknown      5-12                                              



               00:00:                                              



               00                                                

 

     Dose      2022-0      No                                      



     Unknown      5-12                                              



               00:00:                                              



               00                                                

 

     Dose      2022-0      No                                      



     Unknown      5-12                                              



               00:00:                                              



               00                                                

 

     Dose      2022-0      No                                      



     Unknown      5-12                                              



               00:00:                                              



               00                                                

 

     Dose      2022-0      No                                      



     Unknown      5-12                                              



               00:00:                                              



               00                                                

 

     Dose      2022-0      No                                      



     Unknown      5-12                                              



               00:00:                                              



               00                                                

 

     Dose      2022-0      No                                      



     Unknown      5-12                                              



               00:00:                                              



               00                                                

 

     Dose      2022-0      No                                      



     Unknown      5-12                                              



               00:00:                                              



               00                                                

 

     Dose      2022-0      No                                      



     Unknown      5-12                                              



               00:00:                                              



               00                                                

 

     Dose      2022-0      No                                      



     Unknown      5-12                                              



               00:00:                                              



               00                                                

 

     Dose      2022-0      No                                      



     Unknown      5-12                                              



               00:00:                                              



               00                                                

 

     Zoloft 100      2022-0      No             15mg                     



     mg tablet      -                                              



               00:00:                                              



               00                                                

 

     benztropine      2022-0      No             1mg                      



     2 mg tablet      -                                              



               00:00:                                              



               00                                                

 

     quetiapine      2022-0      No             3mg                      



     300 mg      5-12                                              



     tablet      00:00:                                              



               00                                                

 

     trazodone      2022-0      No             1mg                      



     150 mg      5-12                                              



     tablet      00:00:                                              



               00                                                

 

     hydroxyzine      2022-0      No             1mg                      



     HCl 50 mg      5-12                                              



     tablet      00:00:                                              



               00                                                

 

     Dose      2022-0      No                                      



     Unknown      5-12                                              



               00:00:                                              



               00                                                

 

     Dose      2022-0      No                                      



     Unknown      5-12                                              



               00:00:                                              



               00                                                

 

     Dose      2022-0      No                                      



     Unknown      5-12                                              



               00:00:                                              



               00                                                

 

     Dose      2022-0      No                                      



     Unknown      5-12                                              



               00:00:                                              



               00                                                

 

     Dose      2022-0      No                                      



     Unknown      5-12                                              



               00:00:                                              



               00                                                

 

     Dose      2022-0      No                                      



     Unknown      5-12                                              



               00:00:                                              



               00                                                

 

     Dose      2022-0      No                                      



     Unknown      5-12                                              



               00:00:                                              



               00                                                

 

     Dose      2022-0      No                                      



     Unknown      5-12                                              



               00:00:                                              



               00                                                

 

     Dose      2022-0      No                                      



     Unknown      5-12                                              



               00:00:                                              



               00                                                

 

     Dose      2022-0      No                                      



     Unknown      5-12                                              



               00:00:                                              



               00                                                

 

     Dose      2022-0      No                                      



     Unknown      5-12                                              



               00:00:                                              



               00                                                

 

     Dose      2022-0      No                                      



     Unknown      5-12                                              



               00:00:                                              



               00                                                

 

     Zoloft 100      2022-0      No             15mg                     



     mg tablet      5-12                                              



               00:00:                                              



               00                                                

 

     benztropine      2022-0      No             1mg                      



     2 mg tablet      5-12                                              



               00:00:                                              



               00                                                

 

     quetiapine      2022-0      No             3mg                      



     300 mg      5-12                                              



     tablet      00:00:                                              



               00                                                

 

     trazodone      2022-0      No             1mg                      



     150 mg      5-12                                              



     tablet      00:00:                                              



               00                                                

 

     hydroxyzine      2022-0      No             1mg                      



     HCl 50 mg      5-12                                              



     tablet      00:00:                                              



               00                                                

 

     Dose      2022-0      No                                      



     Unknown      5-12                                              



               00:00:                                              



               00                                                

 

     Dose      2022-0      No                                      



     Unknown      5-12                                              



               00:00:                                              



               00                                                

 

     Dose      2022-0      No                                      



     Unknown      5-12                                              



               00:00:                                              



               00                                                

 

     Dose      2022-0      No                                      



     Unknown      5-12                                              



               00:00:                                              



               00                                                

 

     Dose      2022-0      No                                      



     Unknown      5-12                                              



               00:00:                                              



               00                                                

 

     Dose      2022-0      No                                      



     Unknown      5-12                                              



               00:00:                                              



               00                                                

 

     Dose      2022-0      No                                      



     Unknown      5-12                                              



               00:00:                                              



               00                                                

 

     Dose      2022-0      No                                      



     Unknown      5-12                                              



               00:00:                                              



               00                                                

 

     Dose      2022-0      No                                      



     Unknown      5-12                                              



               00:00:                                              



               00                                                

 

     Dose      2022-0      No                                      



     Unknown      5-12                                              



               00:00:                                              



               00                                                

 

     Dose      2022-0      No                                      



     Unknown      5-12                                              



               00:00:                                              



               00                                                

 

     Dose      2022-0      No                                      



     Unknown      5-12                                              



               00:00:                                              



               00                                                

 

     Zoloft 100      2022-0      No             15mg                     



     mg tablet      5-12                                              



               00:00:                                              



               00                                                

 

     benztropine      2022-0      No             1mg                      



     2 mg tablet      5-12                                              



               00:00:                                              



               00                                                

 

     quetiapine      2022-0      No             3mg                      



     300 mg      5-12                                              



     tablet      00:00:                                              



               00                                                

 

     trazodone      2022-0      No             1mg                      



     150 mg      5-12                                              



     tablet      00:00:                                              



               00                                                

 

     hydroxyzine      2022-0      No             1mg                      



     HCl 50 mg      5-12                                              



     tablet      00:00:                                              



               00                                                

 

     Dose      2022-0      No                                      



     Unknown      5-12                                              



               00:00:                                              



               00                                                

 

     Dose      2022-0      No                                      



     Unknown      5-12                                              



               00:00:                                              



               00                                                

 

     Dose      2022-0      No                                      



     Unknown      5-12                                              



               00:00:                                              



               00                                                

 

     Dose      2022-0      No                                      



     Unknown      5-12                                              



               00:00:                                              



               00                                                

 

     Dose      2022-0      No                                      



     Unknown      5-12                                              



               00:00:                                              



               00                                                

 

     Dose      2022-0      No                                      



     Unknown      5-12                                              



               00:00:                                              



               00                                                

 

     Dose      2022-0      No                                      



     Unknown      5-12                                              



               00:00:                                              



               00                                                

 

     Dose      2022-0      No                                      



     Unknown      5-12                                              



               00:00:                                              



               00                                                

 

     Dose      2022-0      No                                      



     Unknown      5-12                                              



               00:00:                                              



               00                                                

 

     Dose      2022-0      No                                      



     Unknown      5-12                                              



               00:00:                                              



               00                                                

 

     Dose      2022-0      No                                      



     Unknown      5-12                                              



               00:00:                                              



               00                                                

 

     Dose      2022-0      No                                      



     Unknown      5-12                                              



               00:00:                                              



               00                                                

 

     Dose      2022-0      No                                      



     Unknown      5-11                                              



               00:00:                                              



               00                                                

 

     Dose      2022-0      No                                      



     Unknown      5-11                                              



               00:00:                                              



               00                                                

 

     Dose      2022-0      No                                      



     Unknown      5-11                                              



               00:00:                                              



               00                                                

 

     Dose      2022-0      No                                      



     Unknown      5-11                                              



               00:00:                                              



               00                                                

 

     Dose      2022-0      No                                      



     Unknown      5-11                                              



               00:00:                                              



               00                                                

 

     Dose      2022-0      No                                      



     Unknown      5-11                                              



               00:00:                                              



               00                                                

 

     Dose      2022-0      No                                      



     Unknown      5-11                                              



               00:00:                                              



               00                                                

 

     Dose      2022-0      No                                      



     Unknown      5-11                                              



               00:00:                                              



               00                                                

 

     Dose      2022-0      No                                      



     Unknown      5-09                                              



               00:00:                                              



               00                                                

 

     Dose      2022-0      No                                      



     Unknown      5-09                                              



               00:00:                                              



               00                                                

 

     Dose      2022-0      No                                      



     Unknown      5-09                                              



               00:00:                                              



               00                                                

 

     Dose      2022-0      No                                      



     Unknown      5-09                                              



               00:00:                                              



               00                                                

 

     Dose      2022-0      No                                      



     Unknown      5-04                                              



               00:00:                                              



               00                                                

 

     Dose      2022-0      No                                      



     Unknown      5-04                                              



               00:00:                                              



               00                                                

 

     Dose      2022-0      No                                      



     Unknown      5-04                                              



               00:00:                                              



               00                                                

 

     Dose      2022-0      No                                      



     Unknown      5-04                                              



               00:00:                                              



               00                                                

 

     Dose      2022-0      No                                      



     Unknown      5-04                                              



               00:00:                                              



               00                                                

 

     Dose      2022-0      No                                      



     Unknown      5-04                                              



               00:00:                                              



               00                                                

 

     Dose      2022-0      No                                      



     Unknown      5-04                                              



               00:00:                                              



               00                                                

 

     Dose      2022-0      No                                      



     Unknown      5-04                                              



               00:00:                                              



               00                                                

 

     Dose      2022-0      No                                      



     Unknown      5-02                                              



               00:00:                                              



               00                                                

 

     Dose      2022-0      No                                      



     Unknown      5-02                                              



               00:00:                                              



               00                                                

 

     Dose      2022-0      No                                      



     Unknown      5-02                                              



               00:00:                                              



               00                                                

 

     Dose      2022-0      No                                      



     Unknown      5-02                                              



               00:00:                                              



               00                                                

 

     Dose      2022-0      No                                      



     Unknown      5-02                                              



               00:00:                                              



               00                                                

 

     Dose      2022-0      No                                      



     Unknown      5-02                                              



               00:00:                                              



               00                                                

 

     Dose      2022-0      No                                      



     Unknown      5-02                                              



               00:00:                                              



               00                                                

 

     Dose      2022-0      No                                      



     Unknown      5-02                                              



               00:00:                                              



               00                                                

 

     Dose      2022-0      No                                      



     Unknown      5-02                                              



               00:00:                                              



               00                                                

 

     Dose      2022-0      No                                      



     Unknown      5-02                                              



               00:00:                                              



               00                                                

 

     Dose      2022-0      No                                      



     Unknown      5-02                                              



               00:00:                                              



               00                                                

 

     Dose      2022-0      No                                      



     Unknown      5-02                                              



               00:00:                                              



               00                                                

 

     Dose      2022-0      No                                      



     Unknown      5-02                                              



               00:00:                                              



               00                                                

 

     Dose      2022-0      No                                      



     Unknown      5-02                                              



               00:00:                                              



               00                                                

 

     Dose      2022-0      No                                      



     Unknown      5-02                                              



               00:00:                                              



               00                                                

 

     Dose      2022-0      No                                      



     Unknown      5-02                                              



               00:00:                                              



               00                                                

 

     Dose      2022-0      No                                      



     Unknown      5-02                                              



               00:00:                                              



               00                                                

 

     Dose      2022-0      No                                      



     Unknown      5-02                                              



               00:00:                                              



               00                                                

 

     Dose      2022-0      No                                      



     Unknown      5-02                                              



               00:00:                                              



               00                                                

 

     Dose      2022-0      No                                      



     Unknown      5-02                                              



               00:00:                                              



               00                                                

 

     Dose      2022-0      No                                      



     Unknown      5-02                                              



               00:00:                                              



               00                                                

 

     Dose      2022-0      No                                      



     Unknown      5-02                                              



               00:00:                                              



               00                                                

 

     Dose      2022-0      No                                      



     Unknown      5-02                                              



               00:00:                                              



               00                                                

 

     Dose      2022-0      No                                      



     Unknown      5-02                                              



               00:00:                                              



               00                                                

 

     Dose      2022-0      No                                      



     Unknown      5-02                                              



               00:00:                                              



               00                                                

 

     Dose      2022-0      No                                      



     Unknown      5-02                                              



               00:00:                                              



               00                                                

 

     Dose      2022-0      No                                      



     Unknown      5-02                                              



               00:00:                                              



               00                                                

 

     Dose      2022-0      No                                      



     Unknown      5-02                                              



               00:00:                                              



               00                                                

 

     Dose      2022-0      No                                      



     Unknown      5-02                                              



               00:00:                                              



               00                                                

 

     Dose      2022-0      No                                      



     Unknown      5-02                                              



               00:00:                                              



               00                                                

 

     Dose      2022-0      No                                      



     Unknown      5-02                                              



               00:00:                                              



               00                                                

 

     Dose      2022-0      No                                      



     Unknown      5-02                                              



               00:00:                                              



               00                                                

 

     Dose      2022-0      No                                      



     Unknown      5-                                              



               00:00:                                              



               00                                                

 

     Dose      2022-0      No                                      



     Unknown      5-                                              



               00:00:                                              



               00                                                

 

     Dose      2022-0      No                                      



     Unknown      5-01                                              



               00:00:                                              



               00                                                

 

     Dose      2022-0      No                                      



     Unknown      5-                                              



               00:00:                                              



               00                                                

 

     Dose      2022-0      No                                      



     Unknown      5-                                              



               00:00:                                              



               00                                                

 

     Dose      2022-0      No                                      



     Unknown      5-                                              



               00:00:                                              



               00                                                

 

     Dose      2022-0      No                                      



     Unknown      5-                                              



               00:00:                                              



               00                                                

 

     Dose      2022-0      No                                      



     Unknown      5-                                              



               00:00:                                              



               00                                                

 

     Dose      2022-0      No                                      



     Unknown      5-01                                              



               00:00:                                              



               00                                                

 

     Dose      2022-0      No                                      



     Unknown      5-01                                              



               00:00:                                              



               00                                                

 

     Dose      2022-0      No                                      



     Unknown      5-01                                              



               00:00:                                              



               00                                                

 

     Dose      2022-0      No                                      



     Unknown      5-01                                              



               00:00:                                              



               00                                                

 

     Bactrim DS      2022-0      No             1mg                      



     800 mg-160      4-29                                              



     mg tablet      00:00:                                              



               00                                                

 

     Bactrim DS      2022-0      No             1mg                      



     800 mg-160      4-29                                              



     mg tablet      00:00:                                              



               00                                                

 

     Bactrim DS      2022-0      No             1mg                      



     800 mg-160      4-29                                              



     mg tablet      00:00:                                              



               00                                                

 

     Bactrim DS      2022-0      No             1mg                      



     800 mg-160      4-29                                              



     mg tablet      00:00:                                              



               00                                                

 

     Dose      2022-0      No                                      



     Unknown      4-14                                              



               00:00:                                              



               00                                                

 

     Dose      2022-0      No                                      



     Unknown      4-14                                              



               00:00:                                              



               00                                                

 

     Dose      2022-0      No                                      



     Unknown      4-14                                              



               00:00:                                              



               00                                                

 

     Dose      2022-0      No                                      



     Unknown      4-14                                              



               00:00:                                              



               00                                                

 

     Dose      2022-0      No                                      



     Unknown      4-14                                              



               00:00:                                              



               00                                                

 

     Dose      2022-0      No                                      



     Unknown      4-14                                              



               00:00:                                              



               00                                                

 

     Dose      2022-0      No                                      



     Unknown      4-14                                              



               00:00:                                              



               00                                                

 

     Dose      2022-0      No                                      



     Unknown      4-14                                              



               00:00:                                              



               00                                                

 

     trazodone      2022-0      No             1mg                      



     100 mg      4-14                                              



     tablet      00:00:                                              



               00                                                

 

     Dose      2022-0      No                                      



     Unknown      4-14                                              



               00:00:                                              



               00                                                

 

     Dose      2022-0      No                                      



     Unknown      4-14                                              



               00:00:                                              



               00                                                

 

     Dose      2022-0      No                                      



     Unknown      4-14                                              



               00:00:                                              



               00                                                

 

     Dose      2022-0      No                                      



     Unknown      4-14                                              



               00:00:                                              



               00                                                

 

     trazodone      2022-0      No             1mg                      



     100 mg      4-14                                              



     tablet      00:00:                                              



               00                                                

 

     Dose      2022-0      No                                      



     Unknown      4-14                                              



               00:00:                                              



               00                                                

 

     Dose      2022-0      No                                      



     Unknown      4-14                                              



               00:00:                                              



               00                                                

 

     Dose      2022-0      No                                      



     Unknown      4-14                                              



               00:00:                                              



               00                                                

 

     Dose      2022-0      No                                      



     Unknown      4-14                                              



               00:00:                                              



               00                                                

 

     trazodone      2022-0      No             1mg                      



     100 mg      4-14                                              



     tablet      00:00:                                              



               00                                                

 

     Dose      2022-0      No                                      



     Unknown      4-14                                              



               00:00:                                              



               00                                                

 

     Dose      2022-0      No                                      



     Unknown      3-28                                              



               00:00:                                              



               00                                                

 

     Dose      2022-0      No                                      



     Unknown      3-28                                              



               00:00:                                              



               00                                                

 

     Dose      2022-0      No                                      



     Unknown      3-28                                              



               00:00:                                              



               00                                                

 

     Dose      2022-0      No                                      



     Unknown      3-28                                              



               00:00:                                              



               00                                                

 

     Dose      2022-0      No                                      



     Unknown      3-28                                              



               00:00:                                              



               00                                                

 

     Dose      2022-0      No                                      



     Unknown      3-28                                              



               00:00:                                              



               00                                                

 

     Dose      2022-0      No                                      



     Unknown      3-28                                              



               00:00:                                              



               00                                                

 

     Dose      2022-0      No                                      



     Unknown      3-28                                              



               00:00:                                              



               00                                                

 

     Dose      2022-0      No                                      



     Unknown      3-28                                              



               00:00:                                              



               00                                                

 

     Dose      2022-0      No                                      



     Unknown      3-28                                              



               00:00:                                              



               00                                                

 

     Dose      2022-0      No                                      



     Unknown      3-28                                              



               00:00:                                              



               00                                                

 

     Dose      2022-0      No                                      



     Unknown      3-28                                              



               00:00:                                              



               00                                                

 

     Dose      2022-0      No                                      



     Unknown      3-28                                              



               00:00:                                              



               00                                                

 

     Dose      2022-0      No                                      



     Unknown      3-28                                              



               00:00:                                              



               00                                                

 

     Dose      2022-0      No                                      



     Unknown      3-28                                              



               00:00:                                              



               00                                                

 

     Dose      2022-0      No                                      



     Unknown      3-28                                              



               00:00:                                              



               00                                                

 

     Dose      2022-0      No                                      



     Unknown      3-28                                              



               00:00:                                              



               00                                                

 

     Dose      2022-0      No                                      



     Unknown      3-28                                              



               00:00:                                              



               00                                                

 

     Dose      2022-0      No                                      



     Unknown      3-28                                              



               00:00:                                              



               00                                                

 

     Dose      2022-0      No                                      



     Unknown      3-28                                              



               00:00:                                              



               00                                                

 

     Dose      2022-0      No                                      



     Unknown      3-28                                              



               00:00:                                              



               00                                                

 

     Dose      2022-0      No                                      



     Unknown      3-28                                              



               00:00:                                              



               00                                                

 

     Dose      2022-0      No                                      



     Unknown      3-28                                              



               00:00:                                              



               00                                                

 

     Dose      2022-0      No                                      



     Unknown      3-28                                              



               00:00:                                              



               00                                                

 

     Dose      2022-0      No                                      



     Unknown      3-28                                              



               00:00:                                              



               00                                                

 

     Dose      2022-0      No                                      



     Unknown      3-28                                              



               00:00:                                              



               00                                                

 

     Dose      2022-0      No                                      



     Unknown      3-28                                              



               00:00:                                              



               00                                                

 

     Dose      2022-0      No                                      



     Unknown      3-28                                              



               00:00:                                              



               00                                                

 

     Dose      2022-0      No                                      



     Unknown      3-28                                              



               00:00:                                              



               00                                                

 

     Dose      2022-0      No                                      



     Unknown      3-28                                              



               00:00:                                              



               00                                                

 

     Dose      2022-0      No                                      



     Unknown      3-28                                              



               00:00:                                              



               00                                                

 

     Dose      2022-0      No                                      



     Unknown      3-28                                              



               00:00:                                              



               00                                                

 

     Dose      2022-0      No                                      



     Unknown      3-28                                              



               00:00:                                              



               00                                                

 

     Dose      2022-0      No                                      



     Unknown      3-28                                              



               00:00:                                              



               00                                                

 

     Dose      2022-0      No                                      



     Unknown      3-28                                              



               00:00:                                              



               00                                                

 

     Dose      2022-0      No                                      



     Unknown      3-28                                              



               00:00:                                              



               00                                                

 

     Dose      2022-0      No                                      



     Unknown      3-28                                              



               00:00:                                              



               00                                                

 

     Dose      2022-0      No                                      



     Unknown      3-28                                              



               00:00:                                              



               00                                                

 

     Dose      2022-0      No                                      



     Unknown      3-28                                              



               00:00:                                              



               00                                                

 

     Dose      2022-0      No                                      



     Unknown      3-28                                              



               00:00:                                              



               00                                                

 

     Dose      2022-0      No                                      



     Unknown      3-28                                              



               00:00:                                              



               00                                                

 

     Dose      2022-0      No                                      



     Unknown      3-28                                              



               00:00:                                              



               00                                                

 

     Dose      2022-0      No                                      



     Unknown      3-28                                              



               00:00:                                              



               00                                                

 

     Dose      2022-0      No                                      



     Unknown      3-28                                              



               00:00:                                              



               00                                                

 

     Dose      2022-0      No                                      



     Unknown      3-28                                              



               00:00:                                              



               00                                                

 

     Dose      2022-0      No                                      



     Unknown      3-28                                              



               00:00:                                              



               00                                                

 

     Dose      2022-0      No                                      



     Unknown      3-28                                              



               00:00:                                              



               00                                                

 

     Dose      2022-0      No                                      



     Unknown      3-28                                              



               00:00:                                              



               00                                                

 

     Dose      2022-0      No                                      



     Unknown      3-28                                              



               00:00:                                              



               00                                                

 

     Dose      2022-0      No                                      



     Unknown      3-28                                              



               00:00:                                              



               00                                                

 

     Dose      2022-0      No                                      



     Unknown      3-28                                              



               00:00:                                              



               00                                                

 

     Dose      2022-0      No                                      



     Unknown      3-28                                              



               00:00:                                              



               00                                                

 

     Dose      2022-0      No                                      



     Unknown      3-28                                              



               00:00:                                              



               00                                                

 

     Dose      2022-0      No                                      



     Unknown      3-28                                              



               00:00:                                              



               00                                                

 

     Dose      2022-0      No                                      



     Unknown      3-28                                              



               00:00:                                              



               00                                                

 

     Dose      2022-0      No                                      



     Unknown      3-28                                              



               00:00:                                              



               00                                                

 

     Dose      2022-0      No                                      



     Unknown      3-28                                              



               00:00:                                              



               00                                                

 

     Dose      2022-0      No                                      



     Unknown      3-28                                              



               00:00:                                              



               00                                                

 

     Dose      2022-0      No                                      



     Unknown      3-28                                              



               00:00:                                              



               00                                                

 

     Dose      2022-0      No                                      



     Unknown      3-28                                              



               00:00:                                              



               00                                                

 

     Dose      2022-0      No                                      



     Unknown      3-28                                              



               00:00:                                              



               00                                                

 

     Dose      2022-0      No                                      



     Unknown      3-28                                              



               00:00:                                              



               00                                                

 

     Dose      2022-0      No                                      



     Unknown      3-28                                              



               00:00:                                              



               00                                                

 

     Dose      2022-0      No                                      



     Unknown      3-28                                              



               00:00:                                              



               00                                                

 

     Dose      2022-0      No                                      



     Unknown      3-28                                              



               00:00:                                              



               00                                                

 

     Dose      2022-0      No                                      



     Unknown      3-28                                              



               00:00:                                              



               00                                                

 

     Dose      2022-0      No                                      



     Unknown      3-28                                              



               00:00:                                              



               00                                                

 

     Dose      2022-0      No                                      



     Unknown      3-28                                              



               00:00:                                              



               00                                                

 

     Dose      2022-0      No                                      



     Unknown      3-28                                              



               00:00:                                              



               00                                                

 

     Dose      2022-0      No                                      



     Unknown      3-28                                              



               00:00:                                              



               00                                                

 

     Dose      2022-0      No                                      



     Unknown      3-28                                              



               00:00:                                              



               00                                                

 

     Dose      2022-0      No                                      



     Unknown      3-28                                              



               00:00:                                              



               00                                                

 

     Dose      2022-0      No                                      



     Unknown      3-28                                              



               00:00:                                              



               00                                                

 

     Dose      2022-0      No                                      



     Unknown      3-28                                              



               00:00:                                              



               00                                                

 

     Dose      2022-0      No                                      



     Unknown      3-28                                              



               00:00:                                              



               00                                                

 

     Dose      2022-0      No                                      



     Unknown      3-28                                              



               00:00:                                              



               00                                                

 

     Dose      2022-0      No                                      



     Unknown      3-28                                              



               00:00:                                              



               00                                                

 

     Dose      2022-0      No                                      



     Unknown      3-28                                              



               00:00:                                              



               00                                                

 

     Dose      2022-0      No                                      



     Unknown      3-28                                              



               00:00:                                              



               00                                                

 

     Dose      2022-0      No                                      



     Unknown      3-28                                              



               00:00:                                              



               00                                                

 

     Dose      2022-0      No                                      



     Unknown      3-28                                              



               00:00:                                              



               00                                                

 

     Dose      2022-0      No                                      



     Unknown      3-28                                              



               00:00:                                              



               00                                                

 

     Dose      2022-0      No                                      



     Unknown      3-28                                              



               00:00:                                              



               00                                                

 

     Dose      2022-0      No                                      



     Unknown      3-28                                              



               00:00:                                              



               00                                                

 

     Dose      2022-0      No                                      



     Unknown      3-28                                              



               00:00:                                              



               00                                                

 

     Dose      2022-0      No                                      



     Unknown      3-28                                              



               00:00:                                              



               00                                                

 

     Dose      2022-0      No                                      



     Unknown      3-28                                              



               00:00:                                              



               00                                                

 

     Dose      2022-0      No                                      



     Unknown      3-28                                              



               00:00:                                              



               00                                                

 

     Dose      2022-0      No                                      



     Unknown      3-28                                              



               00:00:                                              



               00                                                

 

     Dose      2022-0      No                                      



     Unknown      3-28                                              



               00:00:                                              



               00                                                

 

     Dose      2022-0      No                                      



     Unknown      3-28                                              



               00:00:                                              



               00                                                

 

     Dose      2022-0      No                                      



     Unknown      3-28                                              



               00:00:                                              



               00                                                

 

     Dose      2022-0      No                                      



     Unknown      3-28                                              



               00:00:                                              



               00                                                

 

     Dose      2022-0      No                                      



     Unknown      3-28                                              



               00:00:                                              



               00                                                

 

     Dose      2022-0      No                                      



     Unknown      3-28                                              



               00:00:                                              



               00                                                

 

     Dose      2022-0      No                                      



     Unknown      3-28                                              



               00:00:                                              



               00                                                

 

     Dose      2022-0      No                                      



     Unknown      3-28                                              



               00:00:                                              



               00                                                

 

     Dose      2022-0      No                                      



     Unknown      3-28                                              



               00:00:                                              



               00                                                

 

     Dose      2022-0      No                                      



     Unknown      3-28                                              



               00:00:                                              



               00                                                

 

     Dose      2022-0      No                                      



     Unknown      3-28                                              



               00:00:                                              



               00                                                

 

     Dose      2022-0      No                                      



     Unknown      3-28                                              



               00:00:                                              



               00                                                

 

     Dose      2022-0      No                                      



     Unknown      3-28                                              



               00:00:                                              



               00                                                

 

     Dose      2022-0      No                                      



     Unknown      3-28                                              



               00:00:                                              



               00                                                

 

     Dose      2022-0      No                                      



     Unknown      3-28                                              



               00:00:                                              



               00                                                

 

     Dose      2022-0      No                                      



     Unknown      3-28                                              



               00:00:                                              



               00                                                

 

     Dose      2022-0      No                                      



     Unknown      3-28                                              



               00:00:                                              



               00                                                

 

     Dose      2022-0      No                                      



     Unknown      3-28                                              



               00:00:                                              



               00                                                

 

     Dose      2022-0      No                                      



     Unknown      3-28                                              



               00:00:                                              



               00                                                

 

     Dose      2022-0      No                                      



     Unknown      3-28                                              



               00:00:                                              



               00                                                

 

     Dose      2022-0      No                                      



     Unknown      3-28                                              



               00:00:                                              



               00                                                

 

     Dose      2022-0      No                                      



     Unknown      3-28                                              



               00:00:                                              



               00                                                

 

     Dose      2022-0      No                                      



     Unknown      3-28                                              



               00:00:                                              



               00                                                

 

     Dose      2022-0      No                                      



     Unknown      3-28                                              



               00:00:                                              



               00                                                

 

     Dose      2022-0      No                                      



     Unknown      3-28                                              



               00:00:                                              



               00                                                

 

     Dose      2022-0      No                                      



     Unknown      3-28                                              



               00:00:                                              



               00                                                

 

     Dose      2022-0      No                                      



     Unknown      3-28                                              



               00:00:                                              



               00                                                

 

     Dose      2022-0      No                                      



     Unknown      3-28                                              



               00:00:                                              



               00                                                

 

     Dose      2022-0      No                                      



     Unknown      3-28                                              



               00:00:                                              



               00                                                

 

     Dose      2022-0      No                                      



     Unknown      3-28                                              



               00:00:                                              



               00                                                

 

     Dose      2022-0      No                                      



     Unknown      3-28                                              



               00:00:                                              



               00                                                

 

     Dose      2022-0      No                                      



     Unknown      3-28                                              



               00:00:                                              



               00                                                

 

     Dose      2022-0      No                                      



     Unknown      3-28                                              



               00:00:                                              



               00                                                

 

     Dose      2022-0      No                                      



     Unknown      3-28                                              



               00:00:                                              



               00                                                

 

     Dose      2022-0      No                                      



     Unknown      3-28                                              



               00:00:                                              



               00                                                

 

     Dose      2022-0      No                                      



     Unknown      3-28                                              



               00:00:                                              



               00                                                

 

     Dose      2022-0      No                                      



     Unknown      3-28                                              



               00:00:                                              



               00                                                

 

     Dose      2022-0      No                                      



     Unknown      3-28                                              



               00:00:                                              



               00                                                

 

     Dose      2022-0      No                                      



     Unknown      3-28                                              



               00:00:                                              



               00                                                

 

     Dose      2022-0      No                                      



     Unknown      3-28                                              



               00:00:                                              



               00                                                

 

     Dose      2022-0      No                                      



     Unknown      3-28                                              



               00:00:                                              



               00                                                

 

     Dose      2022-0      No                                      



     Unknown      3-28                                              



               00:00:                                              



               00                                                

 

     Dose      2022-0      No                                      



     Unknown      3-28                                              



               00:00:                                              



               00                                                

 

     Dose      2022-0      No                                      



     Unknown      3-28                                              



               00:00:                                              



               00                                                

 

     Dose      2022-0      No                                      



     Unknown      3-28                                              



               00:00:                                              



               00                                                

 

     Dose      2022-0      No                                      



     Unknown      3-28                                              



               00:00:                                              



               00                                                

 

     Dose      2022-0      No                                      



     Unknown      3-28                                              



               00:00:                                              



               00                                                

 

     Dose      2022-0      No                                      



     Unknown      3-28                                              



               00:00:                                              



               00                                                

 

     Dose      2022-0      No                                      



     Unknown      3-28                                              



               00:00:                                              



               00                                                

 

     Dose      2022-0      No                                      



     Unknown      3-28                                              



               00:00:                                              



               00                                                

 

     Dose      2022-0      No                                      



     Unknown      3-28                                              



               00:00:                                              



               00                                                

 

     Dose      2022-0      No                                      



     Unknown      3-28                                              



               00:00:                                              



               00                                                

 

     Dose      2022-0      No                                      



     Unknown      3-28                                              



               00:00:                                              



               00                                                

 

     Dose      2022-0      No                                      



     Unknown      3-28                                              



               00:00:                                              



               00                                                

 

     Dose      2022-0      No                                      



     Unknown      3-28                                              



               00:00:                                              



               00                                                

 

     Dose      2022-0      No                                      



     Unknown      3-28                                              



               00:00:                                              



               00                                                

 

     Dose      2022-0      No                                      



     Unknown      3-28                                              



               00:00:                                              



               00                                                

 

     Dose      2022-0      No                                      



     Unknown      3-28                                              



               00:00:                                              



               00                                                

 

     Dose      2022-0      No                                      



     Unknown      3-28                                              



               00:00:                                              



               00                                                

 

     Dose      2022-0      No                                      



     Unknown      3-28                                              



               00:00:                                              



               00                                                

 

     Dose      2022-0      No                                      



     Unknown      3-28                                              



               00:00:                                              



               00                                                

 

     Dose      2022-0      No                                      



     Unknown      3-28                                              



               00:00:                                              



               00                                                

 

     Dose      2022-0      No                                      



     Unknown      3-28                                              



               00:00:                                              



               00                                                

 

     Dose      2022-0      No                                      



     Unknown      3-28                                              



               00:00:                                              



               00                                                

 

     Dose      2022-0      No                                      



     Unknown      3-28                                              



               00:00:                                              



               00                                                

 

     Dose      2022-0      No                                      



     Unknown      3-28                                              



               00:00:                                              



               00                                                

 

     Dose      2022-0      No                                      



     Unknown      3-28                                              



               00:00:                                              



               00                                                

 

     Dose      2022-0      No                                      



     Unknown      3-28                                              



               00:00:                                              



               00                                                

 

     Dose      2022-0      No                                      



     Unknown      3-28                                              



               00:00:                                              



               00                                                

 

     Dose      2022-0      No                                      



     Unknown      3-28                                              



               00:00:                                              



               00                                                

 

     Dose      2022-0      No                                      



     Unknown      3-28                                              



               00:00:                                              



               00                                                

 

     Dose      2022-0      No                                      



     Unknown      3-28                                              



               00:00:                                              



               00                                                

 

     Dose      2022-0      No                                      



     Unknown      3-28                                              



               00:00:                                              



               00                                                

 

     Dose      2022-0      No                                      



     Unknown      3-28                                              



               00:00:                                              



               00                                                

 

     Dose      2022-0      No                                      



     Unknown      3-28                                              



               00:00:                                              



               00                                                

 

     Dose      2022-0      No                                      



     Unknown      3-28                                              



               00:00:                                              



               00                                                

 

     Dose      2022-0      No                                      



     Unknown      3-28                                              



               00:00:                                              



               00                                                

 

     Dose      2022-0      No                                      



     Unknown      3-28                                              



               00:00:                                              



               00                                                

 

     Dose      2022-0      No                                      



     Unknown      3-28                                              



               00:00:                                              



               00                                                

 

     Dose      2022-0      No                                      



     Unknown      3-28                                              



               00:00:                                              



               00                                                

 

     Dose      2022-0      No                                      



     Unknown      3-28                                              



               00:00:                                              



               00                                                

 

     Dose      2022-0      No                                      



     Unknown      3-28                                              



               00:00:                                              



               00                                                

 

     Dose      2022-0      No                                      



     Unknown      3-28                                              



               00:00:                                              



               00                                                

 

     Dose      2022-0      No                                      



     Unknown      3-28                                              



               00:00:                                              



               00                                                

 

     Dose      2022-0      No                                      



     Unknown      3-28                                              



               00:00:                                              



               00                                                

 

     Dose      2022-0      No                                      



     Unknown      3-28                                              



               00:00:                                              



               00                                                

 

     Dose      2022-0      No                                      



     Unknown      3-28                                              



               00:00:                                              



               00                                                

 

     Dose      2022-0      No                                      



     Unknown      3-28                                              



               00:00:                                              



               00                                                

 

     Dose      2022-0      No                                      



     Unknown      3-28                                              



               00:00:                                              



               00                                                

 

     Dose      2022-0      No                                      



     Unknown      3-28                                              



               00:00:                                              



               00                                                

 

     Dose      2022-0      No                                      



     Unknown      3-28                                              



               00:00:                                              



               00                                                

 

     Dose      2022-0      No                                      



     Unknown      3-28                                              



               00:00:                                              



               00                                                

 

     Dose      2022-0      No                                      



     Unknown      3-28                                              



               00:00:                                              



               00                                                

 

     Dose      2022-0      No                                      



     Unknown      3-28                                              



               00:00:                                              



               00                                                

 

     Dose      2022-0      No                                      



     Unknown      3-28                                              



               00:00:                                              



               00                                                

 

     Dose      2022-0      No                                      



     Unknown      3-28                                              



               00:00:                                              



               00                                                

 

     Dose      2022-0      No                                      



     Unknown      3-28                                              



               00:00:                                              



               00                                                

 

     Dose      2022-0      No                                      



     Unknown      3-28                                              



               00:00:                                              



               00                                                

 

     Dose      2022-0      No                                      



     Unknown      3-28                                              



               00:00:                                              



               00                                                

 

     Dose      2022-0      No                                      



     Unknown      3-28                                              



               00:00:                                              



               00                                                

 

     Dose      2022-0      No                                      



     Unknown      3-28                                              



               00:00:                                              



               00                                                

 

     Dose      2022-0      No                                      



     Unknown      3-28                                              



               00:00:                                              



               00                                                

 

     Dose      2022-0      No                                      



     Unknown      3-28                                              



               00:00:                                              



               00                                                

 

     Dose      2022-0      No                                      



     Unknown      3-28                                              



               00:00:                                              



               00                                                

 

     Dose      2022-0      No                                      



     Unknown      3-28                                              



               00:00:                                              



               00                                                

 

     Dose      2022-0      No                                      



     Unknown      3-28                                              



               00:00:                                              



               00                                                

 

     Dose      2022-0      No                                      



     Unknown      3-28                                              



               00:00:                                              



               00                                                

 

     Dose      2022-0      No                                      



     Unknown      3-28                                              



               00:00:                                              



               00                                                

 

     Dose      2022-0      No                                      



     Unknown      3-28                                              



               00:00:                                              



               00                                                

 

     Dose      2022-0      No                                      



     Unknown      3-28                                              



               00:00:                                              



               00                                                

 

     Dose      2022-0      No                                      



     Unknown      3-28                                              



               00:00:                                              



               00                                                

 

     Dose      2022-0      No                                      



     Unknown      3-28                                              



               00:00:                                              



               00                                                

 

     Dose      2022-0      No                                      



     Unknown      3-28                                              



               00:00:                                              



               00                                                

 

     Dose      2022-0      No                                      



     Unknown      3-28                                              



               00:00:                                              



               00                                                

 

     Dose      2022-0      No                                      



     Unknown      3-28                                              



               00:00:                                              



               00                                                

 

     Dose      2022-0      No                                      



     Unknown      3-28                                              



               00:00:                                              



               00                                                

 

     Dose      2022-0      No                                      



     Unknown      3-28                                              



               00:00:                                              



               00                                                

 

     Dose      2022-0      No                                      



     Unknown      3-28                                              



               00:00:                                              



               00                                                

 

     Dose      2022-0      No                                      



     Unknown      3-28                                              



               00:00:                                              



               00                                                

 

     Dose      2022-0      No                                      



     Unknown      3-28                                              



               00:00:                                              



               00                                                

 

     Dose      2022-0      No                                      



     Unknown      3-28                                              



               00:00:                                              



               00                                                

 

     Dose      2022-0      No                                      



     Unknown      3-28                                              



               00:00:                                              



               00                                                

 

     Dose      2022-0      No                                      



     Unknown      3-28                                              



               00:00:                                              



               00                                                

 

     Dose      2022-0      No                                      



     Unknown      3-28                                              



               00:00:                                              



               00                                                

 

     Dose      2022-0      No                                      



     Unknown      3-28                                              



               00:00:                                              



               00                                                

 

     Dose      2022-0      No                                      



     Unknown      3-28                                              



               00:00:                                              



               00                                                

 

     Dose      2022-0      No                                      



     Unknown      3-28                                              



               00:00:                                              



               00                                                

 

     Dose      2022-0      No                                      



     Unknown      3-28                                              



               00:00:                                              



               00                                                

 

     Dose      2022-0      No                                      



     Unknown      3-28                                              



               00:00:                                              



               00                                                

 

     Dose      2022-0      No                                      



     Unknown      3-28                                              



               00:00:                                              



               00                                                

 

     Dose      2022-0      No                                      



     Unknown      3-28                                              



               00:00:                                              



               00                                                

 

     Dose      2022-0      No                                      



     Unknown      3-28                                              



               00:00:                                              



               00                                                

 

     Dose      2022-0      No                                      



     Unknown      3-28                                              



               00:00:                                              



               00                                                

 

     Dose      2022-0      No                                      



     Unknown      3-28                                              



               00:00:                                              



               00                                                

 

     Dose      2022-0      No                                      



     Unknown      3-28                                              



               00:00:                                              



               00                                                

 

     Dose      2022-0      No                                      



     Unknown      3-28                                              



               00:00:                                              



               00                                                

 

     Dose      2022-0      No                                      



     Unknown      3-28                                              



               00:00:                                              



               00                                                

 

     Dose      2022-0      No                                      



     Unknown      3-28                                              



               00:00:                                              



               00                                                

 

     Dose      2022-0      No                                      



     Unknown      3-28                                              



               00:00:                                              



               00                                                

 

     Dose      2022-0      No                                      



     Unknown      3-28                                              



               00:00:                                              



               00                                                

 

     Dose      2022-0      No                                      



     Unknown      3-28                                              



               00:00:                                              



               00                                                

 

     Dose      2022-0      No                                      



     Unknown      3-28                                              



               00:00:                                              



               00                                                

 

     Dose      2022-0      No                                      



     Unknown      3-28                                              



               00:00:                                              



               00                                                

 

     Dose      2022-0      No                                      



     Unknown      3-28                                              



               00:00:                                              



               00                                                

 

     Dose      2022-0      No                                      



     Unknown      3-28                                              



               00:00:                                              



               00                                                

 

     Dose      2022-0      No                                      



     Unknown      3-28                                              



               00:00:                                              



               00                                                

 

     Dose      2022-0      No                                      



     Unknown      3-28                                              



               00:00:                                              



               00                                                

 

     Dose      2022-0      No                                      



     Unknown      3-28                                              



               00:00:                                              



               00                                                

 

     Dose      2022-0      No                                      



     Unknown      3-28                                              



               00:00:                                              



               00                                                

 

     Dose      2022-0      No                                      



     Unknown      3-28                                              



               00:00:                                              



               00                                                

 

     Dose      2022-0      No                                      



     Unknown      3-28                                              



               00:00:                                              



               00                                                

 

     Dose      2022-0      No                                      



     Unknown      3-28                                              



               00:00:                                              



               00                                                

 

     Dose      2022-0      No                                      



     Unknown      3-28                                              



               00:00:                                              



               00                                                

 

     Dose      2022-0      No                                      



     Unknown      3-28                                              



               00:00:                                              



               00                                                

 

     Dose      2022-0      No                                      



     Unknown      3-28                                              



               00:00:                                              



               00                                                

 

     Dose      2022-0      No                                      



     Unknown      3-28                                              



               00:00:                                              



               00                                                

 

     Dose      2022-0      No                                      



     Unknown      3-28                                              



               00:00:                                              



               00                                                

 

     Dose      2022-0      No                                      



     Unknown      3-28                                              



               00:00:                                              



               00                                                

 

     Dose      2022-0      No                                      



     Unknown      3-28                                              



               00:00:                                              



               00                                                

 

     Dose      2022-0      No                                      



     Unknown      3-28                                              



               00:00:                                              



               00                                                

 

     Dose      2022-0      No                                      



     Unknown      3-28                                              



               00:00:                                              



               00                                                

 

     Dose      2022-0      No                                      



     Unknown      3-28                                              



               00:00:                                              



               00                                                

 

     Dose      2022-0      No                                      



     Unknown      3-28                                              



               00:00:                                              



               00                                                

 

     Dose      2022-0      No                                      



     Unknown      3-28                                              



               00:00:                                              



               00                                                

 

     Dose      2022-0      No                                      



     Unknown      3-28                                              



               00:00:                                              



               00                                                

 

     Dose      2022-0      No                                      



     Unknown      3-28                                              



               00:00:                                              



               00                                                

 

     Dose      2022-0      No                                      



     Unknown      3-28                                              



               00:00:                                              



               00                                                

 

     Dose      2022-0      No                                      



     Unknown      3-16                                              



               00:00:                                              



               00                                                

 

     Dose      2022-0      No                                      



     Unknown      3-16                                              



               00:00:                                              



               00                                                

 

     Dose      2022-0      No                                      



     Unknown      3-16                                              



               00:00:                                              



               00                                                

 

     Dose      2022-0      No                                      



     Unknown      3-16                                              



               00:00:                                              



               00                                                

 

     Dose      2022-0      No                                      



     Unknown      3-16                                              



               00:00:                                              



               00                                                

 

     Dose      2022-0      No                                      



     Unknown      3-16                                              



               00:00:                                              



               00                                                

 

     Dose      2022-0      No                                      



     Unknown      3-16                                              



               00:00:                                              



               00                                                

 

     Dose      2022-0      No                                      



     Unknown      3-16                                              



               00:00:                                              



               00                                                

 

     Dose      2022-0      No                                      



     Unknown      3-16                                              



               00:00:                                              



               00                                                

 

     Dose      2022-0      No                                      



     Unknown      3-16                                              



               00:00:                                              



               00                                                

 

     Dose      2022-0      No                                      



     Unknown      3-16                                              



               00:00:                                              



               00                                                

 

     Dose      2022-0      No                                      



     Unknown      3-16                                              



               00:00:                                              



               00                                                

 

     Dose      2022-0      No                                      



     Unknown      3-16                                              



               00:00:                                              



               00                                                

 

     Dose      2022-0      No                                      



     Unknown      3-16                                              



               00:00:                                              



               00                                                

 

     Dose      2022-0      No                                      



     Unknown      3-16                                              



               00:00:                                              



               00                                                

 

     Dose      2022-0      No                                      



     Unknown      3-16                                              



               00:00:                                              



               00                                                

 

     Dose      2022-0      No                                      



     Unknown      3-16                                              



               00:00:                                              



               00                                                

 

     Dose      2022-0      No                                      



     Unknown      3-16                                              



               00:00:                                              



               00                                                

 

     Dose      2022-0      No                                      



     Unknown      3-16                                              



               00:00:                                              



               00                                                

 

     Dose      2022-0      No                                      



     Unknown      3-16                                              



               00:00:                                              



               00                                                

 

     Dose      2022-0      No                                      



     Unknown      3-16                                              



               00:00:                                              



               00                                                

 

     Dose      2022-0      No                                      



     Unknown      3-16                                              



               00:00:                                              



               00                                                

 

     Dose      2022-0      No                                      



     Unknown      3-16                                              



               00:00:                                              



               00                                                

 

     Dose      2022-0      No                                      



     Unknown      3-16                                              



               00:00:                                              



               00                                                

 

     Dose      2022-0      No                                      



     Unknown      3-16                                              



               00:00:                                              



               00                                                

 

     Dose      2022-0      No                                      



     Unknown      3-16                                              



               00:00:                                              



               00                                                

 

     Dose      2022-0      No                                      



     Unknown      3-16                                              



               00:00:                                              



               00                                                

 

     Dose      2022-0      No                                      



     Unknown      3-16                                              



               00:00:                                              



               00                                                

 

     Dose      2022-0      No                                      



     Unknown      3-16                                              



               00:00:                                              



               00                                                

 

     Dose      2022-0      No                                      



     Unknown      3-16                                              



               00:00:                                              



               00                                                

 

     Dose      2022-0      No                                      



     Unknown      3-16                                              



               00:00:                                              



               00                                                

 

     Dose      2022-0      No                                      



     Unknown      3-16                                              



               00:00:                                              



               00                                                

 

     Dose      2022-0      No                                      



     Unknown      3-15                                              



               00:00:                                              



               00                                                

 

     Dose      2022-0      No                                      



     Unknown      3-15                                              



               00:00:                                              



               00                                                

 

     Dose      2022-0      No                                      



     Unknown      3-15                                              



               00:00:                                              



               00                                                

 

     Dose      2022-0      No                                      



     Unknown      3-15                                              



               00:00:                                              



               00                                                

 

     Dose      2022-0      No                                      



     Unknown      3-15                                              



               00:00:                                              



               00                                                

 

     Dose      2022-0      No                                      



     Unknown      3-15                                              



               00:00:                                              



               00                                                

 

     Dose      2022-0      No                                      



     Unknown      3-15                                              



               00:00:                                              



               00                                                

 

     Dose      2022-0      No                                      



     Unknown      3-15                                              



               00:00:                                              



               00                                                

 

     Dose      2022-0      No                                      



     Unknown      3-15                                              



               00:00:                                              



               00                                                

 

     Dose      2022-0      No                                      



     Unknown      3-15                                              



               00:00:                                              



               00                                                

 

     Dose      2022-0      No                                      



     Unknown      3-15                                              



               00:00:                                              



               00                                                

 

     Dose      2022-0      No                                      



     Unknown      3-15                                              



               00:00:                                              



               00                                                

 

     Dose      2022-0      No                                      



     Unknown      3-15                                              



               00:00:                                              



               00                                                

 

     Dose      2022-0      No                                      



     Unknown      3-15                                              



               00:00:                                              



               00                                                

 

     Dose      2022-0      No                                      



     Unknown      3-15                                              



               00:00:                                              



               00                                                

 

     Dose      2022-0      No                                      



     Unknown      3-15                                              



               00:00:                                              



               00                                                

 

     Dose      2022-0      No                                      



     Unknown      1-20                                              



               00:00:                                              



               00                                                

 

     Dose      2022-0      No                                      



     Unknown      1-20                                              



               00:00:                                              



               00                                                

 

     Dose      2022-0      No                                      



     Unknown      1-20                                              



               00:00:                                              



               00                                                

 

     Dose      2022-0      No                                      



     Unknown      1-20                                              



               00:00:                                              



               00                                                

 

     Dose      2022-0      No                                      



     Unknown      1-20                                              



               00:00:                                              



               00                                                

 

     Dose      2022-0      No                                      



     Unknown      1-20                                              



               00:00:                                              



               00                                                

 

     Dose      2022-0      No                                      



     Unknown      1-20                                              



               00:00:                                              



               00                                                

 

     Dose      2022-0      No                                      



     Unknown      1-20                                              



               00:00:                                              



               00                                                

 

     Dose      2022-0      No                                      



     Unknown      1-20                                              



               00:00:                                              



               00                                                

 

     Dose      2022-0      No                                      



     Unknown      1-20                                              



               00:00:                                              



               00                                                

 

     Dose      2022-0      No                                      



     Unknown      1-20                                              



               00:00:                                              



               00                                                

 

     Dose      2022-0      No                                      



     Unknown      1-20                                              



               00:00:                                              



               00                                                

 

     Dose      2022-0      No                                      



     Unknown      1-20                                              



               00:00:                                              



               00                                                

 

     Dose      2022-0      No                                      



     Unknown      1-20                                              



               00:00:                                              



               00                                                

 

     Dose      2022-0      No                                      



     Unknown      1-20                                              



               00:00:                                              



               00                                                

 

     Dose      2022-0      No                                      



     Unknown      1-20                                              



               00:00:                                              



               00                                                

 

     Dose      2022-0      No                                      



     Unknown      1-20                                              



               00:00:                                              



               00                                                

 

     Dose      2022-0      No                                      



     Unknown      1-20                                              



               00:00:                                              



               00                                                

 

     Dose      2022-0      No                                      



     Unknown      1-20                                              



               00:00:                                              



               00                                                

 

     Dose      2022-0      No                                      



     Unknown      1-20                                              



               00:00:                                              



               00                                                

 

     Dose      2022-0      No                                      



     Unknown      1-06                                              



               00:00:                                              



               00                                                

 

     Dose      2022-0      No                                      



     Unknown      1-06                                              



               00:00:                                              



               00                                                

 

     Dose      2022-0      No                                      



     Unknown      1-06                                              



               00:00:                                              



               00                                                

 

     Dose      2022-0      No                                      



     Unknown      1-06                                              



               00:00:                                              



               00                                                

 

     Dose      2022-0      No                                      



     Unknown      1-06                                              



               00:00:                                              



               00                                                

 

     Dose      2022-0      No                                      



     Unknown      1-06                                              



               00:00:                                              



               00                                                

 

     Dose      2022-0      No                                      



     Unknown      1-06                                              



               00:00:                                              



               00                                                

 

     Dose      2022-0      No                                      



     Unknown      1-06                                              



               00:00:                                              



               00                                                

 

     Dose      2022-0      No                                      



     Unknown      1-06                                              



               00:00:                                              



               00                                                

 

     Dose      2022-0      No                                      



     Unknown      1-06                                              



               00:00:                                              



               00                                                

 

     Dose      2022-0      No                                      



     Unknown      1-06                                              



               00:00:                                              



               00                                                

 

     Dose      2022-0      No                                      



     Unknown      1-06                                              



               00:00:                                              



               00                                                

 

     Dose      2022-0      No                                      



     Unknown      1-06                                              



               00:00:                                              



               00                                                

 

     Dose      2022-0      No                                      



     Unknown      1-06                                              



               00:00:                                              



               00                                                

 

     Dose      2022-0      No                                      



     Unknown      1-06                                              



               00:00:                                              



               00                                                

 

     Dose      2022-0      No                                      



     Unknown      1-06                                              



               00:00:                                              



               00                                                

 

     Dose      2022-0      No                                      



     Unknown      1-06                                              



               00:00:                                              



               00                                                

 

     Dose      2022-0      No                                      



     Unknown      1-06                                              



               00:00:                                              



               00                                                

 

     Dose      2022-0      No                                      



     Unknown      1-06                                              



               00:00:                                              



               00                                                

 

     Dose      2022-0      No                                      



     Unknown      1-06                                              



               00:00:                                              



               00                                                

 

     Dose      2021-1      No                                      



     Unknown      1-30                                              



               00:00:                                              



               00                                                

 

     Dose      2021-1      No                                      



     Unknown      1-30                                              



               00:00:                                              



               00                                                

 

     Dose      2021-1      No                                      



     Unknown      1-30                                              



               00:00:                                              



               00                                                

 

     Dose      1-1      No                                      



     Unknown      1-30                                              



               00:00:                                              



               00                                                

 

     Dose      2021-1      No                                      



     Unknown      1-30                                              



               00:00:                                              



               00                                                

 

     Dose      2021-1      No                                      



     Unknown      1-30                                              



               00:00:                                              



               00                                                

 

     Dose      2021-1      No                                      



     Unknown      1-30                                              



               00:00:                                              



               00                                                

 

     Dose      2021-1      No                                      



     Unknown      1-30                                              



               00:00:                                              



               00                                                

 

     Dose      2021-1      No                                      



     Unknown      1-30                                              



               00:00:                                              



               00                                                

 

     Dose      2021-1      No                                      



     Unknown      1-30                                              



               00:00:                                              



               00                                                

 

     Dose      2021-1      No                                      



     Unknown      1-30                                              



               00:00:                                              



               00                                                

 

     Dose      1-1      No                                      



     Unknown      1-30                                              



               00:00:                                              



               00                                                

 

     Dose      2021-1      No                                      



     Unknown      1-30                                              



               00:00:                                              



               00                                                

 

     Dose      2021-1      No                                      



     Unknown      1-30                                              



               00:00:                                              



               00                                                

 

     Dose      1-1      No                                      



     Unknown      1-30                                              



               00:00:                                              



               00                                                

 

     Dose      1-1      No                                      



     Unknown      1-30                                              



               00:00:                                              



               00                                                

 

     Dose      2021-1      No                                      



     Unknown      1-30                                              



               00:00:                                              



               00                                                

 

     Dose      1-1      No                                      



     Unknown      1-30                                              



               00:00:                                              



               00                                                

 

     Dose      1-1      No                                      



     Unknown      1-30                                              



               00:00:                                              



               00                                                

 

     Dose      1-1      No                                      



     Unknown      1-30                                              



               00:00:                                              



               00                                                

 

     Zoloft 100      1-1      No             15mg                     



     mg tablet                                                    



               00:00:                                              



               00                                                

 

     Dose      1-1      No                                      



     Unknown                                                    



               00:00:                                              



               00                                                

 

     quetiapine      1-1      No             3mg                      



     300 mg      -                                              



     tablet      00:00:                                              



               00                                                

 

     Zoloft 100      -1      No             15mg                     



     mg tablet                                                    



               00:00:                                              



               00                                                

 

     Dose      2021-1      No                                      



     Unknown                                                    



               00:00:                                              



               00                                                

 

     quetiapine      2021-1      No             3mg                      



     300 mg      -                                              



     tablet      00:00:                                              



               00                                                

 

     Zoloft 100      2021-1      No             15mg                     



     mg tablet                                                    



               00:00:                                              



               00                                                

 

     Dose      2021-1      No                                      



     Unknown                                                    



               00:00:                                              



               00                                                

 

     quetiapine      2021-1      No             3mg                      



     300 mg      -02                                              



     tablet      00:00:                                              



               00                                                

 

     Zoloft 100      1-1      No             15mg                     



     mg tablet                                                    



               00:00:                                              



               00                                                

 

     Dose      2021-1      No                                      



     Unknown                                                    



               00:00:                                              



               00                                                

 

     quetiapine      2021-1      No             3mg                      



     300 mg                                                    



     tablet      00:00:                                              



               00                                                

 

     Zoloft 100      1-0      No             15mg                     



     mg tablet                                                    



               00:00:                                              



               00                                                

 

     quetiapine      2021-0      No             3mg                      



     300 mg                                                    



     tablet      00:00:                                              



               00                                                

 

     benztropine      2021-0      No             1mg                      



     2 mg tablet                                                    



               00:00:                                              



               00                                                

 

     trazodone      2021-0      No             1mg                      



     50 mg                                                    



     tablet      00:00:                                              



               00                                                

 

     hydroxyzine      2021-0      No             1mg                      



     HCl 50 mg                                                    



     tablet      00:00:                                              



               00                                                

 

     Zoloft 100      1-0      No             15mg                     



     mg tablet                                                    



               00:00:                                              



               00                                                

 

     quetiapine      2021-0      No             3mg                      



     300 mg                                                    



     tablet      00:00:                                              



               00                                                

 

     benztropine      2021-0      No             1mg                      



     2 mg tablet                                                    



               00:00:                                              



               00                                                

 

     trazodone      2021-0      No             1mg                      



     50 mg                                                    



     tablet      00:00:                                              



               00                                                

 

     hydroxyzine      2021-0      No             1mg                      



     HCl 50 mg      -                                              



     tablet      00:00:                                              



               00                                                

 

     Zoloft 100      1-0      No             15mg                     



     mg tablet                                                    



               00:00:                                              



               00                                                

 

     quetiapine      2021-0      No             3mg                      



     300 mg                                                    



     tablet      00:00:                                              



               00                                                

 

     benztropine      2021-0      No             1mg                      



     2 mg tablet                                                    



               00:00:                                              



               00                                                

 

     trazodone      2021-0      No             1mg                      



     50 mg      -                                              



     tablet      00:00:                                              



               00                                                

 

     hydroxyzine      2021-0      No             1mg                      



     HCl 50 mg      -                                              



     tablet      00:00:                                              



               00                                                

 

     Zoloft 100      2021-0      No             15mg                     



     mg tablet                                                    



               00:00:                                              



               00                                                

 

     quetiapine      2021-0      No             3mg                      



     300 mg                                                    



     tablet      00:00:                                              



               00                                                

 

     benztropine      2021-0      No             1mg                      



     2 mg tablet                                                    



               00:00:                                              



               00                                                

 

     trazodone      2021-0      No             1mg                      



     50 mg                                                    



     tablet      00:00:                                              



               00                                                

 

     hydroxyzine      1-0      No             1mg                      



     HCl 50 mg                                                    



     tablet      00:00:                                              



               00                                                

 

     Zoloft 100      1-0      No             15mg                     



     mg tablet                                                    



               00:00:                                              



               00                                                

 

     benztropine      1-0      No             1mg                      



     2 mg tablet                                                    



               00:00:                                              



               00                                                

 

     quetiapine      1-0      No             3mg                      



     300 mg                                                    



     tablet      00:00:                                              



               00                                                

 

     hydroxyzine      1-0      No             1mg                      



     HCl 50 mg                                                    



     tablet      00:00:                                              



               00                                                

 

     Zoloft 100      1-0      No             15mg                     



     mg tablet                                                    



               00:00:                                              



               00                                                

 

     benztropine      2021-0      No             1mg                      



     2 mg tablet                                                    



               00:00:                                              



               00                                                

 

     quetiapine      1-0      No             3mg                      



     300 mg                                                    



     tablet      00:00:                                              



               00                                                

 

     hydroxyzine      1-0      No             1mg                      



     HCl 50 mg                                                    



     tablet      00:00:                                              



               00                                                

 

     Zoloft 100      1-0      No             15mg                     



     mg tablet                                                    



               00:00:                                              



               00                                                

 

     benztropine      1-0      No             1mg                      



     2 mg tablet                                                    



               00:00:                                              



               00                                                

 

     quetiapine      1-0      No             3mg                      



     300 mg                                                    



     tablet      00:00:                                              



               00                                                

 

     hydroxyzine      1-0      No             1mg                      



     HCl 50 mg                                                    



     tablet      00:00:                                              



               00                                                

 

     Zoloft 100      1-0      No             15mg                     



     mg tablet                                                    



               00:00:                                              



               00                                                

 

     benztropine      2021-0      No             1mg                      



     2 mg tablet                                                    



               00:00:                                              



               00                                                

 

     quetiapine      2021-0      No             3mg                      



     300 mg                                                    



     tablet      00:00:                                              



               00                                                

 

     hydroxyzine      1-0      No             1mg                      



     HCl 50 mg                                                    



     tablet      00:00:                                              



               00                                                

 

     Zoloft 100      2021-0      No             15mg                     



     mg tablet                                                    



               00:00:                                              



               00                                                

 

     quetiapine      2021-0      No             3mg                      



     300 mg                                                    



     tablet      00:00:                                              



               00                                                

 

     benztropine      2021-0      No             1mg                      



     2 mg tablet                                                    



               00:00:                                              



               00                                                

 

     Zoloft 100      2021-0      No             15mg                     



     mg tablet                                                    



               00:00:                                              



               00                                                

 

     quetiapine      2021-0      No             3mg                      



     300 mg                                                    



     tablet      00:00:                                              



               00                                                

 

     benztropine      2021-0      No             1mg                      



     2 mg tablet                                                    



               00:00:                                              



               00                                                

 

     Zoloft 100      2021-0      No             15mg                     



     mg tablet                                                    



               00:00:                                              



               00                                                

 

     quetiapine      2021-0      No             3mg                      



     300 mg                                                    



     tablet      00:00:                                              



               00                                                

 

     benztropine      2021-0      No             1mg                      



     2 mg tablet                                                    



               00:00:                                              



               00                                                

 

     Zoloft 100      2021-0      No             15mg                     



     mg tablet                                                    



               00:00:                                              



               00                                                

 

     quetiapine      2021-0      No             3mg                      



     300 mg                                                    



     tablet      00:00:                                              



               00                                                

 

     benztropine      2021-0      No             1mg                      



     2 mg tablet                                                    



               00:00:                                              



               00                                                

 

     Zoloft 100      2021-0      No             15mg                     



     mg tablet                                                    



               00:00:                                              



               00                                                

 

     Dose      2021-0      No                                      



     Unknown                                                    



               00:00:                                              



               00                                                

 

     benztropine      2021-0      No             1mg                      



     2 mg tablet                                                    



               00:00:                                              



               00                                                

 

     quetiapine      2021-0      No             3mg                      



     300 mg                                                    



     tablet      00:00:                                              



               00                                                

 

     Dose      2021-0      No                                      



     Unknown                                                    



               00:00:                                              



               00                                                

 

     Zoloft 100      2021-0      No             15mg                     



     mg tablet                                                    



               00:00:                                              



               00                                                

 

     Dose      2021-0      No                                      



     Unknown                                                    



               00:00:                                              



               00                                                

 

     benztropine      2021-0      No             1mg                      



     2 mg tablet                                                    



               00:00:                                              



               00                                                

 

     quetiapine      2021-0      No             3mg                      



     300 mg                                                    



     tablet      00:00:                                              



               00                                                

 

     Dose      2021-0      No                                      



     Unknown                                                    



               00:00:                                              



               00                                                

 

     Zoloft 100      2021-0      No             15mg                     



     mg tablet                                                    



               00:00:                                              



               00                                                

 

     Dose      2021-0      No                                      



     Unknown                                                    



               00:00:                                              



               00                                                

 

     benztropine      2021-0      No             1mg                      



     2 mg tablet                                                    



               00:00:                                              



               00                                                

 

     quetiapine      2021-0      No             3mg                      



     300 mg                                                    



     tablet      00:00:                                              



               00                                                

 

     Dose      2021-0      No                                      



     Unknown                                                    



               00:00:                                              



               00                                                

 

     Zoloft 100      2021-0      No             15mg                     



     mg tablet                                                    



               00:00:                                              



               00                                                

 

     Dose      2021-0      No                                      



     Unknown                                                    



               00:00:                                              



               00                                                

 

     benztropine      2021-0      No             1mg                      



     2 mg tablet                                                    



               00:00:                                              



               00                                                

 

     quetiapine      2021-0      No             3mg                      



     300 mg      -                                              



     tablet      00:00:                                              



               00                                                

 

     Dose      2021-0      No                                      



     Unknown                                                    



               00:00:                                              



               00                                                

 

     Zoloft 100      2021-0      No             15mg                     



     mg tablet      6                                              



               00:00:                                              



               00                                                

 

     Depakote      2021-0      No             1mg                      



     500 mg      6-                                              



     tablet,dereck      00:00:                                              



     yed release      00                                                

 

     benztropine      2021-0      No             1mg                      



     2 mg tablet      6                                              



               00:00:                                              



               00                                                

 

     quetiapine      2021-0      No             2mg                      



     300 mg      6-07                                              



     tablet      00:00:                                              



               00                                                

 

     Zoloft 100      2021-0      No             15mg                     



     mg tablet                                                    



               00:00:                                              



               00                                                

 

     Depakote      2021-0      No             1mg                      



     500 mg      6-07                                              



     tablet,dereck      00:00:                                              



     yed release      00                                                

 

     benztropine      2021-0      No             1mg                      



     2 mg tablet                                                    



               00:00:                                              



               00                                                

 

     quetiapine      2021-0      No             2mg                      



     300 mg      6-07                                              



     tablet      00:00:                                              



               00                                                

 

     Zoloft 100      2021-0      No             15mg                     



     mg tablet                                                    



               00:00:                                              



               00                                                

 

     Depakote      2021-0      No             1mg                      



     500 mg      6-07                                              



     tablet,dereck      00:00:                                              



     yed release      00                                                

 

     benztropine      2021-0      No             1mg                      



     2 mg tablet                                                    



               00:00:                                              



               00                                                

 

     quetiapine      2021-0      No             2mg                      



     300 mg      6-07                                              



     tablet      00:00:                                              



               00                                                

 

     Zoloft 100      2021-0      No             15mg                     



     mg tablet                                                    



               00:00:                                              



               00                                                

 

     Depakote      2021-0      No             1mg                      



     500 mg      6-07                                              



     tablet,dereck      00:00:                                              



     yed release      00                                                

 

     benztropine      2021-0      No             1mg                      



     2 mg tablet                                                    



               00:00:                                              



               00                                                

 

     quetiapine      2021-0      No             2mg                      



     300 mg      6-07                                              



     tablet      00:00:                                              



               00                                                

 

     ibuprofen      2021-0      No             1mg                      



     800 mg      5-28                                              



     tablet      00:00:                                              



               00                                                

 

     ibuprofen      2021-0      No             1mg                      



     800 mg      5-28                                              



     tablet      00:00:                                              



               00                                                

 

     ibuprofen      2021-0      No             1mg                      



     800 mg      5-28                                              



     tablet      00:00:                                              



               00                                                

 

     ibuprofen      2021-0      No             1mg                      



     800 mg      5-28                                              



     tablet      00:00:                                              



               00                                                

 

     Zoloft 100      2021-0      No             15mg                     



     mg tablet      5                                              



               00:00:                                              



               00                                                

 

     Depakote      2021-0      No             1mg                      



     500 mg      5-25                                              



     tablet,dereck      00:00:                                              



     yed release      00                                                

 

     benztropine      2021-0      No             1mg                      



     2 mg tablet      5                                              



               00:00:                                              



               00                                                

 

     quetiapine      2021-0      No             3mg                      



     300 mg      5-25                                              



     tablet      00:00:                                              



               00                                                

 

     Zoloft 100      2021-0      No             15mg                     



     mg tablet      5                                              



               00:00:                                              



               00                                                

 

     Depakote      2021-0      No             1mg                      



     500 mg      5-25                                              



     tablet,dereck      00:00:                                              



     yed release      00                                                

 

     benztropine      2021-0      No             1mg                      



     2 mg tablet      5                                              



               00:00:                                              



               00                                                

 

     quetiapine      2021-0      No             3mg                      



     300 mg      5-25                                              



     tablet      00:00:                                              



               00                                                

 

     Zoloft 100      2021-0      No             15mg                     



     mg tablet      5                                              



               00:00:                                              



               00                                                

 

     Depakote      2021-0      No             1mg                      



     500 mg      5-25                                              



     tablet,dereck      00:00:                                              



     yed release      00                                                

 

     benztropine      2021-0      No             1mg                      



     2 mg tablet      5                                              



               00:00:                                              



               00                                                

 

     quetiapine      2021-0      No             3mg                      



     300 mg      5-25                                              



     tablet      00:00:                                              



               00                                                

 

     Zoloft 100      1-0      No             15mg                     



     mg tablet      5                                              



               00:00:                                              



               00                                                

 

     Depakote      2021-0      No             1mg                      



     500 mg      5-25                                              



     tablet,dereck      00:00:                                              



     yed release      00                                                

 

     benztropine      2021-0      No             1mg                      



     2 mg tablet      5                                              



               00:00:                                              



               00                                                

 

     quetiapine      2021-0      No             3mg                      



     300 mg      5-25                                              



     tablet      00:00:                                              



               00                                                

 

     Zoloft 100      2021-0      No             15mg                     



     mg tablet                                                    



               00:00:                                              



               00                                                

 

     Depakote      2021-0      No             1mg                      



     500 mg      4-                                              



     tablet,dereck      00:00:                                              



     yed release      00                                                

 

     quetiapine      2021-0      No             3mg                      



     300 mg      4-                                              



     tablet      00:00:                                              



               00                                                

 

     Zoloft 100      2021-0      No             15mg                     



     mg tablet                                                    



               00:00:                                              



               00                                                

 

     Depakote      2021-0      No             1mg                      



     500 mg      4-                                              



     tablet,dereck      00:00:                                              



     yed release      00                                                

 

     quetiapine      2021-0      No             3mg                      



     300 mg      4-01                                              



     tablet      00:00:                                              



               00                                                

 

     Zoloft 100      2021-0      No             15mg                     



     mg tablet      4-                                              



               00:00:                                              



               00                                                

 

     Depakote      2021-0      No             1mg                      



     500 mg      4-01                                              



     tablet,dereck      00:00:                                              



     yed release      00                                                

 

     quetiapine      2021-0      No             3mg                      



     300 mg      4-01                                              



     tablet      00:00:                                              



               00                                                

 

     Zoloft 100      2021-0      No             15mg                     



     mg tablet      4-                                              



               00:00:                                              



               00                                                

 

     Depakote      2021-0      No             1mg                      



     500 mg      4-                                              



     tablet,dereck      00:00:                                              



     yed release      00                                                

 

     quetiapine      2021-0      No             3mg                      



     300 mg      4-                                              



     tablet      00:00:                                              



               00                                                

 

     Zoloft 100      2021-0      No             15mg                     



     mg tablet      3                                              



               00:00:                                              



               00                                                

 

     Depakote      2021-0      No             1mg                      



     500 mg      3-22                                              



     tablet,dereck      00:00:                                              



     yed release      00                                                

 

     Zoloft 100      2021-0      No             15mg                     



     mg tablet      3-                                              



               00:00:                                              



               00                                                

 

     Zoloft 100      2021-0      No             15mg                     



     mg tablet      3                                              



               00:00:                                              



               00                                                

 

     Depakote      2021-0      No             1mg                      



     500 mg      3-22                                              



     tablet,dereck      00:00:                                              



     yed release      00                                                

 

     Depakote      2021-0      No             1mg                      



     500 mg      3-22                                              



     tablet,dereck      00:00:                                              



     yed release      00                                                

 

     Zoloft 100      2021-0      No             15mg                     



     mg tablet      3                                              



               00:00:                                              



               00                                                

 

     Depakote      2021-0      No             1mg                      



     500 mg      3-22                                              



     tablet,dereck      00:00:                                              



     yed release      00                                                

 

     ibuprofen      2021-0      No             1mg                      



     800 mg      3-09                                              



     tablet      00:00:                                              



               00                                                

 

     ibuprofen      2021-0      No             1mg                      



     800 mg      3-09                                              



     tablet      00:00:                                              



               00                                                

 

     ibuprofen      2021-0      No             1mg                      



     800 mg      3-09                                              



     tablet      00:00:                                              



               00                                                

 

     ibuprofen      2021-0      No             1mg                      



     800 mg      3-09                                              



     tablet      00:00:                                              



               00                                                

 

     Zoloft 100      2021-0      No             15mg                     



     mg tablet      3-                                              



               00:00:                                              



               00                                                

 

     Depakote      2021-0      No             1mg                      



     500 mg      3-03                                              



     tablet,dereck      00:00:                                              



     yed release      00                                                

 

     hydroxyzine      2021-0      No             1mg                      



     HCl 50 mg      3-03                                              



     tablet      00:00:                                              



               00                                                

 

     quetiapine      2021-0      No             3mg                      



     300 mg      3-03                                              



     tablet      00:00:                                              



               00                                                

 

     Zoloft 100      2021-0      No             15mg                     



     mg tablet      3-03                                              



               00:00:                                              



               00                                                

 

     Depakote      2021-0      No             1mg                      



     500 mg      3-03                                              



     tablet,dereck      00:00:                                              



     yed release      00                                                

 

     hydroxyzine      2021-0      No             1mg                      



     HCl 50 mg      3-03                                              



     tablet      00:00:                                              



               00                                                

 

     quetiapine      2021-0      No             3mg                      



     300 mg      3-03                                              



     tablet      00:00:                                              



               00                                                

 

     Zoloft 100      2021-0      No             15mg                     



     mg tablet      3-03                                              



               00:00:                                              



               00                                                

 

     Depakote      2021-0      No             1mg                      



     500 mg      3-03                                              



     tablet,dereck      00:00:                                              



     yed release      00                                                

 

     hydroxyzine      2021-0      No             1mg                      



     HCl 50 mg      3-03                                              



     tablet      00:00:                                              



               00                                                

 

     quetiapine      2021-0      No             3mg                      



     300 mg      3-03                                              



     tablet      00:00:                                              



               00                                                

 

     Zoloft 100      2021-0      No             15mg                     



     mg tablet      3-                                              



               00:00:                                              



               00                                                

 

     Depakote      2021-0      No             1mg                      



     500 mg      3-03                                              



     tablet,dereck      00:00:                                              



     yed release      00                                                

 

     hydroxyzine      2021-0      No             1mg                      



     HCl 50 mg      3-03                                              



     tablet      00:00:                                              



               00                                                

 

     quetiapine      2021-0      No             3mg                      



     300 mg      3-03                                              



     tablet      00:00:                                              



               00                                                

 

     Zoloft 100      2021-0      No             1mg                      



     mg tablet      1-19                                              



               00:00:                                              



               00                                                

 

     hydroxyzine      2021-0      No             1mg                      



     HCl 50 mg      1-19                                              



     tablet      00:00:                                              



               00                                                

 

     quetiapine      2021-0      No             3mg                      



     300 mg      1-19                                              



     tablet      00:00:                                              



               00                                                

 

     Zoloft 100      2021-0      No             1mg                      



     mg tablet      1-19                                              



               00:00:                                              



               00                                                

 

     hydroxyzine      2021-0      No             1mg                      



     HCl 50 mg      1-19                                              



     tablet      00:00:                                              



               00                                                

 

     quetiapine      2021-0      No             3mg                      



     300 mg      1-19                                              



     tablet      00:00:                                              



               00                                                

 

     Zoloft 100      2021-0      No             1mg                      



     mg tablet      1-19                                              



               00:00:                                              



               00                                                

 

     hydroxyzine      2021-0      No             1mg                      



     HCl 50 mg      1-19                                              



     tablet      00:00:                                              



               00                                                

 

     quetiapine      2021-0      No             3mg                      



     300 mg      1-19                                              



     tablet      00:00:                                              



               00                                                

 

     Zoloft 100      2021-0      No             1mg                      



     mg tablet      1-19                                              



               00:00:                                              



               00                                                

 

     hydroxyzine      2021-0      No             1mg                      



     HCl 50 mg      1-19                                              



     tablet      00:00:                                              



               00                                                

 

     quetiapine      1-0      No             3mg                      



     300 mg      1-19                                              



     tablet      00:00:                                              



               00                                                

 

     Zoloft 100      -1      No             1mg                      



     mg tablet      2-02                                              



               00:00:                                              



               00                                                

 

     hydroxyzine      -1      No             1mg                      



     HCl 50 mg      2-02                                              



     tablet      00:00:                                              



               00                                                

 

     quetiapine      -1      No             3mg                      



     300 mg      2-02                                              



     tablet      00:00:                                              



               00                                                

 

     Zoloft 100      -1      No             1mg                      



     mg tablet      2-                                              



               00:00:                                              



               00                                                

 

     hydroxyzine      -1      No             1mg                      



     HCl 50 mg      2-02                                              



     tablet      00:00:                                              



               00                                                

 

     quetiapine      -1      No             3mg                      



     300 mg      2-02                                              



     tablet      00:00:                                              



               00                                                

 

     Zoloft 100      -1      No             1mg                      



     mg tablet      2-                                              



               00:00:                                              



               00                                                

 

     hydroxyzine      -1      No             1mg                      



     HCl 50 mg      2-02                                              



     tablet      00:00:                                              



               00                                                

 

     quetiapine      -1      No             3mg                      



     300 mg      2-02                                              



     tablet      00:00:                                              



               00                                                

 

     Zoloft 100      -1      No             1mg                      



     mg tablet      2-                                              



               00:00:                                              



               00                                                

 

     hydroxyzine      -1      No             1mg                      



     HCl 50 mg      2-02                                              



     tablet      00:00:                                              



               00                                                

 

     quetiapine      -1      No             3mg                      



     300 mg      2-02                                              



     tablet      00:00:                                              



               00                                                

 

     Zoloft 100      -0      No             1mg                      



     mg tablet                                                    



               00:00:                                              



               00                                                

 

     quetiapine      2020-0      No             3mg                      



     300 mg      9-30                                              



     tablet      00:00:                                              



               00                                                

 

     hydroxyzine      -0      No             1mg                      



     HCl 50 mg      9-30                                              



     tablet      00:00:                                              



               00                                                

 

     Zoloft 100      -0      No             1mg                      



     mg tablet                                                    



               00:00:                                              



               00                                                

 

     quetiapine      2020-0      No             3mg                      



     300 mg      9-30                                              



     tablet      00:00:                                              



               00                                                

 

     hydroxyzine      2020-0      No             1mg                      



     HCl 50 mg      9-30                                              



     tablet      00:00:                                              



               00                                                

 

     Zoloft 100      -0      No             1mg                      



     mg tablet                                                    



               00:00:                                              



               00                                                

 

     quetiapine      2020-0      No             3mg                      



     300 mg      9-30                                              



     tablet      00:00:                                              



               00                                                

 

     hydroxyzine      2020-0      No             1mg                      



     HCl 50 mg      9-30                                              



     tablet      00:00:                                              



               00                                                

 

     Zoloft 100      2020-0      No             1mg                      



     mg tablet                                                    



               00:00:                                              



               00                                                

 

     quetiapine      2020-0      No             3mg                      



     300 mg      9-30                                              



     tablet      00:00:                                              



               00                                                

 

     hydroxyzine      2020-0      No             1mg                      



     HCl 50 mg      9-30                                              



     tablet      00:00:                                              



               00                                                

 

     Lidocaine 5 Lidocaine 5 2020-0      Yes  CHRISTINE                 APPLY 1       

    UT



     % External % External 617           [facility]                PATCH TO     

      [facility]



     Patch Patch 00:00:           APRN                THE            ans



               00                                 AFFECTED           



                                                  AREA AND           



                                                  LEAVE IN           



                                                  PLACE FOR           



                                                  12 HOURS,           



                                                  THEN           



                                                  REMOVE AND           



                                                  LEAVE OFF           



                                                  FOR 12           



                                                  HOURS.           

 

     Zoloft 100      2020-0      No             1mg                      



     mg tablet      6-16                                              



               00:00:                                              



               00                                                

 

     Dose      2020-0      No                                      



     Unknown      6-16                                              



               00:00:                                              



               00                                                

 

     hydroxyzine      2020-0      No             1mg                      



     HCl 50 mg      6-16                                              



     tablet      00:00:                                              



               00                                                

 

     quetiapine      2020-0      No             2mg                      



     400 mg      6-16                                              



     tablet      00:00:                                              



               00                                                

 

     Zoloft 100      2020-0      No             1mg                      



     mg tablet      616                                              



               00:00:                                              



               00                                                

 

     Dose      2020-0      No                                      



     Unknown      6-16                                              



               00:00:                                              



               00                                                

 

     hydroxyzine      2020-0      No             1mg                      



     HCl 50 mg      6-16                                              



     tablet      00:00:                                              



               00                                                

 

     quetiapine      2020-0      No             2mg                      



     400 mg      6-16                                              



     tablet      00:00:                                              



               00                                                

 

     Zoloft 100      2020-0      No             1mg                      



     mg tablet      616                                              



               00:00:                                              



               00                                                

 

     Dose      2020-0      No                                      



     Unknown      6-16                                              



               00:00:                                              



               00                                                

 

     hydroxyzine      2020-0      No             1mg                      



     HCl 50 mg      6-16                                              



     tablet      00:00:                                              



               00                                                

 

     quetiapine      2020-0      No             2mg                      



     400 mg      6-16                                              



     tablet      00:00:                                              



               00                                                

 

     Zoloft 100      2020-0      No             1mg                      



     mg tablet      6-16                                              



               00:00:                                              



               00                                                

 

     Dose      2020-0      No                                      



     Unknown      6-16                                              



               00:00:                                              



               00                                                

 

     hydroxyzine      2020-0      No             1mg                      



     HCl 50 mg      6-16                                              



     tablet      00:00:                                              



               00                                                

 

     quetiapine      2020-0      No             2mg                      



     400 mg      6-16                                              



     tablet      00:00:                                              



               00                                                

 

     Zoloft 100      2020-0      No             1mg                      



     mg tablet      608                                              



               00:00:                                              



               00                                                

 

     quetiapine      2020-0      No             3mg                      



     300 mg      6-08                                              



     tablet      00:00:                                              



               00                                                

 

     mirtazapine      2020-0      No             1mg                      



     15 mg      6-08                                              



     tablet      00:00:                                              



               00                                                

 

     Zoloft 100      2020-0      No             1mg                      



     mg tablet      6-08                                              



               00:00:                                              



               00                                                

 

     quetiapine      2020-0      No             3mg                      



     300 mg      6-08                                              



     tablet      00:00:                                              



               00                                                

 

     mirtazapine      2020-0      No             1mg                      



     15 mg      6-08                                              



     tablet      00:00:                                              



               00                                                

 

     Zoloft 100      2020-0      No             1mg                      



     mg tablet      6-08                                              



               00:00:                                              



               00                                                

 

     quetiapine      2020-0      No             3mg                      



     300 mg      6-08                                              



     tablet      00:00:                                              



               00                                                

 

     mirtazapine      2020-0      No             1mg                      



     15 mg      6-08                                              



     tablet      00:00:                                              



               00                                                

 

     Zoloft 100      2020-0      No             1mg                      



     mg tablet      6-08                                              



               00:00:                                              



               00                                                

 

     quetiapine      2020-0      No             3mg                      



     300 mg      6-08                                              



     tablet      00:00:                                              



               00                                                

 

     mirtazapine      2020-0      No             1mg                      



     15 mg      6-08                                              



     tablet      00:00:                                              



               00                                                

 

     Cyclobenzap Cyclobenzap 2020-0      Yes  CHRISTINE            Q0.3333D TAKE 1    

       UT



     rine HCl - rine HCl - 4-15           [facility]                TABLET 3     

      [facility]



     5 MG Oral 5 MG Oral 00:00:           APRN                TIMES           an

s



     Tablet Tablet 00                                 DAILY.           

 

     Naproxen Naproxen -0      Yes  CHRISTINE            Q0.5D TAKE 1           UT



     500 MG Oral 500 MG Oral 2-19           [facility]                TABLET     

      [facility]



     Tablet Tablet 00:00:           APRN                TWICE           ans



               00                                 DAILY WITH           



                                                  MEALS.           

 

     Cyclobenzap Cyclobenzap 2020-0      Yes  WILMER      1    Q0.5D TAKE 1     

      UT



     rine HCl - rine HCl - 1-13           [facility]                TABLET       

    [facility]



     10 MG Oral 10 MG Oral 00:00:                               TWICE           

ans



     Tablet Tablet 00                                 DAILY           

 

     sertraline      2020-0      No             1mg                      



     100 mg      1-08                                              



     tablet      00:00:                                              



               00                                                

 

     quetiapine      2020-0      No             1mg                      



     100 mg      1-08                                              



     tablet      00:00:                                              



               00                                                

 

     quetiapine      2020-0      No             1mg                      



     400 mg      1-08                                              



     tablet      00:00:                                              



               00                                                

 

     sertraline      2020-0      No             1mg                      



     100 mg      1-08                                              



     tablet      00:00:                                              



               00                                                

 

     quetiapine      2020-0      No             1mg                      



     100 mg      1-08                                              



     tablet      00:00:                                              



               00                                                

 

     quetiapine      2020-0      No             1mg                      



     400 mg      1-08                                              



     tablet      00:00:                                              



               00                                                

 

     sertraline      2020-0      No             1mg                      



     100 mg      1-08                                              



     tablet      00:00:                                              



               00                                                

 

     quetiapine      2020-0      No             1mg                      



     100 mg      1-08                                              



     tablet      00:00:                                              



               00                                                

 

     quetiapine      2020-0      No             1mg                      



     400 mg      1-08                                              



     tablet      00:00:                                              



               00                                                

 

     sertraline      2020-0      No             1mg                      



     100 mg      1-08                                              



     tablet      00:00:                                              



               00                                                

 

     quetiapine      2020-0      No             1mg                      



     100 mg      1-08                                              



     tablet      00:00:                                              



               00                                                

 

     quetiapine      2020-0      No             1mg                      



     400 mg      1-08                                              



     tablet      00:00:                                              



               00                                                

 

     Dose      2019-1      No                                      



     Unknown      0-08                                              



               00:00:                                              



               00                                                

 

     prednisone      -      No             1mg                      



     10 mg      0-08                                              



     tablet      00:00:                                              



               00                                                

 

     lidocaine 5      -      No             1%                       



     % topical      0-08                                              



     patch      00:00:                                              



               00                                                

 

     Advair      2019      No             1mcg/do                     



     Diskus 250      0-08                     se                       



     mcg-50      00:00:                                              



     mcg/dose      00                                                



     powder for                                                        



     inhalation                                                        

 

     ipratropium      2019      No             3mg                      



     0.5       0-08                     base)/3                     



     mg-albutero      00:00:                     mL                       



     l 3 mg (2.5      00                                                



     mg base)/3                                                        



     mL                                                          



     nebulizatio                                                        



     n soln                                                        

 

     benzonatate      2019      No             1mg                      



     100 mg      0-08                                              



     capsule      00:00:                                              



               00                                                

 

     prednisone      -      No             1mg                      



     10 mg      0-08                                              



     tablet      00:00:                                              



               00                                                

 

     lidocaine 5      2019      No             1%                       



     % topical      0-08                                              



     patch      00:00:                                              



               00                                                

 

     Advair      2019      No             1mcg/do                     



     Diskus 250      0-08                     se                       



     mcg-50      00:00:                                              



     mcg/dose      00                                                



     powder for                                                        



     inhalation                                                        

 

     ipratropium      2019      No             3mg                      



     0.5       0-08                     base)/3                     



     mg-albutero      00:00:                     mL                       



     l 3 mg (2.5      00                                                



     mg base)/3                                                        



     mL                                                          



     nebulizatio                                                        



     n soln                                                        

 

     benzonatate      2019      No             1mg                      



     100 mg      0-08                                              



     capsule      00:00:                                              



               00                                                

 

     prednisone      -      No             1mg                      



     10 mg      0-08                                              



     tablet      00:00:                                              



               00                                                

 

     lidocaine 5      -      No             1%                       



     % topical      0-08                                              



     patch      00:00:                                              



               00                                                

 

     Advair      2019      No             1mcg/do                     



     Diskus 250      0-08                     se                       



     mcg-50      00:00:                                              



     mcg/dose      00                                                



     powder for                                                        



     inhalation                                                        

 

     ipratropium      2019      No             3mg                      



     0.5       0-08                     base)/3                     



     mg-albutero      00:00:                     mL                       



     l 3 mg (2.5      00                                                



     mg base)/3                                                        



     mL                                                          



     nebulizatio                                                        



     n soln                                                        

 

     benzonatate      -      No             1mg                      



     100 mg      0-08                                              



     capsule      00:00:                                              



               00                                                

 

     prednisone      -      No             1mg                      



     10 mg      0-08                                              



     tablet      00:00:                                              



               00                                                

 

     lidocaine 5      -      No             1%                       



     % topical      0-08                                              



     patch      00:00:                                              



               00                                                

 

     Advair      2019      No             1mcg/do                     



     Diskus 250      0-08                     se                       



     mcg-50      00:00:                                              



     mcg/dose      00                                                



     powder for                                                        



     inhalation                                                        

 

     ipratropium      2019      No             3mg                      



     0.5       0-08                     base)/3                     



     mg-albutero      00:00:                     mL                       



     l 3 mg (2.5      00                                                



     mg base)/3                                                        



     mL                                                          



     nebulizatio                                                        



     n soln                                                        

 

     quetiapine            Yes                      Take by           Univ

ers



     fumarate                                     mouth.           ity of



     (SEROQUEL      21:48:                                              [place]



     ORAL)                                                      Medical



                                                                 Branch

 

     amitriptyli            Yes                      Take by           Uni

vers



     ne HCl                                     mouth.           ity of



     (AMITRIPTYL      21:48:                                              [place]



     INE ORAL)                                                      Medical



                                                                 Branch

 

     ketorolac      2019- No             30mg      30 mg,           Unive

rs



     (TORADOL)                                Intramuscu           ity

 of



     injection      21:00: 21:00                          lar, ONCE,           T

exas



     30 mg      00   :00                           1 dose,           Medical



                                                  u            Branch



                                                  19 at           



                                                  1600,           



                                                  ASAP<br>Fa           



                                                  culty           



                                                  member           



                                                  approving           



                                                  Restricted           



                                                  medication           



                                                  : LAKESHIA GRACE           

 

     FENTanyl PF      2019- No             50ug      50 mcg,           Un

david



     (SUBLIMAZE                                Slow IV           ity o

f



     (PF))      20:45: 21:15                          Push,           [place]



     injection      00   :00                           ONCE, 1           Medical



     50 mcg                                         dose, u           Branch



                                                  19 at           



                                                  1600, STAT           

 

     naproxen            Yes       [id number] 375mg      Take 1           U

nivers



     375 mg                                     tablet by           ity of



     tablet      00:00:                               mouth 2           [place]



               00                                 (two)           Medical



                                                  times           Branch



                                                  daily as           



                                                  needed for           



                                                  Pain           



                                                  (scale           



                                                  4-6).           

 

     Zoloft 25            No             1mg                      



     mg tablet                                                    



               00:00:                                              



               00                                                

 

     Zoloft 25            No             1mg                      



     mg tablet                                                    



               00:00:                                              



               00                                                

 

     Zoloft 25            No             1mg                      



     mg tablet                                                    



               00:00:                                              



               00                                                

 

     Zoloft 25            No             1mg                      



     mg tablet                                                    



               00:00:                                              



               00                                                

 

     acetaminoph            Yes       [id number] 1{tbl}      Take 1        

   [facility]



     en-codeine      6-22                               tablet by           ity 

of



     (TYLENOL-CO      00:00:                               mouth           [place]



     DEINE #3)      00                                 every 6           Medical



     300-30 mg                                         (six)           Branch



     tablet                                         hours as           



                                                  needed for           



                                                  Pain           



                                                  (scale           



                                                  4-6) (for           



                                                  cough).           

 

     Propranolol Propranolol -      Yes  ARGENIS           Q0.5D TAKE 1   

        [facility]



     HCl - 10 MG HCl - 10 MG 2-13           [facility]                TABLET       

    [facility]



     Oral Tablet Oral Tablet 00:00:           M.D.                TWICE         

  ans



               00                                 DAILY.           

 

     Melatonin 5 Melatonin 5 2018      Yes  KULWINDER      1         TAKE 1        

   [facility]



     MG Oral MG Oral 1-14           [facility]                TABLET           [facility]

sici



     Tablet Tablet 00:00:           M.D.                BEDTIME           ans



               00                                                

 

     Sertraline Sertraline 2018      Yes  ARGENIS      1    QD   TAKE 1      

     [facility]



     HCl - 100 HCl - 100 1-14           [facility]                TABLET           

[facility]



     MG Oral MG Oral 00:00:           M.D.                DAILY.           ans



     Tablet Tablet 00                                                

 

     QUEtiapine QUEtiapine 2018      Yes  ARGENIS      1         TAKE 1      

     [facility]



     Fumarate Fumarate 1-14           [facility]                TABLET AT          

 [facility]



     400 MG Oral 400 MG Oral 00:00:           M.D.                BEDTIME.      

     ans



     Tablet Tablet 00                                                

 

     QUEtiapine QUEtiapine 2018      Yes  ARGENIS      1    Q0.3333D TAKE 1  

         [facility]



     Fumarate Fumarate 1-14           [facility]                TABLET 3           

[facility]



     100 MG Oral 100 MG Oral 00:00:           M.D.                TIMES         

  ans



     Tablet Tablet 00                                 DAILY           

 

     Acetaminoph Acetaminoph       Yes  WILMER      1    Q6H  TAKE 1      

     [facility]



     en-Codeine en-Codeine 9-21           [facility]                TABLET       

    [facility]



     #3 300-30 #3 300-30 00:00:                               EVERY 6           

ans



     MG Oral MG Oral 00                                 HOURS           



     Tablet Tablet                                                   

 

     Methocarbam Methocarbam       Yes  WILMER           Q0.5D TAKE 1     

      [facility]



     ol 750 MG ol 750 MG 7-30           [facility]                TABLET         

  [facility]



     Oral Tablet Oral Tablet 00:00:                               TWICE         

  ans



               00                                 DAILY.           

 

     melatonin 5            No             1mg                      



     mg chewable      4-12                                              



     tablet      00:00:                                              



               00                                                

 

     trazodone      -0      No             1mg                      



     100 mg      4-12                                              



     tablet      00:00:                                              



               00                                                

 

     melatonin 5            No             1mg                      



     mg chewable      4-12                                              



     tablet      00:00:                                              



               00                                                

 

     quetiapine      2018-0      No             1mg                      



     100 mg      4-12                                              



     tablet      00:00:                                              



               00                                                

 

     quetiapine      -0      No             1mg                      



     400 mg      4-12                                              



     tablet      00:00:                                              



               00                                                

 

     gabapentin      -0      No             1mg                      



     300 mg      4-12                                              



     capsule      00:00:                                              



               00                                                

 

     quetiapine      2018-0      No             1mg                      



     100 mg      4-12                                              



     tablet      00:00:                                              



               00                                                

 

     quetiapine      2018-0      No             1mg                      



     400 mg      4-12                                              



     tablet      00:00:                                              



               00                                                

 

     gabapentin      2018-0      No             1mg                      



     300 mg      4-12                                              



     capsule      00:00:                                              



               00                                                

 

     trazodone      2018-0      No             1mg                      



     100 mg      4-12                                              



     tablet      00:00:                                              



               00                                                

 

     melatonin 5      2018-0      No             1mg                      



     mg chewable      4-12                                              



     tablet      00:00:                                              



               00                                                

 

     quetiapine      2018-0      No             1mg                      



     100 mg      4-12                                              



     tablet      00:00:                                              



               00                                                

 

     quetiapine      2018-0      No             1mg                      



     400 mg      4-12                                              



     tablet      00:00:                                              



               00                                                

 

     gabapentin      2018-0      No             1mg                      



     300 mg      4-12                                              



     capsule      00:00:                                              



               00                                                

 

     trazodone      2018-0      No             1mg                      



     100 mg      4-12                                              



     tablet      00:00:                                              



               00                                                

 

     melatonin 5      2018-0      No             1mg                      



     mg chewable      4-12                                              



     tablet      00:00:                                              



               00                                                

 

     quetiapine      2018-0      No             1mg                      



     100 mg      4-12                                              



     tablet      00:00:                                              



               00                                                

 

     quetiapine      2018-0      No             1mg                      



     400 mg      4-12                                              



     tablet      00:00:                                              



               00                                                

 

     gabapentin      2018-0      No             1mg                      



     300 mg      4-12                                              



     capsule      00:00:                                              



               00                                                

 

     trazodone      2018-0      No             1mg                      



     100 mg      4-12                                              



     tablet      00:00:                                              



               00                                                

 

     Methocarbam Methocarbam 2018-0      Yes  WILMER      1    Q0.5D TAKE 1     

      [facility]



     ol 500 MG ol 500 MG            [facility]                TABLET         

  [facility]



     Oral Tablet Oral Tablet 00:00:                               TWICE         

  ans



               00                                 DAILY           

 

     traMADol traMADol 2018-0      Yes  WILMER      1         TAKE 1           U

T



     HCl - 50 MG HCl - 50 MG            [facility]                TABLET     

      [facility]



     Oral Tablet Oral Tablet 00:00:                               ONCE          

 ans



               00                                                

 

     cyclobenzap      2018-0      No             1mg                      



     rine 10 mg      3-15                                              



     tablet      00:00:                                              



               00                                                

 

     gabapentin      2018-0      No             2mg                      



     300 mg      3-15                                              



     capsule      00:00:                                              



               00                                                

 

     cyclobenzap      2018-0      No             1mg                      



     rine 10 mg      3-15                                              



     tablet      00:00:                                              



               00                                                

 

     gabapentin      2018-0      No             2mg                      



     300 mg      3-15                                              



     capsule      00:00:                                              



               00                                                

 

     cyclobenzap      2018-0      No             1mg                      



     rine 10 mg      3-15                                              



     tablet      00:00:                                              



               00                                                

 

     cyclobenzap      2018-0      No             1mg                      



     rine 10 mg      3-15                                              



     tablet      00:00:                                              



               00                                                

 

     gabapentin      2018-0      No             2mg                      



     300 mg      3-15                                              



     capsule      00:00:                                              



               00                                                

 

     gabapentin      2018-0      No             2mg                      



     300 mg      3-15                                              



     capsule      00:00:                                              



               00                                                

 

     trazodone      2018-0      No             1mg                      



     100 mg      3-12                                              



     tablet      00:00:                                              



               00                                                

 

     Dose      2018-0      No                                      



     Unknown      3-12                                              



               00:00:                                              



               00                                                

 

     Dose      2018-0      No                                      



     Unknown      3-12                                              



               00:00:                                              



               00                                                

 

     trazodone      2018-0      No             1mg                      



     100 mg      3-12                                              



     tablet      00:00:                                              



               00                                                

 

     trazodone      2018-0      No             1mg                      



     100 mg      3-12                                              



     tablet      00:00:                                              



               00                                                

 

     quetiapine      2018-0      No             1mg                      



     100 mg      3-12                                              



     tablet      00:00:                                              



               00                                                

 

     quetiapine      2018-0      No             1mg                      



     400 mg      3-12                                              



     tablet      00:00:                                              



               00                                                

 

     quetiapine      2018-0      No             1mg                      



     100 mg      3-12                                              



     tablet      00:00:                                              



               00                                                

 

     quetiapine      2018-0      No             1mg                      



     400 mg      3-12                                              



     tablet      00:00:                                              



               00                                                

 

     trazodone      2018-0      No             1mg                      



     100 mg      3-12                                              



     tablet      00:00:                                              



               00                                                

 

     quetiapine      2018-0      No             1mg                      



     100 mg      3-12                                              



     tablet      00:00:                                              



               00                                                

 

     quetiapine      2018-0      No             1mg                      



     400 mg      3-12                                              



     tablet      00:00:                                              



               00                                                

 

     Vitamin D Vitamin D       Yes  CHRISTINE                 Take 1           U

T



     (Ergocalcif (Ergocalcif 8-02           KIMMY                capsule    

       Physici



     alisia) 1.25 alisia) 1.25 00:00:           APRN                twice a         

  ans



     MG (03962 MG (50016 00                                 week,           



     UT) Oral UT) Oral                                    Tuesday           



     Capsule Capsule                                    and            



                                                  Thursday           

 

     Acetaminoph Acetaminoph       Yes  NAILA                TAKE 1 TO   

        UT



     en-Codeine en-Codeine 5-03           ANANT M.D.                2 TABLETS    

       Physici



     #3 300-30 #3 300-30 00:00:                               EVERY 6           

ans



     MG Oral MG Oral 00                                 HOURS AS           



     Tablet Tablet                                    NEEDED FOR           



                                                  PAIN.           

 

     traMADol traMADol       Yes  NAILA                TAKE 1 TO         

  UT



     HCl - 50 MG HCl - 50 MG 5-03           ANANT M.D.                2 TABLETS  

         Physici



     Oral Tablet Oral Tablet 00:00:                               EVERY 6       

    ans



               00                                 HOURS AS           



                                                  NEEDED FOR           



                                                  PAIN.           

 

     gabapentin      0      No             1mg                      



     300 mg      4-14                                              



     capsule      00:00:                                              



               00                                                

 

     gabapentin      2017-0      No             1mg                      



     300 mg      4-14                                              



     capsule      00:00:                                              



               00                                                

 

     gabapentin      -0      No             1mg                      



     300 mg      4-14                                              



     capsule      00:00:                                              



               00                                                

 

     gabapentin      -0      No             1mg                      



     300 mg      4-14                                              



     capsule      00:00:                                              



               00                                                

 

     Gabapentin Gabapentin       Yes  NAILA      1    Q0.3333D TAKE 1    

       UT



     300 MG Oral 300 MG Oral 3-30           ANANT M.D.                CAPSULE 3  

         Physici



     Capsule Capsule 00:00:                               TIMES           ans



               00                                 DAILY           

 

     Methocarbam Methocarbam       Yes  NAILA      1    Q0.3333D TAKE 1  

         UT



     ol 500 MG ol 500 MG 3-30           ANANT M.D.                TABLET 3       

    Physici



     Oral Tablet Oral Tablet 00:00:                               TIMES         

  ans



               00                                 DAILY PRN           



                                                  muscle           



                                                  spasm           

 

     hydroCHLORO      2016      Yes            12.5mg      Take 1           Un

david



     thiazide      2-11                               tablet by           ity of



     (HYDRODIURI      00:00:                               mouth           [place]



     L) 12.5 mg      00                                 daily.           Medical



     tablet                                                        Branch

 

     PNV without            Yes       [id number] 1{each}      Take 1        

   Univers



     Ca-Iron      5-25                               Each by           ity of



     PsCmplx-FA      00:00:                               mouth           [place]



     (SELECT-OB,      00                                 daily.           Medica

l



     FOLIC                                                        Branch



     ACID,) 29-1                                                        



     mg Chew                                                        







Immunizations







           Ordered    Filled Immunization Date       Status     Comments   [facility]

e



           Immunization Name Name                                        

 

           Td                    2006 Completed             [facility]



                                 00:00:00                         [facility]







Vital Signs







             Vital Name   Observation Time Observation Value Comments     Source

 

             HEIGHT       2022 16:00:00 162.6 cm                  

 

             WEIGHT       2022 14:00:00 124.5 kg                  

 

             WEIGHT       2022 08:00:00 124 kg                    

 

             HEIGHT       2022 14:33:00 162.6 cm                  

 

             WEIGHT       2022 13:00:00 123.8 kg                  

 

             HEIGHT       2022 16:00:00 162.6 cm                  

 

             WEIGHT       2022 14:00:00 124.5 kg                  

 

             WEIGHT       2022 08:00:00 124 kg                    

 

             HEIGHT       2022 14:33:00 162.6 cm                  

 

             WEIGHT       2022 13:00:00 123.8 kg                  

 

             Systolic blood 2019 22:13:58 128 mm[Hg]                Univer

sity of



             pressure                                            [facility]

 

             Diastolic blood 2019 22:13:58 72 mm[Hg]                 Unive

[facility]

 

             Heart rate   2019 22:13:58 89 /min                   Universi

[facility]

 

             Respiratory rate 2019 22:13:58 18 /min                   Univ

ersity of



                                                                 [place] [facility]



                                                                 Branch

 

             Oxygen saturation in 2019 22:13:58 98 /min                   

[facility]



             Arterial blood by                                        [place] [facility]

zack



             Pulse oximetry                                        Branch

 

             Body temperature 2019 20:22:00 36.78 Tessa                 Univ

ersity of



                                                                 [place] [facility]



                                                                 Branch

 

             Body weight  2019 20:22:00 113.399 kg                Universi

ty of



                                                                 [place] [facility]



                                                                 Branch

 

             BMI          2019 20:22:00 45.73 kg/m2               Universi

ty of



                                                                 [place] [facility]



                                                                 Branch

 

             Systolic blood 2019 22:13:58 128 mm[Hg]                Univer

sity of



             pressure                                            [facility]



                                                                 Branch

 

             Diastolic blood 2019 22:13:58 72 mm[Hg]                 Unive

rsity of



             pressure                                            [place] [facility]



                                                                 Branch

 

             Heart rate   2019 22:13:58 89 /min                   Universi

ty of



                                                                 [place] [facility]



                                                                 Branch

 

             Respiratory rate 2019 22:13:58 18 /min                   [facility]

[facility]



                                                                 [place] [facility]



                                                                 [place]

 

             Oxygen saturation in 2019 22:13:58 98 /min                   

[facility]



             Arterial blood by                                        [place] [facility]

zack



             Pulse oximetry                                        Branch

 

             Body temperature 2019 20:22:00 36.78 Tessa                 Univ

ersity of



                                                                 [place] [facility]



                                                                 Branch

 

             Body weight  2019 20:22:00 113.399 kg                Universi

ty of



                                                                 [place] [facility]



                                                                 Branch

 

             BMI          2019 20:22:00 45.73 kg/m2               Universi

ty of



                                                                 [facility]



                                                                 Branch

 

             BP Systolic  2022 15:47:00 120 mm[Hg]                

 

             BP Diastolic 2022 15:47:00 80 mm[Hg]                 

 

             Weight Measured 2022 15:47:00 238.20 pounds              

 

             Height Measured 2022 15:47:00 63.00 inches              

 

             Body Temperature 2022 15:47:00 98.10 degrees              

 

             Heart Rate   2022 15:47:00 119.00 /min               

 

             Respiratory Rate 2022 15:47:00 16.00 /min                

 

             BP Systolic  2022 16:14:00 120 mm[Hg]                

 

             BP Diastolic 2022 16:14:00 82 mm[Hg]                 

 

             Weight Measured 2022 16:14:00 252.00 pounds              

 

             Height Measured 2022 16:14:00 63.00 inches              

 

             Body Temperature 2022 16:14:00 98.10 degrees              

 

             Heart Rate   2022 16:14:00 114.00 /min               

 

             Respiratory Rate 2022 16:14:00 17.00 /min                

 

             BP Systolic  2022 11:29:00 135 mm[Hg]                

 

             BP Diastolic 2022 11:29:00 89 mm[Hg]                 

 

             Weight Measured 2022 11:29:00 257.00 pounds              

 

             Height Measured 2022 11:29:00 63.00 inches              

 

             Body Temperature 2022 11:29:00 98.10 degrees              

 

             Heart Rate   2022 11:29:00 126.00 /min               

 

             Respiratory Rate 2022 11:29:00 16.00 /min                

 

             BP Systolic  2021 11:35:00 134 mm[Hg]                

 

             BP Diastolic 2021 11:35:00 87 mm[Hg]                 

 

             Weight Measured 2021 11:35:00 282.60 pounds              

 

             Height Measured 2021 11:35:00 63.00 inches              

 

             Body Temperature 2021 11:35:00 98.30 degrees              

 

             Heart Rate   2021 11:35:00 110.00 /min               

 

             Respiratory Rate 2021 11:35:00 16.00 /min                

 

             BP Systolic  2021 13:53:00 121 mm[Hg]                

 

             BP Diastolic 2021 13:53:00 82 mm[Hg]                 

 

             Weight Measured 2021 13:53:00 301.00 pounds              

 

             Height Measured 2021 13:53:00 63.00 inches              

 

             Body Temperature 2021 13:53:00 98.60 degrees              

 

             Heart Rate   2021 13:53:00 104.00 /min               

 

             Respiratory Rate 2021 13:53:00                           

 

             BP Systolic  2020 13:53:00 133 mm[Hg]                

 

             BP Diastolic 2020 13:53:00 90 mm[Hg]                 

 

             Weight Measured 2020 13:53:00 288.80 pounds              

 

             Height Measured 2020 13:53:00 63.00 inches              

 

             Body Temperature 2020 13:53:00 98.10 degrees              

 

             Heart Rate   2020 13:53:00 105.00 /min               

 

             Respiratory Rate 2020 13:53:00 17.00 /min                

 

             BP Systolic  2019-10-08 08:22:00 118 mm[Hg]                

 

             BP Diastolic 2019-10-08 08:22:00 82 mm[Hg]                 

 

             Weight Measured 2019-10-08 08:22:00 271.60 pounds              

 

             Height Measured 2019-10-08 08:22:00 63.00 inches              

 

             Body Temperature 2019-10-08 08:22:00 98.00 degrees              

 

             Heart Rate   2019-10-08 08:22:00 96.00 /min                

 

             Respiratory Rate 2019-10-08 08:22:00 16.00 /min                

 

             BP Systolic  2019 14:03:00 138 mm[Hg]                

 

             BP Diastolic 2019 14:03:00 91 mm[Hg]                 

 

             Weight Measured 2019 14:03:00 260.00 pounds              

 

             Height Measured 2019 14:03:00 63.00 inches              

 

             Body Temperature 2019 14:03:00 98.60 degrees              

 

             Heart Rate   2019 14:03:00 97.00 /min                

 

             Respiratory Rate 2019 14:03:00 18.00 /min                

 

             BP Systolic  2018 08:52:00 114 mm[Hg]                

 

             BP Diastolic 2018 08:52:00 74 mm[Hg]                 

 

             Weight Measured 2018 08:52:00                           

 

             Height Measured 2018 08:52:00                           

 

             Body Temperature 2018 08:52:00 98.00 degrees              

 

             Heart Rate   2018 08:52:00 101.00 /min               

 

             Respiratory Rate 2018 08:52:00                           

 

             BP Systolic  2018-04-10 08:56:00 104 mm[Hg]                

 

             BP Diastolic 2018-04-10 08:56:00 69 mm[Hg]                 

 

             Weight Measured 2018-04-10 08:56:00                           

 

             Height Measured 2018-04-10 08:56:00                           

 

             Body Temperature 2018-04-10 08:56:00 98.10 degrees              

 

             Heart Rate   2018-04-10 08:56:00 98.00 /min                

 

             Respiratory Rate 2018-04-10 08:56:00 18.00 /min                







Procedures







                Procedure       Date / Time Performed Performing Clinician Sour

e

 

                [U] XRAY PELVIS MIN 3 2020 00:00:00                 [facility]

[facility]



                [id number]                                       

 

                CT Femur without 2019 00:00:00                 [facility]

[facility]



                contrast [id number]                                  

 

                CT Pelvis wo contrast 2019 00:00:00                 [facility]

[facility]



                [id number]                                           

 

                XR KNEE 3 VW LEFT 2019 21:12:48 Lakeshia Grace [facility]

 

                POCT PREGNANCY TEST 2019 21:01:00 Lakeshia Grace [facility]

 

                [U] XRAY FEMUR 2 VWS 2018 00:00:00                 [facility]

[facility]



                RIGHT [id number]                                     

 

                [U] XRAY FEMUR 2 VWS 2018 00:00:00                 [facility]

[facility]



                RIGHT [id number]                                     

 

                History of Leg                                  [facility]



                surgery                                         







Plan of Care







             Planned Activity Planned Date Details      Comments     Source

 

             Future Scheduled 2025   Lipid panel (procedure)              

[facility]



             Test         00:00:00     [code = 53767606]              Medical Ce

nter

 

             Future Scheduled 2023   Influenza Vaccine              [facility]



             Test         00:00:00     (Season Ended) [code =              Medic

al Center



                                       Influenza Vaccine              



                                       (Season Ended)]              

 

             Future Scheduled 2023   DEPRESSION SCREENING              [facility]



             Test         00:00:00     (12+) [code =              Medical Center



                                       DEPRESSION SCREENING              



                                       (12+)]                    

 

             Future Scheduled 1995   Screening for malignant              

[facility]



             Test         00:00:00     neoplasm of cervix              Medical C

enter



                                       (procedure) [code =              



                                       365028793]                

 

             Future Scheduled 1993   DTAP/TDAP/TD VACCINES              [facility]



             Test         00:00:00     (1 - Tdap) [code =              Medical C

enter



                                       DTAP/TDAP/TD VACCINES              



                                       (1 - Tdap)]               

 

             Future Scheduled 1986   Tobacco Cessation              [facility]



             Test         00:00:00     Counseling and              Medical Cente

r



                                       Screening (12+) [code =              



                                       Tobacco Cessation              



                                       Counseling and              



                                       Screening (12+)]              

 

             Future Scheduled 1980   Pneumococcal Vaccine:              [facility]



             Test         00:00:00     0-64 Years (1 - PCV)              Medical

 Center



                                       [code = Pneumococcal              



                                       Vaccine: 0-64 Years (1              



                                       - PCV)]                   

 

             Future Scheduled 1975   COVID-19 VACCINE (#1)              [facility]



             Test         00:00:00     [code = COVID-19              [facility]

ter



                                       VACCINE (#1)]              

 

             Future Scheduled 1974   CT Colonography (combo)              

[facility]



             Test         00:00:00     [code = CT Colonography              [facility]



                                       (combo)]                  

 

             Future Scheduled 1974   Screening for malignant              

[facility]



             Test         00:00:00     neoplasm of colon              Medical Ce

nter



                                       (procedure) [code =              



                                       433270773]                

 

             Future Scheduled 1974   Screening for malignant              

[facility]



             Test         00:00:00     neoplasm of colon              Medical Ce

nter



                                       (procedure) [code =              



                                       156064233]                

 

             Future Scheduled 1974   Screening for malignant              

[facility]



             Test         00:00:00     neoplasm of colon              Medical Ce

nter



                                       (procedure) [code =              



                                       168084674]                

 

             Future Scheduled 1974   Screening for malignant              

[facility]



             Test         00:00:00     neoplasm of colon              Medical Ce

nter



                                       (procedure) [code =              



                                       376628194]                

 

             Future Scheduled 1974   Sigmoidoscopy [code =              [facility]

[facility]



             Test         00:00:00     Sigmoidoscopy]              Medical Pamela rg

 

             Goal                      Plan of Care Note [code              



                                       = 38545-5]                

 

             Goal                      Plan of Care Note [code              



                                       = 37865-1]                

 

             Goal                      Plan of Care Note [code              



                                       = 64709-4]                

 

             Goal                      Plan of Care Note [code              



                                       = 79866-1]                

 

             Goal                      Plan of Care Note [code              



                                       = 00477-0]                

 

             Goal                      Plan of Care Note [code              



                                       = 70279-4]                

 

             Goal                      Plan of Care Note [code              



                                       = 33453-5]                

 

             Goal                      Plan of Care Note [code              



                                       = 33878-2]                

 

             Goal                      Plan of Care Note [code              



                                       = 99344-8]                

 

             Goal                      Plan of Care Note [code              



                                       = 58459-4]                

 

             Goal                      Plan of Care Note [code              



                                       = 24116-5]                

 

             Goal                      Plan of Care Note [code              



                                       = 65309-6]                

 

             Goal                      Plan of Care Note [code              



                                       = 76969-7]                

 

             Goal                      Plan of Care Note [code              



                                       = 19622-0]                

 

             Goal                      Plan of Care Note [code              



                                       = 10725-9]                

 

             Goal                      Plan of Care Note [code              



                                       = 83273-8]                

 

             Goal                      Plan of Care Note [code              



                                       = 33716-7]                

 

             Goal                      Plan of Care Note [code              



                                       = 75303-2]                

 

             Goal                      Plan of Care Note [code              



                                       = 41926-8]                

 

             Goal                      Plan of Care Note [code              



                                       = 48593-4]                

 

             Goal                      Plan of Care Note [code              



                                       = 86089-6]                

 

             Goal                      Plan of Care Note [code              



                                       = 88729-5]                

 

             Goal                      Plan of Care Note [code              



                                       = 87452-0]                

 

             Goal                      Plan of Care Note [code              



                                       = 21430-7]                

 

             Goal                      Plan of Care Note [code              



                                       = 72030-1]                

 

             Goal                      Plan of Care Note [code              



                                       = 80812-5]                

 

             Goal                      Plan of Care Note [code              



                                       = 71274-3]                

 

             Goal                      Plan of Care Note [code              



                                       = 02207-2]                

 

             Goal                      Plan of Care Note [code              



                                       = 21421-4]                

 

             Goal                      Plan of Care Note [code              



                                       = 53481-8]                

 

             Goal                      Plan of Care Note [code              



                                       = 81866-9]                

 

             Goal                      Plan of Care Note [code              



                                       = 55341-9]                

 

             Goal                      Plan of Care Note [code              



                                       = 76380-0]                

 

             Goal                      Plan of Care Note [code              



                                       = 85228-1]                

 

             Goal                      Plan of Care Note [code              



                                       = 11577-2]                

 

             Goal                      Plan of Care Note [code              



                                       = 43215-5]                

 

             Goal                      Plan of Care Note [code              



                                       = 98479-5]                

 

             Goal                      Plan of Care Note [code              



                                       = 83873-2]                

 

             Goal                      Plan of Care Note [code              



                                       = 41060-0]                

 

             Goal                      Plan of Care Note [code              



                                       = 74390-6]                

 

             Goal                      Plan of Care Note [code              



                                       = 55524-9]                

 

             Goal                      Plan of Care Note [code              



                                       = 24779-3]                

 

             Goal                      Plan of Care Note [code              



                                       = 74261-0]                

 

             Goal                      Plan of Care Note [code              



                                       = 49932-1]                

 

             Goal                      Plan of Care Note [code              



                                       = 97823-3]                

 

             Goal                      Plan of Care Note [code              



                                       = 95374-1]                

 

             Goal                      Plan of Care Note [code              



                                       = 30918-7]                

 

             Goal                      Plan of Care Note [code              



                                       = 51088-9]                

 

             Goal                      Plan of Care Note [code              



                                       = 07696-0]                

 

             Goal                      Plan of Care Note [code              



                                       = 68921-0]                

 

             Goal                      Plan of Care Note [code              



                                       = 71109-8]                

 

             Goal                      Plan of Care Note [code              



                                       = 26000-7]                

 

             Goal                      Plan of Care Note [code              



                                       = 65011-0]                

 

             Goal                      Plan of Care Note [code              



                                       = 59556-7]                

 

             Goal                      Plan of Care Note [code              



                                       = 63300-1]                

 

             Goal                      Plan of Care Note [code              



                                       = 13010-0]                

 

             Goal                      Plan of Care Note [code              



                                       = 98201-2]                

 

             Goal                      Plan of Care Note [code              



                                       = 67946-6]                

 

             Goal                      Plan of Care Note [code              



                                       = 19931-1]                

 

             Goal                      Plan of Care Note [code              



                                       = 99733-4]                

 

             Goal                      Plan of Care Note [code              



                                       = 65160-6]                

 

             Goal                      Plan of Care Note [code              



                                       = 96613-4]                

 

             Goal                      Plan of Care Note [code              



                                       = 00567-1]                

 

             Goal                      Plan of Care Note [code              



                                       = 78407-8]                

 

             Goal                      Plan of Care Note [code              



                                       = 80246-6]                

 

             Goal                      Plan of Care Note [code              



                                       = 64859-7]                

 

             Goal                      Plan of Care Note [code              



                                       = 21293-9]                

 

             Goal                      Plan of Care Note [code              



                                       = 27243-8]                

 

             Goal                      Plan of Care Note [code              



                                       = 15645-8]                

 

             Goal                      Plan of Care Note [code              



                                       = 76754-7]                

 

             Goal                      Plan of Care Note [code              



                                       = 49548-7]                

 

             Goal                      Plan of Care Note [code              



                                       = 82872-1]                

 

             Goal                      Plan of Care Note [code              



                                       = 30329-8]                

 

             Goal                      Plan of Care Note [code              



                                       = 34206-0]                

 

             Goal                      Plan of Care Note [code              



                                       = 62638-0]                

 

             Goal                      Plan of Care Note [code              



                                       = 50262-9]                

 

             Goal                      Plan of Care Note [code              



                                       = 69031-0]                

 

             Goal                      Plan of Care Note [code              



                                       = 48350-1]                

 

             Goal                      Plan of Care Note [code              



                                       = 10126-5]                

 

             Goal                      Plan of Care Note [code              



                                       = 25813-6]                

 

             Goal                      Plan of Care Note [code              



                                       = 51413-5]                

 

             Goal                      Plan of Care Note [code              



                                       = 83318-9]                

 

             Goal                      Plan of Care Note [code              



                                       = 60405-2]                

 

             Goal                      Plan of Care Note [code              



                                       = 79392-4]                

 

             Goal                      Plan of Care Note [code              



                                       = 69849-5]                

 

             Goal                      Plan of Care Note [code              



                                       = 29582-5]                

 

             Goal                      Plan of Care Note [code              



                                       = 55760-5]                

 

             Goal                      Plan of Care Note [code              



                                       = 63456-6]                

 

             Goal                      Plan of Care Note [code              



                                       = 22514-3]                

 

             Goal                      Plan of Care Note [code              



                                       = 95567-9]                

 

             Goal                      Plan of Care Note [code              



                                       = 00924-8]                

 

             Goal                      Plan of Care Note [code              



                                       = 62121-9]                

 

             Goal                      Plan of Care Note [code              



                                       = 06551-6]                

 

             Goal                      Plan of Care Note [code              



                                       = 07869-0]                







Encounters







        Start   End     Encounter Admission Attending Care    Care    Encounter 

Source



        Date/Time Date/Time Type    Type    Clinicians Facility Department ID   

   

 

        2022-10-18 2022-10-18 Outpatient                 [facility]     [id number]

022 Tomas



        13:29:57 13:29:57                                         1018    F



                                                                        Ricardo

 

        2022-10-06 2022-10-06 Outpatient                 [id number] e3

[id number] 



        00:00:00 00:00:00 Visit                   35c3-4yk5         8i1-6tj4-1 



                                                -998c-b72         98c-b72be3 



                                                yr366p77l         52f73a  

 

        2022-10-04 2022-10-04 Outpatient                 [facility]     [id number]

022 Tomas



        13:08:18 13:08:18                                         1004    F



                                                                        Ricardo

 

        2022 Outpatient                 [id number]- [id number] a1

[id number]-e 



        00:00:00 00:00:00 Visit                   v169-3hcm         467-4dae-9 



                                                -65r0-87i         9p2-88v877 



                                                [id number]         90365y  

 

        2022 Outpatient                 [id number]- [id number] b8

be25d4-6 



        00:00:00 00:00:00 Visit                   3a56-4d9g         c61-0o6f-9 



                                                -9cfa-605         cfa-6052c1 



                                                3z500h3np         86b4ab  

 

        2022 Outpatient                 [id number]- [id number] 2e

36jo17-7 



        00:00:00 00:00:00 Visit                   4d49-588p         d05-174e-5 



                                                -8ig2-z75         aa0-v9470h 



                                                96b6gi168         6rh489  

 

        2022 Outpatient MUSA WHITMAN [facility]    [id number]

245 [facility]



        00:00:00 00:00:00                 BIJI                            

 

        2022 Inpatient ER      CIVUNIGUNTA [facility]    Neuro ICU 20

[id number] [facility]



        11:05:00 07:55:00                 , ARTEMIO                         

 

        2020 [facility]     Orthopedics 

[id number] [place]



        08:00:00 08:00:00 t;              RADAMES FALCON         Trauma          Phys

[facility]



                        Matthew ONEAL -         [facility]



                        RADAMES FALCON                         [facility]          

 

        2020-04-15 2020-04-15 [facility] 

[id number] [place]



        11:00:00 11:00:00 t;              CHRISTINE, APRN         Trauma          Phys

ici



                        KIMMY,                         [facility]         ans



                        CHRISTINE, APRN                         [facility]          

 

        2020-04-15 2020[facility]     6375

3921 [place]



        11:00:00 11:00:00 t;              CHRISTINE, APRN                         Phys

ici



                        KIMMY,                                         ans



                        CHRISTINE, [facility]                                         

 

        2020 [facility]         KIMMY, [facility]     Orthopedics 

[id number] [place]



        11:00:00 11:00:00 t;              CHRISTINE, APRN         Trauma          Phys

ici



                        KIMMY,                         [facility]



                        CHRISTINE, [facility]          

 

        2020 [facility]         [facility]     6228

7479 [place]



        11:30:00 11:30:00 t;              CHRISTINE, APRN                         Phys

ici



                        KIMMY,                                         ans



                        CHRISTINE, [facility]                                         

 

        2020 [facility]         [facility]     [facility]opedics 619

[address] [place]



        11:30:00 11:30:00 t; WILMER MUJICA PA         Trauma          

Physici



                        NELL GUILLEN                         [facility]          

 

        2019 [facility]     [facility]ics 593

[address]39 [place]



        14:15:00 14:15:00 t; WILMER MUJICA PA         Trauma          

Physici



                        NELL GUILLEN                         [facility]          

 

        2019 Emergency         Vincent, TRAUMA  [id number] 715

[id number] 



        15:23:02 17:14:00                 [facility]  350.1.13.10         



                                                        [id number]         



                                                        [id number]         



                                                        014             

 

        2019 Emergency         Vincent, TRAUMA  [id number] 715

[address] [facility]



        15:23:02 17:14:00                 [facility]  350.1.13.10         it

y of



                                                        [id number]         [facility]

s



                                                        [phone]         Medi

zack



                                                        014             Branch

 

        [facility]     [address]

716 [place]



        09:45:00 09:45:00 t;              ANDIE MIRAMONTES ans OMAR, M.D.                                         

 

        [facility] [place]         DENNIS, [facility]     UT[place]     [id number]

102 [place]



        11:00:00 11:00:00 t;              ANDIE MIRAMONTES ans OMAR, M.D.                                         

 

        [facility] [place]         SIL, [facility]     UTP     [id number]

9 [place]



        11:00:00 11:00:00 t; WILMER MUJICA PA Physici DENISE, PA                                         [facility]

 

        2019 [facility] [place] [place]         DENNIS, [facility]     UT[place]     [id number]

206 [place]



        10:15:00 10:15:00 t;              ANDIE MIRAMONTES ans OMAR, M.D.                                         

 

        2019 [facility]         DENNIS, [facility]     UTP     [id number]

762 [place]



        11:00:00 11:00:00 t;              ANDIE MIRAMONTES ans OMAR, M.D.                                         

 

        2018-12-10 2018-12-10 [facility] [place] [place]         SIL, [facility]     UTP     [id number]

6 [place]



        11:00:00 11:00:00 t; WILMER MUJICA PA Physici DENISE, PA                                         [facility]

 

        2018 [facility]         JEANNIE, [facility]     UTP     [id number]

036 [place]



        10:00:00 10:00:00 t;              ANDIE MIRAMONTES ans OMAR, M.D.                                         

 

        2018-10-29 2018-10-29 [facility]         SIL, [facility]     UTP     [id number]

1 [place]



        13:30:00 13:30:00 t; WILMER MUJICA PA Physici DENISE, PA                                         [facility]

 

        2018-10-08 2018-10-08 [facility]         SIL, [facility]     UTP     [id number]

2 [place]



        11:45:00 11:45:00 t; WILMER MUJICA PA Physici DENISE, PA                                         [facility]

 

        2018 [facility] [place] [place]         SIL, [facility]     UTP     [id number]

1 [place]



        11:30:00 11:30:00 t; WILMER MUJICA PA Physici DENISE, PA                                         [facility]

 

        2018 [facility] [place]         Ruthie,  [facility]     UTP     [id number]

3 [place]



        08:00:00 08:00:00 t; AchorTony Phy

everton Jimenez M.D.                            ans



                        MPRUDENCE                                            

 

        2018 Appointmen         MUJICA, [facility]     [id number]

0 [place]



        12:00:00 12:00:00 t; WILMER MUJICA PA Physici DENISE, PA                                         ans

 

        2018 Appointmen         MUJICA, [facility]     [id number]

6 [place]



        12:00:00 12:00:00 t; WILMER MUJICA PA Physici DENISE, PA                                         ans

 

        2018 Appointmen         MUJICA, [facility]     [id number]

2 [place]



        12:30:00 12:30:00 t; WILMER MUJICA PA                         

Physici



                        WILMER, PA                                         ans

 

        2018 Appointmen         SIL, [facility]     Orthopedics 411

[address]66 [place]



        09:45:00 09:45:00 t; WILMER MUJICA PA Physici DENISE, [place]                                         ans

 

        2018 Appointmen         SIL, [facility]     Orthopedics 409

[id number] [place]



        09:45:00 09:45:00 t; WILMER MUJICA PA                         

Physictamiko GUILLEN, [place]                                         ans

 

        2017-10-18 2017-10-18 Appointmen         KIMMY, [facility]     [id number]

6551 [place]



        13:30:00 13:30:00 t;              DARCIE FALCON i,                                         [facility]



                        DARCIE FALCON                                         

 

        2017-10-18 2017-10-18 Appointmen         JR,  [facility]     [id number]

9 [place]



        12:45:00 12:45:00 t; KALYN NORRIS, NP                         Phy

sici



                        KALYN, NP                                         ans

 

        2017 Appointmen         ALEAH,  [facility]     [id number]

8 [place]



        11:00:00 11:00:00 t; ABIMAEL BULLARD Phy sici JOCELYN, M.D.                            ans



                        MPRUDENCE                                            

 

        2017 Appointmen         JR,  [facility]     [id number]

9 [place]



        10:15:00 10:15:00 t; KALYN NORRIS, NP                         Phy

sici



                        KALYN, NP                                         ans

 

        2017 Appointmen         ANANT,   [facility]     [id number]

3 [place]



        11:15:00 11:15:00 t; NAILA [place],                         Elsa

ci



                        NAILA,         M.SHARAN henderson M.D.                                            

 

        [facility]     [id number]

1 [place]



        10:00:00 10:00:00 t; ABIMAEL BULLARD Phy sici JOCELYN, M.D. ans M.D.                                            

 

        2017 Rupa [place]   [facility]     [id number]

2 [place]



        08:30:00 08:30:00 t; NAILA [place]                         Physi

ANDIE Busch M.D.                                            

 

        2017 [place]   [facility]     [id number]

6 [place]



        12:30:00 12:30:00 t; NAILA [place]                         Physi

ANDIE Busch M.D.                                            







Results







           Test Description Test Time  Test Comments Results    Result Comments 

Source









                    VITAMIN D, 25 OH    2022-10-19 06:55:01 









                      Test Item  Value      Reference Range Interpretation Comme

nts









             VITAMIN D, 25 OH (test code 15 NG/ML     SEE BELOW    L            

 NOTE: 25-HYDROXYVITAMIN D 

ASSAY



             = 4958)                                             INCLUDES 25-HYD

ROXYVITAMIN D2 AND D3.



                                                                 METHODOLOGY IS 

CHEMILUMINESCENT



                                                                 IMMUNOASSAY. **

*** INTERPRETIVE



                                                                 RANGES *****PED

IATRIC (<17 YEARS) . .



                                                                 . . . . . . . .

 . NG/ML 20-100ADULT:



                                                                 INSUFFICIENT . 

. . . . . . . . . . .



                                                                 . . NG/ML <20 S

UBOPTIMAL . . . . . .



                                                                 . . . . . . . .

 . NG/ML 20-29 OPTIMAL



                                                                 . . . . . . . .

 . . . . . . . . .



                                                                 NG/ML 



HIV 1/2 4TH GEN, RFLX CONF2022-10-19 05:36:07





             Test Item    Value        Reference Range Interpretation Comments

 

             HIV 1/2 4TH GEN, RFLX CONF (test NON-REACTIVE NON-REACTIVE         

     



             code = 3514)                                        



HEPATITIS A TOTAL AB2022-10-19 05:36:07





             Test Item    Value        Reference Range Interpretation Comments

 

             HEPATITIS A TOTAL AB (test code NON-REACTIVE NON-REACTIVE          

    



             = 1672)                                             



HEP B CORE TOTAL AB2022-10-19 05:36:07





             Test Item    Value        Reference Range Interpretation Comments

 

             HEP B CORE TOTAL AB (test code = NON-REACTIVE NON-REACTIVE         

     



             2415)                                               



HEP B CORE AB RFLX IgM2022-10-19 05:36:07





             Test Item    Value        Reference Range Interpretation Comments

 

             HEP B CORE TOTAL NON-REACTIVE NON-REACTIVE               UNLESS OTH

ERWISE



             AB (test code =                                        INDICATED, A

LL TESTING



             4640)                                               PERFORMED [facility]



                                                                 PATHOLOGY [facility],



                                                                 [facility]. [address] [place] [address]

4



                                                                 LABORATORY DIRE

CTOR:



                                                                 BETSY OVIEDO M.D.



                                                                 CLIA NUMBER [id number]

[id number]



                                                                 CAP ACCREDITATI

ON NO.



                                                                 [id number]



HEMOGLOBIN [id number] 05:19:07





             Test Item    Value        Reference Range Interpretation Comments

 

             HEMOGLOBIN A1c (test code = 33497) 4.6 %        4.2-5.6            

       



COMPREHENSIVE METABOLIC PANEL2022-10-19 04:43:40





             Test Item    Value        Reference Range Interpretation Comments

 

             GLUCOSE (test code = 98 MG/DL     70-99                     



             )                                               

 

             BUN (test code = 10 MG/DL     6-20                      



             )                                               

 

             CREATININE (test 0.68 MG/DL   0.60-1.30                 



             code = 2214)                                        

 

             eGFR ( CKD-EPI) 107          >60                       



             (test code = 09094) ML/MIN/1.73                            

 

             CALC BUN/CREAT (test 15 RATIO     6-28                      



             code = 2235)                                        

 

             SODIUM (test code = 139 MEQ/L    133-146                   



             )                                               

 

             POTASSIUM (test code 4.3 MEQ/L    3.5-5.4                   



             = 2228)                                             

 

             CHLORIDE (test code 101 MEQ/L                        



             = 2215)                                             

 

             CARBON DIOXIDE (test 23 MEQ/L     19-31                     



             code = 2206)                                        

 

             CALCIUM (test code = 9.2 MG/DL    8.5-10.5                  



             )                                               

 

             PROTEIN, TOTAL (test 8.4 G/DL     6.1-8.3      H            



             code = 2229)                                        

 

             ALBUMIN (test code = 3.9 G/DL     3.5-5.2                   



             )                                               

 

             CALC GLOBULIN (test 4.5 G/DL     1.9-3.7      H            



             code = 2240)                                        

 

             CALC A/G RATIO (test 0.9 RATIO    1.0-2.6      L            



             code = 2234)                                        

 

             BILIRUBIN, TOTAL 0.4 MG/DL    See_Comment                [Automated

 message]



             (test code = 2207)                                        The syste

m which



                                                                 generated this



                                                                 result transmit

carmelita



                                                                 reference range

:



                                                                 <=1.2. The refe

rence



                                                                 range was not u

sed



                                                                 to interpret th

is



                                                                 result as



                                                                 normal/abnormal

.

 

             ALKALINE PHOSPHATASE 126 U/L             H            



             (test code = 2204)                                        

 

             AST (test code = 45 U/L       9-40         H            



             8)                                               

 

             ALT (test code = 33 U/L       5-40                      



             )                                               



CBC W/AUTO DIFF WITH PLATELETS2022-10- 04:26:10





             Test Item    Value        Reference Range Interpretation Comments

 

             WBC (test code = 7.9 K/UL     3.5-11.0                  



             1001)                                               

 

             RBC (test code = 4.85 M/UL    3.80-5.40                 



             1002)                                               

 

             HEMOGLOBIN (test code 11.6 G/DL    11.5-15.5                 



             = 1003)                                             

 

             HEMATOCRIT (test code 38.0 %       34.0-45.0                 



             = 1004)                                             

 

             MCV (test code = 78.4 fL      80.0-99.0    L            



             1005)                                               

 

             MCH (test code = 23.9 PG      25.0-33.0    L            



             1006)                                               

 

             MCHC (test code = 30.5 G/DL    31.0-36.0    L            



             1007)                                               

 

             RDW (test code = 14.7 %       11.5-15.0                 



             1038)                                               

 

             NEUTROPHILS (test 47.0 %                                 



             code = 1008)                                        

 

             LYMPHOCYTES (test 44.2 %                                 



             code = 1010)                                        

 

             MONOCYTES (test code 6.3 %                                  



             = 1011)                                             

 

             EOSINOPHILS (test 1.3 %                                  



             code = 1012)                                        

 

             BASOPHILS (test code 0.6 %                                  



             = 1013)                                             

 

             IMMATURE GRANULOCYTES 0.6 %                                  



             (test code = 1036)                                        

 

             NUCLEATED RBCS (test 0.0 /100 WBC'S See_Comment                [Aut

omated



             code = 1065)                                        message] The sy

stem



                                                                 which generated



                                                                 this result



                                                                 transmitted



                                                                 reference range

:



                                                                 0.0. The refere

nce



                                                                 range was not u

sed



                                                                 to interpret th

is



                                                                 result as



                                                                 normal/abnormal

.

 

             PLATELET COUNT (test 269 K/UL     130-400                   



             code = 1015)                                        

 

             ABSOLUTE NEUTROPHILS 3.73 K/UL    1.50-7.50                 



             (test code = 1066)                                        

 

             ABSOLUTE LYMPHOCYTES 3.51 K/UL    1.00-4.00                 



             (test code = 1067)                                        

 

             ABSOLUTE MONOCYTES 0.50 K/UL    0.20-1.00                 



             (test code = 1068)                                        

 

             ABSOLUTE EOSINOPHILS 0.10 K/UL    0.00-0.50                 



             (test code = 1040)                                        

 

             ABSOLUTE BASOPHILS 0.05 K/UL    0.00-0.20                 



             (test code = 1069)                                        

 

             ABS IMMATURE 0.05 K/UL    0.00-0.10                 



             GRANULOCYTES (test                                        



             code = 1020)                                        

 

             ABS NUCLEATED RBCS 0.03 K/UL    0.00-0.11                 



             (test code = 56104)                                        



HCV RNA, PCR QUAL/QUANT2022-10-05 19:16:30





             Test Item    Value        Reference Range Interpretation Comments

 

             HCV RNA, PCR QUANT 86652 IU/ML               H            



             (test code = 4571)                                        

 

             HCV VIRAL LOG 4.114 LOG IU/ML              H            



             (test code =                                        



             51665)                                              

 

             HCV QUALITATIVE POSITIVE                  A             Range of



             INTERP (test code                                        quantitati

on is



             = 87285)                                            ,000,000 

IU/mL,



                                                                 (1.176-8.000



                                                                 logIU/mL). Samp

les



                                                                 with HCV RNA de

tected



                                                                 below the limit



                                                                 ofquantitation 

are



                                                                 reported as <15



                                                                 IU/mL. Assay



                                                                 methodology



                                                                 ispolymerase ch

ain



                                                                 reaction (PCR) 

using



                                                                 the [employer]



                                                                 6800/8800system

. The



                                                                 expected range 

is NOT



                                                                 DETECTED. UNLES

S



                                                                 OTHERWISE INDIC

ATED,



                                                                 ALL TESTING PER

[facility]



                                                                 ATCLINICAL PATH

[facility], [facility].



                                                                 [address] [place] [address] [id number] [place] [address]



                                                                 DIRECTOR: BETSY HUFF M.D.

 CLIA



                                                                 NUMBER [id number]

03 CAP



                                                                 ACCREDITATION N

O.



                                                                 [id number]



HCV RNA, PCR QUAL/QUANT2022-10-05 00:00:00





             Test Item    Value        Reference Range Interpretation Comments

 

             HCV RNA, PCR QUANT (test code 67406 IU/ML                          

  



             = 4571)                                             

 

             HCV VIRAL LOG (test code = 4.114 LOGIU/ML                          

 



             89403)                                              

 

             HCV QUALITATIVE INTERP (test POSITIVE                              

 



             code = 65733)                                        



HCV RNA, PCR QUAL/QUANT2022-10-05 00:00:00





             Test Item    Value        Reference Range Interpretation Comments

 

             HCV RNA, PCR QUANT (test code 83281 IU/ML                          

  



             = 4571)                                             

 

             HCV VIRAL LOG (test code = 4.114 LOGIU/ML                          

 



             11166)                                              

 

             HCV QUALITATIVE INTERP (test POSITIVE                              

 



             code = 10800)                                        



HCV RNA, PCR QUAL/QUANT2022-10-05 00:00:00





             Test Item    Value        Reference Range Interpretation Comments

 

             HCV RNA, PCR QUANT (test code 44710 IU/ML                          

  



             = 4571)                                             

 

             HCV VIRAL LOG (test code = 4.114 LOGIU/ML                          

 



             18018)                                              

 

             HCV QUALITATIVE INTERP (test POSITIVE                              

 



             code = 28962)                                        



HEPATITIS C [id number] 21:25:18





             Test Item    Value        Reference Range Interpretation Comments

 

             HEPATITIS C GENOTYPE 1a                                      Assay 

methodology is



             (test code = 70092)                                        real-kaiden

e PCR amplification



                                                                 of the 5'UTR an

dNS5b regions



                                                                 of the HCV lucía

me utilizing



                                                                 the [employer] Real

Time



                                                                 HCVGenotype II 

assay and



                                                                 Abbott HCV Lucía

type Plus



                                                                 assay. Possible

genotypes



                                                                 include 1a, 1b,

 2, 3, 4, 5,



                                                                 and 6. UNLESS O

THERWISE



                                                                 INDICATED, ALL 

TESTING



                                                                 PERFORMED [facility]



                                                                 PATHOLOGY [facility], [facility].



                                                                 [address] [place] [address] LABORATOR

Y DIRECTOR:



                                                                 BETSY OVIEDO M.D. CLIA



                                                                 NUMBER [id number]

03 CAP



                                                                 ACCREDITATION N

O. [id number]



HEPATITIS C [id number] 00:00:00





             Test Item    Value        Reference Range Interpretation Comments

 

             HEPATITIS C GENOTYPE (test code = 1a                               

      



             31527)                                              



HEPATITIS C [id number] 00:00:00





             Test Item    Value        Reference Range Interpretation Comments

 

             HEPATITIS C GENOTYPE (test code = 1a                               

      



             51534)                                              



HEPATITIS C [id number] 00:00:00





             Test Item    Value        Reference Range Interpretation Comments

 

             HEPATITIS C GENOTYPE (test code = 1a                               

      



             05308)                                              



HEPATITIS C [id number] 00:00:00





             Test Item    Value        Reference Range Interpretation Comments

 

             HEPATITIS C GENOTYPE (test code = 1a                               

      



             38494)                                              



HEPATITIS C [id number] 00:00:00





             Test Item    Value        Reference Range Interpretation Comments

 

             HEPATITIS C GENOTYPE (test code = 1a                               

      



             91340)                                              



HEPATITIS C [id number] 00:00:00





             Test Item    Value        Reference Range Interpretation Comments

 

             HEPATITIS C GENOTYPE (test code = 1a                               

      



             68663)                                              



HEPATITIS C [id number] 00:00:00





             Test Item    Value        Reference Range Interpretation Comments

 

             HEPATITIS C GENOTYPE (test code = 1a                               

      



             21842)                                              



HEPATITIS C [id number] 00:00:00





             Test Item    Value        Reference Range Interpretation Comments

 

             HEPATITIS C GENOTYPE (test code = 1a                               

      



             41941)                                              



LIPID [id number] 03:57:19





             Test Item    Value        Reference Range Interpretation Comments

 

             CHOLESTEROL (test 244 MG/DL    <200         H            



             code = 2210)                                        

 

             TRIGLYCERIDES (test 112 MG/DL    <150                      



             code = 2232)                                        

 

             HDL CHOLESTEROL (test 77 MG/DL     >39                       



             code = 2220)                                        

 

             CALC LDL CHOL (test 144 MG/DL    <100         H             NOTE: C

ALCULATED LDL



             code = 2237)                                        IS BASED ON



                                                                 ROEL-SMITH 

METHOD



                                                                 WHICHINCLUDES



                                                                 ADJUSTABLE



                                                                 TRIGLYCERIDE:VL

DL



                                                                 CHOLESTEROL RAT

IO.THIS



                                                                 FACTOR VARIES B

Y



                                                                 MEASURED TRIGLY

CERIDE



                                                                 AND NON-HDLCHOL

ESTEROL



                                                                 CONCENTRATIONS 

WITH



                                                                 INCREASED CALCU

LATED



                                                                 LDL SEENIN HIGH

ER



                                                                 TRIGLYCERIDE OR

 LOWER



                                                                 NON-HDL SPECIME

NS. FOR



                                                                 MOREINFORMATION

, SEE



                                                                 CLIENT ANNOUNCE

MENT AT



                                                                 [email or web address]



                                                                 [email or web address]

 

             RISK RATIO LDL/HDL 1.87 RATIO   <3.22                     



             (test code = 2238)                                        



COMPREHENSIVE METABOLIC [id number] 03:57:19





             Test Item    Value        Reference Range Interpretation Comments

 

             GLUCOSE (test code = 91 MG/DL     70-99                     



             )                                               

 

             BUN (test code = 7 MG/DL      6-20                      



             )                                               

 

             CREATININE (test 0.70 MG/DL   0.60-1.30                 



             code = 2214)                                        

 

             eGFR ( CKD-EPI) 107          >60                       



             (test code = 64530) ML/MIN/1.73                            

 

             CALC BUN/CREAT (test 10 RATIO     6-28                      



             code = 2235)                                        

 

             SODIUM (test code = 137 MEQ/L    133-146                   



             )                                               

 

             POTASSIUM (test code 4.5 MEQ/L    3.5-5.4                   



             = )                                             

 

             CHLORIDE (test code 98 MEQ/L                         



             = 2215)                                             

 

             CARBON DIOXIDE (test 27 MEQ/L     19-31                     



             code = 2206)                                        

 

             CALCIUM (test code = 9.5 MG/DL    8.5-10.5                  



             )                                               

 

             PROTEIN, TOTAL (test 8.7 G/DL     6.1-8.3      H            



             code = 2229)                                        

 

             ALBUMIN (test code = 3.9 G/DL     3.5-5.2                   



             )                                               

 

             CALC GLOBULIN (test 4.8 G/DL     1.9-3.7      H            



             code = 2240)                                        

 

             CALC A/G RATIO (test 0.8 RATIO    1.0-2.6      L            



             code = 2234)                                        

 

             BILIRUBIN, TOTAL 0.4 MG/DL    See_Comment                [Automated

 message]



             (test code = 2207)                                        The syste

m which



                                                                 generated this



                                                                 result transmit

carmelita



                                                                 reference range

:



                                                                 <=1.2. The refe

rence



                                                                 range was not u

sed



                                                                 to interpret th

is



                                                                 result as



                                                                 normal/abnormal

.

 

             ALKALINE PHOSPHATASE 135 U/L             H            



             (test code = 220)                                        

 

             AST (test code = 40 U/L       9-40                      



             )                                               

 

             ALT (test code = 27 U/L       5-40                      



             2219)                                               



CBC W/AUTO DIFF WITH [id number] 03:37:19





             Test Item    Value        Reference Range Interpretation Comments

 

             WBC (test code = 6.8 K/UL     3.5-11.0                  



             1001)                                               

 

             RBC (test code = 5.16 M/UL    3.80-5.40                 



             1002)                                               

 

             HEMOGLOBIN (test code 12.3 G/DL    11.5-15.5                 



             = 1003)                                             

 

             HEMATOCRIT (test code 40.7 %       34.0-45.0                 



             = 1004)                                             

 

             MCV (test code = 78.9 fL      80.0-99.0    L            



             1005)                                               

 

             MCH (test code = 23.8 PG      25.0-33.0    L            



             1006)                                               

 

             MCHC (test code = 30.2 G/DL    31.0-36.0    L            



             1007)                                               

 

             RDW (test code = 14.0 %       11.5-15.0                 



             1038)                                               

 

             NEUTROPHILS (test 46.2 %                                 



             code = 1008)                                        

 

             LYMPHOCYTES (test 40.8 %                                 



             code = 1010)                                        

 

             MONOCYTES (test code 9.0 %                                  



             = 1011)                                             

 

             EOSINOPHILS (test 3.2 %                                  



             code = 1012)                                        

 

             BASOPHILS (test code 0.4 %                                  



             = 1013)                                             

 

             IMMATURE GRANULOCYTES 0.4 %                                  



             (test code = 1036)                                        

 

             NUCLEATED RBCS (test 0.0 /100 WBC'S See_Comment                [Aut

omated



             code = 1065)                                        message] The sy

stem



                                                                 which generated



                                                                 this result



                                                                 transmitted



                                                                 reference range

:



                                                                 0.0. The refere

nce



                                                                 range was not u

sed



                                                                 to interpret th

is



                                                                 result as



                                                                 normal/abnormal

.

 

             PLATELET COUNT (test 283 K/UL     130-400                   



             code = 1015)                                        

 

             ABSOLUTE NEUTROPHILS 3.14 K/UL    1.50-7.50                 



             (test code = 1066)                                        

 

             ABSOLUTE LYMPHOCYTES 2.78 K/UL    1.00-4.00                 



             (test code = 1067)                                        

 

             ABSOLUTE MONOCYTES 0.61 K/UL    0.20-1.00                 



             (test code = 1068)                                        

 

             ABSOLUTE EOSINOPHILS 0.22 K/UL    0.00-0.50                 



             (test code = 1040)                                        

 

             ABSOLUTE BASOPHILS 0.03 K/UL    0.00-0.20                 



             (test code = 1069)                                        

 

             ABS IMMATURE 0.03 K/UL    0.00-0.10                 



             GRANULOCYTES (test                                        



             code = 1020)                                        

 

             ABS NUCLEATED RBCS 0.00 K/UL    0.00-0.11                 



             (test code = 26751)                                        



LIPID [id number] 00:00:00





             Test Item    Value        Reference Range Interpretation Comments

 

             CHOLESTEROL (test code = 2210) 244 MG/DL                           

   

 

             TRIGLYCERIDES (test code = 2232) 112 MG/DL                         

     

 

             HDL CHOLESTEROL (test code = 2220) 77 MG/DL                        

       

 

             CALC LDL CHOL (test code = 2237) 144 MG/DL                         

     

 

             RISK RATIO LDL/HDL (test code = 1.87 RATIO                         

    



             2238)                                               



LIPID [id number] 00:00:00





             Test Item    Value        Reference Range Interpretation Comments

 

             CHOLESTEROL (test code = 2210) 244 MG/DL                           

   

 

             TRIGLYCERIDES (test code = 2232) 112 MG/DL                         

     

 

             HDL CHOLESTEROL (test code = 2220) 77 MG/DL                        

       

 

             CALC LDL CHOL (test code = 2237) 144 MG/DL                         

     

 

             RISK RATIO LDL/HDL (test code = 1.87 RATIO                         

    



             2238)                                               



CBC W/AUTO [id number] 00:00:00





             Test Item    Value        Reference Range Interpretation Comments

 

             WBC (test code = 1001) 6.8 K/UL                               

 

             RBC (test code = 1002) 5.16 M/UL                              

 

             HEMOGLOBIN (test code = 1003) 12.3 G/DL                            

  

 

             HEMATOCRIT (test code = 1004) 40.7 %                               

  

 

             MCV (test code = 1005) 78.9 fL                                

 

             MCH (test code = 1006) 23.8 PG                                

 

             MCHC (test code = 1007) 30.2 G/DL                              

 

             RDW (test code = 1038) 14.0 %                                 

 

             NEUTROPHILS (test code = 1008) 46.2 %                              

   

 

             LYMPHOCYTES (test code = 1010) 40.8 %                              

   

 

             MONOCYTES (test code = 1011) 9.0 %                                 

 

 

             EOSINOPHILS (test code = 1012) 3.2 %                               

   

 

             BASOPHILS (test code = 1013) 0.4 %                                 

 

 

             IMMATURE GRANULOCYTES (test 0.4 %                                  



             code = 1036)                                        

 

             NUCLEATED RBCS (test code = 0.0 /100WBC'S                          

 



             1065)                                               

 

             PLATELET COUNT (test code = 283 K/UL                               



             1015)                                               

 

             ABSOLUTE NEUTROPHILS (test code 3.14 K/UL                          

    



             = 1066)                                             

 

             ABSOLUTE LYMPHOCYTES (test code 2.78 K/UL                          

    



             = 1067)                                             

 

             ABSOLUTE MONOCYTES (test code = 0.61 K/UL                          

    



             1068)                                               

 

             ABSOLUTE EOSINOPHILS (test code 0.22 K/UL                          

    



             = 1040)                                             

 

             ABSOLUTE BASOPHILS (test code = 0.03 K/UL                          

    



             1069)                                               

 

             ABS IMMATURE GRANULOCYTES (test 0.03 K/UL                          

    



             code = 1020)                                        

 

             ABS NUCLEATED RBCS (test code = 0.00 K/UL                          

    



             96753)                                              



CBC W/AUTO [id number] 00:00:00





             Test Item    Value        Reference Range Interpretation Comments

 

             WBC (test code = 1001) 6.8 K/UL                               

 

             RBC (test code = 1002) 5.16 M/UL                              

 

             HEMOGLOBIN (test code = 1003) 12.3 G/DL                            

  

 

             HEMATOCRIT (test code = 1004) 40.7 %                               

  

 

             MCV (test code = 1005) 78.9 fL                                

 

             MCH (test code = 1006) 23.8 PG                                

 

             MCHC (test code = 1007) 30.2 G/DL                              

 

             RDW (test code = 1038) 14.0 %                                 

 

             NEUTROPHILS (test code = 1008) 46.2 %                              

   

 

             LYMPHOCYTES (test code = 1010) 40.8 %                              

   

 

             MONOCYTES (test code = 1011) 9.0 %                                 

 

 

             EOSINOPHILS (test code = 1012) 3.2 %                               

   

 

             BASOPHILS (test code = 1013) 0.4 %                                 

 

 

             IMMATURE GRANULOCYTES (test 0.4 %                                  



             code = 1036)                                        

 

             NUCLEATED RBCS (test code = 0.0 /100WBC'S                          

 



             1065)                                               

 

             PLATELET COUNT (test code = 283 K/UL                               



             1015)                                               

 

             ABSOLUTE NEUTROPHILS (test code 3.14 K/UL                          

    



             = 1066)                                             

 

             ABSOLUTE LYMPHOCYTES (test code 2.78 K/UL                          

    



             = 1067)                                             

 

             ABSOLUTE MONOCYTES (test code = 0.61 K/UL                          

    



             1068)                                               

 

             ABSOLUTE EOSINOPHILS (test code 0.22 K/UL                          

    



             = 1040)                                             

 

             ABSOLUTE BASOPHILS (test code = 0.03 K/UL                          

    



             1069)                                               

 

             ABS IMMATURE GRANULOCYTES (test 0.03 K/UL                          

    



             code = 1020)                                        

 

             ABS NUCLEATED RBCS (test code = 0.00 K/UL                          

    



             35012)                                              



CBC W/AUTO [id number] 00:00:00





             Test Item    Value        Reference Range Interpretation Comments

 

             WBC (test code = 1001) 6.8 K/UL                               

 

             RBC (test code = 1002) 5.16 M/UL                              

 

             HEMOGLOBIN (test code = 1003) 12.3 G/DL                            

  

 

             HEMATOCRIT (test code = 1004) 40.7 %                               

  

 

             MCV (test code = 1005) 78.9 fL                                

 

             MCH (test code = 1006) 23.8 PG                                

 

             MCHC (test code = 1007) 30.2 G/DL                              

 

             RDW (test code = 1038) 14.0 %                                 

 

             NEUTROPHILS (test code = 1008) 46.2 %                              

   

 

             LYMPHOCYTES (test code = 1010) 40.8 %                              

   

 

             MONOCYTES (test code = 1011) 9.0 %                                 

 

 

             EOSINOPHILS (test code = 1012) 3.2 %                               

   

 

             BASOPHILS (test code = 1013) 0.4 %                                 

 

 

             IMMATURE GRANULOCYTES (test 0.4 %                                  



             code = 1036)                                        

 

             NUCLEATED RBCS (test code = 0.0 /100WBC'S                          

 



             1065)                                               

 

             PLATELET COUNT (test code = 283 K/UL                               



             1015)                                               

 

             ABSOLUTE NEUTROPHILS (test code 3.14 K/UL                          

    



             = 1066)                                             

 

             ABSOLUTE LYMPHOCYTES (test code 2.78 K/UL                          

    



             = 1067)                                             

 

             ABSOLUTE MONOCYTES (test code = 0.61 K/UL                          

    



             1068)                                               

 

             ABSOLUTE EOSINOPHILS (test code 0.22 K/UL                          

    



             = 1040)                                             

 

             ABSOLUTE BASOPHILS (test code = 0.03 K/UL                          

    



             1069)                                               

 

             ABS IMMATURE GRANULOCYTES (test 0.03 K/UL                          

    



             code = 1020)                                        

 

             ABS NUCLEATED RBCS (test code = 0.00 K/UL                          

    



             13000)                                              



COMPREHENSIVE METABOLIC [id number] 00:00:00





             Test Item    Value        Reference Range Interpretation Comments

 

             GLUCOSE (test code = 2217) 91 MG/DL                               

 

             BUN (test code = 2208) 7 MG/DL                                

 

             CREATININE (test code = 2214) 0.70 MG/DL                           

  

 

             eGFR ( CKD-EPI) (test 107 ML/MIN/1.73                          

 



             code = 32771)                                        

 

             CALC BUN/CREAT (test code = 10 RATIO                               



             2235)                                               

 

             SODIUM (test code = 2231) 137 MEQ/L                              

 

             POTASSIUM (test code = 2228) 4.5 MEQ/L                             

 

 

             CHLORIDE (test code = 2215) 98 MEQ/L                               

 

             CARBON DIOXIDE (test code = 27 MEQ/L                               



             )                                               

 

             CALCIUM (test code = 2209) 9.5 MG/DL                              

 

             PROTEIN, TOTAL (test code = 8.7 G/DL                               



             )                                               

 

             ALBUMIN (test code = 2201) 3.9 G/DL                               

 

             CALC GLOBULIN (test code = 4.8 G/DL                               



             2240)                                               

 

             CALC A/G RATIO (test code = 0.8 RATIO                              



             2234)                                               

 

             BILIRUBIN, TOTAL (test code = 0.4 MG/DL                            

  



             )                                               

 

             ALKALINE PHOSPHATASE (test 135 U/L                                



             code = 2204)                                        

 

             AST (test code = 2218) 40 U/L                                 

 

             ALT (test code = 2219) 27 U/L                                 



COMPREHENSIVE METABOLIC [id number] 00:00:00





             Test Item    Value        Reference Range Interpretation Comments

 

             GLUCOSE (test code = 2217) 91 MG/DL                               

 

             BUN (test code = 2208) 7 MG/DL                                

 

             CREATININE (test code = 2214) 0.70 MG/DL                           

  

 

             eGFR ( CKD-EPI) (test 107 ML/MIN/1.73                          

 



             code = 81268)                                        

 

             CALC BUN/CREAT (test code = 10 RATIO                               



             )                                               

 

             SODIUM (test code = 2231) 137 MEQ/L                              

 

             POTASSIUM (test code = 2228) 4.5 MEQ/L                             

 

 

             CHLORIDE (test code = 2215) 98 MEQ/L                               

 

             CARBON DIOXIDE (test code = 27 MEQ/L                               



             )                                               

 

             CALCIUM (test code = 220) 9.5 MG/DL                              

 

             PROTEIN, TOTAL (test code = 8.7 G/DL                               



             )                                               

 

             ALBUMIN (test code = 220) 3.9 G/DL                               

 

             CALC GLOBULIN (test code = 4.8 G/DL                               



             )                                               

 

             CALC A/G RATIO (test code = 0.8 RATIO                              



             )                                               

 

             BILIRUBIN, TOTAL (test code = 0.4 MG/DL                            

  



             )                                               

 

             ALKALINE PHOSPHATASE (test 135 U/L                                



             code = 2204)                                        

 

             AST (test code = 2218) 40 U/L                                 

 

             ALT (test code = 2219) 27 U/L                                 



LIPID [id number] 00:00:00





             Test Item    Value        Reference Range Interpretation Comments

 

             CHOLESTEROL (test code = 2210) 244 MG/DL                           

   

 

             TRIGLYCERIDES (test code = 2232) 112 MG/DL                         

     

 

             HDL CHOLESTEROL (test code = 2220) 77 MG/DL                        

       

 

             CALC LDL CHOL (test code = 2237) 144 MG/DL                         

     

 

             RISK RATIO LDL/HDL (test code = 1.87 RATIO                         

    



             2238)                                               



LIPID [id number] 00:00:00





             Test Item    Value        Reference Range Interpretation Comments

 

             CHOLESTEROL (test code = 2210) 244 MG/DL                           

   

 

             TRIGLYCERIDES (test code = 2232) 112 MG/DL                         

     

 

             HDL CHOLESTEROL (test code = 2220) 77 MG/DL                        

       

 

             CALC LDL CHOL (test code = 2237) 144 MG/DL                         

     

 

             RISK RATIO LDL/HDL (test code = 1.87 RATIO                         

    



             2238)                                               



CBC W/AUTO [id number] 00:00:00





             Test Item    Value        Reference Range Interpretation Comments

 

             WBC (test code = 1001) 6.8 K/UL                               

 

             RBC (test code = 1002) 5.16 M/UL                              

 

             HEMOGLOBIN (test code = 1003) 12.3 G/DL                            

  

 

             HEMATOCRIT (test code = 1004) 40.7 %                               

  

 

             MCV (test code = 1005) 78.9 fL                                

 

             MCH (test code = 1006) 23.8 PG                                

 

             MCHC (test code = 1007) 30.2 G/DL                              

 

             RDW (test code = 1038) 14.0 %                                 

 

             NEUTROPHILS (test code = 1008) 46.2 %                              

   

 

             LYMPHOCYTES (test code = 1010) 40.8 %                              

   

 

             MONOCYTES (test code = 1011) 9.0 %                                 

 

 

             EOSINOPHILS (test code = 1012) 3.2 %                               

   

 

             BASOPHILS (test code = 1013) 0.4 %                                 

 

 

             IMMATURE GRANULOCYTES (test 0.4 %                                  



             code = 1036)                                        

 

             NUCLEATED RBCS (test code = 0.0 /100WBC'S                          

 



             1065)                                               

 

             PLATELET COUNT (test code = 283 K/UL                               



             1015)                                               

 

             ABSOLUTE NEUTROPHILS (test code 3.14 K/UL                          

    



             = 1066)                                             

 

             ABSOLUTE LYMPHOCYTES (test code 2.78 K/UL                          

    



             = 1067)                                             

 

             ABSOLUTE MONOCYTES (test code = 0.61 K/UL                          

    



             1068)                                               

 

             ABSOLUTE EOSINOPHILS (test code 0.22 K/UL                          

    



             = 1040)                                             

 

             ABSOLUTE BASOPHILS (test code = 0.03 K/UL                          

    



             1069)                                               

 

             ABS IMMATURE GRANULOCYTES (test 0.03 K/UL                          

    



             code = 1020)                                        

 

             ABS NUCLEATED RBCS (test code = 0.00 K/UL                          

    



             83050)                                              



CBC W/AUTO [id number] 00:00:00





             Test Item    Value        Reference Range Interpretation Comments

 

             WBC (test code = 1001) 6.8 K/UL                               

 

             RBC (test code = 1002) 5.16 M/UL                              

 

             HEMOGLOBIN (test code = 1003) 12.3 G/DL                            

  

 

             HEMATOCRIT (test code = 1004) 40.7 %                               

  

 

             MCV (test code = 1005) 78.9 fL                                

 

             MCH (test code = 1006) 23.8 PG                                

 

             MCHC (test code = 1007) 30.2 G/DL                              

 

             RDW (test code = 1038) 14.0 %                                 

 

             NEUTROPHILS (test code = 1008) 46.2 %                              

   

 

             LYMPHOCYTES (test code = 1010) 40.8 %                              

   

 

             MONOCYTES (test code = 1011) 9.0 %                                 

 

 

             EOSINOPHILS (test code = 1012) 3.2 %                               

   

 

             BASOPHILS (test code = 1013) 0.4 %                                 

 

 

             IMMATURE GRANULOCYTES (test 0.4 %                                  



             code = 1036)                                        

 

             NUCLEATED RBCS (test code = 0.0 /100WBC'S                          

 



             1065)                                               

 

             PLATELET COUNT (test code = 283 K/UL                               



             1015)                                               

 

             ABSOLUTE NEUTROPHILS (test code 3.14 K/UL                          

    



             = 1066)                                             

 

             ABSOLUTE LYMPHOCYTES (test code 2.78 K/UL                          

    



             = 1067)                                             

 

             ABSOLUTE MONOCYTES (test code = 0.61 K/UL                          

    



             1068)                                               

 

             ABSOLUTE EOSINOPHILS (test code 0.22 K/UL                          

    



             = 1040)                                             

 

             ABSOLUTE BASOPHILS (test code = 0.03 K/UL                          

    



             1069)                                               

 

             ABS IMMATURE GRANULOCYTES (test 0.03 K/UL                          

    



             code = 1020)                                        

 

             ABS NUCLEATED RBCS (test code = 0.00 K/UL                          

    



             23571)                                              



CBC W/AUTO [id number] 00:00:00





             Test Item    Value        Reference Range Interpretation Comments

 

             WBC (test code = 1001) 6.8 K/UL                               

 

             RBC (test code = 1002) 5.16 M/UL                              

 

             HEMOGLOBIN (test code = 1003) 12.3 G/DL                            

  

 

             HEMATOCRIT (test code = 1004) 40.7 %                               

  

 

             MCV (test code = 1005) 78.9 fL                                

 

             MCH (test code = 1006) 23.8 PG                                

 

             MCHC (test code = 1007) 30.2 G/DL                              

 

             RDW (test code = 1038) 14.0 %                                 

 

             NEUTROPHILS (test code = 1008) 46.2 %                              

   

 

             LYMPHOCYTES (test code = 1010) 40.8 %                              

   

 

             MONOCYTES (test code = 1011) 9.0 %                                 

 

 

             EOSINOPHILS (test code = 1012) 3.2 %                               

   

 

             BASOPHILS (test code = 1013) 0.4 %                                 

 

 

             IMMATURE GRANULOCYTES (test 0.4 %                                  



             code = 1036)                                        

 

             NUCLEATED RBCS (test code = 0.0 /100WBC'S                          

 



             1065)                                               

 

             PLATELET COUNT (test code = 283 K/UL                               



             1015)                                               

 

             ABSOLUTE NEUTROPHILS (test code 3.14 K/UL                          

    



             = 1066)                                             

 

             ABSOLUTE LYMPHOCYTES (test code 2.78 K/UL                          

    



             = 1067)                                             

 

             ABSOLUTE MONOCYTES (test code = 0.61 K/UL                          

    



             1068)                                               

 

             ABSOLUTE EOSINOPHILS (test code 0.22 K/UL                          

    



             = 1040)                                             

 

             ABSOLUTE BASOPHILS (test code = 0.03 K/UL                          

    



             1069)                                               

 

             ABS IMMATURE GRANULOCYTES (test 0.03 K/UL                          

    



             code = 1020)                                        

 

             ABS NUCLEATED RBCS (test code = 0.00 K/UL                          

    



             43194)                                              



COMPREHENSIVE METABOLIC [id number] 00:00:00





             Test Item    Value        Reference Range Interpretation Comments

 

             GLUCOSE (test code = 2217) 91 MG/DL                               

 

             BUN (test code = 2208) 7 MG/DL                                

 

             CREATININE (test code = 2214) 0.70 MG/DL                           

  

 

             eGFR ( CKD-EPI) (test 107 ML/MIN/1.73                          

 



             code = 96933)                                        

 

             CALC BUN/CREAT (test code = 10 RATIO                               



             2235)                                               

 

             SODIUM (test code = 2231) 137 MEQ/L                              

 

             POTASSIUM (test code = 2228) 4.5 MEQ/L                             

 

 

             CHLORIDE (test code = 2215) 98 MEQ/L                               

 

             CARBON DIOXIDE (test code = 27 MEQ/L                               



             2206)                                               

 

             CALCIUM (test code = 2209) 9.5 MG/DL                              

 

             PROTEIN, TOTAL (test code = 8.7 G/DL                               



             )                                               

 

             ALBUMIN (test code = 2201) 3.9 G/DL                               

 

             CALC GLOBULIN (test code = 4.8 G/DL                               



             2240)                                               

 

             CALC A/G RATIO (test code = 0.8 RATIO                              



             2234)                                               

 

             BILIRUBIN, TOTAL (test code = 0.4 MG/DL                            

  



             )                                               

 

             ALKALINE PHOSPHATASE (test 135 U/L                                



             code = 2204)                                        

 

             AST (test code = 2218) 40 U/L                                 

 

             ALT (test code = 2219) 27 U/L                                 



COMPREHENSIVE METABOLIC [id number] 00:00:00





             Test Item    Value        Reference Range Interpretation Comments

 

             GLUCOSE (test code = 2217) 91 MG/DL                               

 

             BUN (test code = 2208) 7 MG/DL                                

 

             CREATININE (test code = 2214) 0.70 MG/DL                           

  

 

             eGFR ( CKD-EPI) (test 107 ML/MIN/1.73                          

 



             code = 86885)                                        

 

             CALC BUN/CREAT (test code = 10 RATIO                               



             2235)                                               

 

             SODIUM (test code = 2231) 137 MEQ/L                              

 

             POTASSIUM (test code = 2228) 4.5 MEQ/L                             

 

 

             CHLORIDE (test code = 2215) 98 MEQ/L                               

 

             CARBON DIOXIDE (test code = 27 MEQ/L                               



             )                                               

 

             CALCIUM (test code = 2209) 9.5 MG/DL                              

 

             PROTEIN, TOTAL (test code = 8.7 G/DL                               



             )                                               

 

             ALBUMIN (test code = 2201) 3.9 G/DL                               

 

             CALC GLOBULIN (test code = 4.8 G/DL                               



             2240)                                               

 

             CALC A/G RATIO (test code = 0.8 RATIO                              



             2234)                                               

 

             BILIRUBIN, TOTAL (test code = 0.4 MG/DL                            

  



             )                                               

 

             ALKALINE PHOSPHATASE (test 135 U/L                                



             code = 2204)                                        

 

             AST (test code = 2218) 40 U/L                                 

 

             ALT (test code = 2219) 27 U/L                                 



LIPID [id number] 00:00:00





             Test Item    Value        Reference Range Interpretation Comments

 

             CHOLESTEROL (test code = 2210) 244 MG/DL                           

   

 

             TRIGLYCERIDES (test code = 2232) 112 MG/DL                         

     

 

             HDL CHOLESTEROL (test code = 2220) 77 MG/DL                        

       

 

             CALC LDL CHOL (test code = 2237) 144 MG/DL                         

     

 

             RISK RATIO LDL/HDL (test code = 1.87 RATIO                         

    



             2238)                                               



LIPID [id number] 00:00:00





             Test Item    Value        Reference Range Interpretation Comments

 

             CHOLESTEROL (test code = 2210) 244 MG/DL                           

   

 

             TRIGLYCERIDES (test code = 2232) 112 MG/DL                         

     

 

             HDL CHOLESTEROL (test code = 2220) 77 MG/DL                        

       

 

             CALC LDL CHOL (test code = 2237) 144 MG/DL                         

     

 

             RISK RATIO LDL/HDL (test code = 1.87 RATIO                         

    



             2238)                                               



CBC W/AUTO [id number] 00:00:00





             Test Item    Value        Reference Range Interpretation Comments

 

             WBC (test code = 1001) 6.8 K/UL                               

 

             RBC (test code = 1002) 5.16 M/UL                              

 

             HEMOGLOBIN (test code = 1003) 12.3 G/DL                            

  

 

             HEMATOCRIT (test code = 1004) 40.7 %                               

  

 

             MCV (test code = 1005) 78.9 fL                                

 

             MCH (test code = 1006) 23.8 PG                                

 

             MCHC (test code = 1007) 30.2 G/DL                              

 

             RDW (test code = 1038) 14.0 %                                 

 

             NEUTROPHILS (test code = 1008) 46.2 %                              

   

 

             LYMPHOCYTES (test code = 1010) 40.8 %                              

   

 

             MONOCYTES (test code = 1011) 9.0 %                                 

 

 

             EOSINOPHILS (test code = 1012) 3.2 %                               

   

 

             BASOPHILS (test code = 1013) 0.4 %                                 

 

 

             IMMATURE GRANULOCYTES (test 0.4 %                                  



             code = 1036)                                        

 

             NUCLEATED RBCS (test code = 0.0 /100WBC'S                          

 



             1065)                                               

 

             PLATELET COUNT (test code = 283 K/UL                               



             1015)                                               

 

             ABSOLUTE NEUTROPHILS (test code 3.14 K/UL                          

    



             = 1066)                                             

 

             ABSOLUTE LYMPHOCYTES (test code 2.78 K/UL                          

    



             = 1067)                                             

 

             ABSOLUTE MONOCYTES (test code = 0.61 K/UL                          

    



             1068)                                               

 

             ABSOLUTE EOSINOPHILS (test code 0.22 K/UL                          

    



             = 1040)                                             

 

             ABSOLUTE BASOPHILS (test code = 0.03 K/UL                          

    



             1069)                                               

 

             ABS IMMATURE GRANULOCYTES (test 0.03 K/UL                          

    



             code = 1020)                                        

 

             ABS NUCLEATED RBCS (test code = 0.00 K/UL                          

    



             75594)                                              



CBC W/AUTO [id number] 00:00:00





             Test Item    Value        Reference Range Interpretation Comments

 

             WBC (test code = 1001) 6.8 K/UL                               

 

             RBC (test code = 1002) 5.16 M/UL                              

 

             HEMOGLOBIN (test code = 1003) 12.3 G/DL                            

  

 

             HEMATOCRIT (test code = 1004) 40.7 %                               

  

 

             MCV (test code = 1005) 78.9 fL                                

 

             MCH (test code = 1006) 23.8 PG                                

 

             MCHC (test code = 1007) 30.2 G/DL                              

 

             RDW (test code = 1038) 14.0 %                                 

 

             NEUTROPHILS (test code = 1008) 46.2 %                              

   

 

             LYMPHOCYTES (test code = 1010) 40.8 %                              

   

 

             MONOCYTES (test code = 1011) 9.0 %                                 

 

 

             EOSINOPHILS (test code = 1012) 3.2 %                               

   

 

             BASOPHILS (test code = 1013) 0.4 %                                 

 

 

             IMMATURE GRANULOCYTES (test 0.4 %                                  



             code = 1036)                                        

 

             NUCLEATED RBCS (test code = 0.0 /100WBC'S                          

 



             1065)                                               

 

             PLATELET COUNT (test code = 283 K/UL                               



             1015)                                               

 

             ABSOLUTE NEUTROPHILS (test code 3.14 K/UL                          

    



             = 1066)                                             

 

             ABSOLUTE LYMPHOCYTES (test code 2.78 K/UL                          

    



             = 1067)                                             

 

             ABSOLUTE MONOCYTES (test code = 0.61 K/UL                          

    



             1068)                                               

 

             ABSOLUTE EOSINOPHILS (test code 0.22 K/UL                          

    



             = 1040)                                             

 

             ABSOLUTE BASOPHILS (test code = 0.03 K/UL                          

    



             1069)                                               

 

             ABS IMMATURE GRANULOCYTES (test 0.03 K/UL                          

    



             code = 1020)                                        

 

             ABS NUCLEATED RBCS (test code = 0.00 K/UL                          

    



             08579)                                              



CBC W/AUTO [id number] 00:00:00





             Test Item    Value        Reference Range Interpretation Comments

 

             WBC (test code = 1001) 6.8 K/UL                               

 

             RBC (test code = 1002) 5.16 M/UL                              

 

             HEMOGLOBIN (test code = 1003) 12.3 G/DL                            

  

 

             HEMATOCRIT (test code = 1004) 40.7 %                               

  

 

             MCV (test code = 1005) 78.9 fL                                

 

             MCH (test code = 1006) 23.8 PG                                

 

             MCHC (test code = 1007) 30.2 G/DL                              

 

             RDW (test code = 1038) 14.0 %                                 

 

             NEUTROPHILS (test code = 1008) 46.2 %                              

   

 

             LYMPHOCYTES (test code = 1010) 40.8 %                              

   

 

             MONOCYTES (test code = 1011) 9.0 %                                 

 

 

             EOSINOPHILS (test code = 1012) 3.2 %                               

   

 

             BASOPHILS (test code = 1013) 0.4 %                                 

 

 

             IMMATURE GRANULOCYTES (test 0.4 %                                  



             code = 1036)                                        

 

             NUCLEATED RBCS (test code = 0.0 /100WBC'S                          

 



             1065)                                               

 

             PLATELET COUNT (test code = 283 K/UL                               



             1015)                                               

 

             ABSOLUTE NEUTROPHILS (test code 3.14 K/UL                          

    



             = 1066)                                             

 

             ABSOLUTE LYMPHOCYTES (test code 2.78 K/UL                          

    



             = 1067)                                             

 

             ABSOLUTE MONOCYTES (test code = 0.61 K/UL                          

    



             1068)                                               

 

             ABSOLUTE EOSINOPHILS (test code 0.22 K/UL                          

    



             = 1040)                                             

 

             ABSOLUTE BASOPHILS (test code = 0.03 K/UL                          

    



             1069)                                               

 

             ABS IMMATURE GRANULOCYTES (test 0.03 K/UL                          

    



             code = 1020)                                        

 

             ABS NUCLEATED RBCS (test code = 0.00 K/UL                          

    



             34079)                                              



COMPREHENSIVE METABOLIC [id number] 00:00:00





             Test Item    Value        Reference Range Interpretation Comments

 

             GLUCOSE (test code = 2217) 91 MG/DL                               

 

             BUN (test code = 2208) 7 MG/DL                                

 

             CREATININE (test code = 2214) 0.70 MG/DL                           

  

 

             eGFR ( CKD-EPI) (test 107 ML/MIN/1.73                          

 



             code = 44705)                                        

 

             CALC BUN/CREAT (test code = 10 RATIO                               



             2235)                                               

 

             SODIUM (test code = 2231) 137 MEQ/L                              

 

             POTASSIUM (test code = 2228) 4.5 MEQ/L                             

 

 

             CHLORIDE (test code = 2215) 98 MEQ/L                               

 

             CARBON DIOXIDE (test code = 27 MEQ/L                               



             )                                               

 

             CALCIUM (test code = 2209) 9.5 MG/DL                              

 

             PROTEIN, TOTAL (test code = 8.7 G/DL                               



             222)                                               

 

             ALBUMIN (test code = 2201) 3.9 G/DL                               

 

             CALC GLOBULIN (test code = 4.8 G/DL                               



             2240)                                               

 

             CALC A/G RATIO (test code = 0.8 RATIO                              



             2234)                                               

 

             BILIRUBIN, TOTAL (test code = 0.4 MG/DL                            

  



             220)                                               

 

             ALKALINE PHOSPHATASE (test 135 U/L                                



             code = 2204)                                        

 

             AST (test code = 2218) 40 U/L                                 

 

             ALT (test code = 2219) 27 U/L                                 



COMPREHENSIVE METABOLIC [id number] 00:00:00





             Test Item    Value        Reference Range Interpretation Comments

 

             GLUCOSE (test code = 2217) 91 MG/DL                               

 

             BUN (test code = 2208) 7 MG/DL                                

 

             CREATININE (test code = 2214) 0.70 MG/DL                           

  

 

             eGFR ( CKD-EPI) (test 107 ML/MIN/1.73                          

 



             code = 41957)                                        

 

             CALC BUN/CREAT (test code = 10 RATIO                               



             2235)                                               

 

             SODIUM (test code = 2231) 137 MEQ/L                              

 

             POTASSIUM (test code = 2228) 4.5 MEQ/L                             

 

 

             CHLORIDE (test code = 2215) 98 MEQ/L                               

 

             CARBON DIOXIDE (test code = 27 MEQ/L                               



             )                                               

 

             CALCIUM (test code = 2209) 9.5 MG/DL                              

 

             PROTEIN, TOTAL (test code = 8.7 G/DL                               



             )                                               

 

             ALBUMIN (test code = 2201) 3.9 G/DL                               

 

             CALC GLOBULIN (test code = 4.8 G/DL                               



             )                                               

 

             CALC A/G RATIO (test code = 0.8 RATIO                              



             )                                               

 

             BILIRUBIN, TOTAL (test code = 0.4 MG/DL                            

  



             )                                               

 

             ALKALINE PHOSPHATASE (test 135 U/L                                



             code = 2204)                                        

 

             AST (test code = 2218) 40 U/L                                 

 

             ALT (test code = 2219) 27 U/L                                 



LIPID [id number] 00:00:00





             Test Item    Value        Reference Range Interpretation Comments

 

             CHOLESTEROL (test code = 2210) 244 MG/DL                           

   

 

             TRIGLYCERIDES (test code = 2232) 112 MG/DL                         

     

 

             HDL CHOLESTEROL (test code = 2220) 77 MG/DL                        

       

 

             CALC LDL CHOL (test code = 2237) 144 MG/DL                         

     

 

             RISK RATIO LDL/HDL (test code = 1.87 RATIO                         

    



             2238)                                               



LIPID [id number] 00:00:00





             Test Item    Value        Reference Range Interpretation Comments

 

             CHOLESTEROL (test code = 2210) 244 MG/DL                           

   

 

             TRIGLYCERIDES (test code = 2232) 112 MG/DL                         

     

 

             HDL CHOLESTEROL (test code = 2220) 77 MG/DL                        

       

 

             CALC LDL CHOL (test code = 2237) 144 MG/DL                         

     

 

             RISK RATIO LDL/HDL (test code = 1.87 RATIO                         

    



             2238)                                               



CBC W/AUTO [id number] 00:00:00





             Test Item    Value        Reference Range Interpretation Comments

 

             WBC (test code = 1001) 6.8 K/UL                               

 

             RBC (test code = 1002) 5.16 M/UL                              

 

             HEMOGLOBIN (test code = 1003) 12.3 G/DL                            

  

 

             HEMATOCRIT (test code = 1004) 40.7 %                               

  

 

             MCV (test code = 1005) 78.9 fL                                

 

             MCH (test code = 1006) 23.8 PG                                

 

             MCHC (test code = 1007) 30.2 G/DL                              

 

             RDW (test code = 1038) 14.0 %                                 

 

             NEUTROPHILS (test code = 1008) 46.2 %                              

   

 

             LYMPHOCYTES (test code = 1010) 40.8 %                              

   

 

             MONOCYTES (test code = 1011) 9.0 %                                 

 

 

             EOSINOPHILS (test code = 1012) 3.2 %                               

   

 

             BASOPHILS (test code = 1013) 0.4 %                                 

 

 

             IMMATURE GRANULOCYTES (test 0.4 %                                  



             code = 1036)                                        

 

             NUCLEATED RBCS (test code = 0.0 /100WBC'S                          

 



             1065)                                               

 

             PLATELET COUNT (test code = 283 K/UL                               



             1015)                                               

 

             ABSOLUTE NEUTROPHILS (test code 3.14 K/UL                          

    



             = 1066)                                             

 

             ABSOLUTE LYMPHOCYTES (test code 2.78 K/UL                          

    



             = 1067)                                             

 

             ABSOLUTE MONOCYTES (test code = 0.61 K/UL                          

    



             1068)                                               

 

             ABSOLUTE EOSINOPHILS (test code 0.22 K/UL                          

    



             = 1040)                                             

 

             ABSOLUTE BASOPHILS (test code = 0.03 K/UL                          

    



             1069)                                               

 

             ABS IMMATURE GRANULOCYTES (test 0.03 K/UL                          

    



             code = 1020)                                        

 

             ABS NUCLEATED RBCS (test code = 0.00 K/UL                          

    



             43330)                                              



CBC W/AUTO [id number] 00:00:00





             Test Item    Value        Reference Range Interpretation Comments

 

             WBC (test code = 1001) 6.8 K/UL                               

 

             RBC (test code = 1002) 5.16 M/UL                              

 

             HEMOGLOBIN (test code = 1003) 12.3 G/DL                            

  

 

             HEMATOCRIT (test code = 1004) 40.7 %                               

  

 

             MCV (test code = 1005) 78.9 fL                                

 

             MCH (test code = 1006) 23.8 PG                                

 

             MCHC (test code = 1007) 30.2 G/DL                              

 

             RDW (test code = 1038) 14.0 %                                 

 

             NEUTROPHILS (test code = 1008) 46.2 %                              

   

 

             LYMPHOCYTES (test code = 1010) 40.8 %                              

   

 

             MONOCYTES (test code = 1011) 9.0 %                                 

 

 

             EOSINOPHILS (test code = 1012) 3.2 %                               

   

 

             BASOPHILS (test code = 1013) 0.4 %                                 

 

 

             IMMATURE GRANULOCYTES (test 0.4 %                                  



             code = 1036)                                        

 

             NUCLEATED RBCS (test code = 0.0 /100WBC'S                          

 



             1065)                                               

 

             PLATELET COUNT (test code = 283 K/UL                               



             1015)                                               

 

             ABSOLUTE NEUTROPHILS (test code 3.14 K/UL                          

    



             = 1066)                                             

 

             ABSOLUTE LYMPHOCYTES (test code 2.78 K/UL                          

    



             = 1067)                                             

 

             ABSOLUTE MONOCYTES (test code = 0.61 K/UL                          

    



             1068)                                               

 

             ABSOLUTE EOSINOPHILS (test code 0.22 K/UL                          

    



             = 1040)                                             

 

             ABSOLUTE BASOPHILS (test code = 0.03 K/UL                          

    



             1069)                                               

 

             ABS IMMATURE GRANULOCYTES (test 0.03 K/UL                          

    



             code = 1020)                                        

 

             ABS NUCLEATED RBCS (test code = 0.00 K/UL                          

    



             95042)                                              



CBC W/AUTO [id number] 00:00:00





             Test Item    Value        Reference Range Interpretation Comments

 

             WBC (test code = 1001) 6.8 K/UL                               

 

             RBC (test code = 1002) 5.16 M/UL                              

 

             HEMOGLOBIN (test code = 1003) 12.3 G/DL                            

  

 

             HEMATOCRIT (test code = 1004) 40.7 %                               

  

 

             MCV (test code = 1005) 78.9 fL                                

 

             MCH (test code = 1006) 23.8 PG                                

 

             MCHC (test code = 1007) 30.2 G/DL                              

 

             RDW (test code = 1038) 14.0 %                                 

 

             NEUTROPHILS (test code = 1008) 46.2 %                              

   

 

             LYMPHOCYTES (test code = 1010) 40.8 %                              

   

 

             MONOCYTES (test code = 1011) 9.0 %                                 

 

 

             EOSINOPHILS (test code = 1012) 3.2 %                               

   

 

             BASOPHILS (test code = 1013) 0.4 %                                 

 

 

             IMMATURE GRANULOCYTES (test 0.4 %                                  



             code = 1036)                                        

 

             NUCLEATED RBCS (test code = 0.0 /100WBC'S                          

 



             1065)                                               

 

             PLATELET COUNT (test code = 283 K/UL                               



             1015)                                               

 

             ABSOLUTE NEUTROPHILS (test code 3.14 K/UL                          

    



             = 1066)                                             

 

             ABSOLUTE LYMPHOCYTES (test code 2.78 K/UL                          

    



             = 1067)                                             

 

             ABSOLUTE MONOCYTES (test code = 0.61 K/UL                          

    



             1068)                                               

 

             ABSOLUTE EOSINOPHILS (test code 0.22 K/UL                          

    



             = 1040)                                             

 

             ABSOLUTE BASOPHILS (test code = 0.03 K/UL                          

    



             1069)                                               

 

             ABS IMMATURE GRANULOCYTES (test 0.03 K/UL                          

    



             code = 1020)                                        

 

             ABS NUCLEATED RBCS (test code = 0.00 K/UL                          

    



             87388)                                              



COMPREHENSIVE METABOLIC [id number] 00:00:00





             Test Item    Value        Reference Range Interpretation Comments

 

             GLUCOSE (test code = 2217) 91 MG/DL                               

 

             BUN (test code = 2208) 7 MG/DL                                

 

             CREATININE (test code = 2214) 0.70 MG/DL                           

  

 

             eGFR ( CKD-EPI) (test 107 ML/MIN/1.73                          

 



             code = 61460)                                        

 

             CALC BUN/CREAT (test code = 10 RATIO                               



             2235)                                               

 

             SODIUM (test code = 2231) 137 MEQ/L                              

 

             POTASSIUM (test code = 2228) 4.5 MEQ/L                             

 

 

             CHLORIDE (test code = 2215) 98 MEQ/L                               

 

             CARBON DIOXIDE (test code = 27 MEQ/L                               



             2206)                                               

 

             CALCIUM (test code = 2209) 9.5 MG/DL                              

 

             PROTEIN, TOTAL (test code = 8.7 G/DL                               



             2229)                                               

 

             ALBUMIN (test code = 2201) 3.9 G/DL                               

 

             CALC GLOBULIN (test code = 4.8 G/DL                               



             2240)                                               

 

             CALC A/G RATIO (test code = 0.8 RATIO                              



             2234)                                               

 

             BILIRUBIN, TOTAL (test code = 0.4 MG/DL                            

  



             2207)                                               

 

             ALKALINE PHOSPHATASE (test 135 U/L                                



             code = 2204)                                        

 

             AST (test code = 2218) 40 U/L                                 

 

             ALT (test code = 2219) 27 U/L                                 



COMPREHENSIVE METABOLIC [id number] 00:00:00





             Test Item    Value        Reference Range Interpretation Comments

 

             GLUCOSE (test code = 2217) 91 MG/DL                               

 

             BUN (test code = 2208) 7 MG/DL                                

 

             CREATININE (test code = 2214) 0.70 MG/DL                           

  

 

             eGFR ( CKD-EPI) (test 107 ML/MIN/1.73                          

 



             code = 06655)                                        

 

             CALC BUN/CREAT (test code = 10 RATIO                               



             2235)                                               

 

             SODIUM (test code = 2231) 137 MEQ/L                              

 

             POTASSIUM (test code = 2228) 4.5 MEQ/L                             

 

 

             CHLORIDE (test code = 2215) 98 MEQ/L                               

 

             CARBON DIOXIDE (test code = 27 MEQ/L                               



             2206)                                               

 

             CALCIUM (test code = 2209) 9.5 MG/DL                              

 

             PROTEIN, TOTAL (test code = 8.7 G/DL                               



             2229)                                               

 

             ALBUMIN (test code = 2201) 3.9 G/DL                               

 

             CALC GLOBULIN (test code = 4.8 G/DL                               



             2240)                                               

 

             CALC A/G RATIO (test code = 0.8 RATIO                              



             2234)                                               

 

             BILIRUBIN, TOTAL (test code = 0.4 MG/DL                            

  



             2207)                                               

 

             ALKALINE PHOSPHATASE (test 135 U/L                                



             code = 2204)                                        

 

             AST (test code = 2218) 40 U/L                                 

 

             ALT (test code = 2219) 27 U/L                                 



CULTURE, [id number] 14:32:48SPECIMEN NUMBER: [id number] CULTURE, ROUTINE 
SPECIMEN NUMBER: [id number] SPECIMEN COMMENT: UND TISSUE/ES SOURCE: UNDEFINED 
REPORT STATUS: FINAL DIRECT GRAM STAIN: NO WBCs SEEN FEW GRAM POSITIVE COCCI 
ISOLATE NUMBER 1: ORGANISM: 2022 ABUNDANT STAPHYLOCOCCUS SPECIES 
IDENTIFICATION:  2022 STAPHYLOCOCCUS AUREUS STAPH. AUREUS 
----------------AMOXICILLIN/CA SENSITIVE &lt;=4/2CEFAZOLIN SENSITIVE &l
t;=4CLINDAMYCIN RESISTANT *1 Note 1: D-TEST POSITIVE. INDUCIBLE RESISTANCE 
PRESENT. CLINDAMYCIN IS RESISTANT.ERYTHROMYCIN RESISTANT &gt;4OXACILLIN 
SENSITIVE 0.5RIFAMPIN SENSITIVE &lt;=1TETRACYCLINE SENSITIVE &lt;=1TRIMETH/SULFA
SENSITIVE *2 Note 2: &lt;=0.5/9.5VANCOMYCIN SENSITIVE 2 NOTE: NUMBERS DISPLAYED 
REPRESENT MINIMUM INHIBITORY CONCENTRATION (TUCEKR) WHICH IS EXPRESSED IN MCG/ML. 
UNLESS OTHERWISEINDICATED, ALL TESTING PERFORMED [facility] [address] [place] [address] : BETSY HUFF M.D. CLIA NUMBER [id number] [facility] [place]. [id number], 
[id number] 00:00:00





             Test Item    Value        Reference Range Interpretation Comments

 

             CULTURE, ROUTINE (test SPECIMEN NUMBER:                           



             code = 22894) [id number]                              



CULTURE, [id number] 00:00:00





             Test Item    Value        Reference Range Interpretation Comments

 

             CULTURE, ROUTINE (test SPECIMEN NUMBER:                           



             code = 18929) [id number]                              



CULTURE, [id number] 00:00:00





             Test Item    Value        Reference Range Interpretation Comments

 

             CULTURE, ROUTINE (test SPECIMEN NUMBER:                           



             code = 72076) [id number]                              



CULTURE, [id number] 00:00:00





             Test Item    Value        Reference Range Interpretation Comments

 

             CULTURE, ROUTINE (test SPECIMEN NUMBER:                           



             code = 10592) [id number]                              



CULTURE, [id number] 00:00:00





             Test Item    Value        Reference Range Interpretation Comments

 

             CULTURE, ROUTINE (test SPECIMEN NUMBER:                           



             code = 86479) [id number]                              



CULTURE, [id number] 00:00:00





             Test Item    Value        Reference Range Interpretation Comments

 

             CULTURE, ROUTINE (test SPECIMEN NUMBER:                           



             code = 49680) [id number]                              



CULTURE, [id number] 00:00:00





             Test Item    Value        Reference Range Interpretation Comments

 

             CULTURE, ROUTINE (test SPECIMEN NUMBER:                           



             code = 16074) [id number]                              



CULTURE, [id number] 00:00:00





             Test Item    Value        Reference Range Interpretation Comments

 

             CULTURE, ROUTINE (test SPECIMEN NUMBER:                           



             code = 11254) [id number]                              



CULTURE, [id number] 00:00:00





             Test Item    Value        Reference Range Interpretation Comments

 

             CULTURE, ROUTINE (test SPECIMEN NUMBER:                           



             code = 03176) [id number]                              



CULTURE, [id number] 00:00:00





             Test Item    Value        Reference Range Interpretation Comments

 

             CULTURE, ROUTINE (test SPECIMEN NUMBER:                           



             code = 55776) [id number]                              



CULTURE, [id number] 00:00:00





             Test Item    Value        Reference Range Interpretation Comments

 

             CULTURE, ROUTINE (test SPECIMEN NUMBER:                           



             code = 40831) [id number]                              



CULTURE, [id number] 00:00:00





             Test Item    Value        Reference Range Interpretation Comments

 

             CULTURE, ROUTINE (test SPECIMEN NUMBER:                           



             code = 77789) [id number]                              



SEDIMENTATION [id number] 09:34:07





             Test Item    Value        Reference Range Interpretation Comments

 

             SEDIMENTATION RATE (test code = 43 MM/HOUR   0-20         H        

    



             1017)                                               



C-REACTIVE [id number] 04:33:24





             Test Item    Value        Reference Range Interpretation Comments

 

             C-REACTIVE PROTEIN <0.3 MG/DL   <0.5                       UNLESS O

THERWISE



             (test code = 3513)                                        INDICATED

, ALL TESTING



                                                                 PERFORMED [facility]



                                                                 PATHOLOGY [facility],



                                                                 INC. [address] [place] [address]

4



                                                                 LABORATORY DIRE

CTOR:



                                                                 BETSY OVIEDO M.D.



                                                                 CLIA NUMBER [id number]

[id number]



                                                                 [facility]

ON NO.



                                                                 [id number]



COMPREHENSIVE METABOLIC [id number] 03:29:20





             Test Item    Value        Reference Range Interpretation Comments

 

             GLUCOSE (test code = 102 MG/DL    70-99        H            



             )                                               

 

             BUN (test code = 6 MG/DL      -2208)                                               

 

             CREATININE (test 0.59 MG/DL   0.60-1.30    L            



             code = 2214)                                        

 

             eGFR ( CKD-EPI) 112          >60                       



             (test code = 91503) ML/MIN/1.73                            

 

             CALC BUN/CREAT (test 10 RATIO     -                      



             code = 2235)                                        

 

             SODIUM (test code = 142 MEQ/L    133-146                   



             )                                               

 

             POTASSIUM (test code 4.1 MEQ/L    3.5-5.4                   



             = 2228)                                             

 

             CHLORIDE (test code 103 MEQ/L                        



             = 2215)                                             

 

             CARBON DIOXIDE (test 24 MEQ/L     19-31                     



             code = 2206)                                        

 

             CALCIUM (test code = 9.3 MG/DL    8.5-10.5                  



             )                                               

 

             PROTEIN, TOTAL (test 8.4 G/DL     6.1-8.3      H            



             code = 2229)                                        

 

             ALBUMIN (test code = 3.3 G/DL     3.5-5.2      L            



             )                                               

 

             CALC GLOBULIN (test 5.1 G/DL     1.9-3.7      H            



             code = 2240)                                        

 

             CALC A/G RATIO (test 0.6 RATIO    1.0-2.6      L            



             code = 2234)                                        

 

             BILIRUBIN, TOTAL 0.5 MG/DL    See_Comment                [Automated

 message]



             (test code = 220)                                        The syste

m which



                                                                 generated this



                                                                 result transmit

carmelita



                                                                 reference range

:



                                                                 <=1.2. The refe

rence



                                                                 range was not u

sed



                                                                 to interpret th

is



                                                                 result as



                                                                 normal/abnormal

.

 

             ALKALINE PHOSPHATASE 111 U/L                          



             (test code = )                                        

 

             AST (test code = 41 U/L       9-40         H            



             )                                               

 

             ALT (test code = 23 U/L       5-40                      



             )                                               



CBC W/AUTO DIFF WITH [id number] 02:35:48





             Test Item    Value        Reference Range Interpretation Comments

 

             WBC (test code = 7.5 K/UL     3.5-11.0                  



             1001)                                               

 

             RBC (test code = 5.19 M/UL    3.80-5.40                 



             1002)                                               

 

             HEMOGLOBIN (test code 12.8 G/DL    11.5-15.5                 



             = 1003)                                             

 

             HEMATOCRIT (test code 42.2 %       34.0-45.0                 



             = 1004)                                             

 

             MCV (test code = 81.3 fL      80.0-99.0                 



             1005)                                               

 

             MCH (test code = 24.7 PG      25.0-33.0    L            



             1006)                                               

 

             MCHC (test code = 30.3 G/DL    31.0-36.0    L            



             1007)                                               

 

             RDW (test code = 14.1 %       11.5-15.0                 



             1038)                                               

 

             NEUTROPHILS (test 53.7 %                                 



             code = 1008)                                        

 

             LYMPHOCYTES (test 35.5 %                                 



             code = 1010)                                        

 

             MONOCYTES (test code 8.6 %                                  



             = 1011)                                             

 

             EOSINOPHILS (test 1.3 %                                  



             code = 1012)                                        

 

             BASOPHILS (test code 0.5 %                                  



             = 1013)                                             

 

             IMMATURE GRANULOCYTES 0.4 %                                  



             (test code = 1036)                                        

 

             NUCLEATED RBCS (test 0.0 /100 WBC'S See_Comment                [Aut

omated



             code = 1065)                                        message] The sy

stem



                                                                 which generated



                                                                 this result



                                                                 transmitted



                                                                 reference range

:



                                                                 0.0. The refere

nce



                                                                 range was not u

sed



                                                                 to interpret th

is



                                                                 result as



                                                                 normal/abnormal

.

 

             PLATELET COUNT (test 331 K/UL     130-400                   



             code = 1015)                                        

 

             ABSOLUTE NEUTROPHILS 4.04 K/UL    1.50-7.50                 



             (test code = 1066)                                        

 

             ABSOLUTE LYMPHOCYTES 2.68 K/UL    1.00-4.00                 



             (test code = 1067)                                        

 

             ABSOLUTE MONOCYTES 0.65 K/UL    0.20-1.00                 



             (test code = 1068)                                        

 

             ABSOLUTE EOSINOPHILS 0.10 K/UL    0.00-0.50                 



             (test code = 1040)                                        

 

             ABSOLUTE BASOPHILS 0.04 K/UL    0.00-0.20                 



             (test code = 1069)                                        

 

             ABS IMMATURE 0.03 K/UL    0.00-0.10                 



             GRANULOCYTES (test                                        



             code = 1020)                                        

 

             ABS NUCLEATED RBCS 0.00 K/UL    0.00-0.11                 



             (test code = 13806)                                        



SEDIMENTATION [id number] 00:00:00





             Test Item    Value        Reference Range Interpretation Comments

 

             SEDIMENTATION RATE (test code = 43 MM/HOUR                         

    



             1017)                                               



SEDIMENTATION [id number] 00:00:00





             Test Item    Value        Reference Range Interpretation Comments

 

             SEDIMENTATION RATE (test code = 43 MM/HOUR                         

    



             1017)                                               



C-REACTIVE [id number] 00:00:00





             Test Item    Value        Reference Range Interpretation Comments

 

             C-REACTIVE PROTEIN (test code = <0.3 MG/DL                         

    



             3513)                                               



C-REACTIVE [id number] 00:00:00





             Test Item    Value        Reference Range Interpretation Comments

 

             C-REACTIVE PROTEIN (test code = <0.3 MG/DL                         

    



             3513)                                               



CBC W/AUTO [id number] 00:00:00





             Test Item    Value        Reference Range Interpretation Comments

 

             WBC (test code = 1001) 7.5 K/UL                               

 

             RBC (test code = 1002) 5.19 M/UL                              

 

             HEMOGLOBIN (test code = 1003) 12.8 G/DL                            

  

 

             HEMATOCRIT (test code = 1004) 42.2 %                               

  

 

             MCV (test code = 1005) 81.3 fL                                

 

             MCH (test code = 1006) 24.7 PG                                

 

             MCHC (test code = 1007) 30.3 G/DL                              

 

             RDW (test code = 1038) 14.1 %                                 

 

             NEUTROPHILS (test code = 1008) 53.7 %                              

   

 

             LYMPHOCYTES (test code = 1010) 35.5 %                              

   

 

             MONOCYTES (test code = 1011) 8.6 %                                 

 

 

             EOSINOPHILS (test code = 1012) 1.3 %                               

   

 

             BASOPHILS (test code = 1013) 0.5 %                                 

 

 

             IMMATURE GRANULOCYTES (test 0.4 %                                  



             code = 1036)                                        

 

             NUCLEATED RBCS (test code = 0.0 /100WBC'S                          

 



             1065)                                               

 

             PLATELET COUNT (test code = 331 K/UL                               



             1015)                                               

 

             ABSOLUTE NEUTROPHILS (test code 4.04 K/UL                          

    



             = 1066)                                             

 

             ABSOLUTE LYMPHOCYTES (test code 2.68 K/UL                          

    



             = 1067)                                             

 

             ABSOLUTE MONOCYTES (test code = 0.65 K/UL                          

    



             1068)                                               

 

             ABSOLUTE EOSINOPHILS (test code 0.10 K/UL                          

    



             = 1040)                                             

 

             ABSOLUTE BASOPHILS (test code = 0.04 K/UL                          

    



             1069)                                               

 

             ABS IMMATURE GRANULOCYTES (test 0.03 K/UL                          

    



             code = 1020)                                        

 

             ABS NUCLEATED RBCS (test code = 0.00 K/UL                          

    



             24612)                                              



CBC W/AUTO [id number] 00:00:00





             Test Item    Value        Reference Range Interpretation Comments

 

             WBC (test code = 1001) 7.5 K/UL                               

 

             RBC (test code = 1002) 5.19 M/UL                              

 

             HEMOGLOBIN (test code = 1003) 12.8 G/DL                            

  

 

             HEMATOCRIT (test code = 1004) 42.2 %                               

  

 

             MCV (test code = 1005) 81.3 fL                                

 

             MCH (test code = 1006) 24.7 PG                                

 

             MCHC (test code = 1007) 30.3 G/DL                              

 

             RDW (test code = 1038) 14.1 %                                 

 

             NEUTROPHILS (test code = 1008) 53.7 %                              

   

 

             LYMPHOCYTES (test code = 1010) 35.5 %                              

   

 

             MONOCYTES (test code = 1011) 8.6 %                                 

 

 

             EOSINOPHILS (test code = 1012) 1.3 %                               

   

 

             BASOPHILS (test code = 1013) 0.5 %                                 

 

 

             IMMATURE GRANULOCYTES (test 0.4 %                                  



             code = 1036)                                        

 

             NUCLEATED RBCS (test code = 0.0 /100WBC'S                          

 



             1065)                                               

 

             PLATELET COUNT (test code = 331 K/UL                               



             1015)                                               

 

             ABSOLUTE NEUTROPHILS (test code 4.04 K/UL                          

    



             = 1066)                                             

 

             ABSOLUTE LYMPHOCYTES (test code 2.68 K/UL                          

    



             = 1067)                                             

 

             ABSOLUTE MONOCYTES (test code = 0.65 K/UL                          

    



             1068)                                               

 

             ABSOLUTE EOSINOPHILS (test code 0.10 K/UL                          

    



             = 1040)                                             

 

             ABSOLUTE BASOPHILS (test code = 0.04 K/UL                          

    



             1069)                                               

 

             ABS IMMATURE GRANULOCYTES (test 0.03 K/UL                          

    



             code = 1020)                                        

 

             ABS NUCLEATED RBCS (test code = 0.00 K/UL                          

    



             44311)                                              



CBC W/AUTO [id number] 00:00:00





             Test Item    Value        Reference Range Interpretation Comments

 

             WBC (test code = 1001) 7.5 K/UL                               

 

             RBC (test code = 1002) 5.19 M/UL                              

 

             HEMOGLOBIN (test code = 1003) 12.8 G/DL                            

  

 

             HEMATOCRIT (test code = 1004) 42.2 %                               

  

 

             MCV (test code = 1005) 81.3 fL                                

 

             MCH (test code = 1006) 24.7 PG                                

 

             MCHC (test code = 1007) 30.3 G/DL                              

 

             RDW (test code = 1038) 14.1 %                                 

 

             NEUTROPHILS (test code = 1008) 53.7 %                              

   

 

             LYMPHOCYTES (test code = 1010) 35.5 %                              

   

 

             MONOCYTES (test code = 1011) 8.6 %                                 

 

 

             EOSINOPHILS (test code = 1012) 1.3 %                               

   

 

             BASOPHILS (test code = 1013) 0.5 %                                 

 

 

             IMMATURE GRANULOCYTES (test 0.4 %                                  



             code = 1036)                                        

 

             NUCLEATED RBCS (test code = 0.0 /100WBC'S                          

 



             1065)                                               

 

             PLATELET COUNT (test code = 331 K/UL                               



             1015)                                               

 

             ABSOLUTE NEUTROPHILS (test code 4.04 K/UL                          

    



             = 1066)                                             

 

             ABSOLUTE LYMPHOCYTES (test code 2.68 K/UL                          

    



             = 1067)                                             

 

             ABSOLUTE MONOCYTES (test code = 0.65 K/UL                          

    



             1068)                                               

 

             ABSOLUTE EOSINOPHILS (test code 0.10 K/UL                          

    



             = 1040)                                             

 

             ABSOLUTE BASOPHILS (test code = 0.04 K/UL                          

    



             1069)                                               

 

             ABS IMMATURE GRANULOCYTES (test 0.03 K/UL                          

    



             code = 1020)                                        

 

             ABS NUCLEATED RBCS (test code = 0.00 K/UL                          

    



             08281)                                              



COMPREHENSIVE METABOLIC [id number] 00:00:00





             Test Item    Value        Reference Range Interpretation Comments

 

             GLUCOSE (test code = 2217) 102 MG/DL                              

 

             BUN (test code = 2208) 6 MG/DL                                

 

             CREATININE (test code = 2214) 0.59 MG/DL                           

  

 

             eGFR ( CKD-EPI) (test 112 ML/MIN/1.73                          

 



             code = 00955)                                        

 

             CALC BUN/CREAT (test code = 10 RATIO                               



             2235)                                               

 

             SODIUM (test code = 2231) 142 MEQ/L                              

 

             POTASSIUM (test code = 2228) 4.1 MEQ/L                             

 

 

             CHLORIDE (test code = 2215) 103 MEQ/L                              

 

             CARBON DIOXIDE (test code = 24 MEQ/L                               



             )                                               

 

             CALCIUM (test code = 2209) 9.3 MG/DL                              

 

             PROTEIN, TOTAL (test code = 8.4 G/DL                               



             )                                               

 

             ALBUMIN (test code = 2201) 3.3 G/DL                               

 

             CALC GLOBULIN (test code = 5.1 G/DL                               



             2240)                                               

 

             CALC A/G RATIO (test code = 0.6 RATIO                              



             2234)                                               

 

             BILIRUBIN, TOTAL (test code = 0.5 MG/DL                            

  



             )                                               

 

             ALKALINE PHOSPHATASE (test 111 U/L                                



             code = 2204)                                        

 

             AST (test code = 2218) 41 U/L                                 

 

             ALT (test code = 2219) 23 U/L                                 



COMPREHENSIVE METABOLIC [id number] 00:00:00





             Test Item    Value        Reference Range Interpretation Comments

 

             GLUCOSE (test code = 2217) 102 MG/DL                              

 

             BUN (test code = 2208) 6 MG/DL                                

 

             CREATININE (test code = 2214) 0.59 MG/DL                           

  

 

             eGFR ( CKD-EPI) (test 112 ML/MIN/1.73                          

 



             code = 74195)                                        

 

             CALC BUN/CREAT (test code = 10 RATIO                               



             2235)                                               

 

             SODIUM (test code = 2231) 142 MEQ/L                              

 

             POTASSIUM (test code = 2228) 4.1 MEQ/L                             

 

 

             CHLORIDE (test code = 2215) 103 MEQ/L                              

 

             CARBON DIOXIDE (test code = 24 MEQ/L                               



             )                                               

 

             CALCIUM (test code = 2209) 9.3 MG/DL                              

 

             PROTEIN, TOTAL (test code = 8.4 G/DL                               



             )                                               

 

             ALBUMIN (test code = 2201) 3.3 G/DL                               

 

             CALC GLOBULIN (test code = 5.1 G/DL                               



             2240)                                               

 

             CALC A/G RATIO (test code = 0.6 RATIO                              



             2234)                                               

 

             BILIRUBIN, TOTAL (test code = 0.5 MG/DL                            

  



             )                                               

 

             ALKALINE PHOSPHATASE (test 111 U/L                                



             code = 2204)                                        

 

             AST (test code = 2218) 41 U/L                                 

 

             ALT (test code = 2219) 23 U/L                                 



SEDIMENTATION [id number] 00:00:00





             Test Item    Value        Reference Range Interpretation Comments

 

             SEDIMENTATION RATE (test code = 43 MM/HOUR                         

    



             1017)                                               



SEDIMENTATION [id number] 00:00:00





             Test Item    Value        Reference Range Interpretation Comments

 

             SEDIMENTATION RATE (test code = 43 MM/HOUR                         

    



             1017)                                               



C-REACTIVE [id number] 00:00:00





             Test Item    Value        Reference Range Interpretation Comments

 

             C-REACTIVE PROTEIN (test code = <0.3 MG/DL                         

    



             3513)                                               



C-REACTIVE [id number] 00:00:00





             Test Item    Value        Reference Range Interpretation Comments

 

             C-REACTIVE PROTEIN (test code = <0.3 MG/DL                         

    



             3513)                                               



CBC W/AUTO [id number] 00:00:00





             Test Item    Value        Reference Range Interpretation Comments

 

             WBC (test code = 1001) 7.5 K/UL                               

 

             RBC (test code = 1002) 5.19 M/UL                              

 

             HEMOGLOBIN (test code = 1003) 12.8 G/DL                            

  

 

             HEMATOCRIT (test code = 1004) 42.2 %                               

  

 

             MCV (test code = 1005) 81.3 fL                                

 

             MCH (test code = 1006) 24.7 PG                                

 

             MCHC (test code = 1007) 30.3 G/DL                              

 

             RDW (test code = 1038) 14.1 %                                 

 

             NEUTROPHILS (test code = 1008) 53.7 %                              

   

 

             LYMPHOCYTES (test code = 1010) 35.5 %                              

   

 

             MONOCYTES (test code = 1011) 8.6 %                                 

 

 

             EOSINOPHILS (test code = 1012) 1.3 %                               

   

 

             BASOPHILS (test code = 1013) 0.5 %                                 

 

 

             IMMATURE GRANULOCYTES (test 0.4 %                                  



             code = 1036)                                        

 

             NUCLEATED RBCS (test code = 0.0 /100WBC'S                          

 



             1065)                                               

 

             PLATELET COUNT (test code = 331 K/UL                               



             1015)                                               

 

             ABSOLUTE NEUTROPHILS (test code 4.04 K/UL                          

    



             = 1066)                                             

 

             ABSOLUTE LYMPHOCYTES (test code 2.68 K/UL                          

    



             = 1067)                                             

 

             ABSOLUTE MONOCYTES (test code = 0.65 K/UL                          

    



             1068)                                               

 

             ABSOLUTE EOSINOPHILS (test code 0.10 K/UL                          

    



             = 1040)                                             

 

             ABSOLUTE BASOPHILS (test code = 0.04 K/UL                          

    



             1069)                                               

 

             ABS IMMATURE GRANULOCYTES (test 0.03 K/UL                          

    



             code = 1020)                                        

 

             ABS NUCLEATED RBCS (test code = 0.00 K/UL                          

    



             82763)                                              



CBC W/AUTO [id number] 00:00:00





             Test Item    Value        Reference Range Interpretation Comments

 

             WBC (test code = 1001) 7.5 K/UL                               

 

             RBC (test code = 1002) 5.19 M/UL                              

 

             HEMOGLOBIN (test code = 1003) 12.8 G/DL                            

  

 

             HEMATOCRIT (test code = 1004) 42.2 %                               

  

 

             MCV (test code = 1005) 81.3 fL                                

 

             MCH (test code = 1006) 24.7 PG                                

 

             MCHC (test code = 1007) 30.3 G/DL                              

 

             RDW (test code = 1038) 14.1 %                                 

 

             NEUTROPHILS (test code = 1008) 53.7 %                              

   

 

             LYMPHOCYTES (test code = 1010) 35.5 %                              

   

 

             MONOCYTES (test code = 1011) 8.6 %                                 

 

 

             EOSINOPHILS (test code = 1012) 1.3 %                               

   

 

             BASOPHILS (test code = 1013) 0.5 %                                 

 

 

             IMMATURE GRANULOCYTES (test 0.4 %                                  



             code = 1036)                                        

 

             NUCLEATED RBCS (test code = 0.0 /100WBC'S                          

 



             1065)                                               

 

             PLATELET COUNT (test code = 331 K/UL                               



             1015)                                               

 

             ABSOLUTE NEUTROPHILS (test code 4.04 K/UL                          

    



             = 1066)                                             

 

             ABSOLUTE LYMPHOCYTES (test code 2.68 K/UL                          

    



             = 1067)                                             

 

             ABSOLUTE MONOCYTES (test code = 0.65 K/UL                          

    



             1068)                                               

 

             ABSOLUTE EOSINOPHILS (test code 0.10 K/UL                          

    



             = 1040)                                             

 

             ABSOLUTE BASOPHILS (test code = 0.04 K/UL                          

    



             1069)                                               

 

             ABS IMMATURE GRANULOCYTES (test 0.03 K/UL                          

    



             code = 1020)                                        

 

             ABS NUCLEATED RBCS (test code = 0.00 K/UL                          

    



             30520)                                              



CBC W/AUTO [id number] 00:00:00





             Test Item    Value        Reference Range Interpretation Comments

 

             WBC (test code = 1001) 7.5 K/UL                               

 

             RBC (test code = 1002) 5.19 M/UL                              

 

             HEMOGLOBIN (test code = 1003) 12.8 G/DL                            

  

 

             HEMATOCRIT (test code = 1004) 42.2 %                               

  

 

             MCV (test code = 1005) 81.3 fL                                

 

             MCH (test code = 1006) 24.7 PG                                

 

             MCHC (test code = 1007) 30.3 G/DL                              

 

             RDW (test code = 1038) 14.1 %                                 

 

             NEUTROPHILS (test code = 1008) 53.7 %                              

   

 

             LYMPHOCYTES (test code = 1010) 35.5 %                              

   

 

             MONOCYTES (test code = 1011) 8.6 %                                 

 

 

             EOSINOPHILS (test code = 1012) 1.3 %                               

   

 

             BASOPHILS (test code = 1013) 0.5 %                                 

 

 

             IMMATURE GRANULOCYTES (test 0.4 %                                  



             code = 1036)                                        

 

             NUCLEATED RBCS (test code = 0.0 /100WBC'S                          

 



             1065)                                               

 

             PLATELET COUNT (test code = 331 K/UL                               



             1015)                                               

 

             ABSOLUTE NEUTROPHILS (test code 4.04 K/UL                          

    



             = 1066)                                             

 

             ABSOLUTE LYMPHOCYTES (test code 2.68 K/UL                          

    



             = 1067)                                             

 

             ABSOLUTE MONOCYTES (test code = 0.65 K/UL                          

    



             1068)                                               

 

             ABSOLUTE EOSINOPHILS (test code 0.10 K/UL                          

    



             = 1040)                                             

 

             ABSOLUTE BASOPHILS (test code = 0.04 K/UL                          

    



             1069)                                               

 

             ABS IMMATURE GRANULOCYTES (test 0.03 K/UL                          

    



             code = 1020)                                        

 

             ABS NUCLEATED RBCS (test code = 0.00 K/UL                          

    



             48846)                                              



COMPREHENSIVE METABOLIC [id number] 00:00:00





             Test Item    Value        Reference Range Interpretation Comments

 

             GLUCOSE (test code = 2217) 102 MG/DL                              

 

             BUN (test code = 2208) 6 MG/DL                                

 

             CREATININE (test code = 2214) 0.59 MG/DL                           

  

 

             eGFR ( CKD-EPI) (test 112 ML/MIN/1.73                          

 



             code = 99424)                                        

 

             CALC BUN/CREAT (test code = 10 RATIO                               



             2235)                                               

 

             SODIUM (test code = 2231) 142 MEQ/L                              

 

             POTASSIUM (test code = 2228) 4.1 MEQ/L                             

 

 

             CHLORIDE (test code = 2215) 103 MEQ/L                              

 

             CARBON DIOXIDE (test code = 24 MEQ/L                               



             220)                                               

 

             CALCIUM (test code = 2209) 9.3 MG/DL                              

 

             PROTEIN, TOTAL (test code = 8.4 G/DL                               



             )                                               

 

             ALBUMIN (test code = 2201) 3.3 G/DL                               

 

             CALC GLOBULIN (test code = 5.1 G/DL                               



             2240)                                               

 

             CALC A/G RATIO (test code = 0.6 RATIO                              



             2234)                                               

 

             BILIRUBIN, TOTAL (test code = 0.5 MG/DL                            

  



             220)                                               

 

             ALKALINE PHOSPHATASE (test 111 U/L                                



             code = 2204)                                        

 

             AST (test code = 2218) 41 U/L                                 

 

             ALT (test code = 2219) 23 U/L                                 



COMPREHENSIVE METABOLIC [id number] 00:00:00





             Test Item    Value        Reference Range Interpretation Comments

 

             GLUCOSE (test code = 2217) 102 MG/DL                              

 

             BUN (test code = 2208) 6 MG/DL                                

 

             CREATININE (test code = 2214) 0.59 MG/DL                           

  

 

             eGFR ( CKD-EPI) (test 112 ML/MIN/1.73                          

 



             code = 78883)                                        

 

             CALC BUN/CREAT (test code = 10 RATIO                               



             2235)                                               

 

             SODIUM (test code = 2231) 142 MEQ/L                              

 

             POTASSIUM (test code = 2228) 4.1 MEQ/L                             

 

 

             CHLORIDE (test code = 2215) 103 MEQ/L                              

 

             CARBON DIOXIDE (test code = 24 MEQ/L                               



             2206)                                               

 

             CALCIUM (test code = 2209) 9.3 MG/DL                              

 

             PROTEIN, TOTAL (test code = 8.4 G/DL                               



             )                                               

 

             ALBUMIN (test code = 2201) 3.3 G/DL                               

 

             CALC GLOBULIN (test code = 5.1 G/DL                               



             0)                                               

 

             CALC A/G RATIO (test code = 0.6 RATIO                              



             2234)                                               

 

             BILIRUBIN, TOTAL (test code = 0.5 MG/DL                            

  



             )                                               

 

             ALKALINE PHOSPHATASE (test 111 U/L                                



             code = 2204)                                        

 

             AST (test code = 2218) 41 U/L                                 

 

             ALT (test code = 2219) 23 U/L                                 



SEDIMENTATION [id number] 00:00:00





             Test Item    Value        Reference Range Interpretation Comments

 

             SEDIMENTATION RATE (test code = 43 MM/HOUR                         

    



             1017)                                               



SEDIMENTATION [id number] 00:00:00





             Test Item    Value        Reference Range Interpretation Comments

 

             SEDIMENTATION RATE (test code = 43 MM/HOUR                         

    



             1017)                                               



C-REACTIVE [id number] 00:00:00





             Test Item    Value        Reference Range Interpretation Comments

 

             C-REACTIVE PROTEIN (test code = <0.3 MG/DL                         

    



             3513)                                               



C-REACTIVE [id number] 00:00:00





             Test Item    Value        Reference Range Interpretation Comments

 

             C-REACTIVE PROTEIN (test code = <0.3 MG/DL                         

    



             3513)                                               



CBC W/AUTO [id number] 00:00:00





             Test Item    Value        Reference Range Interpretation Comments

 

             WBC (test code = 1001) 7.5 K/UL                               

 

             RBC (test code = 1002) 5.19 M/UL                              

 

             HEMOGLOBIN (test code = 1003) 12.8 G/DL                            

  

 

             HEMATOCRIT (test code = 1004) 42.2 %                               

  

 

             MCV (test code = 1005) 81.3 fL                                

 

             MCH (test code = 1006) 24.7 PG                                

 

             MCHC (test code = 1007) 30.3 G/DL                              

 

             RDW (test code = 1038) 14.1 %                                 

 

             NEUTROPHILS (test code = 1008) 53.7 %                              

   

 

             LYMPHOCYTES (test code = 1010) 35.5 %                              

   

 

             MONOCYTES (test code = 1011) 8.6 %                                 

 

 

             EOSINOPHILS (test code = 1012) 1.3 %                               

   

 

             BASOPHILS (test code = 1013) 0.5 %                                 

 

 

             IMMATURE GRANULOCYTES (test 0.4 %                                  



             code = 1036)                                        

 

             NUCLEATED RBCS (test code = 0.0 /100WBC'S                          

 



             1065)                                               

 

             PLATELET COUNT (test code = 331 K/UL                               



             1015)                                               

 

             ABSOLUTE NEUTROPHILS (test code 4.04 K/UL                          

    



             = 1066)                                             

 

             ABSOLUTE LYMPHOCYTES (test code 2.68 K/UL                          

    



             = 1067)                                             

 

             ABSOLUTE MONOCYTES (test code = 0.65 K/UL                          

    



             1068)                                               

 

             ABSOLUTE EOSINOPHILS (test code 0.10 K/UL                          

    



             = 1040)                                             

 

             ABSOLUTE BASOPHILS (test code = 0.04 K/UL                          

    



             1069)                                               

 

             ABS IMMATURE GRANULOCYTES (test 0.03 K/UL                          

    



             code = 1020)                                        

 

             ABS NUCLEATED RBCS (test code = 0.00 K/UL                          

    



             83095)                                              



CBC W/AUTO [id number] 00:00:00





             Test Item    Value        Reference Range Interpretation Comments

 

             WBC (test code = 1001) 7.5 K/UL                               

 

             RBC (test code = 1002) 5.19 M/UL                              

 

             HEMOGLOBIN (test code = 1003) 12.8 G/DL                            

  

 

             HEMATOCRIT (test code = 1004) 42.2 %                               

  

 

             MCV (test code = 1005) 81.3 fL                                

 

             MCH (test code = 1006) 24.7 PG                                

 

             MCHC (test code = 1007) 30.3 G/DL                              

 

             RDW (test code = 1038) 14.1 %                                 

 

             NEUTROPHILS (test code = 1008) 53.7 %                              

   

 

             LYMPHOCYTES (test code = 1010) 35.5 %                              

   

 

             MONOCYTES (test code = 1011) 8.6 %                                 

 

 

             EOSINOPHILS (test code = 1012) 1.3 %                               

   

 

             BASOPHILS (test code = 1013) 0.5 %                                 

 

 

             IMMATURE GRANULOCYTES (test 0.4 %                                  



             code = 1036)                                        

 

             NUCLEATED RBCS (test code = 0.0 /100WBC'S                          

 



             1065)                                               

 

             PLATELET COUNT (test code = 331 K/UL                               



             1015)                                               

 

             ABSOLUTE NEUTROPHILS (test code 4.04 K/UL                          

    



             = 1066)                                             

 

             ABSOLUTE LYMPHOCYTES (test code 2.68 K/UL                          

    



             = 1067)                                             

 

             ABSOLUTE MONOCYTES (test code = 0.65 K/UL                          

    



             1068)                                               

 

             ABSOLUTE EOSINOPHILS (test code 0.10 K/UL                          

    



             = 1040)                                             

 

             ABSOLUTE BASOPHILS (test code = 0.04 K/UL                          

    



             1069)                                               

 

             ABS IMMATURE GRANULOCYTES (test 0.03 K/UL                          

    



             code = 1020)                                        

 

             ABS NUCLEATED RBCS (test code = 0.00 K/UL                          

    



             91964)                                              



CBC W/AUTO [id number] 00:00:00





             Test Item    Value        Reference Range Interpretation Comments

 

             WBC (test code = 1001) 7.5 K/UL                               

 

             RBC (test code = 1002) 5.19 M/UL                              

 

             HEMOGLOBIN (test code = 1003) 12.8 G/DL                            

  

 

             HEMATOCRIT (test code = 1004) 42.2 %                               

  

 

             MCV (test code = 1005) 81.3 fL                                

 

             MCH (test code = 1006) 24.7 PG                                

 

             MCHC (test code = 1007) 30.3 G/DL                              

 

             RDW (test code = 1038) 14.1 %                                 

 

             NEUTROPHILS (test code = 1008) 53.7 %                              

   

 

             LYMPHOCYTES (test code = 1010) 35.5 %                              

   

 

             MONOCYTES (test code = 1011) 8.6 %                                 

 

 

             EOSINOPHILS (test code = 1012) 1.3 %                               

   

 

             BASOPHILS (test code = 1013) 0.5 %                                 

 

 

             IMMATURE GRANULOCYTES (test 0.4 %                                  



             code = 1036)                                        

 

             NUCLEATED RBCS (test code = 0.0 /100WBC'S                          

 



             1065)                                               

 

             PLATELET COUNT (test code = 331 K/UL                               



             1015)                                               

 

             ABSOLUTE NEUTROPHILS (test code 4.04 K/UL                          

    



             = 1066)                                             

 

             ABSOLUTE LYMPHOCYTES (test code 2.68 K/UL                          

    



             = 1067)                                             

 

             ABSOLUTE MONOCYTES (test code = 0.65 K/UL                          

    



             1068)                                               

 

             ABSOLUTE EOSINOPHILS (test code 0.10 K/UL                          

    



             = 1040)                                             

 

             ABSOLUTE BASOPHILS (test code = 0.04 K/UL                          

    



             1069)                                               

 

             ABS IMMATURE GRANULOCYTES (test 0.03 K/UL                          

    



             code = 1020)                                        

 

             ABS NUCLEATED RBCS (test code = 0.00 K/UL                          

    



             52793)                                              



COMPREHENSIVE METABOLIC [id number] 00:00:00





             Test Item    Value        Reference Range Interpretation Comments

 

             GLUCOSE (test code = 2217) 102 MG/DL                              

 

             BUN (test code = 2208) 6 MG/DL                                

 

             CREATININE (test code = 2214) 0.59 MG/DL                           

  

 

             eGFR ( CKD-EPI) (test 112 ML/MIN/1.73                          

 



             code = 00146)                                        

 

             CALC BUN/CREAT (test code = 10 RATIO                               



             2235)                                               

 

             SODIUM (test code = 2231) 142 MEQ/L                              

 

             POTASSIUM (test code = 2228) 4.1 MEQ/L                             

 

 

             CHLORIDE (test code = 2215) 103 MEQ/L                              

 

             CARBON DIOXIDE (test code = 24 MEQ/L                               



             220)                                               

 

             CALCIUM (test code = 2209) 9.3 MG/DL                              

 

             PROTEIN, TOTAL (test code = 8.4 G/DL                               



             )                                               

 

             ALBUMIN (test code = 2201) 3.3 G/DL                               

 

             CALC GLOBULIN (test code = 5.1 G/DL                               



             )                                               

 

             CALC A/G RATIO (test code = 0.6 RATIO                              



             2234)                                               

 

             BILIRUBIN, TOTAL (test code = 0.5 MG/DL                            

  



             220)                                               

 

             ALKALINE PHOSPHATASE (test 111 U/L                                



             code = 2204)                                        

 

             AST (test code = 2218) 41 U/L                                 

 

             ALT (test code = 2219) 23 U/L                                 



COMPREHENSIVE METABOLIC [id number] 00:00:00





             Test Item    Value        Reference Range Interpretation Comments

 

             GLUCOSE (test code = 2217) 102 MG/DL                              

 

             BUN (test code = 2208) 6 MG/DL                                

 

             CREATININE (test code = 2214) 0.59 MG/DL                           

  

 

             eGFR ( CKD-EPI) (test 112 ML/MIN/1.73                          

 



             code = 78115)                                        

 

             CALC BUN/CREAT (test code = 10 RATIO                               



             2235)                                               

 

             SODIUM (test code = 2231) 142 MEQ/L                              

 

             POTASSIUM (test code = 2228) 4.1 MEQ/L                             

 

 

             CHLORIDE (test code = 2215) 103 MEQ/L                              

 

             CARBON DIOXIDE (test code = 24 MEQ/L                               



             )                                               

 

             CALCIUM (test code = 2209) 9.3 MG/DL                              

 

             PROTEIN, TOTAL (test code = 8.4 G/DL                               



             )                                               

 

             ALBUMIN (test code = 2201) 3.3 G/DL                               

 

             CALC GLOBULIN (test code = 5.1 G/DL                               



             2240)                                               

 

             CALC A/G RATIO (test code = 0.6 RATIO                              



             2234)                                               

 

             BILIRUBIN, TOTAL (test code = 0.5 MG/DL                            

  



             )                                               

 

             ALKALINE PHOSPHATASE (test 111 U/L                                



             code = 2204)                                        

 

             AST (test code = 2218) 41 U/L                                 

 

             ALT (test code = 2219) 23 U/L                                 



SEDIMENTATION [id number] 00:00:00





             Test Item    Value        Reference Range Interpretation Comments

 

             SEDIMENTATION RATE (test code = 43 MM/HOUR                         

    



             1017)                                               



SEDIMENTATION [id number] 00:00:00





             Test Item    Value        Reference Range Interpretation Comments

 

             SEDIMENTATION RATE (test code = 43 MM/HOUR                         

    



             1017)                                               



C-REACTIVE [id number] 00:00:00





             Test Item    Value        Reference Range Interpretation Comments

 

             C-REACTIVE PROTEIN (test code = <0.3 MG/DL                         

    



             3513)                                               



C-REACTIVE [id number] 00:00:00





             Test Item    Value        Reference Range Interpretation Comments

 

             C-REACTIVE PROTEIN (test code = <0.3 MG/DL                         

    



             3513)                                               



CBC W/AUTO [id number] 00:00:00





             Test Item    Value        Reference Range Interpretation Comments

 

             WBC (test code = 1001) 7.5 K/UL                               

 

             RBC (test code = 1002) 5.19 M/UL                              

 

             HEMOGLOBIN (test code = 1003) 12.8 G/DL                            

  

 

             HEMATOCRIT (test code = 1004) 42.2 %                               

  

 

             MCV (test code = 1005) 81.3 fL                                

 

             MCH (test code = 1006) 24.7 PG                                

 

             MCHC (test code = 1007) 30.3 G/DL                              

 

             RDW (test code = 1038) 14.1 %                                 

 

             NEUTROPHILS (test code = 1008) 53.7 %                              

   

 

             LYMPHOCYTES (test code = 1010) 35.5 %                              

   

 

             MONOCYTES (test code = 1011) 8.6 %                                 

 

 

             EOSINOPHILS (test code = 1012) 1.3 %                               

   

 

             BASOPHILS (test code = 1013) 0.5 %                                 

 

 

             IMMATURE GRANULOCYTES (test 0.4 %                                  



             code = 1036)                                        

 

             NUCLEATED RBCS (test code = 0.0 /100WBC'S                          

 



             1065)                                               

 

             PLATELET COUNT (test code = 331 K/UL                               



             1015)                                               

 

             ABSOLUTE NEUTROPHILS (test code 4.04 K/UL                          

    



             = 1066)                                             

 

             ABSOLUTE LYMPHOCYTES (test code 2.68 K/UL                          

    



             = 1067)                                             

 

             ABSOLUTE MONOCYTES (test code = 0.65 K/UL                          

    



             1068)                                               

 

             ABSOLUTE EOSINOPHILS (test code 0.10 K/UL                          

    



             = 1040)                                             

 

             ABSOLUTE BASOPHILS (test code = 0.04 K/UL                          

    



             1069)                                               

 

             ABS IMMATURE GRANULOCYTES (test 0.03 K/UL                          

    



             code = 1020)                                        

 

             ABS NUCLEATED RBCS (test code = 0.00 K/UL                          

    



             81540)                                              



CBC W/AUTO [id number] 00:00:00





             Test Item    Value        Reference Range Interpretation Comments

 

             WBC (test code = 1001) 7.5 K/UL                               

 

             RBC (test code = 1002) 5.19 M/UL                              

 

             HEMOGLOBIN (test code = 1003) 12.8 G/DL                            

  

 

             HEMATOCRIT (test code = 1004) 42.2 %                               

  

 

             MCV (test code = 1005) 81.3 fL                                

 

             MCH (test code = 1006) 24.7 PG                                

 

             MCHC (test code = 1007) 30.3 G/DL                              

 

             RDW (test code = 1038) 14.1 %                                 

 

             NEUTROPHILS (test code = 1008) 53.7 %                              

   

 

             LYMPHOCYTES (test code = 1010) 35.5 %                              

   

 

             MONOCYTES (test code = 1011) 8.6 %                                 

 

 

             EOSINOPHILS (test code = 1012) 1.3 %                               

   

 

             BASOPHILS (test code = 1013) 0.5 %                                 

 

 

             IMMATURE GRANULOCYTES (test 0.4 %                                  



             code = 1036)                                        

 

             NUCLEATED RBCS (test code = 0.0 /100WBC'S                          

 



             1065)                                               

 

             PLATELET COUNT (test code = 331 K/UL                               



             1015)                                               

 

             ABSOLUTE NEUTROPHILS (test code 4.04 K/UL                          

    



             = 1066)                                             

 

             ABSOLUTE LYMPHOCYTES (test code 2.68 K/UL                          

    



             = 1067)                                             

 

             ABSOLUTE MONOCYTES (test code = 0.65 K/UL                          

    



             1068)                                               

 

             ABSOLUTE EOSINOPHILS (test code 0.10 K/UL                          

    



             = 1040)                                             

 

             ABSOLUTE BASOPHILS (test code = 0.04 K/UL                          

    



             1069)                                               

 

             ABS IMMATURE GRANULOCYTES (test 0.03 K/UL                          

    



             code = 1020)                                        

 

             ABS NUCLEATED RBCS (test code = 0.00 K/UL                          

    



             72205)                                              



CBC W/AUTO [id number] 00:00:00





             Test Item    Value        Reference Range Interpretation Comments

 

             WBC (test code = 1001) 7.5 K/UL                               

 

             RBC (test code = 1002) 5.19 M/UL                              

 

             HEMOGLOBIN (test code = 1003) 12.8 G/DL                            

  

 

             HEMATOCRIT (test code = 1004) 42.2 %                               

  

 

             MCV (test code = 1005) 81.3 fL                                

 

             MCH (test code = 1006) 24.7 PG                                

 

             MCHC (test code = 1007) 30.3 G/DL                              

 

             RDW (test code = 1038) 14.1 %                                 

 

             NEUTROPHILS (test code = 1008) 53.7 %                              

   

 

             LYMPHOCYTES (test code = 1010) 35.5 %                              

   

 

             MONOCYTES (test code = 1011) 8.6 %                                 

 

 

             EOSINOPHILS (test code = 1012) 1.3 %                               

   

 

             BASOPHILS (test code = 1013) 0.5 %                                 

 

 

             IMMATURE GRANULOCYTES (test 0.4 %                                  



             code = 1036)                                        

 

             NUCLEATED RBCS (test code = 0.0 /100WBC'S                          

 



             1065)                                               

 

             PLATELET COUNT (test code = 331 K/UL                               



             1015)                                               

 

             ABSOLUTE NEUTROPHILS (test code 4.04 K/UL                          

    



             = 1066)                                             

 

             ABSOLUTE LYMPHOCYTES (test code 2.68 K/UL                          

    



             = 1067)                                             

 

             ABSOLUTE MONOCYTES (test code = 0.65 K/UL                          

    



             1068)                                               

 

             ABSOLUTE EOSINOPHILS (test code 0.10 K/UL                          

    



             = 1040)                                             

 

             ABSOLUTE BASOPHILS (test code = 0.04 K/UL                          

    



             1069)                                               

 

             ABS IMMATURE GRANULOCYTES (test 0.03 K/UL                          

    



             code = 1020)                                        

 

             ABS NUCLEATED RBCS (test code = 0.00 K/UL                          

    



             14709)                                              



COMPREHENSIVE METABOLIC [id number] 00:00:00





             Test Item    Value        Reference Range Interpretation Comments

 

             GLUCOSE (test code = 2217) 102 MG/DL                              

 

             BUN (test code = 2208) 6 MG/DL                                

 

             CREATININE (test code = 2214) 0.59 MG/DL                           

  

 

             eGFR ( CKD-EPI) (test 112 ML/MIN/1.73                          

 



             code = 67572)                                        

 

             CALC BUN/CREAT (test code = 10 RATIO                               



             2235)                                               

 

             SODIUM (test code = 2231) 142 MEQ/L                              

 

             POTASSIUM (test code = 2228) 4.1 MEQ/L                             

 

 

             CHLORIDE (test code = 2215) 103 MEQ/L                              

 

             CARBON DIOXIDE (test code = 24 MEQ/L                               



             2206)                                               

 

             CALCIUM (test code = 2209) 9.3 MG/DL                              

 

             PROTEIN, TOTAL (test code = 8.4 G/DL                               



             )                                               

 

             ALBUMIN (test code = 2201) 3.3 G/DL                               

 

             CALC GLOBULIN (test code = 5.1 G/DL                               



             2240)                                               

 

             CALC A/G RATIO (test code = 0.6 RATIO                              



             2234)                                               

 

             BILIRUBIN, TOTAL (test code = 0.5 MG/DL                            

  



             )                                               

 

             ALKALINE PHOSPHATASE (test 111 U/L                                



             code = 2204)                                        

 

             AST (test code = 2218) 41 U/L                                 

 

             ALT (test code = 2219) 23 U/L                                 



COMPREHENSIVE METABOLIC [id number] 00:00:00





             Test Item    Value        Reference Range Interpretation Comments

 

             GLUCOSE (test code = 2217) 102 MG/DL                              

 

             BUN (test code = 2208) 6 MG/DL                                

 

             CREATININE (test code = 2214) 0.59 MG/DL                           

  

 

             eGFR ( CKD-EPI) (test 112 ML/MIN/1.73                          

 



             code = 98620)                                        

 

             CALC BUN/CREAT (test code = 10 RATIO                               



             2235)                                               

 

             SODIUM (test code = 2231) 142 MEQ/L                              

 

             POTASSIUM (test code = 2228) 4.1 MEQ/L                             

 

 

             CHLORIDE (test code = 2215) 103 MEQ/L                              

 

             CARBON DIOXIDE (test code = 24 MEQ/L                               



             2206)                                               

 

             CALCIUM (test code = 2209) 9.3 MG/DL                              

 

             PROTEIN, TOTAL (test code = 8.4 G/DL                               



             222)                                               

 

             ALBUMIN (test code = 2201) 3.3 G/DL                               

 

             CALC GLOBULIN (test code = 5.1 G/DL                               



             2240)                                               

 

             CALC A/G RATIO (test code = 0.6 RATIO                              



             2234)                                               

 

             BILIRUBIN, TOTAL (test code = 0.5 MG/DL                            

  



             7)                                               

 

             ALKALINE PHOSPHATASE (test 111 U/L                                



             code = 2204)                                        

 

             AST (test code = 2218) 41 U/L                                 

 

             ALT (test code = 2219) 23 U/L                                 



AFB CULTURE + SMEAR (NON-SPUTUM)2022 15:11:10





             Test Item    Value        Reference Range Interpretation Comments

 

             CULTURE (BEAKER) (test No acid-fast bacilli                        

   



             code = 1095) isolated in 42 days                           

 

             AFB SMEAR (BEAKER) No acid fast bacilli                           



             (test code = 994) seen                                   



AFB CULTURE + SMEAR (NON-SPUTUM)2022 15:11:09





             Test Item    Value        Reference Range Interpretation Comments

 

             CULTURE (BEAKER) (test No acid-fast bacilli                        

   



             code = 1095) isolated in 42 days                           

 

             AFB SMEAR (BEAKER) No acid fast bacilli                           



             (test code = 994) seen                                   



FUNGUS CULTURE + SMEAR2022-03-10 17:25:00





             Test Item    Value        Reference Range Interpretation Comments

 

             CULTURE (BEAKER)                           A            Same organi

sm has been



             (test code =                                        isolated from



             1095)                                               cultures(s) of 

the same



                                                                 body site and



                                                                 collection date

. Repeat



                                                                 identification 

and



                                                                 susceptibility 

testing



                                                                 performed only 

after



                                                                 consultation wi

th the



                                                                 clinical microb

iology



                                                                 laboratory.

 

             FUNGUS SMEAR No fungi seen                           



             (BEAKER) (test                                        



             code = 1406)                                        



Refer to previous culture of Candida albicans.FUNGUS CULTURE + SMEAR2022-03-10 
17:18:56





             Test Item    Value        Reference Range Interpretation Comments

 

             CULTURE (BEAKER)                           A            1+ Candida



             (test code = 1095)                                        albicans

 

             FUNGUS SMEAR <1+ budding yeast                           



             (BEAKER) (test                                        



             code = 1406)                                        



BASIC METABOLIC [id number] 05:52:49





             Test Item    Value        Reference Range Interpretation Comments

 

             SODIUM (BEAKER) 139 meq/L    136-145                   



             (test code = 381)                                        

 

             POTASSIUM (BEAKER) 4.1 meq/L    3.5-5.1                   Specimen 

slightly



             (test code = 379)                                        hemolyzed

 

             CHLORIDE (BEAKER) 109 meq/L           H            



             (test code = 382)                                        

 

             CO2 (BEAKER) (test 23 meq/L     22-29                     



             code = 355)                                         

 

             BLOOD UREA NITROGEN 7 mg/dL      7-21                      



             (BEAKER) (test code                                        



             = 354)                                              

 

             CREATININE (BEAKER) 0.50 mg/dL   0.57-1.25    L            Specimen

 slightly



             (test code = 358)                                        hemolyzed

 

             GLUCOSE RANDOM 78 mg/dL                         



             (BEAKER) (test code                                        



             = 652)                                              

 

             CALCIUM (BEAKER) 8.4 mg/dL    8.4-10.2                  



             (test code = 697)                                        

 

             EGFR (BEAKER) (test 160 mL/min/1.73                           ESTIM

ATED GFR IS



             code = 1092) sq m                                   NOT AS ACCURATE

 AS



                                                                 CREATININE



                                                                 CLEARANCE IN



                                                                 PREDICTING



                                                                 GLOMERULAR



                                                                 FILTRATION RATE

.



                                                                 ESTIMATED GFR I

S



                                                                 NOT APPLICABLE 

FOR



                                                                 DIALYSIS PATIEN

TS.



 ID - CHAN WCBC W/PLT COUNT &amp; AUTO [id number] 04:04:33





             Test Item    Value        Reference Range Interpretation Comments

 

             WHITE BLOOD CELL COUNT (BEAKER) 5.6 K/ L     3.5-10.5              

    



             (test code = 775)                                        

 

             RED BLOOD CELL COUNT (BEAKER) 3.19 M/ L    3.93-5.22    L          

  



             (test code = 761)                                        

 

             HEMOGLOBIN (BEAKER) (test code = 8.8 GM/DL    11.2-15.7    L       

     



             410)                                                

 

             HEMATOCRIT (BEAKER) (test code = 28.7 %       34.1-44.9    L       

     



             411)                                                

 

             MEAN CORPUSCULAR VOLUME (BEAKER) 90.0 fL      79.4-94.8            

     



             (test code = 753)                                        

 

             MEAN CORPUSCULAR HEMOGLOBIN 27.6 pg      25.6-32.2                 



             (BEAKER) (test code = 751)                                        

 

             MEAN CORPUSCULAR HEMOGLOBIN CONC 30.7 GM/DL   32.2-35.5    L       

     



             (BEAKER) (test code = 752)                                        

 

             RED CELL DISTRIBUTION WIDTH 17.9 %       11.7-14.4    H            



             (BEAKER) (test code = 412)                                        

 

             PLATELET COUNT (BEAKER) (test 307 K/CU MM  150-450                 

  



             code = 756)                                         

 

             MEAN PLATELET VOLUME (BEAKER) 11.3 fL      9.4-12.3                

  



             (test code = 754)                                        

 

             NUCLEATED RED BLOOD CELLS 2 /100 WBC   0-0          H            



             (BEAKER) (test code = 413)                                        

 

             NEUTROPHILS RELATIVE PERCENT 39 %                                  

 



             (BEAKER) (test code = 429)                                        

 

             LYMPHOCYTES RELATIVE PERCENT 46 %                                  

 



             (BEAKER) (test code = 430)                                        

 

             MONOCYTES RELATIVE PERCENT 7 %                                    



             (BEAKER) (test code = 431)                                        

 

             EOSINOPHILS RELATIVE PERCENT 7 %                                   

 



             (BEAKER) (test code = 432)                                        

 

             BASOPHILS RELATIVE PERCENT 0 %                                    



             (BEAKER) (test code = 437)                                        

 

             NEUTROPHILS ABSOLUTE COUNT 2.20 K/ L    1.56-6.13                 



             (BEAKER) (test code = 670)                                        

 

             LYMPHOCYTES ABSOLUTE COUNT 2.55 K/ L    1.18-3.74                 



             (BEAKER) (test code = 414)                                        

 

             MONOCYTES ABSOLUTE COUNT (BEAKER) 0.39 K/ L    0.24-0.36    H      

      



             (test code = 415)                                        

 

             EOSINOPHILS ABSOLUTE COUNT 0.41 K/ L    0.04-0.36    H            



             (BEAKER) (test code = 416)                                        

 

             BASOPHILS ABSOLUTE COUNT (BEAKER) 0.01 K/ L    0.01-0.08           

      



             (test code = 417)                                        

 

             IMMATURE GRANULOCYTES-RELATIVE 0 %          0-1                    

   



             PERCENT (BEAKER) (test code =                                      

  



             2801)                                               



SARS-COV2/RT-PCR ([facility]; [facility])2022 00:28:31





             Test Item    Value        Reference Range Interpretation Comments

 

             SARS-COV2/RT-PCR Negative     Negative                  The SARS-Co

V-2 target



             (test code =                                        nucleic acids a

re not



             [id number])                                            detected in thi

s specimen.



                                                                 Negative result

s do not



                                                                 preclude SARS-C

oV-2



                                                                 infection and s

hould not be



                                                                 used as the sol

e basis for



                                                                 patient managem

ent



                                                                 decisions. Nega

tive results



                                                                 must be combine

d with



                                                                 clinical observ

ations,



                                                                 patient history

, and



                                                                 epidemiological

 information.



                                                                 A false negativ

e result may



                                                                 occur if a spec

imen is



                                                                 improperly pee

ected,



                                                                 transported or 

handled. This



                                                                 SARS CoV-2 test

 is a rapid,



                                                                 real-time RT-PC

R test



                                                                 intended for th

e qualitative



                                                                 detection of nu

cleic acid



                                                                 from SARS-CoV-2

 in a



                                                                 nasopharyngeal 

swab specimen



                                                                 collected from 

individuals



                                                                 suspected of CO

VID-19 by



                                                                 their healthcar

e provider.



This test has been authorized by FDA under an EUA for use by authorized 
laboratories. This test is only authorized for the duration of the declaration 
that circumstances exist justifying the authorization of emergency use of in 
vitro diagnostic tests for detection and/or diagnosis of COVID-19 under Section 
564(b)(1) of the Federal Food, Drug and Cosmetic Act, 21 U.S.C. 360bbb-3(b)(1), 
unless the authorization is terminated or revoked sooner. Fact Sheet for 
Healthcare Providers: https://www.cepheid.co
m/Documents/Xpert%20Xpress%20SARS%20CoV-2/Fact%20Sheets/302-3802%97RNNR-XPW-5%20

HEALTHCARE%20PROVIDERS%20FACT%20SHEET.pdf Fact Sheet for Healthcare Patients: 
https://www.[employer]/Documents/Xpert%20Xp
ress%20SARS%20CoV-2/Fact%20Sheets/302-3801%50PAAU-CBT-0%20PATIENT%20FACT%20SHEET

.pdf(CELLAVISION MANUAL DIFF)2022 06:48:58





             Test Item    Value        Reference Range Interpretation Comments

 

             NEUTROPHILS - REL 64 %                                   



             (CELLAVISION)(BEAKER) (test code                                   

     



             = 2816)                                             

 

             LYMPHOCYTES - REL 29 %                                   



             (CELLAVISION)(BEAKER) (test code                                   

     



             = 2817)                                             

 

             MONOCYTES - REL 1 %                                    



             (CELLAVISION)(BEAKER) (test code                                   

     



             = 2818)                                             

 

             EOSINOPHILS - REL 4 %                                    



             (CELLAVISION)(BEAKER) (test code                                   

     



             = 2819)                                             

 

             BASOPHILS - REL 1 %                                    



             (CELLAVISION)(BEAKER) (test code                                   

     



             = 2820)                                             

 

             ATYPICAL LYMPHOCYTES - REL 1 %          0-0          H            



             (CELLAVISION)(BEAKER) (test code                                   

     



             = 2829)                                             

 

             NEUTROPHILS - ABS 3.71 K/ul    1.56-6.13                 



             (CELLAVISION)(BEAKER) (test code                                   

     



             = 2830)                                             

 

             LYMPHOCYTES - ABS 1.68 K/ul    1.18-3.74                 



             (CELLAVISION)(BEAKER) (test code                                   

     



             = 2831)                                             

 

             MONOCYTES - ABS 0.06 K/uL    0.24-0.36    L            



             (CELLAVISION)(BEAKER) (test code                                   

     



             = 2832)                                             

 

             EOSINOPHILS - ABS 0.23 K/uL    0.04-0.36                 



             (CELLAVISION)(BEAKER) (test code                                   

     



             = 2834)                                             

 

             BASOPHILS - ABS 0.06 K/uL    0.01-0.08                 



             (CELLAVISION)(BEAKER) (test code                                   

     



             = 2835)                                             

 

             ATYPICAL LYMPHOCYTES - ABS 0.06 K/uL    0.00-0.00    H            



             (CELLAVISION)(BEAKER) (test code                                   

     



             = 2858)                                             

 

             TOTAL COUNTED (BEAKER) (test code 100                              

      



             = 1351)                                             

 

             MANUAL NRBC  CELLS 1 /100 WBC   0-0          H            



             (BEAKER) (test code = 1353)                                        

 

             PLT MORPHOLOGY (BEAKER) (test Normal                               

  



             code = 486)                                         

 

             SMUDGE CELLS (BEAKER) (test code Present                           

     



             = 1371)                                             

 

             POLYCHROMATOPHILLIC RBCS(BEAKER) 2+ moderate                       

     



             (test code = 478)                                        

 

             HYPOCHROMIA (BEAKER) (test code = 2+ moderate                      

      



             963)                                                

 

             ANISOCYTOSIS (BEAKER) (test code 1+ few                            

     



             = 961)                                              

 

             MACROCYTES (BEAKER) (test code = 1+ few                            

     



             964)                                                

 

             POIKILOCYTES (BEAKER) (test code 1+ few                            

     



             = 966)                                              

 

             TARGET CELLS (BEAKER) (test code 1+ few                            

     



             = 480)                                              

 

             OVALOCYTES (BEAKER) (test code = 1+ few                            

     



             477)                                                

 

             TEAR DROP CELLS (BEAKER) (test 1+ few                              

   



             code = 481)                                         

 

             BASOPHILIC STIPPLING (BEAKER) Present                              

  



             (test code = 473)                                        

 

             ARTIFACT (CELLAVISION)(BEAKER) Present                             

   



             (test code = 3432)                                        

 

             PLATELET CONCENTRATION Adequate                               



             (CELLAVISION)(BEAKER) (test code                                   

     



             = 3438)                                             



 ID - Janet Quiles comments: Slide comments:CBC W/PLT COUNT 
&amp; AUTO [id number] 06:48:56





             Test Item    Value        Reference Range Interpretation Comments

 

             WHITE BLOOD CELL COUNT (BEAKER) 5.8 K/ L     3.5-10.5              

    



             (test code = 775)                                        

 

             RED BLOOD CELL COUNT (BEAKER) 3.23 M/ L    3.93-5.22    L          

  



             (test code = 761)                                        

 

             HEMOGLOBIN (BEAKER) (test code = 8.7 GM/DL    11.2-15.7    L       

     



             410)                                                

 

             HEMATOCRIT (BEAKER) (test code = 29.2 %       34.1-44.9    L       

     



             411)                                                

 

             MEAN CORPUSCULAR VOLUME (BEAKER) 90.4 fL      79.4-94.8            

     



             (test code = 753)                                        

 

             MEAN CORPUSCULAR HEMOGLOBIN 26.9 pg      25.6-32.2                 



             (BEAKER) (test code = 751)                                        

 

             MEAN CORPUSCULAR HEMOGLOBIN CONC 29.8 GM/DL   32.2-35.5    L       

     



             (BEAKER) (test code = 752)                                        

 

             RED CELL DISTRIBUTION WIDTH 17.6 %       11.7-14.4    H            



             (BEAKER) (test code = 412)                                        

 

             PLATELET COUNT (BEAKER) (test 285 K/CU MM  150-450                 

  



             code = 756)                                         

 

             MEAN PLATELET VOLUME (BEAKER) 10.3 fL      9.4-12.3                

  



             (test code = 754)                                        

 

             NUCLEATED RED BLOOD CELLS 0 /100 WBC   0-0                       



             (BEAKER) (test code = 413)                                        



HEPATIC FUNCTION [id number] 05:22:54





             Test Item    Value        Reference Range Interpretation Comments

 

             TOTAL PROTEIN (BEAKER) (test code = 5.6 gm/dL    6.0-8.3      L    

        



             770)                                                

 

             ALBUMIN (BEAKER) (test code = 1145) 2.2 g/dL     3.5-5.0      L    

        

 

             BILIRUBIN TOTAL (BEAKER) (test code 1.4 mg/dL    0.2-1.2      H    

        



             = 377)                                              

 

             BILIRUBIN DIRECT (BEAKER) (test 1.1 mg/dL    0.1-0.5      H        

    



             code = 706)                                         

 

             ALKALINE PHOSPHATASE (BEAKER) (test 105 U/L                  

        



             code = 346)                                         

 

             AST (SGOT) (BEAKER) (test code = 66 U/L       5-34         H       

     



             353)                                                

 

             ALT (SGPT) (BEAKER) (test code = 57 U/L       6-55         H       

     



             347)                                                



 ID - OMAIRA GBASIC METABOLIC [id number] 05:22:53





             Test Item    Value        Reference Range Interpretation Comments

 

             SODIUM (BEAKER) 139 meq/L    136-145                   



             (test code = 381)                                        

 

             POTASSIUM (BEAKER) 4.3 meq/L    3.5-5.1                   



             (test code = 379)                                        

 

             CHLORIDE (BEAKER) 107 meq/L                        



             (test code = 382)                                        

 

             CO2 (BEAKER) (test 28 meq/L     22-29                     



             code = 355)                                         

 

             BLOOD UREA NITROGEN 8 mg/dL      7-21                      



             (BEAKER) (test code                                        



             = 354)                                              

 

             CREATININE (BEAKER) 0.47 mg/dL   0.57-1.25    L            



             (test code = 358)                                        

 

             GLUCOSE RANDOM 81 mg/dL                         



             (BEAKER) (test code                                        



             = 652)                                              

 

             CALCIUM (BEAKER) 8.3 mg/dL    8.4-10.2     L            



             (test code = 697)                                        

 

             EGFR (BEAKER) (test 172 mL/min/1.73                           ESTIM

ATED GFR IS



             code = 1092) sq m                                   NOT AS ACCURATE

 AS



                                                                 CREATININE



                                                                 CLEARANCE IN



                                                                 PREDICTING



                                                                 GLOMERULAR



                                                                 FILTRATION RATE

.



                                                                 ESTIMATED GFR I

S



                                                                 NOT APPLICABLE 

FOR



                                                                 DIALYSIS PATIEN

TS.



 ID - OMAIRA GPOCT-GLUCOSE [id number] 08:23:03





             Test Item    Value        Reference Range Interpretation Comments

 

             POC-GLUCOSE METER 81 mg/dL                         : TESTED A

T [facility] [address]



             (BEAKER) (test code =                                        [address] [place],



             [address])                                               [id number]:



                                                                 /Techni

alexis ID =



                                                                 [id number] for Rosana Rios



[id number] 06:20:29





             Test Item    Value        Reference Range Interpretation Comments

 

             PHOSPHORUS (BEAKER) (test code = 2.5 mg/dL    2.3-4.7              

     



             604)                                                



 ID Juan CATALAN MBASIC METABOLIC [id number] 06:20:28





             Test Item    Value        Reference Range Interpretation Comments

 

             SODIUM (BEAKER) 140 meq/L    136-145                   



             (test code = 381)                                        

 

             POTASSIUM (BEAKER) 3.8 meq/L    3.5-5.1                   



             (test code = 379)                                        

 

             CHLORIDE (BEAKER) 107 meq/L                        



             (test code = 382)                                        

 

             CO2 (BEAKER) (test 30 meq/L     22-29        H            



             code = 355)                                         

 

             BLOOD UREA NITROGEN 6 mg/dL      7-21         L            



             (BEAKER) (test code                                        



             = 354)                                              

 

             CREATININE (BEAKER) 0.46 mg/dL   0.57-1.25    L            



             (test code = 358)                                        

 

             GLUCOSE RANDOM 80 mg/dL                         



             (BEAKER) (test code                                        



             = 652)                                              

 

             CALCIUM (BEAKER) 8.0 mg/dL    8.4-10.2     L            



             (test code = 697)                                        

 

             EGFR (BEAKER) (test 176 mL/min/1.73                           ESTIM

ATED GFR IS



             code = 1092) sq m                                   NOT AS ACCURATE

 AS



                                                                 CREATININE



                                                                 CLEARANCE IN



                                                                 PREDICTING



                                                                 GLOMERULAR



                                                                 FILTRATION RATE

.



                                                                 ESTIMATED GFR I

S



                                                                 NOT APPLICABLE 

FOR



                                                                 DIALYSIS PATIEN

TS.



 ID - ALAYNA [id number] 06:20:27





             Test Item    Value        Reference Range Interpretation Comments

 

             MAGNESIUM (BEAKER) (test code = 1.7 mg/dL    1.6-2.6               

    



             627)                                                



 ID - ALAYNA MCBC W/PLT COUNT &amp; AUTO [id number] 05:46:52





             Test Item    Value        Reference Range Interpretation Comments

 

             WHITE BLOOD CELL COUNT (BEAKER) 5.5 K/ L     3.5-10.5              

    



             (test code = 775)                                        

 

             RED BLOOD CELL COUNT (BEAKER) 3.18 M/ L    3.93-5.22    L          

  



             (test code = 761)                                        

 

             HEMOGLOBIN (BEAKER) (test code = 8.7 GM/DL    11.2-15.7    L       

     



             410)                                                

 

             HEMATOCRIT (BEAKER) (test code = 29.3 %       34.1-44.9    L       

     



             411)                                                

 

             MEAN CORPUSCULAR VOLUME (BEAKER) 92.1 fL      79.4-94.8            

     



             (test code = 753)                                        

 

             MEAN CORPUSCULAR HEMOGLOBIN 27.4 pg      25.6-32.2                 



             (BEAKER) (test code = 751)                                        

 

             MEAN CORPUSCULAR HEMOGLOBIN CONC 29.7 GM/DL   32.2-35.5    L       

     



             (BEAKER) (test code = 752)                                        

 

             RED CELL DISTRIBUTION WIDTH 18.0 %       11.7-14.4    H            



             (BEAKER) (test code = 412)                                        

 

             PLATELET COUNT (BEAKER) (test 306 K/CU MM  150-450                 

  



             code = 756)                                         

 

             MEAN PLATELET VOLUME (BEAKER) 10.6 fL      9.4-12.3                

  



             (test code = 754)                                        

 

             NUCLEATED RED BLOOD CELLS 0 /100 WBC   0-0                       



             (BEAKER) (test code = 413)                                        

 

             NEUTROPHILS RELATIVE PERCENT 43 %                                  

 



             (BEAKER) (test code = 429)                                        

 

             LYMPHOCYTES RELATIVE PERCENT 42 %                                  

 



             (BEAKER) (test code = 430)                                        

 

             MONOCYTES RELATIVE PERCENT 7 %                                    



             (BEAKER) (test code = 431)                                        

 

             EOSINOPHILS RELATIVE PERCENT 8 %                                   

 



             (BEAKER) (test code = 432)                                        

 

             BASOPHILS RELATIVE PERCENT 0 %                                    



             (BEAKER) (test code = 437)                                        

 

             NEUTROPHILS ABSOLUTE COUNT 2.32 K/ L    1.56-6.13                 



             (BEAKER) (test code = 670)                                        

 

             LYMPHOCYTES ABSOLUTE COUNT 2.30 K/ L    1.18-3.74                 



             (BEAKER) (test code = 414)                                        

 

             MONOCYTES ABSOLUTE COUNT (BEAKER) 0.39 K/ L    0.24-0.36    H      

      



             (test code = 415)                                        

 

             EOSINOPHILS ABSOLUTE COUNT 0.41 K/ L    0.04-0.36    H            



             (BEAKER) (test code = 416)                                        

 

             BASOPHILS ABSOLUTE COUNT (BEAKER) 0.01 K/ L    0.01-0.08           

      



             (test code = 417)                                        

 

             IMMATURE GRANULOCYTES-RELATIVE 0 %          0-1                    

   



             PERCENT (BEAKER) (test code =                                      

  



             2801)                                               



POCT-GLUCOSE [id number] 21:54:46





             Test Item    Value        Reference Range Interpretation Comments

 

             POC-GLUCOSE METER 89 mg/dL                         : TESTED A

T [facility] [address]



             (BEAKER) (test code =                                        [facility],



             [address])                                               [address]:



                                                                 /Techni

alexis ID =



                                                                 [id number] for Orlin saucedo



                                                                 (contract)Jc

ra



POCT-GLUCOSE [id number] 16:21:03





             Test Item    Value        Reference Range Interpretation Comments

 

             POC-GLUCOSE METER 97 mg/dL                         : TESTED A

T [facility] [address]



             (BEAKER) (test code =                                        [facility],



             [address])                                               [id number]:



                                                                 /Techni

alexis ID =



                                                                 [id number] for LEWI

S



                                                                 -Ethel, LATAND

JUDY



POCT-GLUCOSE [id number] 11:53:49





             Test Item    Value        Reference Range Interpretation Comments

 

             POC-GLUCOSE METER 114 mg/dL           H            : TESTED A

T [facility] [address]



             (BEAKER) (test code                                        [facility],



             = 1538)                                             [address]:



                                                                 /Techni

alexis ID =



                                                                 [id number] for LEWI

S



                                                                 -Ethel, LATAND

JUDY



(CELLAVISION MANUAL DIFF)2022 09:35:12





             Test Item    Value        Reference Range Interpretation Comments

 

             NEUTROPHILS - REL 73 %                                   



             (CELLAVISION)(BEAKER) (test code                                   

     



             = 2816)                                             

 

             LYMPHOCYTES - REL 22 %                                   



             (CELLAVISION)(BEAKER) (test code                                   

     



             = 2817)                                             

 

             MONOCYTES - REL 3 %                                    



             (CELLAVISION)(BEAKER) (test code                                   

     



             = 2818)                                             

 

             EOSINOPHILS - REL 1 %                                    



             (CELLAVISION)(BEAKER) (test code                                   

     



             = 2819)                                             

 

             ATYPICAL LYMPHOCYTES - REL 1 %          0-0          H            



             (CELLAVISION)(BEAKER) (test code                                   

     



             = 2829)                                             

 

             NEUTROPHILS - ABS 3.87 K/ul    1.56-6.13                 



             (CELLAVISION)(BEAKER) (test code                                   

     



             = 2830)                                             

 

             LYMPHOCYTES - ABS 1.17 K/ul    1.18-3.74    L            



             (CELLAVISION)(BEAKER) (test code                                   

     



             = 2831)                                             

 

             MONOCYTES - ABS 0.16 K/uL    0.24-0.36    L            



             (CELLAVISION)(BEAKER) (test code                                   

     



             = 2832)                                             

 

             EOSINOPHILS - ABS 0.05 K/uL    0.04-0.36                 



             (CELLAVISION)(BEAKER) (test code                                   

     



             = 2834)                                             

 

             ATYPICAL LYMPHOCYTES - ABS 0.05 K/uL    0.00-0.00    H            



             (CELLAVISION)(BEAKER) (test code                                   

     



             = 2858)                                             

 

             TOTAL COUNTED (BEAKER) (test code 100                              

      



             = 1351)                                             

 

             MANUAL NRBC  CELLS 2 /100 WBC   0-0          H            



             (BEAKER) (test code = 1353)                                        

 

             PLT MORPHOLOGY (BEAKER) (test Normal                               

  



             code = 486)                                         

 

             SMUDGE CELLS (BEAKER) (test code Present                           

     



             = 1371)                                             

 

             POLYCHROMATOPHILLIC RBCS(BEAKER) 1+ few                            

     



             (test code = 478)                                        

 

             HYPOCHROMIA (BEAKER) (test code = 1+ few                           

      



             963)                                                

 

             ANISOCYTOSIS (BEAKER) (test code 2+ moderate                       

     



             = 961)                                              

 

             MACROCYTES (BEAKER) (test code = 2+ moderate                       

     



             964)                                                

 

             POIKILOCYTES (BEAKER) (test code 1+ few                            

     



             = 966)                                              

 

             TARGET CELLS (BEAKER) (test code 1+ few                            

     



             = 480)                                              

 

             OVALOCYTES (BEAKER) (test code = 1+ few                            

     



             477)                                                

 

             TEAR DROP CELLS (BEAKER) (test 1+ few                              

   



             code = 481)                                         

 

             ARTIFACT (CELLAVISION)(BEAKER) Present                             

   



             (test code = 3432)                                        

 

             PLATELET CONCENTRATION Adequate                               



             (CELLAVISION)(BEAKER) (test code                                   

     



             = 3438)                                             



 ID - Janet Quiles comments: Slide comments:CBC W/PLT COUNT 
&amp; AUTO [id number] 09:35:11





             Test Item    Value        Reference Range Interpretation Comments

 

             WHITE BLOOD CELL COUNT (BEAKER) 5.3 K/ L     3.5-10.5              

    



             (test code = 775)                                        

 

             RED BLOOD CELL COUNT (BEAKER) 3.02 M/ L    3.93-5.22    L          

  



             (test code = 761)                                        

 

             HEMOGLOBIN (BEAKER) (test code = 8.2 GM/DL    11.2-15.7    L       

     



             410)                                                

 

             HEMATOCRIT (BEAKER) (test code = 27.7 %       34.1-44.9    L       

     



             411)                                                

 

             MEAN CORPUSCULAR VOLUME (BEAKER) 91.7 fL      79.4-94.8            

     



             (test code = 753)                                        

 

             MEAN CORPUSCULAR HEMOGLOBIN 27.2 pg      25.6-32.2                 



             (BEAKER) (test code = 751)                                        

 

             MEAN CORPUSCULAR HEMOGLOBIN CONC 29.6 GM/DL   32.2-35.5    L       

     



             (BEAKER) (test code = 752)                                        

 

             RED CELL DISTRIBUTION WIDTH 18.6 %       11.7-14.4    H            



             (BEAKER) (test code = 412)                                        

 

             PLATELET COUNT (BEAKER) (test 312 K/CU MM  150-450                 

  



             code = 756)                                         

 

             MEAN PLATELET VOLUME (BEAKER) 10.9 fL      9.4-12.3                

  



             (test code = 754)                                        

 

             NUCLEATED RED BLOOD CELLS 0 /100 WBC   0-0                       



             (BEAKER) (test code = 413)                                        



POCT-GLUCOSE [id number] 08:24:20





             Test Item    Value        Reference Range Interpretation Comments

 

             POC-GLUCOSE METER 78 mg/dL                         : TESTED A

T [facility] [address]



             (BEAKER) (test code =                                        [place]

[place],



             1538)                                               [id number]:



                                                                 /Techni

alexis ID =



                                                                 [id number] for POP Aguila

JUDY



HEPATIC FUNCTION [id number] 05:45:02





             Test Item    Value        Reference Range Interpretation Comments

 

             TOTAL PROTEIN (BEAKER) (test code = 5.2 gm/dL    6.0-8.3      L    

        



             770)                                                

 

             ALBUMIN (BEAKER) (test code = 1145) 2.1 g/dL     3.5-5.0      L    

        

 

             BILIRUBIN TOTAL (BEAKER) (test code 1.6 mg/dL    0.2-1.2      H    

        



             = 377)                                              

 

             BILIRUBIN DIRECT (BEAKER) (test 1.2 mg/dL    0.1-0.5      H        

    



             code = 706)                                         

 

             ALKALINE PHOSPHATASE (BEAKER) (test 80 U/L                   

        



             code = 346)                                         

 

             AST (SGOT) (BEAKER) (test code = 68 U/L       5-34         H       

     



             353)                                                

 

             ALT (SGPT) (BEAKER) (test code = 74 U/L       6-55         H       

     



             347)                                                



 ID - [id number] 05:45:01





             Test Item    Value        Reference Range Interpretation Comments

 

             MAGNESIUM (BEAKER) (test code = 1.7 mg/dL    1.6-2.6               

    



             627)                                                



 ID - [id number] 05:45:01





             Test Item    Value        Reference Range Interpretation Comments

 

             PHOSPHORUS (BEAKER) (test code = 2.6 mg/dL    2.3-4.7              

     



             604)                                                



 ID - EOBASIC METABOLIC [id number] 05:45:00





             Test Item    Value        Reference Range Interpretation Comments

 

             SODIUM (BEAKER) 142 meq/L    136-145                   



             (test code = 381)                                        

 

             POTASSIUM (BEAKER) 3.9 meq/L    3.5-5.1                   



             (test code = 379)                                        

 

             CHLORIDE (BEAKER) 108 meq/L           H            



             (test code = 382)                                        

 

             CO2 (BEAKER) (test 31 meq/L     22-29        H            



             code = 355)                                         

 

             BLOOD UREA NITROGEN 8 mg/dL      7-21                      



             (BEAKER) (test code                                        



             = 354)                                              

 

             CREATININE (BEAKER) 0.55 mg/dL   0.57-1.25    L            



             (test code = 358)                                        

 

             GLUCOSE RANDOM 86 mg/dL                         



             (BEAKER) (test code                                        



             = 652)                                              

 

             CALCIUM (BEAKER) 8.1 mg/dL    8.4-10.2     L            



             (test code = 697)                                        

 

             EGFR (BEAKER) (test 144 mL/min/1.73                           ESTIM

ATED GFR IS



             code = 1092) sq m                                   NOT AS ACCURATE

 AS



                                                                 CREATININE



                                                                 CLEARANCE IN



                                                                 PREDICTING



                                                                 GLOMERULAR



                                                                 FILTRATION RATE

.



                                                                 ESTIMATED GFR I

S



                                                                 NOT APPLICABLE 

FOR



                                                                 DIALYSIS PATIEN

TS.



 ID - EOPOCT-GLUCOSE [id number] 21:13:26





             Test Item    Value        Reference Range Interpretation Comments

 

             POC-GLUCOSE METER 87 mg/dL                         : TESTED A

T [facility] [address]



             ([facility]) (test code =                                        [facility],



             1538)                                               [address]:



                                                                 /Techni

alexis ID =



                                                                 [id number] for Orlin saucedo



                                                                 (contract) Jc

ra



POCT-GLUCOSE [id number] 17:29:06





             Test Item    Value        Reference Range Interpretation Comments

 

             POC-GLUCOSE METER 89 mg/dL                         : TESTED A

T [facility] [address]



             ([facility]) (test code =                                        [facility],



             1538)                                               [address]:



                                                                 /Techni

alexis ID =



                                                                 [id number] for RASHMI

SRADHAIE



POCT-GLUCOSE [id number] 12:36:03





             Test Item    Value        Reference Range Interpretation Comments

 

             POC-GLUCOSE METER 106 mg/dL                        : TESTED A

T [facility] [address]



             ([facility]) (test code =                                        [facility],



             1538)                                               [address]:



                                                                 /Techni

alexis ID



                                                                 = [id number] for LEONOR

SHANTISAAKISTIE



(CELLAVISION MANUAL DIFF)2022 07:17:26





             Test Item    Value        Reference Range Interpretation Comments

 

             NEUTROPHILS - REL 64 %                                   



             (CELLAVISION)(BEAKER) (test code                                   

     



             = 2816)                                             

 

             LYMPHOCYTES - REL 27 %                                   



             (CELLAVISION)(BEAKER) (test code                                   

     



             = 2817)                                             

 

             MONOCYTES - REL 5 %                                    



             (CELLAVISION)(BEAKER) (test code                                   

     



             = 2818)                                             

 

             EOSINOPHILS - REL 4 %                                    



             (CELLAVISION)(BEAKER) (test code                                   

     



             = 2819)                                             

 

             NEUTROPHILS - ABS 3.71 K/ul    1.56-6.13                 



             (CELLAVISION)(BEAKER) (test code                                   

     



             = 2830)                                             

 

             LYMPHOCYTES - ABS 1.57 K/ul    1.18-3.74                 



             (CELLAVISION)(BEAKER) (test code                                   

     



             = 2831)                                             

 

             MONOCYTES - ABS 0.29 K/uL    0.24-0.36                 



             (CELLAVISION)(BEAKER) (test code                                   

     



             = 2832)                                             

 

             EOSINOPHILS - ABS 0.23 K/uL    0.04-0.36                 



             (CELLAVISION)(BEAKER) (test code                                   

     



             = 2834)                                             

 

             TOTAL COUNTED (BEAKER) (test code 100                              

      



             = 1351)                                             

 

             MANUAL NRBC  CELLS 2 /100 WBC   0-0          H            



             (BEAKER) (test code = 1353)                                        

 

             SMUDGE CELLS (BEAKER) (test code Present                           

     



             = 1371)                                             

 

             GIANT PLATELETS (BEAKER) (test Present                             

   



             code = 313)                                         

 

             POLYCHROMATOPHILLIC RBCS(BEAKER) 2+ moderate                       

     



             (test code = 478)                                        

 

             ANISOCYTOSIS (BEAKER) (test code 2+ moderate                       

     



             = 961)                                              

 

             MACROCYTES (BEAKER) (test code = 2+ moderate                       

     



             964)                                                

 

             ARTIFACT (CELLAVISION)(BEAKER) Present                             

   



             (test code = 3432)                                        

 

             PLATELET CONCENTRATION Adequate                               



             (CELLAVISION)(BEAKER) (test code                                   

     



             = 3438)                                             



 ID - nova Vásquez comments: Slide comments:CBC W/PLT COUNT &amp; 
AUTO [id number] 07:17:25





             Test Item    Value        Reference Range Interpretation Comments

 

             WHITE BLOOD CELL COUNT (BEAKER) 5.8 K/ L     3.5-10.5              

    



             (test code = 775)                                        

 

             RED BLOOD CELL COUNT (BEAKER) 3.03 M/ L    3.93-5.22    L          

  



             (test code = 761)                                        

 

             HEMOGLOBIN (BEAKER) (test code = 8.2 GM/DL    11.2-15.7    L       

     



             410)                                                

 

             HEMATOCRIT (BEAKER) (test code = 28.1 %       34.1-44.9    L       

     



             411)                                                

 

             MEAN CORPUSCULAR VOLUME (BEAKER) 92.7 fL      79.4-94.8            

     



             (test code = 753)                                        

 

             MEAN CORPUSCULAR HEMOGLOBIN 27.1 pg      25.6-32.2                 



             (BEAKER) (test code = 751)                                        

 

             MEAN CORPUSCULAR HEMOGLOBIN CONC 29.2 GM/DL   32.2-35.5    L       

     



             (BEAKER) (test code = 752)                                        

 

             RED CELL DISTRIBUTION WIDTH 19.1 %       11.7-14.4    H            



             (BEAKER) (test code = 412)                                        

 

             PLATELET COUNT (BEAKER) (test 326 K/CU MM  150-450                 

  



             code = 756)                                         

 

             MEAN PLATELET VOLUME (BEAKER) 11.0 fL      9.4-12.3                

  



             (test code = 754)                                        

 

             NUCLEATED RED BLOOD CELLS 1 /100 WBC   0-0          H            



             (BEAKER) (test code = 413)                                        



[id number] 05:25:01





             Test Item    Value        Reference Range Interpretation Comments

 

             PHOSPHORUS (BEAKER) (test code = 2.4 mg/dL    2.3-4.7              

     



             604)                                                



 ID - ALAYNA MBASIC METABOLIC [id number] 05:25:00





             Test Item    Value        Reference Range Interpretation Comments

 

             SODIUM (BEAKER) 140 meq/L    136-145                   



             (test code = 381)                                        

 

             POTASSIUM (BEAKER) 3.8 meq/L    3.5-5.1                   



             (test code = 379)                                        

 

             CHLORIDE (BEAKER) 106 meq/L                        



             (test code = 382)                                        

 

             CO2 (BEAKER) (test 30 meq/L     22-29        H            



             code = 355)                                         

 

             BLOOD UREA NITROGEN 10 mg/dL     7-21                      



             (BEAKER) (test code                                        



             = 354)                                              

 

             CREATININE (BEAKER) 0.51 mg/dL   0.57-1.25    L            



             (test code = 358)                                        

 

             GLUCOSE RANDOM 84 mg/dL                         



             (BEAKER) (test code                                        



             = 652)                                              

 

             CALCIUM (BEAKER) 8.0 mg/dL    8.4-10.2     L            



             (test code = 697)                                        

 

             EGFR (BEAKER) (test 157 mL/min/1.73                           ESTIM

ATED GFR IS



             code = 1092) sq m                                   NOT AS ACCURATE

 AS



                                                                 CREATININE



                                                                 CLEARANCE IN



                                                                 PREDICTING



                                                                 GLOMERULAR



                                                                 FILTRATION RATE

.



                                                                 ESTIMATED GFR I

S



                                                                 NOT APPLICABLE 

FOR



                                                                 DIALYSIS PATIEN

TS.



 ID - ALAYNA [id number] 05:24:59





             Test Item    Value        Reference Range Interpretation Comments

 

             MAGNESIUM (BEAKER) (test code = 1.8 mg/dL    1.6-2.6               

    



             627)                                                



 ID - ALAYNA MPOCT-GLUCOSE [id number] 22:27:30





             Test Item    Value        Reference Range Interpretation Comments

 

             POC-GLUCOSE METER 98 mg/dL                         : TESTED A

T [facility] [address]



             (BEAKER) (test code =                                        [facility],



             1538)                                               [address]:



                                                                 /Techni

alexsi ID =



                                                                 [id number] for Nguy

en



                                                                 (contract), [place]

ra



POCT-GLUCOSE [id number] 16:45:52





             Test Item    Value        Reference Range Interpretation Comments

 

             POC-GLUCOSE METER 94 mg/dL                         : TESTED A

T [facility] [address]



             (BEAKER) (test code =                                        [facility],



             1538)                                               [address]:



                                                                 /Techni

alexis ID =



                                                                 [id number] for Abua

steven,



                                                                 Aiyat



POCT-GLUCOSE [id number] 13:20:04





             Test Item    Value        Reference Range Interpretation Comments

 

             POC-GLUCOSE METER 110 mg/dL                        : TESTED A

T [facility] [address]



             (BEAKER) (test code =                                        [facility],



             1538)                                               [address]:



                                                                 /Techni

alexis ID



                                                                 = [id number] for Ab

uanga,



                                                                 Aiyat



LEGIONELLA [id number] 09:01:15





             Test Item    Value        Reference Range Interpretation Comments

 

             CULTURE (BEAKER) No Legionella species                           



             (test code = 1095) isolated                               



POCT-GLUCOSE [id number] 08:55:50





             Test Item    Value        Reference Range Interpretation Comments

 

             POC-GLUCOSE METER 86 mg/dL                         : TESTED A

T [facility] [address]



             (BEAKER) (test code =                                        [facility],



             1538)                                               [address]:



                                                                 /Techni

alexis ID =



                                                                 [id number] for Abua

steven,



                                                                 Aiyat



BLOOD [id number] 06:01:32





             Test Item    Value        Reference Range Interpretation Comments

 

             CULTURE (BEAKER) (test No growth in 5 days                         

  



             code = 1095)                                        



BLOOD [id number] 06:01:31





             Test Item    Value        Reference Range Interpretation Comments

 

             CULTURE (BEAKER) (test No growth in 5 days                         

  



             code = 1095)                                        



The specimen volume collected for this blood culture was below the optimum (10 
mL per bottle or 20 mL total). Use of lower volumes may adversely affect 
recovery and/or detection times of some organisms.BASIC METABOLIC PANEL
2022 05:24:28





             Test Item    Value        Reference Range Interpretation Comments

 

             SODIUM (BEAKER) 139 meq/L    136-145                   



             (test code = 381)                                        

 

             POTASSIUM (BEAKER) 3.9 meq/L    3.5-5.1                   



             (test code = 379)                                        

 

             CHLORIDE (BEAKER) 105 meq/L                        



             (test code = 382)                                        

 

             CO2 (BEAKER) (test 31 meq/L     22-29        H            



             code = 355)                                         

 

             BLOOD UREA NITROGEN 10 mg/dL     7-21                      



             (BEAKER) (test code                                        



             = 354)                                              

 

             CREATININE (BEAKER) 0.58 mg/dL   0.57-1.25                 



             (test code = 358)                                        

 

             GLUCOSE RANDOM 94 mg/dL                         



             (BEAKER) (test code                                        



             = 652)                                              

 

             CALCIUM (BEAKER) 7.9 mg/dL    8.4-10.2     L            



             (test code = 697)                                        

 

             EGFR (BEAKER) (test 135 mL/min/1.73                           ESTIM

ATED GFR IS



             code = 1092) sq m                                   NOT AS ACCURATE

 AS



                                                                 CREATININE



                                                                 CLEARANCE IN



                                                                 PREDICTING



                                                                 GLOMERULAR



                                                                 FILTRATION RATE

.



                                                                 ESTIMATED GFR I

S



                                                                 NOT APPLICABLE 

FOR



                                                                 DIALYSIS PATIEN

TS.



 ID - DBHEPATIC FUNCTION [id number] 05:08:18





             Test Item    Value        Reference Range Interpretation Comments

 

             TOTAL PROTEIN (BEAKER) (test code = 5.7 gm/dL    6.0-8.3      L    

        



             770)                                                

 

             ALBUMIN (BEAKER) (test code = 1145) 2.3 g/dL     3.5-5.0      L    

        

 

             BILIRUBIN TOTAL (BEAKER) (test code 2.0 mg/dL    0.2-1.2      H    

        



             = 377)                                              

 

             BILIRUBIN DIRECT (BEAKER) (test 1.5 mg/dL    0.1-0.5      H        

    



             code = 706)                                         

 

             ALKALINE PHOSPHATASE (BEAKER) (test 83 U/L                   

        



             code = 346)                                         

 

             AST (SGOT) (BEAKER) (test code = 66 U/L       5-34         H       

     



             353)                                                

 

             ALT (SGPT) (BEAKER) (test code = 93 U/L       6-55         H       

     



             347)                                                



 ID - [id number] 05:08:17





             Test Item    Value        Reference Range Interpretation Comments

 

             PHOSPHORUS (BEAKER) (test code = 2.8 mg/dL    2.3-4.7              

     



             604)                                                



 ID - [id number] 05:08:16





             Test Item    Value        Reference Range Interpretation Comments

 

             MAGNESIUM (BEAKER) (test code = 2.0 mg/dL    1.6-2.6               

    



             627)                                                



 ID - DBCBC W/PLT COUNT &amp; AUTO [id number] 04:46:53





             Test Item    Value        Reference Range Interpretation Comments

 

             WHITE BLOOD CELL COUNT (BEAKER) 7.0 K/ L     3.5-10.5              

    



             (test code = 775)                                        

 

             RED BLOOD CELL COUNT (BEAKER) 3.04 M/ L    3.93-5.22    L          

  



             (test code = 761)                                        

 

             HEMOGLOBIN (BEAKER) (test code = 8.5 GM/DL    11.2-15.7    L       

     



             410)                                                

 

             HEMATOCRIT (BEAKER) (test code = 27.6 %       34.1-44.9    L       

     



             411)                                                

 

             MEAN CORPUSCULAR VOLUME (BEAKER) 90.8 fL      79.4-94.8            

     



             (test code = 753)                                        

 

             MEAN CORPUSCULAR HEMOGLOBIN 28.0 pg      25.6-32.2                 



             (BEAKER) (test code = 751)                                        

 

             MEAN CORPUSCULAR HEMOGLOBIN CONC 30.8 GM/DL   32.2-35.5    L       

     



             (BEAKER) (test code = 752)                                        

 

             RED CELL DISTRIBUTION WIDTH 19.9 %       11.7-14.4    H            



             (BEAKER) (test code = 412)                                        

 

             PLATELET COUNT (BEAKER) (test 340 K/CU MM  150-450                 

  



             code = 756)                                         

 

             MEAN PLATELET VOLUME (BEAKER) 11.1 fL      9.4-12.3                

  



             (test code = 754)                                        

 

             NUCLEATED RED BLOOD CELLS 3 /100 WBC   0-0          H            



             (BEAKER) (test code = 413)                                        

 

             NEUTROPHILS RELATIVE PERCENT 50 %                                  

 



             (BEAKER) (test code = 429)                                        

 

             LYMPHOCYTES RELATIVE PERCENT 39 %                                  

 



             (BEAKER) (test code = 430)                                        

 

             MONOCYTES RELATIVE PERCENT 6 %                                    



             (BEAKER) (test code = 431)                                        

 

             EOSINOPHILS RELATIVE PERCENT 4 %                                   

 



             (BEAKER) (test code = 432)                                        

 

             BASOPHILS RELATIVE PERCENT 0 %                                    



             (BEAKER) (test code = 437)                                        

 

             NEUTROPHILS ABSOLUTE COUNT 3.52 K/ L    1.56-6.13                 



             (BEAKER) (test code = 670)                                        

 

             LYMPHOCYTES ABSOLUTE COUNT 2.77 K/ L    1.18-3.74                 



             (BEAKER) (test code = 414)                                        

 

             MONOCYTES ABSOLUTE COUNT (BEAKER) 0.43 K/ L    0.24-0.36    H      

      



             (test code = 415)                                        

 

             EOSINOPHILS ABSOLUTE COUNT 0.26 K/ L    0.04-0.36                 



             (BEAKER) (test code = 416)                                        

 

             BASOPHILS ABSOLUTE COUNT (BEAKER) 0.00 K/ L    0.01-0.08    L      

      



             (test code = 417)                                        

 

             IMMATURE GRANULOCYTES-RELATIVE 1 %          0-1                    

   



             PERCENT (BEAKER) (test code =                                      

  



             2801)                                               



POCT-GLUCOSE [id number] 22:59:39





             Test Item    Value        Reference Range Interpretation Comments

 

             POC-GLUCOSE METER 106 mg/dL                        : TESTED A

T [facility] [address]



             ([facility]) (test code =                                        [facility],



             1538)                                               [address]:



                                                                 /Techni

alexis ID



                                                                 = [id number] for Adela No



POCT-GLUCOSE [id number] 17:36:14





             Test Item    Value        Reference Range Interpretation Comments

 

             POC-GLUCOSE METER 84 mg/dL                         : TESTED A

T [facility] [address]



             ([facility]) (test code =                                        [facility],



             1538)                                               [address]:



                                                                 /Techni

alexis ID =



                                                                 [id number] for TEZE

NO, BLAYNE



POCT-GLUCOSE [id number] 12:58:06





             Test Item    Value        Reference Range Interpretation Comments

 

             POC-GLUCOSE METER 112 mg/dL           H            : TESTED A

T [facility] [address]



             ([facility]) (test code =                                        [facility],



             1538)                                               [address]:



                                                                 /Techni

alexis ID



                                                                 = [id number] for Naina laonso



                                                                 (contract)Nuhaene



POCT-GLUCOSE [id number] 09:09:45





             Test Item    Value        Reference Range Interpretation Comments

 

             POC-GLUCOSE METER 77 mg/dL                         : TESTED A

T [facility] [address]



             (AKER) (test code =                                        [facility],



             1538)                                               [id number]:



                                                                 /Techni

alexis ID =



                                                                 [id number] for TEZE

NO, BLAYNE



(CELLAVISION MANUAL DIFF)2022 06:25:56





             Test Item    Value        Reference Range Interpretation Comments

 

             NEUTROPHILS - REL 72 %                                   



             (CELLAVISION)(BEAKER) (test code                                   

     



             = 2816)                                             

 

             LYMPHOCYTES - REL 21 %                                   



             (CELLAVISION)(BEAKER) (test code                                   

     



             = 2817)                                             

 

             MONOCYTES - REL 5 %                                    



             (CELLAVISION)(BEAKER) (test code                                   

     



             = 2818)                                             

 

             EOSINOPHILS - REL 1 %                                    



             (CELLAVISION)(BEAKER) (test code                                   

     



             = 2819)                                             

 

             METAMYELOCYTES - REL 1 %          0-0          H            



             (CELLAVISION)(BEAKER) (test code                                   

     



             = 2821)                                             

 

             NEUTROPHILS - ABS 6.41 K/ul    1.56-6.13    H            



             (CELLAVISION)(BEAKER) (test code                                   

     



             = 2830)                                             

 

             LYMPHOCYTES - ABS 1.87 K/ul    1.18-3.74                 



             (CELLAVISION)(BEAKER) (test code                                   

     



             = 2831)                                             

 

             MONOCYTES - ABS 0.45 K/uL    0.24-0.36    H            



             (CELLAVISION)(BEAKER) (test code                                   

     



             = 2832)                                             

 

             EOSINOPHILS - ABS 0.09 K/uL    0.04-0.36                 



             (CELLAVISION)(BEAKER) (test code                                   

     



             = 2834)                                             

 

             METAMYELOCYTES - ABS 0.09 K/uL    0.00-0.00    H            



             (CELLAVISION)(BEAKER) (test code                                   

     



             = 2836)                                             

 

             TOTAL COUNTED (BEAKER) (test code 100                              

      



             = 1351)                                             

 

             MANUAL NRBC  CELLS 8 /100 WBC   0-0          H            



             (BEAKER) (test code = 1353)                                        

 

             WBC MORPHOLOGY (BEAKER) (test Normal                               

  



             code = 487)                                         

 

             PLT MORPHOLOGY (BEAKER) (test Normal                               

  



             code = 486)                                         

 

             POLYCHROMATOPHILLIC RBCS(BEAKER) 1+ few                            

     



             (test code = 478)                                        

 

             ANISOCYTOSIS (BEAKER) (test code 2+ moderate                       

     



             = 961)                                              

 

             MACROCYTES (BEAKER) (test code = 2+ moderate                       

     



             964)                                                

 

             POIKILOCYTES (BEAKER) (test code 2+ moderate                       

     



             = 966)                                              

 

             SPHEROCYTES (BEAKER) (test code = 1+ few                           

      



             768)                                                

 

             OVALOCYTES (BEAKER) (test code = 2+ moderate                       

     



             477)                                                

 

             BASOPHILIC STIPPLING (BEAKER) Present                              

  



             (test code = 473)                                        

 

             ARTIFACT (CELLAVISION)(BEAKER) Present                             

   



             (test code = 3432)                                        

 

             PLATELET CONCENTRATION Adequate                               



             (CELLAVISION)(BEAKER) (test code                                   

     



             = 3438)                                             



 ID - Nitish comments: Slide comments:CBC W/PLT COUNT &amp; AUTO 
[id number] 06:25:55





             Test Item    Value        Reference Range Interpretation Comments

 

             WHITE BLOOD CELL COUNT (BEAKER) 8.9 K/ L     3.5-10.5              

    



             (test code = 775)                                        

 

             RED BLOOD CELL COUNT (BEAKER) 3.23 M/ L    3.93-5.22    L          

  



             (test code = 761)                                        

 

             HEMOGLOBIN (BEAKER) (test code = 8.8 GM/DL    11.2-15.7    L       

     



             410)                                                

 

             HEMATOCRIT (BEAKER) (test code = 29.2 %       34.1-44.9    L       

     



             411)                                                

 

             MEAN CORPUSCULAR VOLUME (BEAKER) 90.4 fL      79.4-94.8            

     



             (test code = 753)                                        

 

             MEAN CORPUSCULAR HEMOGLOBIN 27.2 pg      25.6-32.2                 



             (BEAKER) (test code = 751)                                        

 

             MEAN CORPUSCULAR HEMOGLOBIN CONC 30.1 GM/DL   32.2-35.5    L       

     



             (BEAKER) (test code = 752)                                        

 

             RED CELL DISTRIBUTION WIDTH 21.1 %       11.7-14.4    H            



             (BEAKER) (test code = 412)                                        

 

             PLATELET COUNT (BEAKER) (test 341 K/CU MM  150-450                 

  



             code = 756)                                         

 

             MEAN PLATELET VOLUME (BEAKER) 11.4 fL      9.4-12.3                

  



             (test code = 754)                                        

 

             NUCLEATED RED BLOOD CELLS 10 /100 WBC  0-0          H            



             (BEAKER) (test code = 413)                                        



BASIC METABOLIC [id number] 04:33:00





             Test Item    Value        Reference Range Interpretation Comments

 

             SODIUM (BEAKER) 137 meq/L    136-145                   



             (test code = 381)                                        

 

             POTASSIUM (BEAKER) 4.6 meq/L    3.5-5.1                   Specimen 

slightly



             (test code = 379)                                        hemolyzed

 

             CHLORIDE (BEAKER) 104 meq/L                        



             (test code = 382)                                        

 

             CO2 (BEAKER) (test 26 meq/L     22-29                     



             code = 355)                                         

 

             BLOOD UREA NITROGEN 15 mg/dL     7-21                      



             (BEAKER) (test code                                        



             = 354)                                              

 

             CREATININE (BEAKER) 0.63 mg/dL   0.57-1.25                 Specimen

 slightly



             (test code = 358)                                        hemolyzed

 

             GLUCOSE RANDOM 86 mg/dL                         



             (BEAKER) (test code                                        



             = 652)                                              

 

             CALCIUM (BEAKER) 7.7 mg/dL    8.4-10.2     L            



             (test code = 697)                                        

 

             EGFR (BEAKER) (test 123 mL/min/1.73                           ESTIM

ATED GFR IS



             code = 1092) sq m                                   NOT AS ACCURATE

 AS



                                                                 CREATININE



                                                                 CLEARANCE IN



                                                                 PREDICTING



                                                                 GLOMERULAR



                                                                 FILTRATION RATE

.



                                                                 ESTIMATED GFR I

S



                                                                 NOT APPLICABLE 

FOR



                                                                 DIALYSIS PATIEN

TS.



 ID - ALAYNA MSpecimen slightly [id number] 04:22:16





             Test Item    Value        Reference Range Interpretation Comments

 

             MAGNESIUM (BEAKER) 2.0 mg/dL    1.6-2.6                   Specimen 

moderately



             (test code = 627)                                        hemolyzed



 ID - ALAYNA [id number] 04:22:16





             Test Item    Value        Reference Range Interpretation Comments

 

             PHOSPHORUS (BEAKER) 3.2 mg/dL    2.3-4.7                   Specimen

 moderately



             (test code = 604)                                        hemolyzed



 ID - ALAYNA MPOCT-GLUCOSE [id number] 22:43:34





             Test Item    Value        Reference Range Interpretation Comments

 

             POC-GLUCOSE METER 97 mg/dL                         : TESTED A

T [facility] [address]



             (BEAKER) (test code =                                        [facility]

[facility],



             1538)                                               [id number]:



                                                                 /Techni

alexis ID =



                                                                 [id number] for



                                                                 Gianna



                                                                 (contract), Chong

corinne



POCT-GLUCOSE [id number] 16:52:31





             Test Item    Value        Reference Range Interpretation Comments

 

             POC-GLUCOSE METER 88 mg/dL                         : TESTED A

T [facility] [address]



             (BEAKER) (test code =                                        [facility]

[facility],



             1538)                                               [address]:



                                                                 /Techni

alexis ID =



                                                                 [id number] for STOJ

CIC, NADA



POCT-GLUCOSE [id number] 11:51:13





             Test Item    Value        Reference Range Interpretation Comments

 

             POC-GLUCOSE METER 80 mg/dL                         : TESTED A

T [facility] [address]



             (BEAKER) (test code =                                        [facility]

[facility],



             1538)                                               [id number]:



                                                                 /Techni

alexis ID =



                                                                 [id number] for STOJ

CIC, NADA



MISCELLANEOUS LAB [id number] 10:47:30





             Test Item    Value        Reference Range Interpretation Comments

 

             SCAN RESULT (test code = See scanned report                        

   



             [id number])                                            



See scanned reportPOCT-GLUCOSE [id number] 08:26:00





             Test Item    Value        Reference Range Interpretation Comments

 

             POC-GLUCOSE METER 78 mg/dL                         : TESTED A

T [facility] [address]



             (BEAKER) (test code =                                        [place],



             1538)                                               [address]:



                                                                 /Techni

alexis ID =



                                                                 [id number] for STOJ

CIC, NADA



(CELLAVISION MANUAL DIFF)2022 07:49:56





             Test Item    Value        Reference Range Interpretation Comments

 

             NEUTROPHILS - REL 72 %                                   



             (CELLAVISION)(BEAKER) (test code                                   

     



             = 2816)                                             

 

             LYMPHOCYTES - REL 16 %                                   



             (CELLAVISION)(BEAKER) (test code                                   

     



             = 2817)                                             

 

             MONOCYTES - REL 10 %                                   



             (CELLAVISION)(BEAKER) (test code                                   

     



             = 2818)                                             

 

             EOSINOPHILS - REL 1 %                                    



             (CELLAVISION)(BEAKER) (test code                                   

     



             = 2819)                                             

 

             METAMYELOCYTES - REL 1 %          0-0          H            



             (CELLAVISION)(BEAKER) (test code                                   

     



             = 2821)                                             

 

             NEUTROPHILS - ABS 7.63 K/ul    1.56-6.13    H            



             (CELLAVISION)(BEAKER) (test code                                   

     



             = 2830)                                             

 

             LYMPHOCYTES - ABS 1.70 K/ul    1.18-3.74                 



             (CELLAVISION)(BEAKER) (test code                                   

     



             = 2831)                                             

 

             MONOCYTES - ABS 1.06 K/uL    0.24-0.36    H            



             (CELLAVISION)(BEAKER) (test code                                   

     



             = 2832)                                             

 

             EOSINOPHILS - ABS 0.11 K/uL    0.04-0.36                 



             (CELLAVISION)(BEAKER) (test code                                   

     



             = 2834)                                             

 

             METAMYELOCYTES - ABS 0.11 K/uL    0.00-0.00    H            



             (CELLAVISION)(BEAKER) (test code                                   

     



             = 2836)                                             

 

             TOTAL COUNTED (BEAKER) (test code 100                              

      



             = 1351)                                             

 

             MANUAL NRBC  CELLS 47 /100 WBC  0-0          H            



             (BEAKER) (test code = 1353)                                        

 

             CLUMPED PLATELETS (BEAKER) (test Present                           

     



             code = 436)                                         

 

             GIANT PLATELETS (BEAKER) (test Present                             

   



             code = 313)                                         

 

             HYPERSEGMENTATION Present                                



             (CELLAVISION)(BEAKER) (test code                                   

     



             = 3445)                                             

 

             POLYCHROMATOPHILLIC RBCS(BEAKER) 3+ many                           

     



             (test code = 478)                                        

 

             HYPOCHROMIA (BEAKER) (test code = 1+ few                           

      



             963)                                                

 

             ANISOCYTOSIS (BEAKER) (test code 2+ moderate                       

     



             = 961)                                              

 

             MICROCYTES (BEAKER) (test code = 1+ few                            

     



             965)                                                

 

             MACROCYTES (BEAKER) (test code = 1+ few                            

     



             964)                                                

 

             ARTIFACT (CELLAVISION)(BEAKER) Present                             

   



             (test code = 3432)                                        

 

             PLATELET CONCENTRATION Adequate                               



             (CELLAVISION)(BEAKER) (test code                                   

     



             = 3438)                                             



 ID - nova Vásquez comments: Slide comments:CBC W/PLT COUNT &amp; 
AUTO [id number] 07:49:55





             Test Item    Value        Reference Range Interpretation Comments

 

             WHITE BLOOD CELL COUNT (BEAKER) 10.6 K/ L    3.5-10.5     H        

    



             (test code = 775)                                        

 

             RED BLOOD CELL COUNT (BEAKER) 3.20 M/ L    3.93-5.22    L          

  



             (test code = 761)                                        

 

             HEMOGLOBIN (BEAKER) (test code = 8.6 GM/DL    11.2-15.7    L       

     



             410)                                                

 

             HEMATOCRIT (BEAKER) (test code = 29.7 %       34.1-44.9    L       

     



             411)                                                

 

             MEAN CORPUSCULAR VOLUME (BEAKER) 92.8 fL      79.4-94.8            

     



             (test code = 753)                                        

 

             MEAN CORPUSCULAR HEMOGLOBIN 26.9 pg      25.6-32.2                 



             (BEAKER) (test code = 751)                                        

 

             MEAN CORPUSCULAR HEMOGLOBIN CONC 29.0 GM/DL   32.2-35.5    L       

     



             (BEAKER) (test code = 752)                                        

 

             RED CELL DISTRIBUTION WIDTH 21.2 %       11.7-14.4    H            



             (BEAKER) (test code = 412)                                        

 

             PLATELET COUNT (BEAKER) (test 330 K/CU MM  150-450                 

  



             code = 756)                                         

 

             MEAN PLATELET VOLUME (BEAKER) 11.8 fL      9.4-12.3                

  



             (test code = 754)                                        

 

             NUCLEATED RED BLOOD CELLS 27 /100 WBC  0-0          H            



             (BEAKER) (test code = 413)                                        



BASIC METABOLIC [id number] 06:45:14





             Test Item    Value        Reference Range Interpretation Comments

 

             SODIUM (BEAKER) 138 meq/L    136-145                   



             (test code = 381)                                        

 

             POTASSIUM (BEAKER) 3.9 meq/L    3.5-5.1                   Specimen 

slightly



             (test code = 379)                                        hemolyzed

 

             CHLORIDE (BEAKER) 103 meq/L                        



             (test code = 382)                                        

 

             CO2 (BEAKER) (test 31 meq/L     22-29        H            



             code = 355)                                         

 

             BLOOD UREA NITROGEN 15 mg/dL     7-21                      



             (BEAKER) (test code                                        



             = 354)                                              

 

             CREATININE (BEAKER) 0.60 mg/dL   0.57-1.25                 Specimen

 slightly



             (test code = 358)                                        hemolyzed

 

             GLUCOSE RANDOM 79 mg/dL                         



             (BEAKER) (test code                                        



             = 652)                                              

 

             CALCIUM (BEAKER) 7.7 mg/dL    8.4-10.2     L            



             (test code = 697)                                        

 

             EGFR (BEAKER) (test 130 mL/min/1.73                           ESTIM

ATED GFR IS



             code = 1092) sq m                                   NOT AS ACCURATE

 AS



                                                                 CREATININE



                                                                 CLEARANCE IN



                                                                 PREDICTING



                                                                 GLOMERULAR



                                                                 FILTRATION RATE

.



                                                                 ESTIMATED GFR I

S



                                                                 NOT APPLICABLE 

FOR



                                                                 DIALYSIS PATIEN

TS.



 ID - CHAN WHEPATIC FUNCTION [id number] 06:39:23





             Test Item    Value        Reference Range Interpretation Comments

 

             TOTAL PROTEIN (BEAKER) 5.8 gm/dL    6.0-8.3      L            Speci

men slightly



             (test code = 770)                                        hemolyzed

 

             ALBUMIN (BEAKER) (test 2.1 g/dL     3.5-5.0      L            Speci

men slightly



             code = 1145)                                        hemolyzed

 

             BILIRUBIN TOTAL 2.0 mg/dL    0.2-1.2      H            Specimen sli

ghtly



             (BEAKER) (test code =                                        hemoly

zed



             377)                                                

 

             BILIRUBIN DIRECT 1.4 mg/dL    0.1-0.5      H            Specimen sl

ightly



             (BEAKER) (test code =                                        hemoly

zed



             706)                                                

 

             ALKALINE PHOSPHATASE 76 U/L                           



             (BEAKER) (test code =                                        



             346)                                                

 

             AST (SGOT) (BEAKER) 75 U/L       5-34         H            Specimen

 slightly



             (test code = 353)                                        hemolyzed

 

             ALT (SGPT) (BEAKER) 101 U/L      6-55         H            Specimen

 slightly



             (test code = 347)                                        hemolyzed



 ID - CHAN [id number] 06:39:22





             Test Item    Value        Reference Range Interpretation Comments

 

             PHOSPHORUS (BEAKER) 3.1 mg/dL    2.3-4.7                   Specimen

 slightly



             (test code = 604)                                        hemolyzed



 ID - CHAN [id number] 06:39:21





             Test Item    Value        Reference Range Interpretation Comments

 

             MAGNESIUM (BEAKER) 1.8 mg/dL    1.6-2.6                   Specimen 

slightly



             (test code = 627)                                        hemolyzed



 ID - CHAN WPOCT-GLUCOSE [id number] 05:28:16





             Test Item    Value        Reference Range Interpretation Comments

 

             POC-GLUCOSE METER 82 mg/dL                         : TESTED A

T [facility] [address]



             (BEAKER) (test code =                                        [address] [place],



             [address])                                               [id number]:



                                                                 /Techni

alexis ID =



                                                                 [id number] for Good

acre,



                                                                 Paz (contrac

t)



POCT-GLUCOSE [id number] 21:45:48





             Test Item    Value        Reference Range Interpretation Comments

 

             POC-GLUCOSE METER 92 mg/dL                         : TESTED A

T [facility] [address]



             (BEAKER) (test code =                                        [facility]

[address] [facility],



             [address]38)                                               [id number]:



                                                                 /Techni

alexis ID =



                                                                 [id number] for Good

acre,



                                                                 Paz (contrac

t)



[id number] 18:24:40





             Test Item    Value        Reference Range Interpretation Comments

 

             MAGNESIUM (BEAKER) (test code = 1.9 mg/dL    1.6-2.6               

    



             627)                                                



 ID - [id number] 18:24:40





             Test Item    Value        Reference Range Interpretation Comments

 

             PHOSPHORUS (BEAKER) (test code = 2.7 mg/dL    2.3-4.7              

     



             604)                                                



 ID - BSBASIC METABOLIC [id number] 18:24:39





             Test Item    Value        Reference Range Interpretation Comments

 

             SODIUM (BEAKER) 136 meq/L    136-145                   



             (test code = 381)                                        

 

             POTASSIUM (BEAKER) 4.0 meq/L    3.5-5.1                   



             (test code = 379)                                        

 

             CHLORIDE (BEAKER) 99 meq/L                         



             (test code = 382)                                        

 

             CO2 (BEAKER) (test 35 meq/L     22-29        H            



             code = 355)                                         

 

             BLOOD UREA NITROGEN 17 mg/dL     7-21                      



             (BEAKER) (test code                                        



             = 354)                                              

 

             CREATININE (BEAKER) 0.59 mg/dL   0.57-1.25                 



             (test code = 358)                                        

 

             GLUCOSE RANDOM 105 mg/dL                        



             (BEAKER) (test code                                        



             = 652)                                              

 

             CALCIUM (BEAKER) 8.3 mg/dL    8.4-10.2     L            



             (test code = 697)                                        

 

             EGFR (BEAKER) (test 132 mL/min/1.73                           ESTIM

ATED GFR IS



             code = 1092) sq m                                   NOT AS ACCURATE

 AS



                                                                 CREATININE



                                                                 CLEARANCE IN



                                                                 PREDICTING



                                                                 GLOMERULAR



                                                                 FILTRATION RATE

.



                                                                 ESTIMATED GFR I

S



                                                                 NOT APPLICABLE 

FOR



                                                                 DIALYSIS PATIEN

TS.



 ID - BSCBC (HEMOGRAM ONLY)2022 18:04:47





             Test Item    Value        Reference Range Interpretation Comments

 

             WHITE BLOOD CELL COUNT (BEAKER) 13.7 K/ L    3.5-10.5     H        

    



             (test code = 775)                                        

 

             RED BLOOD CELL COUNT (BEAKER) 3.40 M/ L    3.93-5.22    L          

  



             (test code = 761)                                        

 

             HEMOGLOBIN (BEAKER) (test code = 9.2 GM/DL    11.2-15.7    L       

     



             410)                                                

 

             HEMATOCRIT (BEAKER) (test code = 30.7 %       34.1-44.9    L       

     



             411)                                                

 

             MEAN CORPUSCULAR VOLUME (BEAKER) 90.3 fL      79.4-94.8            

     



             (test code = 753)                                        

 

             MEAN CORPUSCULAR HEMOGLOBIN 27.1 pg      25.6-32.2                 



             (BEAKER) (test code = 751)                                        

 

             MEAN CORPUSCULAR HEMOGLOBIN CONC 30.0 GM/DL   32.2-35.5    L       

     



             (BEAKER) (test code = 752)                                        

 

             RED CELL DISTRIBUTION WIDTH 21.0 %       11.7-14.4    H            



             (BEAKER) (test code = 412)                                        

 

             PLATELET COUNT (BEAKER) (test 332 K/CU MM  150-450                 

  



             code = 756)                                         

 

             MEAN PLATELET VOLUME (BEAKER) 12.2 fL      9.4-12.3                

  



             (test code = 754)                                        

 

             NUCLEATED RED BLOOD CELLS 24 /100 WBC  0-0          H            



             (BEAKER) (test code = 413)                                        



POCT-GLUCOSE [id number] 13:12:53





             Test Item    Value        Reference Range Interpretation Comments

 

             POC-GLUCOSE METER 125 mg/dL           H            : TESTED A

T [facility] [address]



             (BEAKER) (test code =                                        [address] [place],



             [address])                                               [id number]:



                                                                 /Techni

alexis ID



                                                                 = [id number] for Da

costa



                                                                 (contract), Skip mehta



RAD, CHEST, 1 VIEW, NON [id number] 07:57:00Reason for exam:-&gt;acute 
hypoxemic respiratory failure, pneumonia, pulm edemaShould this be performed at 
the bedside?-&gt;Yes
************************************************************[facility] : 1975 Sex: 
F************************************************************FINALREPORT PATIENT
ID: [id number] Exam: RAD, CHEST, 1 VIEW, NON DEPTDate: 2022 7:56 AM 
Indication:acute hypoxemic respiratory failure, pneumonia, pulm edemaComparison:
2022 FINDINGS: Lines/Tubes/Devices: Interval extubation. Enteric tube in 
place seen coursing below the diaphragm with the distal tip collimated out of 
view. Right midline in place, distal tip terminating in right right axilla. 
Lungs/pleura:Low lung volumes. Diffuse airspace and interstitial opacities 
grossly unchanged. No significant pleural effusion. No pneumothorax. 
Heart/Mediastinum:Unchanged Bones/Soft Tissues: No acute osseous abnormality. 
Upper abdomen: Unremarkable. IMPRESSION:Interval extubation.Enteric tube in 
appropriate position.Low lung volumes.Airspace and interstitial opacities are 
grossly unchanged. Signed: Fahrtash, Sina MDReport Verified Date/Time: 
2022 07:57:45 Reading Location: [facility] Radiology Reading Room 
Electronically signed by: SINA FAHRTASH, MD on 2022 07:57 AM(CELLAVISION 
MANUAL DIFF)2022 07:53:18





             Test Item    Value        Reference Range Interpretation Comments

 

             NEUTROPHILS - REL 86 %                                   



             (CELLAVISION)(BEAKER) (test code                                   

     



             = 2816)                                             

 

             LYMPHOCYTES - REL 10 %                                   



             (CELLAVISION)(BEAKER) (test code                                   

     



             = 2817)                                             

 

             MONOCYTES - REL 2 %                                    



             (CELLAVISION)(BEAKER) (test code                                   

     



             = 2818)                                             

 

             EOSINOPHILS - REL 1 %                                    



             (CELLAVISION)(BEAKER) (test code                                   

     



             = 2819)                                             

 

             ATYPICAL LYMPHOCYTES - REL 1 %          0-0          H            



             (CELLAVISION)(BEAKER) (test code                                   

     



             = 2829)                                             

 

             NEUTROPHILS - ABS 11.09 K/ul   1.56-6.13    H            



             (CELLAVISION)(BEAKER) (test code                                   

     



             = 2830)                                             

 

             LYMPHOCYTES - ABS 1.29 K/ul    1.18-3.74                 



             (CELLAVISION)(BEAKER) (test code                                   

     



             = 2831)                                             

 

             MONOCYTES - ABS 0.26 K/uL    0.24-0.36                 



             (CELLAVISION)(BEAKER) (test code                                   

     



             = 2832)                                             

 

             EOSINOPHILS - ABS 0.13 K/uL    0.04-0.36                 



             (CELLAVISION)(BEAKER) (test code                                   

     



             = 2834)                                             

 

             ATYPICAL LYMPHOCYTES - ABS 0.13 K/uL    0.00-0.00    H            



             (CELLAVISION)(BEAKER) (test code                                   

     



             = 2858)                                             

 

             TOTAL COUNTED (BEAKER) (test code 100                              

      



             = 1351)                                             

 

             MANUAL NRBC  CELLS 23 /100 WBC  0-0          H            



             (BEAKER) (test code = 1353)                                        

 

             WBC MORPHOLOGY (BEAKER) (test Normal                               

  



             code = 487)                                         

 

             PLT MORPHOLOGY (BEAKER) (test Normal                               

  



             code = 486)                                         

 

             POLYCHROMATOPHILLIC RBCS(BEAKER) 3+ many                           

     



             (test code = 478)                                        

 

             HYPOCHROMIA (BEAKER) (test code = 1+ few                           

      



             963)                                                

 

             ANISOCYTOSIS (BEAKER) (test code 3+ many                           

     



             = 961)                                              

 

             MACROCYTES (BEAKER) (test code = 3+ many                           

     



             964)                                                

 

             POIKILOCYTES (BEAKER) (test code 1+ few                            

     



             = 966)                                              

 

             TARGET CELLS (BEAKER) (test code 1+ few                            

     



             = 480)                                              

 

             OVALOCYTES (BEAKER) (test code = 1+ few                            

     



             477)                                                

 

             ARTIFACT (CELLAVISION)(BEAKER) Present                             

   



             (test code = 3432)                                        

 

             PLATELET CONCENTRATION Adequate                               



             (CELLAVISION)(BEAKER) (test code                                   

     



             = 3438)                                             



 ID - Janet Quiles comments: Slide comments:CBC W/PLT COUNT 
&amp; AUTO [id number] 07:53:17





             Test Item    Value        Reference Range Interpretation Comments

 

             WHITE BLOOD CELL COUNT (BEAKER) 12.9 K/ L    3.5-10.5     H        

    



             (test code = 775)                                        

 

             RED BLOOD CELL COUNT (BEAKER) 3.22 M/ L    3.93-5.22    L          

  



             (test code = 761)                                        

 

             HEMOGLOBIN (BEAKER) (test code = 9.7 GM/DL    11.2-15.7    L       

     



             410)                                                

 

             HEMATOCRIT (BEAKER) (test code = 30.7 %       34.1-44.9    L       

     



             411)                                                

 

             MEAN CORPUSCULAR VOLUME (BEAKER) 95.3 fL      79.4-94.8    H       

     



             (test code = 753)                                        

 

             MEAN CORPUSCULAR HEMOGLOBIN 30.1 pg      25.6-32.2                 



             (BEAKER) (test code = 751)                                        

 

             MEAN CORPUSCULAR HEMOGLOBIN CONC 31.6 GM/DL   32.2-35.5    L       

     



             (BEAKER) (test code = 752)                                        

 

             RED CELL DISTRIBUTION WIDTH 21.6 %       11.7-14.4    H            



             (BEAKER) (test code = 412)                                        

 

             PLATELET COUNT (BEAKER) (test 322 K/CU MM  150-450                 

  



             code = 756)                                         

 

             MEAN PLATELET VOLUME (BEAKER) 12.6 fL      9.4-12.3     H          

  



             (test code = 754)                                        

 

             NUCLEATED RED BLOOD CELLS 24 /100 WBC  0-0          H            



             (BEAKER) (test code = 413)                                        



[id number] 06:18:59





             Test Item    Value        Reference Range Interpretation Comments

 

             PHOSPHORUS (BEAKER) (test code = 3.2 mg/dL    2.3-4.7              

     



             604)                                                



 ID - BSHEPATIC FUNCTION [id number] 06:18:59





             Test Item    Value        Reference Range Interpretation Comments

 

             TOTAL PROTEIN (BEAKER) (test code = 7.2 gm/dL    6.0-8.3           

        



             770)                                                

 

             ALBUMIN (BEAKER) (test code = 1145) 2.6 g/dL     3.5-5.0      L    

        

 

             BILIRUBIN TOTAL (BEAKER) (test code 2.3 mg/dL    0.2-1.2      H    

        



             = 377)                                              

 

             BILIRUBIN DIRECT (BEAKER) (test 1.6 mg/dL    0.1-0.5      H        

    



             code = 706)                                         

 

             ALKALINE PHOSPHATASE (BEAKER) (test 105 U/L                  

        



             code = 346)                                         

 

             AST (SGOT) (BEAKER) (test code = 68 U/L       5-34         H       

     



             353)                                                

 

             ALT (SGPT) (BEAKER) (test code = 104 U/L      6-55         H       

     



             347)                                                



 ID - [id number] 06:18:58





             Test Item    Value        Reference Range Interpretation Comments

 

             MAGNESIUM (BEAKER) (test code = 2.0 mg/dL    1.6-2.6               

    



             627)                                                



 ID - BSBASIC METABOLIC [id number] 06:18:57





             Test Item    Value        Reference Range Interpretation Comments

 

             SODIUM (BEAKER) 135 meq/L    136-145      L            



             (test code = 381)                                        

 

             POTASSIUM (BEAKER) 4.2 meq/L    3.5-5.1                   



             (test code = 379)                                        

 

             CHLORIDE (BEAKER) 100 meq/L                        



             (test code = 382)                                        

 

             CO2 (BEAKER) (test 29 meq/L     22-29                     



             code = 355)                                         

 

             BLOOD UREA NITROGEN 19 mg/dL     7-21                      



             (BEAKER) (test code                                        



             = 354)                                              

 

             CREATININE (BEAKER) 0.65 mg/dL   0.57-1.25                 



             (test code = 358)                                        

 

             GLUCOSE RANDOM 152 mg/dL           H            



             (BEAKER) (test code                                        



             = 652)                                              

 

             CALCIUM (BEAKER) 8.7 mg/dL    8.4-10.2                  



             (test code = 697)                                        

 

             EGFR (BEAKER) (test 118 mL/min/1.73                           ESTIM

ATED GFR IS



             code = 1092) sq m                                   NOT AS ACCURATE

 AS



                                                                 CREATININE



                                                                 CLEARANCE IN



                                                                 PREDICTING



                                                                 GLOMERULAR



                                                                 FILTRATION RATE

.



                                                                 ESTIMATED GFR I

S



                                                                 NOT APPLICABLE 

FOR



                                                                 DIALYSIS PATIEN

TS.



 ID - BSLACTIC ACID, [id number] 05:39:58





             Test Item    Value        Reference Range Interpretation Comments

 

             LACTATE BLOOD VENOUS 2.57 mmol/L  0.50-2.20    H            Specime

n markedly



             (2) (BEAKER) (test                                        hemolyzed



             code = 6687)                                        



 ID - ALAYNA [id number] 05:37:38





             Test Item    Value        Reference Range Interpretation Comments

 

             PARTIAL THROMBOPLASTIN TIME 26.1 seconds 22.5-36.0                 



             (BEAKER) (test code = 760)                                        



[id number] 05:37:22





             Test Item    Value        Reference Range Interpretation Comments

 

             FIBRINOGEN LEVEL (BEAKER) (test 440 mg/dl    225-434      H        

    



             code = 658)                                         



PROTHROMBIN [id number] 05:36:56





             Test Item    Value        Reference Range Interpretation Comments

 

             PROTIME (BEAKER) 14.0 seconds 11.9-14.2                 



             (test code = 759)                                        

 

             INR (BEAKER) (test 1.10         See_Comment                [Automat

ed message]



             code = 370)                                         The system [employer]



                                                                 generated this 

result



                                                                 transmitted ref

erence



                                                                 range: <=5.90. 

The



                                                                 reference range

 was



                                                                 not used to int

erpret



                                                                 this result as



                                                                 normal/abnormal

.



RECOMMENDED COUMADIN/WARFARIN INR THERAPY RANGESSTANDARD DOSE: 2.0 - 3.0 
Includes: PROPHYLAXIS for venous thrombosis, systemic embolization; TREATMENT 
for venous thrombosis and/or pulmonary embolus.HIGH RISK: Target INR is 2.5-3.5 
for patients with mechanical heart valves.BLOOD GAS, [id number] 05:21:18





             Test Item    Value        Reference Range Interpretation Comments

 

             PH VENOUS (BEAKER) (test code = 7.40         7.32-7.42             

    



             701)                                                

 

             PCO2 VENOUS (BEAKER) (test code = 53 mm Hg     41-51        H      

      



             755)                                                

 

             PO2 VENOUS (BEAKER) (test code = 29 mm Hg     25-40                

     



             702)                                                

 

             O2 SATURATION VENOUS (BEAKER) 53.4 %       40.0-70.0               

  



             (test code = 703)                                        

 

             HCO3 VENOUS (BEAKER) (test code = 32 mmol/L    21-29        H      

      



             705)                                                

 

             BASE EXCESS VENOUS (BEAKER) (test 5.8 mmol/L   -2.0-3.0     H      

      



             code = 704)                                         

 

             PATIENT TEMPERATURE (BEAKER) (test 36.7                            

       



             code = 1818)                                        

 

             FIO2 (BEAKER) (test code = 1819) 100.0                             

     



POCT-GLUCOSE [id number] 00:40:29





             Test Item    Value        Reference Range Interpretation Comments

 

             POC-GLUCOSE METER 154 mg/dL           H            : TESTED A

T [facility] [address]



             (BEAKER) (test code =                                        [place],



             [address])                                               [id number]:



                                                                 /Techni

alexis ID



                                                                 = [id number] for DE

ALMAZ (V),



                                                                 RAFFI



[id number] 18:30:49





             Test Item    Value        Reference Range Interpretation Comments

 

             PHOSPHORUS (BEAKER) (test code = 3.5 mg/dL    2.3-4.7              

     



             604)                                                



 ID - [id number] 18:30:48





             Test Item    Value        Reference Range Interpretation Comments

 

             MAGNESIUM (BEAKER) (test code = 2.0 mg/dL    1.6-2.6               

    



             627)                                                



 ID - BSBASIC METABOLIC [id number] 18:30:47





             Test Item    Value        Reference Range Interpretation Comments

 

             SODIUM (BEAKER) 135 meq/L    136-145      L            



             (test code = 381)                                        

 

             POTASSIUM (BEAKER) 3.8 meq/L    3.5-5.1                   



             (test code = 379)                                        

 

             CHLORIDE (BEAKER) 101 meq/L                        



             (test code = 382)                                        

 

             CO2 (BEAKER) (test 29 meq/L     22-29                     



             code = 355)                                         

 

             BLOOD UREA NITROGEN 18 mg/dL     7-21                      



             (BEAKER) (test code                                        



             = 354)                                              

 

             CREATININE (BEAKER) 0.62 mg/dL   0.57-1.25                 



             (test code = 358)                                        

 

             GLUCOSE RANDOM 137 mg/dL           H            



             (BEAKER) (test code                                        



             = 652)                                              

 

             CALCIUM (BEAKER) 8.0 mg/dL    8.4-10.2     L            



             (test code = 697)                                        

 

             EGFR (BEAKER) (test 125 mL/min/1.73                           ESTIM

ATED GFR IS



             code = 1092) sq m                                   NOT AS ACCURATE

 AS



                                                                 CREATININE



                                                                 CLEARANCE IN



                                                                 PREDICTING



                                                                 GLOMERULAR



                                                                 FILTRATION RATE

.



                                                                 ESTIMATED GFR I

S



                                                                 NOT APPLICABLE 

FOR



                                                                 DIALYSIS PATIEN

TS.



 ID - BSLACTIC ACID, [id number] 18:26:04





             Test Item    Value        Reference Range Interpretation Comments

 

             LACTATE BLOOD VENOUS 1.50 mmol/L  0.50-2.20                 Specime

n moderately



             (2) (BEAKER) (test                                        hemolyzed



             code = 7528)                                        



 ID - BSCBC (HEMOGRAM ONLY)2022 18:20:39





             Test Item    Value        Reference Range Interpretation Comments

 

             WHITE BLOOD CELL COUNT (BEAKER) 14.9 K/ L    3.5-10.5     H        

    



             (test code = 775)                                        

 

             RED BLOOD CELL COUNT (BEAKER) 3.33 M/ L    3.93-5.22    L          

  



             (test code = 761)                                        

 

             HEMOGLOBIN (BEAKER) (test code = 9.0 GM/DL    11.2-15.7    L       

     



             410)                                                

 

             HEMATOCRIT (BEAKER) (test code = 29.7 %       34.1-44.9    L       

     



             411)                                                

 

             MEAN CORPUSCULAR VOLUME (BEAKER) 89.2 fL      79.4-94.8            

     



             (test code = 753)                                        

 

             MEAN CORPUSCULAR HEMOGLOBIN 27.0 pg      25.6-32.2                 



             (BEAKER) (test code = 751)                                        

 

             MEAN CORPUSCULAR HEMOGLOBIN CONC 30.3 GM/DL   32.2-35.5    L       

     



             (BEAKER) (test code = 752)                                        

 

             RED CELL DISTRIBUTION WIDTH 20.7 %       11.7-14.4    H            



             (BEAKER) (test code = 412)                                        

 

             PLATELET COUNT (BEAKER) (test 273 K/CU MM  150-450                 

  



             code = 756)                                         

 

             MEAN PLATELET VOLUME (BEAKER) 12.5 fL      9.4-12.3     H          

  



             (test code = 754)                                        

 

             NUCLEATED RED BLOOD CELLS 13 /100 WBC  0-0          H            



             (BEAKER) (test code = 413)                                        



POCT-GLUCOSE [id number] 11:14:12





             Test Item    Value        Reference Range Interpretation Comments

 

             POC-GLUCOSE METER 183 mg/dL           H            : TESTED A

T [facility] [address]



             (BEAKER) (test code =                                        [place],



             [address])                                               [id number]:



                                                                 /Techni

alexis ID



                                                                 = [id number] for Yamel campos



                                                                 (contract)[employer]

[facility]en



RAD, CHEST, 1 VIEW, NON [id number] 08:18:00Reason for exam:-&gt;acute 
hypoxemic respiratory failure, pneumonia, pulm edemaShould this be performed at 
the bedside?-&gt;Yes
************************************************************[facility] : 1975 Sex: 
F************************************************************FINALREPORT PATIENT
ID: [id number] Exam: RAD, CHEST, 1 VIEW, NON DEPTDate: 2022 8:18 AM 
Indication:acute hypoxemic respiratory failure, pneumonia, pulm edemaComparison:
2022 FINDINGS: Lines/Tubes/Devices: ET tube stable. Enteric tube in place 
seen coursing below the diaphragm with the distal tip and side port seen below 
the GE junction. Right midline in place, distal tip terminating in right right
axilla. Lungs/pleura:Diffuse airspace and interstitial opacities. Low lung 
volumes. No pleural effusion. No pneumothorax. Heart/Mediastinum:Unchanged 
Bones/Soft Tissues: No acute osseous abnormality. Upper abdomen: Unremarkable. 
IMPRESSION:Lines and tubes as above.Low lung volumes.Diffuse airspace and
interstitial opacities grossly unchanged. Signed: Fahrtash, Sina MDReport 
Verified Date/Time:  2022 08:18:53 Reading Location: [facility] 
Radiology Reading Room Electronically signed by: SINA FAHRTASH, MD on 2022
08:18 AM(CELLAVISION MANUAL DIFF)2022 07:02:45





             Test Item    Value        Reference Range Interpretation Comments

 

             NEUTROPHILS - REL 94 %                                   



             (CELLAVISION)(BEAKER) (test code                                   

     



             = 2816)                                             

 

             LYMPHOCYTES - REL 3 %                                    



             (CELLAVISION)(BEAKER) (test code                                   

     



             = 2817)                                             

 

             MONOCYTES - REL 2 %                                    



             (CELLAVISION)(BEAKER) (test code                                   

     



             = 2818)                                             

 

             ATYPICAL LYMPHOCYTES - REL 1 %          0-0          H            



             (CELLAVISION)(BEAKER) (test code                                   

     



             = 2829)                                             

 

             NEUTROPHILS - ABS 10.53 K/ul   1.56-6.13    H            



             (CELLAVISION)(BEAKER) (test code                                   

     



             = 2830)                                             

 

             LYMPHOCYTES - ABS 0.34 K/ul    1.18-3.74    L            



             (CELLAVISION)(BEAKER) (test code                                   

     



             = 2831)                                             

 

             MONOCYTES - ABS 0.22 K/uL    0.24-0.36    L            



             (CELLAVISION)(BEAKER) (test code                                   

     



             = 2832)                                             

 

             ATYPICAL LYMPHOCYTES - ABS 0.11 K/uL    0.00-0.00    H            



             (CELLAVISION)(BEAKER) (test code                                   

     



             = 2858)                                             

 

             TOTAL COUNTED (BEAKER) (test code 100                              

      



             = 1351)                                             

 

             MANUAL NRBC  CELLS 9 /100 WBC   0-0          H            



             (BEAKER) (test code = 1353)                                        

 

             WBC MORPHOLOGY (BEAKER) (test Normal                               

  



             code = 487)                                         

 

             CLUMPED PLATELETS (BEAKER) (test Present                           

     



             code = 436)                                         

 

             GIANT PLATELETS (BEAKER) (test Present                             

   



             code = 313)                                         

 

             POLYCHROMATOPHILLIC RBCS(BEAKER) 1+ few                            

     



             (test code = 478)                                        

 

             ANISOCYTOSIS (BEAKER) (test code 2+ moderate                       

     



             = 961)                                              

 

             MACROCYTES (BEAKER) (test code = 2+ moderate                       

     



             964)                                                

 

             POIKILOCYTES (BEAKER) (test code 1+ few                            

     



             = 966)                                              

 

             SPHEROCYTES (BEAKER) (test code = 1+ few                           

      



             768)                                                

 

             ELLIPTOCYTES (BEAKER) (test code 1+ few                            

     



             = 962)                                              

 

             ARTIFACT (CELLAVISION)(BEAKER) Present                             

   



             (test code = 3432)                                        

 

             PLATELET CONCENTRATION Adequate                               



             (CELLAVISION)(BEAKER) (test code                                   

     



             = 3438)                                             



 ID - nova gloverUser comments: Slide comments:CBC W/PLT COUNT &amp; 
AUTO [id number] 07:02:44





             Test Item    Value        Reference Range Interpretation Comments

 

             WHITE BLOOD CELL COUNT (BEAKER) 11.2 K/ L    3.5-10.5     H        

    



             (test code = 775)                                        

 

             RED BLOOD CELL COUNT (BEAKER) 2.89 M/ L    3.93-5.22    L          

  



             (test code = 761)                                        

 

             HEMOGLOBIN (BEAKER) (test code = 8.1 GM/DL    11.2-15.7    L       

     



             410)                                                

 

             HEMATOCRIT (BEAKER) (test code = 25.4 %       34.1-44.9    L       

     



             411)                                                

 

             MEAN CORPUSCULAR VOLUME (BEAKER) 87.9 fL      79.4-94.8            

     



             (test code = 753)                                        

 

             MEAN CORPUSCULAR HEMOGLOBIN 28.0 pg      25.6-32.2                 



             (BEAKER) (test code = 751)                                        

 

             MEAN CORPUSCULAR HEMOGLOBIN CONC 31.9 GM/DL   32.2-35.5    L       

     



             (BEAKER) (test code = 752)                                        

 

             RED CELL DISTRIBUTION WIDTH 20.9 %       11.7-14.4    H            



             (BEAKER) (test code = 412)                                        

 

             PLATELET COUNT (BEAKER) (test 250 K/CU MM  150-450                 

  



             code = 756)                                         

 

             MEAN PLATELET VOLUME (BEAKER) 12.6 fL      9.4-12.3     H          

  



             (test code = 754)                                        

 

             NUCLEATED RED BLOOD CELLS 13 /100 WBC  0-0          H            



             (BEAKER) (test code = 413)                                        



[id number] 06:03:56





             Test Item    Value        Reference Range Interpretation Comments

 

             PHOSPHORUS (BEAKER) 3.3 mg/dL    2.3-4.7                   Specimen

 slightly



             (test code = 604)                                        hemolyzed



 ID - CHAN WBASIC METABOLIC [id number] 06:03:56





             Test Item    Value        Reference Range Interpretation Comments

 

             SODIUM (BEAKER) 134 meq/L    136-145      L            



             (test code = 381)                                        

 

             POTASSIUM (BEAKER) 4.0 meq/L    3.5-5.1                   Specimen 

slightly



             (test code = 379)                                        hemolyzed

 

             CHLORIDE (BEAKER) 102 meq/L                        



             (test code = 382)                                        

 

             CO2 (BEAKER) (test 26 meq/L     22-29                     



             code = 355)                                         

 

             BLOOD UREA NITROGEN 20 mg/dL     7-21                      



             (BEAKER) (test code                                        



             = 354)                                              

 

             CREATININE (BEAKER) 0.59 mg/dL   0.57-1.25                 Specimen

 slightly



             (test code = 358)                                        hemolyzed

 

             GLUCOSE RANDOM 167 mg/dL           H            



             (BEAKER) (test code                                        



             = 652)                                              

 

             CALCIUM (BEAKER) 8.2 mg/dL    8.4-10.2     L            



             (test code = 697)                                        

 

             EGFR (BEAKER) (test 132 mL/min/1.73                           ESTIM

ATED GFR IS



             code = 1092) sq m                                   NOT AS ACCURATE

 AS



                                                                 CREATININE



                                                                 CLEARANCE IN



                                                                 PREDICTING



                                                                 GLOMERULAR



                                                                 FILTRATION RATE

.



                                                                 ESTIMATED GFR I

S



                                                                 NOT APPLICABLE 

FOR



                                                                 DIALYSIS PATIEN

TS.



 ID - CHAN [id number] 06:03:55





             Test Item    Value        Reference Range Interpretation Comments

 

             MAGNESIUM (BEAKER) 1.8 mg/dL    1.6-2.6                   Specimen 

slightly



             (test code = 627)                                        hemolyzed



 ID Juan GILES [id number] 05:50:09





             Test Item    Value        Reference Range Interpretation Comments

 

             PARTIAL THROMBOPLASTIN TIME 28.6 seconds 22.5-36.0                 



             (BEAKER) (test code = 760)                                        



[id number] 05:49:48





             Test Item    Value        Reference Range Interpretation Comments

 

             FIBRINOGEN LEVEL (BEAKER) (test 448 mg/dl    225-434      H        

    



             code = 658)                                         



PROTHROMBIN [id number] 05:49:31





             Test Item    Value        Reference Range Interpretation Comments

 

             PROTIME (BEAKER) 14.8 seconds 11.9-14.2    H            



             (test code = 759)                                        

 

             INR (BEAKER) (test 1.18         See_Comment                [Automat

ed message]



             code = 370)                                         The system [employer]



                                                                 generated this 

result



                                                                 transmitted ref

erence



                                                                 range: <=5.90. 

The



                                                                 reference range

 was



                                                                 not used to int

erpret



                                                                 this result as



                                                                 normal/abnormal

.



RECOMMENDED COUMADIN/WARFARIN INR THERAPY RANGESSTANDARD DOSE: 2.0 - 3.0 
Includes: PROPHYLAXIS for venous thrombosis, systemic embolization; TREATMENT 
for venous thrombosis and/or pulmonary embolus.HIGH RISK: Target INR is 2.5-3.5 
for patients with mechanical heart valves.LACTIC ACID, [id number] 05:47:51





             Test Item    Value        Reference Range Interpretation Comments

 

             LACTATE BLOOD VENOUS 1.64 mmol/L  0.50-2.20                 Specime

n slightly



             (2) (BEAKER) (test                                        hemolyzed



             code = 2872)                                        



 ID - BSPOCT-GLUCOSE [id number] 05:37:31





             Test Item    Value        Reference Range Interpretation Comments

 

             POC-GLUCOSE METER 173 mg/dL           H            : Notified

 RN/MD:



             (ARIANA) (test code =                                        TESTED

 AT [place] [address])                                               [facility],



                                                                 [address]:



                                                                 /Techni

alexis ID



                                                                 = [id number] for Po

ole



                                                                 (contract, Molly

lica



BLOOD GAS, [id number] 05:33:15





             Test Item    Value        Reference Range Interpretation Comments

 

             PH VENOUS (BEAKER) (test code = 7.47         7.32-7.42    H        

    



             701)                                                

 

             PCO2 VENOUS (BEAKER) (test code = 39 mm Hg     41-51        L      

      



             755)                                                

 

             PO2 VENOUS (BEAKER) (test code = 49 mm Hg     25-40        H       

     



             702)                                                

 

             O2 SATURATION VENOUS (BEAKER) 87.5 %       40.0-70.0    H          

  



             (test code = 703)                                        

 

             HCO3 VENOUS (BEAKER) (test code = 28 mmol/L    21-29               

      



             705)                                                

 

             BASE EXCESS VENOUS (BEAKER) (test 3.7 mmol/L   -2.0-3.0     H      

      



             code = 704)                                         

 

             PATIENT TEMPERATURE (BEAKER) (test 36.7                            

       



             code = 1818)                                        

 

             FIO2 (BEAKER) (test code = 1819) 100.0                             

     



POCT-GLUCOSE [id number] 00:01:57





             Test Item    Value        Reference Range Interpretation Comments

 

             POC-GLUCOSE METER 160 mg/dL           H            : Notified

 RN/MD:



             (ARIANA) (test code =                                        TESTED

 AT [place] [address]



             [address])                                               [facility],



                                                                 [address]:



                                                                 /Techni

alexis ID



                                                                 = [id number] for Po

ole



                                                                 (contract, Molly

lica



POCT-GLUCOSE [id number] 17:52:41





             Test Item    Value        Reference Range Interpretation Comments

 

             POC-GLUCOSE METER 172 mg/dL           H            : TESTED A

T [facility] [address]



             (BEAKER) (test code =                                        [place],



             [address]38)                                               [id number]:



                                                                 /Techni

alexis ID



                                                                 = [id number] for ELZA RICHARDSON



CBC (HEMOGRAM ONLY)2022 15:54:11





             Test Item    Value        Reference Range Interpretation Comments

 

             WHITE BLOOD CELL COUNT (BEAKER) 12.5 K/ L    3.5-10.5     H        

    



             (test code = 775)                                        

 

             RED BLOOD CELL COUNT (BEAKER) 2.89 M/ L    3.93-5.22    L          

  



             (test code = 761)                                        

 

             HEMOGLOBIN (BEAKER) (test code = 8.0 GM/DL    11.2-15.7    L       

     



             410)                                                

 

             HEMATOCRIT (BEAKER) (test code = 25.5 %       34.1-44.9    L       

     



             411)                                                

 

             MEAN CORPUSCULAR VOLUME (BEAKER) 88.2 fL      79.4-94.8            

     



             (test code = 753)                                        

 

             MEAN CORPUSCULAR HEMOGLOBIN 27.7 pg      25.6-32.2                 



             (BEAKER) (test code = 751)                                        

 

             MEAN CORPUSCULAR HEMOGLOBIN CONC 31.4 GM/DL   32.2-35.5    L       

     



             (BEAKER) (test code = 752)                                        

 

             RED CELL DISTRIBUTION WIDTH 20.5 %       11.7-14.4    H            



             (BEAKER) (test code = 412)                                        

 

             PLATELET COUNT (BEAKER) (test 201 K/CU MM  150-450                 

  



             code = 756)                                         

 

             MEAN PLATELET VOLUME (BEAKER) 12.3 fL      9.4-12.3                

  



             (test code = 754)                                        

 

             NUCLEATED RED BLOOD CELLS 9 /100 WBC   0-0          H            



             (BEAKER) (test code = 413)                                        



BASIC METABOLIC [id number] 15:35:43





             Test Item    Value        Reference Range Interpretation Comments

 

             SODIUM (BEAKER) 133 meq/L    136-145      L            



             (test code = 381)                                        

 

             POTASSIUM (BEAKER) 4.4 meq/L    3.5-5.1                   Specimen 

moderately



             (test code = 379)                                        hemolyzed

 

             CHLORIDE (BEAKER) 105 meq/L                        



             (test code = 382)                                        

 

             CO2 (BEAKER) (test 24 meq/L     22-29                     



             code = 355)                                         

 

             BLOOD UREA NITROGEN 18 mg/dL     7-21                      



             (BEAKER) (test code                                        



             = 354)                                              

 

             CREATININE (BEAKER) 0.59 mg/dL   0.57-1.25                 Specimen

 moderately



             (test code = 358)                                        hemolyzed

 

             GLUCOSE RANDOM 194 mg/dL           H            



             (BEAKER) (test code                                        



             = 652)                                              

 

             CALCIUM (BEAKER) 8.1 mg/dL    8.4-10.2     L            



             (test code = 697)                                        

 

             EGFR (BEAKER) (test 132 mL/min/1.73                           ESTIM

ATED GFR IS



             code = 1092) sq m                                   NOT AS ACCURATE

 AS



                                                                 CREATININE



                                                                 CLEARANCE IN



                                                                 PREDICTING



                                                                 GLOMERULAR



                                                                 FILTRATION RATE

.



                                                                 ESTIMATED GFR I

S



                                                                 NOT APPLICABLE 

FOR



                                                                 DIALYSIS PATIEN

TS.



 ID - ARELY [id number] 15:35:42





             Test Item    Value        Reference Range Interpretation Comments

 

             MAGNESIUM (BEAKER) 2.0 mg/dL    1.6-2.6                   Specimen 

moderately



             (test code = 627)                                        hemolyzed



 ID - ARELY [id number] 15:35:42





             Test Item    Value        Reference Range Interpretation Comments

 

             PHOSPHORUS (BEAKER) 3.0 mg/dL    2.3-4.7                   Specimen

 moderately



             (test code = 604)                                        hemolyzed



 ID - ARELY CLACTIC ACID, [id number] 15:26:59





             Test Item    Value        Reference Range Interpretation Comments

 

             LACTATE BLOOD VENOUS 1.55 mmol/L  0.50-2.20                 Specime

n slightly



             (2) (BEAKER) (test                                        hemolyzed



             code = 2872)                                        



 ID - ARELY CPOCT-GLUCOSE [id number] 12:13:27





             Test Item    Value        Reference Range Interpretation Comments

 

             POC-GLUCOSE METER 167 mg/dL           H            : TESTED A

T [facility] [address]



             (BEAKER) (test code =                                        [place] [facility],



             [address])                                               [address]:



                                                                 /Techni

alexis ID



                                                                 = [id number] for LATRELL NIXON,



                                                                 ELZA



SPUTUM CULTURE + GRAM [id number] 11:16:48





             Test Item    Value        Reference    Interpretation Comments



                                       Range                     

 

             CULTURE (AKER) (test ENTEROBACTER              A            3+ En

terobacter



             code = 1095) CLOACAE COMPLEX                           cloacae comp

cristina

 

             Amikacin (test code =                           S            



             1)                                                  

 

             Aztreonam (test code =                           R            



             32)                                                 

 

             Cefepime (test code =                           S            



             51)                                                 

 

             Cefoxitin (test code =                           R            



             68)                                                 

 

             Ceftazidime (test code                           R            



             = 27)                                               

 

             Ceftriaxone (test code                           R            



             = 52)                                               

 

             Ertapenem (test code =                           S            



             38)                                                 

 

             Gentamicin (test code                           S            



             = 18)                                               

 

             Levofloxacin (test                           S            



             code = 22)                                          

 

             Meropenem (test code =                           S            



             34)                                                 

 

             Piperacillin +                           R            



             Tazobactam (test code                                        



             = 29)                                               

 

             Tetracycline (test                           S            



             code = 2)                                           

 

             Tobramycin (test code                           S            



             = 25)                                               

 

             Trimethoprim +                           S            



             Sulfamethoxazole (test                                        



             code = 47)                                          

 

             GRAM STAIN RESULT 1+ WBCs                                



             (BEAKER) (test code =                                        



             1123)                                               

 

             GRAM STAIN RESULT 0-5 epithelial                           



             (BEAKER) (test code = cells                                  



             737863)                                             

 

             GRAM STAIN RESULT 1+ gram negative                           



             (BEAKER) (test code = rods                                   



             254503)                                             



&lt;1+ Normal respiratory anju presentRAD, CHEST, 1 VIEW, NON [id number] 
10:20:00Reason for exam:-&gt;acute hypoxemic respiratory failure, pneumonia, 
pulm edemaShould this be performed at the bedside?-&gt;Yes
************************************************************[facility] MED[facility] BETTY : 1975 Sex: 
F************************************************************FINALREPORT PATIENT
ID: [id number] Exam: RAD, CHEST, 1 VIEW, NON DEPTDate: 2022 10:20 AM 
Indication:acute hypoxemic respiratory failure, pneumonia, pulm edemaComparison:
2022 FINDINGS: Lines/Tubes/Devices: Endotracheal tube and enteric tube in 
appropriate positions. Right upper extremity midline inplace. Lungs/pleura: Low 
lung volumes. Pleural parenchymal opacities are grossly unchanged. No pneumo
thorax. Heart/Mediastinum:Unchanged Bones/Soft Tissues: No acute osseous 
abnormality. Upper abdomen:Unremarkable. IMPRESSION:No significant interval 
change. Signed: Fahrtash, Jose A MDReport Verified Date/Time: 2022 10:20:51 
Reading Location: [facility] Radiology Reading Room Electronically signed by:
SINA FAHRTASH, MD on 2022 10:20 AMHEPATIC FUNCTION [id number] 
07:52:19





             Test Item    Value        Reference Range Interpretation Comments

 

             TOTAL PROTEIN (BEAKER) (test code = 6.6 gm/dL    6.0-8.3           

        



             770)                                                

 

             ALBUMIN (BEAKER) (test code = 1145) 2.2 g/dL     3.5-5.0      L    

        

 

             BILIRUBIN TOTAL (BEAKER) (test code 2.6 mg/dL    0.2-1.2      H    

        



             = 377)                                              

 

             BILIRUBIN DIRECT (BEAKER) (test 1.9 mg/dL    0.1-0.5      H        

    



             code = 706)                                         

 

             ALKALINE PHOSPHATASE (BEAKER) (test 92 U/L                   

        



             code = 346)                                         

 

             AST (SGOT) (BEAKER) (test code = 44 U/L       5-34         H       

     



             353)                                                

 

             ALT (SGPT) (BEAKER) (test code = 74 U/L       6-55         H       

     



             347)                                                



 ID - EOSpecimen slightly icteric(CELLAVISION MANUAL DIFF)2022 
06:50:55





             Test Item    Value        Reference Range Interpretation Comments

 

             NEUTROPHILS - REL 87 %                                   



             (CELLAVISION)(BEAKER) (test code                                   

     



             = 2816)                                             

 

             LYMPHOCYTES - REL 9 %                                    



             (CELLAVISION)(BEAKER) (test code                                   

     



             = 2817)                                             

 

             MONOCYTES - REL 4 %                                    



             (CELLAVISION)(BEAKER) (test code                                   

     



             = 2818)                                             

 

             NEUTROPHILS - ABS 12.27 K/ul   1.56-6.13    H            



             (CELLAVISION)(BEAKER) (test code                                   

     



             = 2830)                                             

 

             LYMPHOCYTES - ABS 1.27 K/ul    1.18-3.74                 



             (CELLAVISION)(BEAKER) (test code                                   

     



             = 2831)                                             

 

             MONOCYTES - ABS 0.56 K/uL    0.24-0.36    H            



             (CELLAVISION)(BEAKER) (test code                                   

     



             = 2832)                                             

 

             TOTAL COUNTED (BEAKER) (test code 100                              

      



             = 1351)                                             

 

             MANUAL NRBC  CELLS 4 /100 WBC   0-0          H            



             (BEAKER) (test code = 1353)                                        

 

             WBC MORPHOLOGY (BEAKER) (test Normal                               

  



             code = 487)                                         

 

             LARGE PLT(BEAKER) (test code = Present                             

   



             2156)                                               

 

             POLYCHROMATOPHILLIC RBCS(BEAKER) 3+ many                           

     



             (test code = 478)                                        

 

             HYPOCHROMIA (BEAKER) (test code = 1+ few                           

      



             963)                                                

 

             ANISOCYTOSIS (BEAKER) (test code 2+ moderate                       

     



             = 961)                                              

 

             MACROCYTES (BEAKER) (test code = 2+ moderate                       

     



             964)                                                

 

             POIKILOCYTES (BEAKER) (test code 2+ moderate                       

     



             = 966)                                              

 

             TARGET CELLS (BEAKER) (test code 1+ few                            

     



             = 480)                                              

 

             SCHISTOCYTES (BEAKER) (test code 1+ few                            

     



             = 765)                                              

 

             SPHEROCYTES (BEAKER) (test code = 1+ few                           

      



             768)                                                

 

             OVALOCYTES (BEAKER) (test code = 2+ moderate                       

     



             477)                                                

 

             TEAR DROP CELLS (BEAKER) (test 1+ few                              

   



             code = 481)                                         

 

             BASOPHILIC STIPPLING (BEAKER) Present                              

  



             (test code = 473)                                        

 

             ARTIFACT (CELLAVISION)(BEAKER) Present                             

   



             (test code = 3432)                                        

 

             PLATELET CONCENTRATION Adequate                               



             (CELLAVISION)(BEAKER) (test code                                   

     



             = 3438)                                             



 ID - Nitish comments: Slide comments:CBC W/PLT COUNT &amp; AUTO 
[id number] 06:50:54





             Test Item    Value        Reference Range Interpretation Comments

 

             WHITE BLOOD CELL COUNT (BEAKER) 14.1 K/ L    3.5-10.5     H        

    



             (test code = 775)                                        

 

             RED BLOOD CELL COUNT (BEAKER) 2.73 M/ L    3.93-5.22    L          

  



             (test code = 761)                                        

 

             HEMOGLOBIN (BEAKER) (test code = 8.0 GM/DL    11.2-15.7    L       

     



             410)                                                

 

             HEMATOCRIT (BEAKER) (test code = 24.7 %       34.1-44.9    L       

     



             411)                                                

 

             MEAN CORPUSCULAR VOLUME (BEAKER) 90.5 fL      79.4-94.8            

     



             (test code = 753)                                        

 

             MEAN CORPUSCULAR HEMOGLOBIN 29.3 pg      25.6-32.2                 



             (BEAKER) (test code = 751)                                        

 

             MEAN CORPUSCULAR HEMOGLOBIN CONC 32.4 GM/DL   32.2-35.5            

     



             (BEAKER) (test code = 752)                                        

 

             RED CELL DISTRIBUTION WIDTH 20.9 %       11.7-14.4    H            



             (BEAKER) (test code = 412)                                        

 

             PLATELET COUNT (BEAKER) (test 170 K/CU MM  150-450                 

  



             code = 756)                                         

 

             MEAN PLATELET VOLUME (BEAKER) 12.7 fL      9.4-12.3     H          

  



             (test code = 754)                                        

 

             NUCLEATED RED BLOOD CELLS 8 /100 WBC   0-0          H            



             (BEAKER) (test code = 413)                                        



POCT-GLUCOSE [id number] 05:57:07





             Test Item    Value        Reference Range Interpretation Comments

 

             POC-GLUCOSE METER 153 mg/dL           H            : [facility] A

[facility]



             (BEAKER) (test code =                                        [place],



             [address])                                               [id number]:



                                                                 /Techni

alexis ID



                                                                 = [id number] for



                                                                 DELOS-VICKY,



                                                                 MARINO-ROEHL



LACTIC ACID, [id number] 03:54:13





             Test Item    Value        Reference Range Interpretation Comments

 

             LACTATE BLOOD VENOUS (2) (BEAKER) 1.19 mmol/L  0.50-2.20           

      



             (test code = 2872)                                        



 ID - DBSpecimen slightly [id number] 03:51:08





             Test Item    Value        Reference Range Interpretation Comments

 

             PHOSPHORUS (BEAKER) (test code = 3.2 mg/dL    2.3-4.7              

     



             604)                                                



 ID - DBBASIC METABOLIC [id number] 03:51:08





             Test Item    Value        Reference Range Interpretation Comments

 

             SODIUM (BEAKER) 133 meq/L    136-145      L            



             (test code = 381)                                        

 

             POTASSIUM (BEAKER) 4.0 meq/L    3.5-5.1                   



             (test code = 379)                                        

 

             CHLORIDE (BEAKER) 105 meq/L                        



             (test code = 382)                                        

 

             CO2 (BEAKER) (test 24 meq/L     22-29                     



             code = 355)                                         

 

             BLOOD UREA NITROGEN 19 mg/dL     7-21                      



             (BEAKER) (test code                                        



             = 354)                                              

 

             CREATININE (BEAKER) 0.57 mg/dL   0.57-1.25                 



             (test code = 358)                                        

 

             GLUCOSE RANDOM 200 mg/dL           H            



             (BEAKER) (test code                                        



             = 652)                                              

 

             CALCIUM (BEAKER) 8.3 mg/dL    8.4-10.2     L            



             (test code = 697)                                        

 

             EGFR (BEAKER) (test 138 mL/min/1.73                           ESTIM

ATED GFR IS



             code = 1092) sq m                                   NOT AS ACCURATE

 AS



                                                                 CREATININE



                                                                 CLEARANCE IN



                                                                 PREDICTING



                                                                 GLOMERULAR



                                                                 FILTRATION RATE

.



                                                                 ESTIMATED GFR I

S



                                                                 NOT APPLICABLE 

FOR



                                                                 DIALYSIS PATIEN

TS.



 ID - DBSpecimen slightly [id number] 03:51:07





             Test Item    Value        Reference Range Interpretation Comments

 

             MAGNESIUM (BEAKER) (test code = 1.8 mg/dL    1.6-2.6               

    



             627)                                                



 ID - DBCBC (HEMOGRAM ONLY)2022 03:33:57





             Test Item    Value        Reference Range Interpretation Comments

 

             WHITE BLOOD CELL COUNT (BEAKER) 14.1 K/ L    3.5-10.5     H        

    



             (test code = 775)                                        

 

             RED BLOOD CELL COUNT (BEAKER) 2.73 M/ L    3.93-5.22    L          

  



             (test code = 761)                                        

 

             HEMOGLOBIN (BEAKER) (test code = 8.0 GM/DL    11.2-15.7    L       

     



             410)                                                

 

             HEMATOCRIT (BEAKER) (test code = 24.7 %       34.1-44.9    L       

     



             411)                                                

 

             MEAN CORPUSCULAR VOLUME (BEAKER) 90.5 fL      79.4-94.8            

     



             (test code = 753)                                        

 

             MEAN CORPUSCULAR HEMOGLOBIN 29.3 pg      25.6-32.2                 



             (BEAKER) (test code = 751)                                        

 

             MEAN CORPUSCULAR HEMOGLOBIN CONC 32.4 GM/DL   32.2-35.5            

     



             (BEAKER) (test code = 752)                                        

 

             RED CELL DISTRIBUTION WIDTH 20.9 %       11.7-14.4    H            



             (BEAKER) (test code = 412)                                        

 

             PLATELET COUNT (BEAKER) (test 170 K/CU MM  150-450                 

  



             code = 756)                                         

 

             MEAN PLATELET VOLUME (BEAKER) 12.7 fL      9.4-12.3     H          

  



             (test code = 754)                                        

 

             NUCLEATED RED BLOOD CELLS 8 /100 WBC   0-0          H            



             (BEAKER) (test code = 413)                                        



[id number] 03:01:58





             Test Item    Value        Reference Range Interpretation Comments

 

             PARTIAL THROMBOPLASTIN TIME 31.6 seconds 22.5-36.0                 



             (BEAKER) (test code = 760)                                        



[id number] 03:01:33





             Test Item    Value        Reference Range Interpretation Comments

 

             FIBRINOGEN LEVEL (BEAKER) (test 488 mg/dl    225-434      H        

    



             code = 658)                                         



PROTHROMBIN [id number] 03:01:17





             Test Item    Value        Reference Range Interpretation Comments

 

             PROTIME (BEAKER) 15.0 seconds 11.9-14.2    H            



             (test code = 759)                                        

 

             INR (BEAKER) (test 1.20         See_Comment                [Automat

ed message]



             code = 370)                                         The system [employer]



                                                                 generated this 

result



                                                                 transmitted ref

erence



                                                                 range: <=5.90. 

The



                                                                 reference range

 was



                                                                 not used to int

erpret



                                                                 this result as



                                                                 normal/abnormal

.



RECOMMENDED COUMADIN/WARFARIN INR THERAPY RANGESSTANDARD DOSE: 2.0 - 3.0 
Includes: PROPHYLAXIS for venous thrombosis, systemic embolization; TREATMENT 
for venous thrombosis and/or pulmonary embolus.HIGH RISK: Target INR is 2.5-3.5 
for patients with mechanical heart valves.BLOOD GAS, [id number] 02:37:08





             Test Item    Value        Reference Range Interpretation Comments

 

             PH VENOUS (BEAKER) (test code = 7.44         7.32-7.42    H        

    



             701)                                                

 

             PCO2 VENOUS (BEAKER) (test code = 38 mm Hg     41-51        L      

      



             755)                                                

 

             PO2 VENOUS (BEAKER) (test code = 45 mm Hg     25-40        H       

     



             702)                                                

 

             O2 SATURATION VENOUS (BEAKER) 83.5 %       40.0-70.0    H          

  



             (test code = 703)                                        

 

             HCO3 VENOUS (BEAKER) (test code = 26 mmol/L    21-29               

      



             705)                                                

 

             BASE EXCESS VENOUS (BEAKER) (test 1.5 mmol/L   -2.0-3.0            

      



             code = 704)                                         

 

             PATIENT TEMPERATURE (BEAKER) (test 36.7                            

       



             code = 1818)                                        

 

             FIO2 (BEAKER) (test code = 1819) 100.0                             

     



POCT-GLUCOSE [id number] 23:40:35





             Test Item    Value        Reference Range Interpretation Comments

 

             POC-GLUCOSE METER 164 mg/dL           H            : TESTED A

T [facility] [address]



             (BEAKER) (test code =                                        [place],



             [address]38)                                               [address]:



                                                                 /Techni

alexis ID



                                                                 = [id number] for



                                                                 MING LOPEZ



FLOW CYTOMETRY [id number] 14:21:10





             Test Item    Value        Reference Range Interpretation Comments

 

             FLOW CYTOMETRY RESULT See Separate Report                          

 



             POINTER (BEAKER) (test                                        



             code = 2758)                                        

 

             FLOW CYTOMETRY AP CASE #                                 



             (BEAKER) (test code =                                        



             2759)                                               



BLOOD [id number] 12:00:33





             Test Item    Value        Reference Range Interpretation Comments

 

             CULTURE (BEAKER) (test No growth in 5 days                         

  



             code = 1095)                                        



FLOW [id number] 11:45:38Flow Cytometry Report Case: [id number] 
Authorizing Provider: Meena Ervin MD Collected: 2022 03:55 AM 
Ordering Location: [address] Received: 2022 07:03 AM Cardiovascular 
Pathologist: Forrest Montano MD  Specimen: Other PERIPHERAL BLOOD, FLOW 
CYTOMETRY:- NO MONOTYPIC B CELL POPULATION- NO ABERRANT T CELL POPULATION- NO 
INCREASE IN IMMUNOPHENOTYPIC MYELOBLASTSElectronically signed by Forrest Montano MD on 2022 at 11:45 [id number] shock, anemia, COVID-19 pneu
monia, leukocytosis, thrombocytopeniaPeripheral bloodCD8, surface-Kappa, CD56, 
surface-Lambda, CD5, CD19, CD10, CD3, CD20, CD4, CD45, CD14, CD13, CD33, , 
[id number] Viability: 91.6% Number of Events Acquired: [id number] The following 
populations are identified: Blasts: the dim CD45+ CD34+ blasts comprise 0.0% of 
total cells. Lymphocytes: Bright CD45+ lymphocytes comprise 12.4% of total 
cells. T cells show a CD4:CD8 ratio of 6.2 and normal expression of the pan T 
cell antigens CD3 and CD5. B cellsare polytypic with a kappa:lambda ratio of 
1.6.Myeloid/monocytic populations: As identified by CD45 and light scatter 
characteristics, granulocytes comprise 45% of cells analyzed, and CD14+ 
monocytes comprise 2.2% of total cells. The remaining events analyzed represent 
nonviable cells, non-hematolymphoid cells, and debris.These tests were developed
and their performance characteristics determined by [facility]. They have not been cleared or approved by the U.S. Food and Drug Adm
inistration. The FDA has determined that such clearance or approval is not 
necessary. It should not be regarded as investigational or for research. This 
laboratory is certified under the Clinical Laboratory Improvement Amendments of 
1988 ("CLIA") as qualified to perform high-complexity clinical testing.[facility], Department of Pathology, [address] [place] [address], 
[place] [address], Tel [phone], Department 
of [facility]ology, [address], [place] [address], Tel [phone], 
CHEST, 1 VIEW, NON [id number] 09:46:00Reason for exam:-&gt;acute hypoxemic 
respiratory failure, pneumonia, pulm edemaShould this be performed at the 
bedside?-&gt;Yes************************************************************[facility] : 1975 Sex: 
F************************************************************FINALREPORT PATIENT
ID: [id number] Exam: RAD, CHEST, 1 VIEW, NON DEPTDate: 2022 9:44 AM 
Indication:acute hypoxemic respiratory failure, pneumonia, pulm edemaComparison:
2022 FINDINGS: Lines/Tubes/Devices: Endotracheal tube in place with distal 
tip projecting just above the ena, recommend 2 cm retraction. Enteric tube in
place seen coursing below the diaphragm with the distal tip and side-port pr
ojecting below the GE junction. Right-sided midline in place with distal tip 
projecting over the right axilla. Lungs:Low lung volumes with interval worsening
of diffuse airspace and interstitial opacities. Pleura:No pleural effusion. No 
pneumothorax. Heart/Mediastinum: Unchanged in comparison to prior. Bones/Soft 
Tissues: No acute osseous abnormality. Upper abdomen: Unremarkable. 
IMPRESSION:Lines andtubes as above. Recommend 2 cm retraction of ET tube.Low 
lung volumes with interval worsening of diffuse airspace and interstitial 
opacities. Signed: Fahrtash, Sina MDReport Verified Date/Time: 2022 
09:46:16 Reading Location: [facility] Radiology Reading Room Electronically 
signed by: SINA FAHRTASH, MD on 2022 09:46 AMBLOOD [id number] 
09:34:53





             Test Item    Value        Reference Range Interpretation Comments

 

             CULTURE                                A            From Anaerobic 

Bottle



             (BEAKER) (test                                        Only Same org

anism has



             code = 1095)                                        been isolated f

rom



                                                                 cultures(s) of 

the same



                                                                 body site withi

n 3



                                                                 days. Repeat



                                                                 identification 

and



                                                                 susceptibility 

testing



                                                                 performed only 

after



                                                                 consultation wi

 the



                                                                 clinical microb

iology



                                                                 laboratory.Refe

r to



                                                                 previous cultur

e



                                                                 ofStaphylococcu

s aureus

 

             GRAM STAIN   From anaerobic                           



             RESULT (BEAKER) bottle only:                           



             (test code = gram positive                           



             1123)        cocci in                               



                          clusters                               



VIRUS [id number] 09:30:55





             Test Item    Value        Reference Range Interpretation Comments

 

             CULTURE (BEAKER) (test code = See comment                          

  



             1095)                                               



See scanned reportDRUG SCREEN, URINE, [id number] 08:51:46





             Test Item    Value        Reference Range Interpretation Comments

 

             SCAN RESULT (test code = See scanned report                        

   



             [id number])                                            



See scanned reportPHOSPHATIDYLETHANOL, [id number] 08:38:52





             Test Item    Value        Reference Range Interpretation Comments

 

             PHOSPHATIDYLETHANOL (PETH) See scanned                            



             (test code = 4232718) report                                 



See scanned reportPOCT-GLUCOSE [id number] 05:29:20





             Test Item    Value        Reference Range Interpretation Comments

 

             POC-GLUCOSE METER 189 mg/dL           H            : TESTED A

T [facility] [address]



             (BEAKER) (test code =                                        [place],



             [address])                                               [address]:



                                                                 /Techni

alexis ID



                                                                 = [id number] for DE

ALMAZ (V),



                                                                 RAFFI



(CELLAVISION MANUAL DIFF)2022 03:00:07





             Test Item    Value        Reference Range Interpretation Comments

 

             NEUTROPHILS - REL 92 %                                   



             (CELLAVISION)(BEAKER) (test code                                   

     



             = 2816)                                             

 

             LYMPHOCYTES - REL 6 %                                    



             (CELLAVISION)(BEAKER) (test code                                   

     



             = 2817)                                             

 

             MONOCYTES - REL 1 %                                    



             (CELLAVISION)(BEAKER) (test code                                   

     



             = 2818)                                             

 

             BANDS - REL (CELLAVISION)(BEAKER) 1 %          0-10                

      



             (test code = 2826)                                        

 

             NEUTROPHILS - ABS 13.62 K/ul   1.56-6.13    H            



             (CELLAVISION)(BEAKER) (test code                                   

     



             = 2830)                                             

 

             LYMPHOCYTES - ABS 0.89 K/ul    1.18-3.74    L            



             (CELLAVISION)(BEAKER) (test code                                   

     



             = 2831)                                             

 

             MONOCYTES - ABS 0.15 K/uL    0.24-0.36    L            



             (CELLAVISION)(BEAKER) (test code                                   

     



             = 2832)                                             

 

             BANDS - ABS (CELLAVISION)(BEAKER) 0.15 K/uL    0.00-0.80           

      



             (test code = 2840)                                        

 

             TOTAL COUNTED (BEAKER) (test code 100                              

      



             = 1351)                                             

 

             MANUAL NRBC  CELLS 18 /100 WBC  0-0          H            



             (BEAKER) (test code = 1353)                                        

 

             WBC MORPHOLOGY (BEAKER) (test Normal                               

  



             code = 487)                                         

 

             LARGE PLT(BEAKER) (test code = Present                             

   



             2156)                                               

 

             POLYCHROMATOPHILLIC RBCS(BEAKER) 3+ many                           

     



             (test code = 478)                                        

 

             HYPOCHROMIA (BEAKER) (test code = 1+ few                           

      



             963)                                                

 

             ANISOCYTOSIS (BEAKER) (test code 1+ few                            

     



             = 961)                                              

 

             MICROCYTES (BEAKER) (test code = 1+ few                            

     



             965)                                                

 

             MACROCYTES (BEAKER) (test code = 1+ few                            

     



             964)                                                

 

             POIKILOCYTES (BEAKER) (test code 1+ few                            

     



             = 966)                                              

 

             TARGET CELLS (BEAKER) (test code 1+ few                            

     



             = 480)                                              

 

             SCHISTOCYTES (BEAKER) (test code 1+ few                            

     



             = 765)                                              

 

             SPHEROCYTES (BEAKER) (test code = 1+ few                           

      



             768)                                                

 

             OVALOCYTES (BEAKER) (test code = 1+ few                            

     



             477)                                                

 

             BASOPHILIC STIPPLING (BEAKER) Present                              

  



             (test code = 473)                                        

 

             ARTIFACT (CELLAVISION)(BEAKER) Present                             

   



             (test code = 3432)                                        

 

             PLATELET CONCENTRATION Decreased                              



             (CELLAVISION)(BEAKER) (test code                                   

     



             = 3438)                                             



 ID - YohannesBennyoleg comments: Slide comments:CBC W/PLT COUNT &amp; AUTO 
[id number] 02:59:57





             Test Item    Value        Reference Range Interpretation Comments

 

             WHITE BLOOD CELL COUNT (BEAKER) 14.8 K/ L    3.5-10.5     H        

    



             (test code = 775)                                        

 

             RED BLOOD CELL COUNT (BEAKER) 2.66 M/ L    3.93-5.22    L          

  



             (test code = 761)                                        

 

             HEMOGLOBIN (BEAKER) (test code = 7.9 GM/DL    11.2-15.7    L       

     



             410)                                                

 

             HEMATOCRIT (BEAKER) (test code = 24.3 %       34.1-44.9    L       

     



             411)                                                

 

             MEAN CORPUSCULAR VOLUME (BEAKER) 91.4 fL      79.4-94.8            

     



             (test code = 753)                                        

 

             MEAN CORPUSCULAR HEMOGLOBIN 29.7 pg      25.6-32.2                 



             (BEAKER) (test code = 751)                                        

 

             MEAN CORPUSCULAR HEMOGLOBIN CONC 32.5 GM/DL   32.2-35.5            

     



             (BEAKER) (test code = 752)                                        

 

             RED CELL DISTRIBUTION WIDTH 21.8 %       11.7-14.4    H            



             (BEAKER) (test code = 412)                                        

 

             PLATELET COUNT (BEAKER) (test 141 K/CU MM  150-450      L          

  



             code = 756)                                         

 

             MEAN PLATELET VOLUME (BEAKER) 12.9 fL      9.4-12.3     H          

  



             (test code = 754)                                        

 

             NUCLEATED RED BLOOD CELLS 18 /100 WBC  0-0          H            



             (BEAKER) (test code = 413)                                        



BASIC METABOLIC [id number] 02:41:35





             Test Item    Value        Reference Range Interpretation Comments

 

             SODIUM (BEAKER) 138 meq/L    136-145                   



             (test code = 381)                                        

 

             POTASSIUM (BEAKER) 4.7 meq/L    3.5-5.1                   



             (test code = 379)                                        

 

             CHLORIDE (BEAKER) 108 meq/L           H            



             (test code = 382)                                        

 

             CO2 (BEAKER) (test 25 meq/L     22-29                     



             code = 355)                                         

 

             BLOOD UREA NITROGEN 17 mg/dL     7-21                      



             (BEAKER) (test code                                        



             = 354)                                              

 

             CREATININE (BEAKER) 0.58 mg/dL   0.57-1.25                 



             (test code = 358)                                        

 

             GLUCOSE RANDOM 138 mg/dL           H            



             (BEAKER) (test code                                        



             = 652)                                              

 

             CALCIUM (BEAKER) 7.9 mg/dL    8.4-10.2     L            



             (test code = 697)                                        

 

             EGFR (BEAKER) (test 135 mL/min/1.73                           ESTIM

ATED GFR IS



             code = 1092) sq m                                   NOT AS ACCURATE

 AS



                                                                 CREATININE



                                                                 CLEARANCE IN



                                                                 PREDICTING



                                                                 GLOMERULAR



                                                                 FILTRATION RATE

.



                                                                 ESTIMATED GFR I

S



                                                                 NOT APPLICABLE 

FOR



                                                                 DIALYSIS PATIEN

TS.



 ID - CHAN WSpecimen slightly [id number] 02:24:30





             Test Item    Value        Reference Range Interpretation Comments

 

             PHOSPHORUS (BEAKER) (test code = 2.9 mg/dL    2.3-4.7              

     



             604)                                                



 ID Juan GILES [id number] 02:24:30





             Test Item    Value        Reference Range Interpretation Comments

 

             TRIGLYCERIDES (BEAKER) (test code = 101 mg/dL                      

        



             540)                                                



TRIGLYCERIDE REFERENCE RANGELow Risk &lt;150Borderline Risk 150-199High Risk 
200-499Very High Risk &gt;=500Operator ID Juan Avilez slightly icteric
[id number] 02:24:29





             Test Item    Value        Reference Range Interpretation Comments

 

             MAGNESIUM (BEAKER) (test code = 1.9 mg/dL    1.6-2.6               

    



             627)                                                



 ID Juan GILES [id number] 02:21:25





             Test Item    Value        Reference Range Interpretation Comments

 

             PARTIAL THROMBOPLASTIN TIME 32.7 seconds 22.5-36.0                 



             (BEAKER) (test code = 760)                                        



[id number] 02:21:08





             Test Item    Value        Reference Range Interpretation Comments

 

             FIBRINOGEN LEVEL (BEAKER) (test 463 mg/dl    225-434      H        

    



             code = 658)                                         



PROTHROMBIN [id number] 02:20:47





             Test Item    Value        Reference Range Interpretation Comments

 

             PROTIME (BEAKER) 15.5 seconds 11.9-14.2    H            



             (test code = 759)                                        

 

             INR (BEAKER) (test 1.25         See_Comment                [Automat

ed message]



             code = 370)                                         The system [employer]



                                                                 generated this 

result



                                                                 transmitted ref

erence



                                                                 range: <=5.90. 

The



                                                                 reference range

 was



                                                                 not used to int

erpret



                                                                 this result as



                                                                 normal/abnormal

.



RECOMMENDED COUMADIN/WARFARIN INR THERAPY RANGESSTANDARD DOSE: 2.0 - 3.0 
Includes: PROPHYLAXIS for venous thrombosis, systemic embolization; TREATMENT 
for venous thrombosis and/or pulmonary embolus.HIGH RISK: Target INR is 2.5-3.5 
for patients with mechanical heart valves.LACTIC ACID, [id number] 02:20:05





             Test Item    Value        Reference Range Interpretation Comments

 

             LACTATE BLOOD VENOUS (2) (BEAKER) 0.95 mmol/L  0.50-2.20           

      



             (test code = 2872)                                        



 ID - CHAN Avilez slightly ictericBLOOD GAS, [id number] 
01:28:16





             Test Item    Value        Reference Range Interpretation Comments

 

             PH VENOUS (BEAKER) (test code = 7.43         7.32-7.42    H        

    



             701)                                                

 

             PCO2 VENOUS (BEAKER) (test code = 40 mm Hg     41-51        L      

      



             755)                                                

 

             PO2 VENOUS (BEAKER) (test code = 49 mm Hg     25-40        H       

     



             702)                                                

 

             O2 SATURATION VENOUS (BEAKER) 85.8 %       40.0-70.0    H          

  



             (test code = 703)                                        

 

             HCO3 VENOUS (BEAKER) (test code = 26 mmol/L    21-29               

      



             705)                                                

 

             BASE EXCESS VENOUS (BEAKER) (test 1.6 mmol/L   -2.0-3.0            

      



             code = 704)                                         

 

             PATIENT TEMPERATURE (BEAKER) (test 37.0                            

       



             code = 1818)                                        

 

             FIO2 (BEAKER) (test code = 1819) 35.0                              

     



POCT-GLUCOSE [id number] 23:15:07





             Test Item    Value        Reference Range Interpretation Comments

 

             POC-GLUCOSE METER 136 mg/dL           H            : TESTED A

T BSC [address]



             (BEAKER) (test code =                                        [place],



             1538)                                               [address]:



                                                                 /Techni

alexis ID



                                                                 = [id number] for [place]

ALMAZ (V),



                                                                 RAFFI



BASIC METABOLIC [id number] 18:06:28





             Test Item    Value        Reference Range Interpretation Comments

 

             SODIUM (BEAKER) 136 meq/L    136-145                   



             (test code = 381)                                        

 

             POTASSIUM (BEAKER) 3.3 meq/L    3.5-5.1      L            



             (test code = 379)                                        

 

             CHLORIDE (BEAKER) 108 meq/L           H            



             (test code = 382)                                        

 

             CO2 (BEAKER) (test 24 meq/L     22-29                     



             code = 355)                                         

 

             BLOOD UREA NITROGEN 17 mg/dL     7-21                      



             (BEAKER) (test code                                        



             = 354)                                              

 

             CREATININE (BEAKER) 0.56 mg/dL   0.57-1.25    L            



             (test code = 358)                                        

 

             GLUCOSE RANDOM 117 mg/dL           H            



             (BEAKER) (test code                                        



             = 652)                                              

 

             CALCIUM (BEAKER) 7.8 mg/dL    8.4-10.2     L            



             (test code = 697)                                        

 

             EGFR (BEAKER) (test 141 mL/min/1.73                           ESTIM

ATED GFR IS



             code = 1092) sq m                                   NOT AS ACCURATE

 AS



                                                                 CREATININE



                                                                 CLEARANCE IN



                                                                 PREDICTING



                                                                 GLOMERULAR



                                                                 FILTRATION RATE

.



                                                                 ESTIMATED GFR I

S



                                                                 NOT APPLICABLE 

FOR



                                                                 DIALYSIS PATIEN

TS.



 ID - DBSpecimen slightly [id number] 18:03:05





             Test Item    Value        Reference Range Interpretation Comments

 

             PHOSPHORUS (BEAKER) (test code = 2.9 mg/dL    2.3-4.7              

     



             604)                                                



 ID - [id number] 18:03:04





             Test Item    Value        Reference Range Interpretation Comments

 

             MAGNESIUM (BEAKER) (test code = 1.8 mg/dL    1.6-2.6               

    



             627)                                                



 ID - DBLACTIC ACID, [id number] 18:00:08





             Test Item    Value        Reference Range Interpretation Comments

 

             LACTATE BLOOD VENOUS (2) (BEAKER) 1.32 mmol/L  0.50-2.20           

      



             (test code = 2872)                                        



 ID - ALAYNA MCBC (HEMOGRAM ONLY)2022 17:46:58





             Test Item    Value        Reference Range Interpretation Comments

 

             WHITE BLOOD CELL COUNT (BEAKER) 15.2 K/ L    3.5-10.5     H        

    



             (test code = 775)                                        

 

             RED BLOOD CELL COUNT (BEAKER) 2.88 M/ L    3.93-5.22    L          

  



             (test code = 761)                                        

 

             HEMOGLOBIN (BEAKER) (test code = 7.8 GM/DL    11.2-15.7    L       

     



             410)                                                

 

             HEMATOCRIT (BEAKER) (test code = 25.4 %       34.1-44.9    L       

     



             411)                                                

 

             MEAN CORPUSCULAR VOLUME (BEAKER) 88.2 fL      79.4-94.8            

     



             (test code = 753)                                        

 

             MEAN CORPUSCULAR HEMOGLOBIN 27.1 pg      25.6-32.2                 



             (BEAKER) (test code = 751)                                        

 

             MEAN CORPUSCULAR HEMOGLOBIN CONC 30.7 GM/DL   32.2-35.5    L       

     



             (BEAKER) (test code = 752)                                        

 

             RED CELL DISTRIBUTION WIDTH 19.9 %       11.7-14.4    H            



             (BEAKER) (test code = 412)                                        

 

             PLATELET COUNT (BEAKER) (test 137 K/CU MM  150-450      L          

  



             code = 756)                                         

 

             MEAN PLATELET VOLUME (BEAKER) 13.6 fL      9.4-12.3     H          

  



             (test code = 754)                                        

 

             NUCLEATED RED BLOOD CELLS 27 /100 WBC  0-0          H            



             (BEAKER) (test code = 413)                                        



COMPREHENSIVE METABOLIC [id number] 13:02:57





             Test Item    Value        Reference Range Interpretation Comments

 

             TOTAL PROTEIN 6.7 gm/dL    6.0-8.3                   



             (BEAKER) (test code =                                        



             770)                                                

 

             ALBUMIN (BEAKER) 2.3 g/dL     3.5-5.0      L            



             (test code = 1145)                                        

 

             ALKALINE PHOSPHATASE 92 U/L                           



             (BEAKER) (test code =                                        



             346)                                                

 

             BILIRUBIN TOTAL 3.4 mg/dL    0.2-1.2      H            



             (BEAKER) (test code =                                        



             377)                                                

 

             SODIUM (BEAKER) (test 140 meq/L    136-145                   



             code = 381)                                         

 

             POTASSIUM (BEAKER) 3.5 meq/L    3.5-5.1                   



             (test code = 379)                                        

 

             CHLORIDE (BEAKER) 113 meq/L           H            



             (test code = 382)                                        

 

             CO2 (BEAKER) (test 18 meq/L     22-29        L            



             code = 355)                                         

 

             BLOOD UREA NITROGEN 19 mg/dL     7-21                      



             (BEAKER) (test code =                                        



             354)                                                

 

             CREATININE (BEAKER) 0.60 mg/dL   0.57-1.25                 



             (test code = 358)                                        

 

             GLUCOSE RANDOM 128 mg/dL           H            



             (BEAKER) (test code =                                        



             652)                                                

 

             CALCIUM (BEAKER) 8.2 mg/dL    8.4-10.2     L            



             (test code = 697)                                        

 

             AST (SGOT) (BEAKER) 47 U/L       5-34         H            



             (test code = 353)                                        

 

             ALT (SGPT) (BEAKER) 110 U/L      6-55         H            



             (test code = 347)                                        

 

             EGFR (BEAKER) (test 130                                    ESTIMATE

D GFR IS



             code = 1092) mL/min/1.73 sq                           NOT AS ACCURA

TE AS



                          m                                      CREATININE



                                                                 CLEARANCE IN



                                                                 PREDICTING



                                                                 GLOMERULAR



                                                                 FILTRATION RATE

.



                                                                 ESTIMATED GFR I

S



                                                                 NOT APPLICABLE 

FOR



                                                                 DIALYSIS PATIEN

TS.



 ID - ALAYNA MSpecimen slightly icteric(CELLAVISION MANUAL DIFF)2022
12:05:44





             Test Item    Value        Reference Range Interpretation Comments

 

             NEUTROPHILS - REL 84 %                                   



             (CELLAVISION)(BEAKER) (test code                                   

     



             = 2816)                                             

 

             LYMPHOCYTES - REL 9 %                                    



             (CELLAVISION)(BEAKER) (test code                                   

     



             = 2817)                                             

 

             MONOCYTES - REL 5 %                                    



             (CELLAVISION)(BEAKER) (test code                                   

     



             = 2818)                                             

 

             METAMYELOCYTES - REL 1 %          0-0          H            



             (CELLAVISION)(BEAKER) (test code                                   

     



             = 2825)                                             

 

             ATYPICAL LYMPHOCYTES - REL 1 %          0-0          H            



             (CELLAVISION)(BEAKER) (test code                                   

     



             = 2829)                                             

 

             NEUTROPHILS - ABS 12.94 K/ul   1.56-6.13    H            



             (CELLAVISION)(BEAKER) (test code                                   

     



             = 2830)                                             

 

             LYMPHOCYTES - ABS 1.39 K/ul    1.18-3.74                 



             (CELLAVISION)(BEAKER) (test code                                   

     



             = 2831)                                             

 

             MONOCYTES - ABS 0.77 K/uL    0.24-0.36    H            



             (CELLAVISION)(BEAKER) (test code                                   

     



             = 2832)                                             

 

             METAMYELOCYTES - ABS 0.15 K/uL    0.00-0.00    H            



             (CELLAVISION)(BEAKER) (test code                                   

     



             = 2836)                                             

 

             ATYPICAL LYMPHOCYTES - ABS 0.15 K/uL    0.00-0.00    H            



             (CELLAVISION)(BEAKER) (test code                                   

     



             = 2858)                                             

 

             TOTAL COUNTED (BEAKER) (test code 100                              

      



             = 1351)                                             

 

             MANUAL NRBC  CELLS 81 /100 WBC  0-0          H            



             (BEAKER) (test code = 1353)                                        

 

             PLT MORPHOLOGY (BEAKER) (test Normal                               

  



             code = 486)                                         

 

             SMUDGE CELLS (BEAKER) (test code Present                           

     



             = 1371)                                             

 

             VACUOLATED NEUTROPHILS (BEAKER) Present                            

    



             (test code = 483)                                        

 

             POLYCHROMATOPHILLIC RBCS(BEAKER) 3+ many                           

     



             (test code = 478)                                        

 

             ANISOCYTOSIS (BEAKER) (test code 2+ moderate                       

     



             = 961)                                              

 

             MACROCYTES (BEAKER) (test code = 2+ moderate                       

     



             964)                                                

 

             TARGET CELLS (BEAKER) (test code 1+ few                            

     



             = 480)                                              

 

             SPHEROCYTES (BEAKER) (test code = 2+ moderate                      

      



             768)                                                

 

             ACANTHOCYTES (BEAKER) (test code 1+ few                            

     



             = 471)                                              

 

             ARTIFACT (CELLAVISION)(BEAKER) Present                             

   



             (test code = 3432)                                        

 

             PLATELET CONCENTRATION Decreased                              



             (CELLAVISION)(BEAKER) (test code                                   

     



             = 3438)                                             



 ID - [id number] ID - Michelle Vasquez comments: Slide comments:CBC 
W/PLT COUNT &amp; AUTO [id number] 12:05:34





             Test Item    Value        Reference Range Interpretation Comments

 

             WHITE BLOOD CELL COUNT 15.4 K/ L    3.5-10.5     H            



             (BEAKER) (test code =                                        



             775)                                                

 

             RED BLOOD CELL COUNT 2.67 M/ L    3.93-5.22    L            



             (BEAKER) (test code =                                        



             761)                                                

 

             HEMOGLOBIN (BEAKER) 8.4 GM/DL    11.2-15.7    L            



             (test code = 410)                                        

 

             HEMATOCRIT (BEAKER) 24.9 %       34.1-44.9    L            



             (test code = 411)                                        

 

             MEAN CORPUSCULAR 93.3 fL      79.4-94.8                 Discordant 

MCV



             VOLUME (BEAKER) (test                                        result

s compared to



             code = 753)                                         previous result

s;



                                                                 clinical correl

ation



                                                                 required.

 

             MEAN CORPUSCULAR 31.5 pg      25.6-32.2                 



             HEMOGLOBIN (BEAKER)                                        



             (test code = 751)                                        

 

             MEAN CORPUSCULAR 33.7 GM/DL   32.2-35.5                 



             HEMOGLOBIN CONC                                        



             (BEAKER) (test code =                                        



             752)                                                

 

             RED CELL DISTRIBUTION 21.7 %       11.7-14.4    H            



             WIDTH (BEAKER) (test                                        



             code = 412)                                         

 

             PLATELET COUNT 109 K/CU MM  150-450      L            



             (BEAKER) (test code =                                        



             756)                                                

 

             MEAN PLATELET VOLUME 13.6 fL      9.4-12.3     H            



             (BEAKER) (test code =                                        



             754)                                                

 

             NUCLEATED RED BLOOD 65 /100 WBC  0-0          H            



             CELLS (BEAKER) (test                                        



             code = 413)                                         



RAD, CHEST, 1 VIEW, NON [id number] 06:31:00Reason for exam:-&gt;NG tube 
placementShould this be performed at the bedside?-&gt;Yes
************************************************************[facility] BET[facility] : 1975  Sex: 
F************************************************************FINAL REPORT 
PATIENT ID: [id number] RAD, CHEST, 1 VIEW, NON DEPT INDICATION: NG tube placement 
COMPARISON: Prior day's exam FINDINGS: Portable frontal view of the chest. 
IMPRESSION: Support Lines: ET tube terminates 1.5 cm above the ena. Enteric 
tube side-port at the level of the GE junction. Lungs and pleura: Stable patchy 
pulmonary opacities. No large effusion. No pneumothorax.Heart and mediastinum: 
Stable contours. Additional findings: None. Signed: Carson Michaud MDReport 
Verified Date/Time: 2022 06:31:09 Electronically signed by: CARSON MICHAUD MD on 2022 06:31 [id number] 05:20:46





             Test Item    Value        Reference Range Interpretation Comments

 

             PHOSPHORUS (BEAKER) (test code = 2.9 mg/dL    2.3-4.7              

     



             604)                                                



 ID - ALAYNA MBASIC METABOLIC [id number] 05:20:46





             Test Item    Value        Reference Range Interpretation Comments

 

             SODIUM (BEAKER) 141 meq/L    136-145                   



             (test code = 381)                                        

 

             POTASSIUM (BEAKER) 3.5 meq/L    3.5-5.1                   



             (test code = 379)                                        

 

             CHLORIDE (BEAKER) 113 meq/L           H            



             (test code = 382)                                        

 

             CO2 (BEAKER) (test 22 meq/L     22-29                     



             code = 355)                                         

 

             BLOOD UREA NITROGEN 19 mg/dL     7-21                      



             (BEAKER) (test code                                        



             = 354)                                              

 

             CREATININE (BEAKER) 0.60 mg/dL   0.57-1.25                 



             (test code = 358)                                        

 

             GLUCOSE RANDOM 130 mg/dL           H            



             (BEAKER) (test code                                        



             = 652)                                              

 

             CALCIUM (BEAKER) 8.0 mg/dL    8.4-10.2     L            



             (test code = 697)                                        

 

             EGFR (BEAKER) (test 130 mL/min/1.73                           ESTIM

ATED GFR IS



             code = 1092) sq m                                   NOT AS ACCURATE

 AS



                                                                 CREATININE



                                                                 CLEARANCE IN



                                                                 PREDICTING



                                                                 GLOMERULAR



                                                                 FILTRATION RATE

.



                                                                 ESTIMATED GFR I

S



                                                                 NOT APPLICABLE 

FOR



                                                                 DIALYSIS PATIEN

TS.



 ID - ALAYNA MSpecimen slightly [id number] 05:20:45





             Test Item    Value        Reference Range Interpretation Comments

 

             MAGNESIUM (BEAKER) (test code = 1.6 mg/dL    1.6-2.6               

    



             627)                                                



 ID - ALAYNA [id number] 05:14:42





             Test Item    Value        Reference Range Interpretation Comments

 

             PARTIAL THROMBOPLASTIN TIME 29.0 seconds 22.5-36.0                 



             (BEAKER) (test code = 760)                                        



[id number] 05:14:40





             Test Item    Value        Reference Range Interpretation Comments

 

             FIBRINOGEN LEVEL (BEAKER) (test 446 mg/dl    225-434      H        

    



             code = 658)                                         



PROTHROMBIN [id number] 05:14:16





             Test Item    Value        Reference Range Interpretation Comments

 

             PROTIME (BEAKER) 16.1 seconds 11.9-14.2    H            



             (test code = 759)                                        

 

             INR (BEAKER) (test 1.31         See_Comment                [Automat

ed message]



             code = 370)                                         The system [employer]



                                                                 generated this 

result



                                                                 transmitted ref

erence



                                                                 range: <=5.90. 

The



                                                                 reference range

 was



                                                                 not used to int

erpret



                                                                 this result as



                                                                 normal/abnormal

.



RECOMMENDED COUMADIN/WARFARIN INR THERAPY RANGESSTANDARD DOSE: 2.0 - 3.0 
Includes: PROPHYLAXIS for venous thrombosis, systemic embolization; TREATMENT 
for venous thrombosis and/or pulmonary embolus.HIGH RISK: Target INR is 2.5-3.5 
for patients with mechanical heart valves.LACTIC ACID, [id number] 05:04:16





             Test Item    Value        Reference Range Interpretation Comments

 

             LACTATE BLOOD VENOUS (2) (BEAKER) 1.27 mmol/L  0.50-2.20           

      



             (test code = 2872)                                        



 ID - ALAYNA MSpecimen slightly ictericBLOOD GAS, [id number] 
04:40:42





             Test Item    Value        Reference Range Interpretation Comments

 

             PH ARTERIAL (BEAKER) (test code = 7.41         7.35-7.45           

      



             383)                                                

 

             PCO2 ARTERIAL (BEAKER) (test code 37 mm Hg     35-45               

      



             = 384)                                              

 

             PO2 ARTERIAL (BEAKER) (test code 78 mm Hg     80-90        L       

     



             = 385)                                              

 

             O2 SATURATION ARTERIAL (BEAKER) 95.8 %       96.0-97.0    L        

    



             (test code = 386)                                        

 

             HCO3 ARTERIAL (BEAKER) (test code 23 mmol/L    21-29               

      



             = 388)                                              

 

             BASE EXCESS ARTERIAL (BEAKER) -1.3 mmol/L  -2.0-3.0                

  



             (test code = 387)                                        

 

             PATIENT TEMPERATURE (BEAKER) 37.0                                  

 



             (test code = 1818)                                        



POCT-GLUCOSE [id number] 00:33:11





             Test Item    Value        Reference Range Interpretation Comments

 

             POC-GLUCOSE METER 116 mg/dL           H            : TESTED A

T [facility] [address]



             (BEAKER) (test code =                                        [place],



             [address]38)                                               [id number]:



                                                                 /Techni

alexis ID



                                                                 = [id number] for Geovanna Vargas



LACTIC ACID, [id number] 18:26:15





             Test Item    Value        Reference Range Interpretation Comments

 

             LACTATE BLOOD VENOUS 1.57 mmol/L  0.50-2.20                 Specime

n slightly



             (2) (BEAKER) (test                                        hemolyzed



             code = 2872)                                        



 ID - DBSpecimen slightly ictericPOCT-GLUCOSE [id number] 18:09:50





             Test Item    Value        Reference Range Interpretation Comments

 

             POC-GLUCOSE METER 174 mg/dL           H            : TESTED A

T [facility] [address]



             (BEAKER) (test code =                                        [facility]

[facility],



             [address])                                               [id number]:



                                                                 /Techni

alexis ID



                                                                 = [id number] for ELZA RICHARDSON



[id number] 17:29:42





             Test Item    Value        Reference Range Interpretation Comments

 

             MAGNESIUM (BEAKER) 1.6 mg/dL    1.6-2.6                   Specimen 

moderately



             (test code = 627)                                        hemolyzed



 ID - [id number] 17:29:42





             Test Item    Value        Reference Range Interpretation Comments

 

             PHOSPHORUS (BEAKER) 3.1 mg/dL    2.3-4.7                   Specimen

 moderately



             (test code = 604)                                        hemolyzed



 ID - DBCBC (HEMOGRAM ONLY)2022 17:12:40





             Test Item    Value        Reference Range Interpretation Comments

 

             WHITE BLOOD CELL COUNT (BEAKER) 16.6 K/ L    3.5-10.5     H        

    



             (test code = 775)                                        

 

             RED BLOOD CELL COUNT (BEAKER) 3.00 M/ L    3.93-5.22    L          

  



             (test code = 761)                                        

 

             HEMOGLOBIN (BEAKER) (test code = 8.6 GM/DL    11.2-15.7    L       

     



             410)                                                

 

             HEMATOCRIT (BEAKER) (test code = 26.2 %       34.1-44.9    L       

     



             411)                                                

 

             MEAN CORPUSCULAR VOLUME (BEAKER) 87.3 fL      79.4-94.8            

     



             (test code = 753)                                        

 

             MEAN CORPUSCULAR HEMOGLOBIN 28.7 pg      25.6-32.2                 



             (BEAKER) (test code = 751)                                        

 

             MEAN CORPUSCULAR HEMOGLOBIN CONC 32.8 GM/DL   32.2-35.5            

     



             (BEAKER) (test code = 752)                                        

 

             RED CELL DISTRIBUTION WIDTH 20.4 %       11.7-14.4    H            



             (BEAKER) (test code = 412)                                        

 

             PLATELET COUNT (BEAKER) (test 102 K/CU MM  150-450      L          

  



             code = 756)                                         

 

             NUCLEATED RED BLOOD CELLS 86 /100 WBC  0-0          H            



             (BEAKER) (test code = 413)                                        



PERIPHERAL BLOOD SMEAR - HOLD [id number] 13:55:48





             Test Item    Value        Reference Range Interpretation Comments

 

             PERIPHERAL SMEAR SAVE (BEAKER) (test saved                         

         



             code = 1815)                                        



RETICULOCYTE [id number] 13:54:59





             Test Item    Value        Reference Range Interpretation Comments

 

             RETICULOCYTE COUNT PCT (BEAKER) (test 7.5 %        0.5-1.7      H  

          



             code = 575)                                         



 ID - [id number] comments: Slide comments:POCT-GLUCOSE [id number] 
12:12:14





             Test Item    Value        Reference Range Interpretation Comments

 

             POC-GLUCOSE METER 198 mg/dL           H            : TESTED A

T [facility] [address]



             (BEAKER) (test code =                                        [place],



             [address])                                               [id number]:



                                                                 /Techni

alexis ID



                                                                 = [id number] for ELZA RICHARDSON



LACTATE DEHYDROGENASE (LDH)2022 10:33:22





             Test Item    Value        Reference Range Interpretation Comments

 

             LACTATE DEHYDROGENASE (BEAKER) (test 1444 U/L     125-220      H   

         



             code = 635)                                         



 ID - ALAYNA AUSTIN(CELLAVISION MANUAL DIFF)2022 07:27:21





             Test Item    Value        Reference Range Interpretation Comments

 

             NEUTROPHILS - REL 85 %                                   



             (CELLAVISION)(BEAKER) (test code                                   

     



             = 2816)                                             

 

             LYMPHOCYTES - REL 7 %                                    



             (CELLAVISION)(BEAKER) (test code                                   

     



             = 2817)                                             

 

             MONOCYTES - REL 1 %                                    



             (CELLAVISION)(BEAKER) (test code                                   

     



             = 2818)                                             

 

             METAMYELOCYTES - REL 1 %          0-0          H            



             (CELLAVISION)(BEAKER) (test code                                   

     



             = 2821)                                             

 

             BANDS - REL  6 %          0-10                      



             (CELLAVISION)(BEAKER) (test code                                   

     



             = 2826)                                             

 

             NEUTROPHILS - ABS 16.49 K/ul   1.56-6.13    H            



             (CELLAVISION)(BEAKER) (test code                                   

     



             = 2830)                                             

 

             LYMPHOCYTES - ABS 1.36 K/ul    1.18-3.74                 



             (CELLAVISION)(BEAKER) (test code                                   

     



             = 2831)                                             

 

             MONOCYTES - ABS 0.19 K/uL    0.24-0.36    L            



             (CELLAVISION)(BEAKER) (test code                                   

     



             = 2832)                                             

 

             METAMYELOCYTES - ABS 0.19 K/uL    0.00-0.00    H            



             (CELLAVISION)(BEAKER) (test code                                   

     



             = 2836)                                             

 

             BANDS - ABS  1.16 K/uL    0.00-0.80    H            



             (CELLAVISION)(BEAKER) (test code                                   

     



             = 2840)                                             

 

             TOTAL COUNTED (BEAKER) (test 100                                   

 



             code = 1351)                                        

 

             MANUAL NRBC  CELLS 129 /100 WBC 0-0          H            



             (BEAKER) (test code = 1353)                                        

 

             WBC MORPHOLOGY (BEAKER) (test Normal                               

  



             code = 487)                                         

 

             PLT MORPHOLOGY (BEAKER) (test Normal                               

  



             code = 486)                                         

 

             POLYCHROMATOPHILLIC RBCS(BEAKER) 3+ many                           

     



             (test code = 478)                                        

 

             ANISOCYTOSIS (BEAKER) (test code 2+ moderate                       

     



             = 961)                                              

 

             MACROCYTES (BEAKER) (test code = 2+ moderate                       

     



             964)                                                

 

             POIKILOCYTES (BEAKER) (test code 2+ moderate                       

     



             = 966)                                              

 

             TARGET CELLS (BEAKER) (test code 2+ moderate                       

     



             = 480)                                              

 

             SPHEROCYTES (BEAKER) (test code 1+ few                             

    



             = 768)                                              

 

             OVALOCYTES (BEAKER) (test code = 2+ moderate                       

     



             477)                                                

 

             PATEL-JOLLY BODIES (BEAKER) 1+ few                                

 



             (test code = 475)                                        

 

             BASOPHILIC STIPPLING (BEAKER) Present                              

  



             (test code = 473)                                        

 

             ARTIFACT (CELLAVISION)(BEAKER) Present                             

   



             (test code = 3432)                                        

 

             PAPPENHEIMER 1+ few                                 



             (CELLAVISION)(BEAKER) (test code                                   

     



             = 3435)                                             

 

             PLATELET CONCENTRATION Decreased                              



             (CELLAVISION)(BEAKER) (test code                                   

     



             = 3438)                                             



 ID - ClaudiaOperator ID - Nitish comments: Slide comments:CBC W/PLT 
COUNT &amp; AUTO [id number] 07:27:20





             Test Item    Value        Reference Range Interpretation Comments

 

             WHITE BLOOD CELL COUNT 19.4 K/ L    3.5-10.5     H            



             (BEAKER) (test code =                                        



             775)                                                

 

             RED BLOOD CELL COUNT 2.24 M/ L    3.93-5.22    L            



             (BEAKER) (test code =                                        



             761)                                                

 

             HEMOGLOBIN (BEAKER) 6.7 GM/DL    11.2-15.7    L            



             (test code = 410)                                        

 

             HEMATOCRIT (BEAKER) 20.6 %       34.1-44.9    L            



             (test code = 411)                                        

 

             MEAN CORPUSCULAR 92.0 fL      79.4-94.8                 Discordant 

result



             VOLUME (BEAKER) (test                                        compar

ed to previous



             code = 753)                                         result. Clinica

l



                                                                 correlation req

uired

 

             MEAN CORPUSCULAR 29.9 pg      25.6-32.2                 



             HEMOGLOBIN (BEAKER)                                        



             (test code = 751)                                        

 

             MEAN CORPUSCULAR 32.5 GM/DL   32.2-35.5                 



             HEMOGLOBIN CONC                                        



             (BEAKER) (test code =                                        



             752)                                                

 

             RED CELL DISTRIBUTION 22.4 %       11.7-14.4    H            



             WIDTH (BEAKER) (test                                        



             code = 412)                                         

 

             PLATELET COUNT 96 K/CU MM   150-450      L            



             (BEAKER) (test code =                                        



             756)                                                

 

             MEAN PLATELET VOLUME                                        Unable 

to report due



             (BEAKER) (test code =                                        to abn

ormal Platelet



             754)                                                population



                                                                 distribution.

 

             NUCLEATED RED BLOOD 96 /100 WBC  0-0          H            



             CELLS (BEAKER) (test                                        



             code = 413)                                         



RAD, CHEST, 1 VIEW, NON [id number] 07:03:00Reason for exam:-&gt;et tube 
placementShould this be performed at the bedside?-&gt;Yes
************************************************************[facility] GALINDO BLAYNEEDEN HERNÁNDEZ : 1975 Sex: 
F************************************************************FINALREPORT PATIENT
ID: [id number] RAD, CHEST, 1 VIEW, NON DEPT INDICATION: et tube placement 
COMPARISON: Prior day's exam FINDINGS: Portable frontal view of the chest. 
IMPRESSION: Support Lines: ET tube tip is 3.5 cm above the ena. NG tube 
descends below the diaphragm. Lungs and pleura: Unchanged diffuseinterstitial 
thickening, representing singly or in combination, interstitial edema and/or 
pneumonitis. No significant pneumothorax. Heart and mediastinum: Stable 
contours. Additional findings: None. Signed: Marybel Pepe MDReport Verified 
Date/Time: 2022 07:03:23 Electronically signed by: MARYBEL PEPE MD on 2022 07:03 AMCOMPREHENSIVE METABOLIC [id number] 
05:43:30





             Test Item    Value        Reference Range Interpretation Comments

 

             TOTAL PROTEIN 6.6 gm/dL    6.0-8.3                   



             (BEAKER) (test code =                                        



             770)                                                

 

             ALBUMIN (BEAKER) 2.3 g/dL     3.5-5.0      L            



             (test code = 1145)                                        

 

             ALKALINE PHOSPHATASE 97 U/L                           



             (BEAKER) (test code =                                        



             346)                                                

 

             BILIRUBIN TOTAL 4.3 mg/dL    0.2-1.2      H            



             (BEAKER) (test code =                                        



             377)                                                

 

             SODIUM (BEAKER) (test 142 meq/L    136-145                   



             code = 381)                                         

 

             POTASSIUM (BEAKER) 3.9 meq/L    3.5-5.1                   



             (test code = 379)                                        

 

             CHLORIDE (BEAKER) 114 meq/L           H            



             (test code = 382)                                        

 

             CO2 (BEAKER) (test 22 meq/L     22-29                     



             code = 355)                                         

 

             BLOOD UREA NITROGEN 22 mg/dL     7-21         H            



             (BEAKER) (test code =                                        



             354)                                                

 

             CREATININE (BEAKER) 0.67 mg/dL   0.57-1.25                 



             (test code = 358)                                        

 

             GLUCOSE RANDOM 142 mg/dL           H            



             (BEAKER) (test code =                                        



             652)                                                

 

             CALCIUM (BEAKER) 8.3 mg/dL    8.4-10.2     L            



             (test code = 697)                                        

 

             AST (SGOT) (BEAKER) 50 U/L       5-34         H            



             (test code = 353)                                        

 

             ALT (SGPT) (BEAKER) 139 U/L      6-55         H            



             (test code = 347)                                        

 

             EGFR (BEAKER) (test 114                                    ESTIMATE

D GFR IS



             code = 1092) mL/min/1.73 sq                           NOT AS ACCURA

TE AS



                          m                                      CREATININE



                                                                 CLEARANCE IN



                                                                 PREDICTING



                                                                 GLOMERULAR



                                                                 FILTRATION RATE

.



                                                                 ESTIMATED GFR I

S



                                                                 NOT APPLICABLE 

FOR



                                                                 DIALYSIS PATIEN

TS.



 ID - DBSpecimen slightly [id number] 05:43:29





             Test Item    Value        Reference Range Interpretation Comments

 

             PHOSPHORUS (BEAKER) (test code = 3.5 mg/dL    2.3-4.7              

     



             604)                                                



 ID - [id number] 05:43:29





             Test Item    Value        Reference Range Interpretation Comments

 

             TRIGLYCERIDES (BEAKER) (test code = 153 mg/dL                      

        



             540)                                                



TRIGLYCERIDE REFERENCE RANGELow Risk &lt;150Borderline Risk 150-199High Risk 
200-499Very High Risk &gt;=500Operator ID - DBSpecimen slightly ictericMAGNESIUM
2022 05:43:28





             Test Item    Value        Reference Range Interpretation Comments

 

             MAGNESIUM (BEAKER) (test code = 2.1 mg/dL    1.6-2.6               

    



             627)                                                



 ID - DBLACTIC ACID, [id number] 05:37:26





             Test Item    Value        Reference Range Interpretation Comments

 

             LACTATE BLOOD VENOUS (2) (BEAKER) 1.70 mmol/L  0.50-2.20           

      



             (test code = 2872)                                        



 ID - ALAYNA MSpecimen slightly ictericBLOOD GAS, [id number] 
05:28:09





             Test Item    Value        Reference Range Interpretation Comments

 

             PH ARTERIAL (BEAKER) (test code = 7.42         7.35-7.45           

      



             383)                                                

 

             PCO2 ARTERIAL (BEAKER) (test code 38 mm Hg     35-45               

      



             = 384)                                              

 

             PO2 ARTERIAL (BEAKER) (test code 74 mm Hg     80-90        L       

     



             = 385)                                              

 

             O2 SATURATION ARTERIAL (BEAKER) 95.2 %       96.0-97.0    L        

    



             (test code = 386)                                        

 

             HCO3 ARTERIAL (BEAKER) (test code 24 mmol/L    21-29               

      



             = 388)                                              

 

             BASE EXCESS ARTERIAL (BEAKER) -0.6 mmol/L  -2.0-3.0                

  



             (test code = 387)                                        

 

             PATIENT TEMPERATURE (BEAKER) 37.0                                  

 



             (test code = 1818)                                        

 

             FIO2 (BEAKER) (test code = 1819) 45.0                              

     



[id number] 05:28:06





             Test Item    Value        Reference Range Interpretation Comments

 

             PARTIAL THROMBOPLASTIN TIME 29.8 seconds 22.5-36.0                 



             (BEAKER) (test code = 760)                                        



[id number] 05:27:47





             Test Item    Value        Reference Range Interpretation Comments

 

             FIBRINOGEN LEVEL (BEAKER) (test 456 mg/dl    225-434      H        

    



             code = 658)                                         



PROTHROMBIN [id number] 05:27:25





             Test Item    Value        Reference Range Interpretation Comments

 

             PROTIME (ARIANA) 15.7 seconds 11.9-14.2    H            



             (test code = 759)                                        

 

             INR (ARIANA) (test 1.27         See_Comment                [Automat

ed message]



             code = 370)                                         The system [employer]



                                                                 generated this 

result



                                                                 transmitted ref

erence



                                                                 range: <=5.90. 

The



                                                                 reference range

 was



                                                                 not used to int

erpret



                                                                 this result as



                                                                 normal/abnormal

.



RECOMMENDED COUMADIN/WARFARIN INR THERAPY RANGESSTANDARD DOSE: 2.0 - 3.0 
Includes: PROPHYLAXIS for venous thrombosis, systemic embolization; TREATMENT 
for venous thrombosis and/or pulmonary embolus.HIGH RISK: Target INR is 2.5-3.5 
for patients with mechanical heart valves.POCT-GLUCOSE [id number] 05:10:41





             Test Item    Value        Reference Range Interpretation Comments

 

             POC-GLUCOSE METER 152 mg/dL           H            : TESTED A

T [facility] [address]



             (ARIANA) (test code =                                        [place] [facility],



             1538)                                               [id number]:



                                                                 /Techni

alexis ID



                                                                 = [id number] for Reji basurto



                                                                 (contract)Robert



RAD, CHEST, 1 VIEW, NON [id number] 04:08:00Reason for exam:-&gt;acute 
hypoxemic respiratory failure, pneumonia, pulm edemaShould this be performed at 
the bedside?-&gt;Yes
************************************************************[facility] : 1975 Sex: 
F************************************************************FINALREPORT PATIENT
ID: [id number] RAD, CHEST, 1 VIEW, NON DEPT INDICATION: acute hypoxemic 
respiratory failure, pneumonia, pulm edema COMPARISON: Prior day's exam 
FINDINGS: Portable frontal view of the chest. IMPRESSION: Support Lines: Stable.
Lungs and pleura: Unchanged airspace and pleural opacities. No pn
eumothorax.Heart and mediastinum: Stable contours. Additional findings: None. 
Signed: Luz Garces Verified Date/Time: 2022 04:08:02 
Electronically signed by: LUZ GARCES MD on 2022 04:08 AM
POCT-GLUCOSE [id number] 23:25:36





             Test Item    Value        Reference Range Interpretation Comments

 

             POC-GLUCOSE METER 132 mg/dL           H            : TESTED A

T [facility] [address]



             (BEAKER) (test code =                                        [place],



             [address]38)                                               [id number]:



                                                                 /Techni

alexis ID



                                                                 = [id number] for Reji basurto



                                                                 (contract), Robert gu



BASIC METABOLIC [id number] 18:11:28





             Test Item    Value        Reference Range Interpretation Comments

 

             SODIUM (BEAKER) 146 meq/L    136-145      H            



             (test code = 381)                                        

 

             POTASSIUM (BEAKER) 4.5 meq/L    3.5-5.1                   



             (test code = 379)                                        

 

             CHLORIDE (BEAKER) 116 meq/L           H            



             (test code = 382)                                        

 

             CO2 (BEAKER) (test 23 meq/L     22-29                     



             code = 355)                                         

 

             BLOOD UREA NITROGEN 22 mg/dL     7-21         H            



             (BEAKER) (test code                                        



             = 354)                                              

 

             CREATININE (BEAKER) 0.70 mg/dL   0.57-1.25                 



             (test code = 358)                                        

 

             GLUCOSE RANDOM 178 mg/dL           H            



             (BEAKER) (test code                                        



             = 652)                                              

 

             CALCIUM (BEAKER) 8.5 mg/dL    8.4-10.2                  



             (test code = 697)                                        

 

             EGFR (BEAKER) (test 109 mL/min/1.73                           ESTIM

ATED GFR IS



             code = 1092) sq m                                   NOT AS ACCURATE

 AS



                                                                 CREATININE



                                                                 CLEARANCE IN



                                                                 PREDICTING



                                                                 GLOMERULAR



                                                                 FILTRATION RATE

.



                                                                 ESTIMATED GFR I

S



                                                                 NOT APPLICABLE 

FOR



                                                                 DIALYSIS PATIEN

TS.



 ID - DBSpecimen slightly [id number] 18:11:27





             Test Item    Value        Reference Range Interpretation Comments

 

             MAGNESIUM (BEAKER) (test code = 2.5 mg/dL    1.6-2.6               

    



             627)                                                



 ID - [id number] 18:11:27





             Test Item    Value        Reference Range Interpretation Comments

 

             PHOSPHORUS (BEAKER) (test code = 4.3 mg/dL    2.3-4.7              

     



             604)                                                



 ID - DBLACTIC ACID, [id number] 18:05:22





             Test Item    Value        Reference Range Interpretation Comments

 

             LACTATE BLOOD VENOUS 1.78 mmol/L  0.50-2.20                 Specime

n markedly



             (2) (BEAKER) (test                                        hemolyzed



             code = 2872)                                        



 ID - DBSpecimen slightly ictericRAD, CHEST, 1 VIEW, NON [id number] 
14:47:00Reason for exam:-&gt;ET tube placementShould this be performed at the 
bedside?-&gt;Yes************************************************************[facility] : 1975 Sex: 
F************************************************************FINALREPORT PATIENT
ID: [id number] CLINICAL HISTORY: ET tube placement TECHNIQUE: 1 view of the chest.
COMPARISON: 2022 IMPRESSION: The ETT terminates 2.5 cm above the ena and
NGT in the upper stomach. Diffuse bilateral airspace opacities are grossly 
unchanged. Subpulmonic pleural effusions cannot beexcluded. The 
cardiomediastinal silhouette is magnified by technique. Signed: Naila Mancia 
MDReportVerified Date/Time: 2022 14:47:45 Reading Location: [facility] 
Radiology Reading Room Electronically signed by: NAILA MANCIA M.D. on 
2022 02:47 PMCBC (HEMOGRAM ONLY)2022 13:14:24





             Test Item    Value        Reference Range Interpretation Comments

 

             WHITE BLOOD CELL COUNT (BEAKER) 30.3 K/ L    3.5-10.5     H        

    



             (test code = 775)                                        

 

             RED BLOOD CELL COUNT (BEAKER) 2.81 M/ L    3.93-5.22    L          

  



             (test code = 761)                                        

 

             HEMOGLOBIN (BEAKER) (test code = 7.7 GM/DL    11.2-15.7    L       

     



             410)                                                

 

             HEMATOCRIT (BEAKER) (test code = 23.9 %       34.1-44.9    L       

     



             411)                                                

 

             MEAN CORPUSCULAR VOLUME (BEAKER) 85.1 fL      79.4-94.8            

     



             (test code = 753)                                        

 

             MEAN CORPUSCULAR HEMOGLOBIN 27.4 pg      25.6-32.2                 



             (BEAKER) (test code = 751)                                        

 

             MEAN CORPUSCULAR HEMOGLOBIN CONC 32.2 GM/DL   32.2-35.5            

     



             (BEAKER) (test code = 752)                                        

 

             RED CELL DISTRIBUTION WIDTH 19.2 %       11.7-14.4    H            



             (BEAKER) (test code = 412)                                        

 

             PLATELET COUNT (BEAKER) (test 104 K/CU MM  150-450      L          

  



             code = 756)                                         

 

             NUCLEATED RED BLOOD CELLS 109 /100 WBC 0-0          H            



             (BEAKER) (test code = 413)                                        



POCT-GLUCOSE [id number] 12:31:30





             Test Item    Value        Reference Range Interpretation Comments

 

             POC-GLUCOSE METER 188 mg/dL           H            : TESTED A

T [facility] [address]



             (BEAKER) (test code =                                        [place],



             [address])                                               [id number]:



                                                                 /Techni

alexis ID



                                                                 = [id number] for SILVESTRE BHATIA



(CELLAVISION MANUAL DIFF)2022 12:29:52





             Test Item    Value        Reference Range Interpretation Comments

 

             NEUTROPHILS - REL 71 %                                   



             (CELLAVISION)(BEAKER) (test code                                   

     



             = 2816)                                             

 

             LYMPHOCYTES - REL 8 %                                    



             (CELLAVISION)(BEAKER) (test code                                   

     



             = 2817)                                             

 

             MONOCYTES - REL 5 %                                    



             (CELLAVISION)(BEAKER) (test code                                   

     



             = 2818)                                             

 

             METAMYELOCYTES - REL 5 %          0-0          H            



             (CELLAVISION)(BEAKER) (test code                                   

     



             = 2821)                                             

 

             BANDS - REL  9 %          0-10                      



             (CELLAVISION)(BEAKER) (test code                                   

     



             = 2826)                                             

 

             ATYPICAL LYMPHOCYTES - REL 2 %          0-0          H            



             (CELLAVISION)(BEAKER) (test code                                   

     



             = 2829)                                             

 

             NEUTROPHILS - ABS 21.73 K/ul   1.56-6.13    H            



             (CELLAVISION)(BEAKER) (test code                                   

     



             = 2830)                                             

 

             LYMPHOCYTES - ABS 2.45 K/ul    1.18-3.74                 



             (CELLAVISION)(BEAKER) (test code                                   

     



             = 2831)                                             

 

             MONOCYTES - ABS 1.53 K/uL    0.24-0.36    H            



             (CELLAVISION)(BEAKER) (test code                                   

     



             = 2832)                                             

 

             METAMYELOCYTES - ABS 1.53 K/uL    0.00-0.00    H            



             (CELLAVISION)(BEAKER) (test code                                   

     



             = 2836)                                             

 

             BANDS - ABS  2.75 K/uL    0.00-0.80    H            



             (CELLAVISION)(BEAKER) (test code                                   

     



             = 2840)                                             

 

             ATYPICAL LYMPHOCYTES - ABS 0.61 K/uL    0.00-0.00    H            



             (CELLAVISION)(BEAKER) (test code                                   

     



             = 2858)                                             

 

             TOTAL COUNTED (BEAKER) (test 100                                   

 



             code = 1351)                                        

 

             MANUAL NRBC  CELLS 260 /100 WBC 0-0          H            



             (BEAKER) (test code = 1353)                                        

 

             WBC MORPHOLOGY (BEAKER) (test Normal                               

  



             code = 487)                                         

 

             GIANT PLATELETS (BEAKER) (test Present                             

   



             code = 313)                                         

 

             POLYCHROMATOPHILLIC RBCS(BEAKER) 3+ many                           

     



             (test code = 478)                                        

 

             HYPOCHROMIA (BEAKER) (test code 1+ few                             

    



             = 963)                                              

 

             ANISOCYTOSIS (BEAKER) (test code 3+ many                           

     



             = 961)                                              

 

             MICROCYTES (BEAKER) (test code = 2+ moderate                       

     



             965)                                                

 

             MACROCYTES (BEAKER) (test code = 1+ few                            

     



             964)                                                

 

             POIKILOCYTES (BEAKER) (test code 2+ moderate                       

     



             = 966)                                              

 

             TARGET CELLS (BEAKER) (test code 1+ few                            

     



             = 480)                                              

 

             JARETT CELLS (BEAKER) (test code = 1+ few                            

     



             474)                                                

 

             ARTIFACT (CELLAVISION)(BEAKER) Present                             

   



             (test code = 3432)                                        

 

             PLATELET CONCENTRATION Decreased                              



             (CELLAVISION)(BEAKER) (test code                                   

     



             = 3438)                                             



 ID - [id number] ID - nova Vásquez comments: Slide comments:CBC 
W/PLT COUNT &amp; AUTO [id number] 12:29:51





             Test Item    Value        Reference Range Interpretation Comments

 

             WHITE BLOOD CELL COUNT 30.6 K/ L    3.5-10.5     H            



             (BEAKER) (test code =                                        



             775)                                                

 

             RED BLOOD CELL COUNT 2.40 M/ L    3.93-5.22    L            



             (BEAKER) (test code =                                        



             761)                                                

 

             HEMOGLOBIN (BEAKER) 7.5 GM/DL    11.2-15.7    L            



             (test code = 410)                                        

 

             HEMATOCRIT (BEAKER) 21.0 %       34.1-44.9    L            



             (test code = 411)                                        

 

             MEAN CORPUSCULAR 87.5 fL      79.4-94.8                 



             VOLUME (BEAKER) (test                                        



             code = 753)                                         

 

             MEAN CORPUSCULAR 31.3 pg      25.6-32.2                 



             HEMOGLOBIN (BEAKER)                                        



             (test code = 751)                                        

 

             MEAN CORPUSCULAR 35.7 GM/DL   32.2-35.5    H            



             HEMOGLOBIN CONC                                        



             (BEAKER) (test code =                                        



             752)                                                

 

             RED CELL DISTRIBUTION 21.6 %       11.7-14.4    H            



             WIDTH (BEAKER) (test                                        



             code = 412)                                         

 

             PLATELET COUNT 97 K/CU MM   150-450      L            



             (BEAKER) (test code =                                        



             756)                                                

 

             MEAN PLATELET VOLUME                                        Unable 

to report



             (BEAKER) (test code =                                        due to

 abnormal



             754)                                                Platelet popula

tion



                                                                 distribution.

 

             NUCLEATED RED BLOOD 146 /100 WBC 0-0          H            



             CELLS (BEAKER) (test                                        



             code = 413)                                         



BLOOD GAS, [id number] 12:26:57





             Test Item    Value        Reference Range Interpretation Comments

 

             PH ARTERIAL (BEAKER) (test code = 7.39         7.35-7.45           

      



             383)                                                

 

             PCO2 ARTERIAL (BEAKER) (test code 41 mm Hg     35-45               

      



             = 384)                                              

 

             PO2 ARTERIAL (BEAKER) (test code 86 mm Hg     80-90                

     



             = 385)                                              

 

             O2 SATURATION ARTERIAL (BEAKER) 96.2 %       96.0-97.0             

    



             (test code = 386)                                        

 

             HCO3 ARTERIAL (BEAKER) (test code 24 mmol/L    21-29               

      



             = 388)                                              

 

             BASE EXCESS ARTERIAL (BEAKER) -0.8 mmol/L  -2.0-3.0                

  



             (test code = 387)                                        

 

             PATIENT TEMPERATURE (BEAKER) 37.6                                  

 



             (test code = 1818)                                        

 

             FIO2 (BEAKER) (test code = 1819) 60.0                              

     



RAD, ABDOMEN/KUB, 1 VIEW [id number] 11:40:00Reason for exam:-&gt;feeding tube 
placement********************************************************[facility] 1975 Sex: 
F************************************************************FINALREPORT PATIENT
ID: [id number] Exam: RAD, CHEST, 1 VIEW, NON DEPT, RAD, ABDOMEN/KUB, 1 VIEW 
APDate: 2022 11:37 AM Indication: Endotracheal tube placement and feeding 
tube placement Comparison: CXR ofearlier the same day, KUB of 2022 
FINDINGS: Lines/Tubes:Endotracheal tube terminates approximately 6.1 cm above 
the ena. Enteric tube with tip in the stomach and side-port in the distal 
esophagus. EKG leads overlie the chest. Lungs:Stable lung volumes and bibasilar 
opacities. Pleura:No pleuraleffusion. No pneumothorax. Heart/Mediastinum:The 
cardiomediastinal silhouette is normal in size and contour. Bones/Soft Tissues: 
No acute osseous injury. Abdomen: No free air below the diaphragm. IMPRES
CHRIS:Endotracheal tube terminates approximately 6.1 cm above the ena. Enteric
tube tip in the stomach and side-port in the distal esophagus. Recommend 
advancement by 5 cm. Signed: Greta Vieira MDReport Verified Date/Time: 2022
11:40:08 Electronically signed by: GRETA VIEIRA MD on 2022 11:40 AMRAD, 
CHEST, 1 VIEW, NON [id number] 11:40:00Reason for exam:-&gt;Et tube 
placementShould this be performed at the bedside?-&gt;Yes
************************************************************[facility] : 1975 Sex: 
F************************************************************FINALREPORT PATIENT
ID: [id number] Exam: RAD, CHEST, 1 VIEW, NON DEPT, RAD, ABDOMEN/KUB, 1 VIEW 
APDate: 2022 11:37 AM Indication: Endotracheal tube placement and feeding 
tube placement Comparison: CXR ofearlier the same day, KUB of 2022 
FINDINGS: Lines/Tubes:Endotracheal tube terminates approximately 6.1 cm above 
the ena. Enteric tube with tip in the stomach and side-port in the distal 
esophagus. EKG leads overlie the chest. Lungs:Stable lung volumes and bibasilar 
opacities. Pleura:No pleuraleffusion. No pneumothorax. Heart/Mediastinum:The 
cardiomediastinal silhouette is normal in size and contour. Bones/Soft Tissues: 
No acute osseous injury. Abdomen: No free air below the diaphragm. IMPRES
CHRIS:Endotracheal tube terminates approximately 6.1 cm above the ena. Enteric
tube tip in the stomach and side-port in the distal esophagus. Recommend 
advancement by 5 cm. Signed: Greta Vieira MDReport Verified Date/Time: 2022
11:40:08 Electronically signed by: GRETA VIEIRA MD on 2022 11:40 AM
BRONCHIAL CULTURE + GRAM [id number] 09:48:15





             Test Item    Value        Reference Range Interpretation Comments

 

             CULTURE                                A            <1+ Same organi

sm has



             (BEAKER) (test                                        been isolated

 from



             code = 1095)                                        cultures(s) of 

the same



                                                                 body site withi

n 3



                                                                 days. Repeat



                                                                 identification 

and



                                                                 susceptibility 

testing



                                                                 performed only 

after



                                                                 consultation wi

 the



                                                                 clinical microb

iology



                                                                 laboratory.Refe

r to



                                                                 previous cultur

e



                                                                 ofStaphylococcu

s aureus

 

             GRAM STAIN   <1+ White blood                           



             RESULT (BEAKER) cells seen                             



             (test code =                                        



             1123)                                               

 

             GRAM STAIN   No organisms                           



             RESULT (BEAKER) seen                                   



             (test code =                                        



             937260)                                             



&lt;1+ Normal respiratory anju presentBRONCHIAL CULTURE + GRAM [id number] 
09:48:09





             Test Item    Value        Reference Range Interpretation Comments

 

             CULTURE                                A            1+ Same organis

m has



             (BEAKER) (test                                        been isolated

 from



             code = 1095)                                        cultures(s) of 

the same



                                                                 body site withi

n 3



                                                                 days. Repeat



                                                                 identification 

and



                                                                 susceptibility 

testing



                                                                 performed only 

after



                                                                 consultation wi

 the



                                                                 clinical microb

iology



                                                                 laboratory.Refe

r to



                                                                 previous cultur

e



                                                                 ofStaphylococcu

s aureus

 

             GRAM STAIN   1+ WBCs                                



             RESULT (BEAKER)                                        



             (test code =                                        



             1123)                                               

 

             GRAM STAIN   <1+ gram                               



             RESULT (BEAKER) positive cocci                           



             (test code = in clusters                            



             518402)                                             

 

             GRAM STAIN   <1+ budding                            



             RESULT (BEAKER) yeast                                  



             (test code =                                        



             170635)                                             



1+ Normal respiratory anju presentRAD, CHEST, 1 VIEW, NON [id number] 
08:50:00Reason for exam:-&gt;acute hypoxemic respiratory failure, pneumonia, 
pulm edemaShould this be performed at the bedside?-&gt;Yes
********************************************************[facility] : 1975 Sex: 
F************************************************************FINALREPORT PATIENT
ID: [id number] CLINICAL HISTORY: acute hypoxemic respiratory failure, pneumonia, 
pulm edema TECHNIQUE: 1 view of the chest. COMPARISON: 2022 IMPRESSION: The
ETT and NGT are unchanged.Bilateral airspace opacities decreased. Subpulmonic 
pleural effusions cannot be excluded. The cardiomediastinal silhouette is within
normal limits for size. Signed: Naila Mancia MDReport Verified Date/Time: 
2022 08:50:47 Reading Location: [facility] 
Electronically signed by: NAILA MANCIA M.D. on 2022 08:50 AMBLOOD GAS, 
[id number] 06:49:36





             Test Item    Value        Reference Range Interpretation Comments

 

             PH ARTERIAL (BEAKER) (test code = 7.45         7.35-7.45           

      



             383)                                                

 

             PCO2 ARTERIAL (BEAKER) (test code 37 mm Hg     35-45               

      



             = 384)                                              

 

             PO2 ARTERIAL (BEAKER) (test code = 108 mm Hg    80-90        H     

       



             385)                                                

 

             O2 SATURATION ARTERIAL (BEAKER) 98.1 %       96.0-97.0    H        

    



             (test code = 386)                                        

 

             HCO3 ARTERIAL (BEAKER) (test code 25 mmol/L    21-29               

      



             = 388)                                              

 

             BASE EXCESS ARTERIAL (BEAKER) 1.4 mmol/L   -2.0-3.0                

  



             (test code = 387)                                        

 

             PATIENT TEMPERATURE (BEAKER) (test 37.3                            

       



             code = 1818)                                        

 

             FIO2 (BEAKER) (test code = 1819) 45.0                              

     



POCT-GLUCOSE [id number] 06:24:53





             Test Item    Value        Reference Range Interpretation Comments

 

             POC-GLUCOSE METER 133 mg/dL           H            : TESTED A

T [facility] [address]



             (BEAKER) (test code =                                        [place],



             [address]38)                                               [address]:



                                                                 /Techni

alexis ID



                                                                 = [id number] for Anton dumont



                                                                 (contract)Loren



[id number] 05:25:38





             Test Item    Value        Reference Range Interpretation Comments

 

             PARTIAL THROMBOPLASTIN TIME 30.2 seconds 22.5-36.0                 



             (BEAKER) (test code = 760)                                        



[id number] 05:25:28





             Test Item    Value        Reference Range Interpretation Comments

 

             FIBRINOGEN LEVEL (BEAKER) (test 465 mg/dl    225-434      H        

    



             code = 658)                                         



PROTHROMBIN [id number] 05:25:12





             Test Item    Value        Reference Range Interpretation Comments

 

             PROTIME (BEAKER) 16.0 seconds 11.9-14.2    H            



             (test code = 759)                                        

 

             INR (BEAKER) (test 1.30         See_Comment                [Automat

ed message]



             code = 370)                                         The system [employer]



                                                                 generated this 

result



                                                                 transmitted ref

erence



                                                                 range: <=5.90. 

The



                                                                 reference range

 was



                                                                 not used to int

erpret



                                                                 this result as



                                                                 normal/abnormal

.



RECOMMENDED COUMADIN/WARFARIN INR THERAPY RANGESSTANDARD DOSE: 2.0 - 3.0 
Includes: PROPHYLAXIS for venous thrombosis, systemic embolization; TREATMENT 
for venous thrombosis and/or pulmonary embolus.HIGH RISK: Target INR is 2.5-3.5 
for patients with mechanical heart valves.[id number] 05:19:31





             Test Item    Value        Reference Range Interpretation Comments

 

             PHOSPHORUS (BEAKER) (test code = 3.5 mg/dL    2.3-4.7              

     



             604)                                                



 ID - OMAIRA GCOMPREHENSIVE METABOLIC [id number] 05:19:31





             Test Item    Value        Reference Range Interpretation Comments

 

             TOTAL PROTEIN 6.9 gm/dL    6.0-8.3                   



             (BEAKER) (test code =                                        



             770)                                                

 

             ALBUMIN (BEAKER) 2.4 g/dL     3.5-5.0      L            



             (test code = 1145)                                        

 

             ALKALINE PHOSPHATASE 94 U/L                           



             (BEAKER) (test code =                                        



             346)                                                

 

             BILIRUBIN TOTAL 6.2 mg/dL    0.2-1.2      H            



             (BEAKER) (test code =                                        



             377)                                                

 

             SODIUM (BEAKER) (test 149 meq/L    136-145      H            



             code = 381)                                         

 

             POTASSIUM (BEAKER) 4.0 meq/L    3.5-5.1                   



             (test code = 379)                                        

 

             CHLORIDE (BEAKER) 118 meq/L           H            



             (test code = 382)                                        

 

             CO2 (BEAKER) (test 25 meq/L     22-29                     



             code = 355)                                         

 

             BLOOD UREA NITROGEN 23 mg/dL     7-21         H            



             (BEAKER) (test code =                                        



             354)                                                

 

             CREATININE (BEAKER) 0.62 mg/dL   0.57-1.25                 



             (test code = 358)                                        

 

             GLUCOSE RANDOM 128 mg/dL           H            



             (BEAKER) (test code =                                        



             652)                                                

 

             CALCIUM (BEAKER) 8.8 mg/dL    8.4-10.2                  



             (test code = 697)                                        

 

             AST (SGOT) (BEAKER) 80 U/L       5-34         H            



             (test code = 353)                                        

 

             ALT (SGPT) (BEAKER) 199 U/L      6-55         H            



             (test code = 347)                                        

 

             EGFR (BEAKER) (test 125                                    ESTIMATE

D GFR IS



             code = 1092) mL/min/1.73 sq                           NOT AS ACCURA

TE AS



                          m                                      CREATININE



                                                                 CLEARANCE IN



                                                                 PREDICTING



                                                                 GLOMERULAR



                                                                 FILTRATION RATE

.



                                                                 ESTIMATED GFR I

S



                                                                 NOT APPLICABLE 

FOR



                                                                 DIALYSIS PATIEN

TS.



 ID - OMAIRA GSpecimen moderately [id number] 05:19:30





             Test Item    Value        Reference Range Interpretation Comments

 

             MAGNESIUM (BEAKER) (test code = 1.9 mg/dL    1.6-2.6               

    



             627)                                                



 ID - OMAIRA GLACTIC ACID, [id number] 05:12:24





             Test Item    Value        Reference Range Interpretation Comments

 

             LACTATE BLOOD VENOUS (2) (BEAKER) 1.47 mmol/L  0.50-2.20           

      



             (test code = 2872)                                        



 ID - OMAIRA GSpecimen slightly ictericBASIC METABOLIC [id number] 
01:15:15





             Test Item    Value        Reference Range Interpretation Comments

 

             SODIUM (BEAKER) 148 meq/L    136-145      H            



             (test code = 381)                                        

 

             POTASSIUM (BEAKER) 4.0 meq/L    3.5-5.1                   



             (test code = 379)                                        

 

             CHLORIDE (BEAKER) 118 meq/L           H            



             (test code = 382)                                        

 

             CO2 (BEAKER) (test 23 meq/L     22-29                     



             code = 355)                                         

 

             BLOOD UREA NITROGEN 26 mg/dL     7-21         H            



             (BEAKER) (test code                                        



             = 354)                                              

 

             CREATININE (BEAKER) 0.65 mg/dL   0.57-1.25                 



             (test code = 358)                                        

 

             GLUCOSE RANDOM 133 mg/dL           H            



             (BEAKER) (test code                                        



             = 652)                                              

 

             CALCIUM (BEAKER) 8.9 mg/dL    8.4-10.2                  



             (test code = 697)                                        

 

             EGFR (BEAKER) (test 118 mL/min/1.73                           ESTIM

ATED GFR IS



             code = 1092) sq m                                   NOT AS ACCURATE

 AS



                                                                 CREATININE



                                                                 CLEARANCE IN



                                                                 PREDICTING



                                                                 GLOMERULAR



                                                                 FILTRATION RATE

.



                                                                 ESTIMATED GFR I

S



                                                                 NOT APPLICABLE 

FOR



                                                                 DIALYSIS PATIEN

TS.



 ID - DBSpecimen moderately ictericPOCT-GLUCOSE [id number] 00:05:38





             Test Item    Value        Reference Range Interpretation Comments

 

             POC-GLUCOSE METER 123 mg/dL           H            : TESTED A

T [facility] [address]



             (BEAKER) (test code =                                        [place],



             [address])                                               [address]:



                                                                 /Techni

alexis ID



                                                                 = [id number] for 

julia



                                                                 (contract), Loren

grecia



RAD, ABDOMEN/KUB, 1 VIEW [id number] 23:27:00Reason for exam:-&gt;feeding tube 
placement********************************************************[facility] 1975 Sex: 
F************************************************************FINALREPORT PATIENT
ID: [id number] CLINICAL HISTORY: feeding tube placement TECHNIQUE: RAD, 
ABDOMEN/KUB, 1 VIEW AP COMPARISON: Same day examination. IMPRESSION: Gastric 
decompression tube tip overlying the gastric fundus proximal sidehole near the 
GE junction. Otherwise unchanged examination the interval. Persistent gaseous 
distention of the large bowel. Patchy airspace opacities in the bilateral lung 
bases. Surgical material in the right upper quadrant. Signed: Luz Garces Verified Date/Time: 2022 23:27:53 Electronically signed
by: LUZ GARCES MD on 2022 11:27 PMPOCT-GLUCOSE METER
2022 19:14:08





             Test Item    Value        Reference Range Interpretation Comments

 

             POC-GLUCOSE METER 150 mg/dL           H            : TESTED A

T [facility] [address]



             (BEAKER) (test code =                                        [address] [facility],



             [address])                                               [address]:



                                                                 /Techni

alexis ID



                                                                 = [id number] for ELZA RICHARDSON



[id number] 18:08:53





             Test Item    Value        Reference Range Interpretation Comments

 

             HAPTOGLOBIN (BEAKER) (test code = < mg/dL             L      

      



             366)                                                



 ID - DBLACTIC ACID, [id number] 18:05:17





             Test Item    Value        Reference Range Interpretation Comments

 

             LACTATE BLOOD VENOUS (2) (BEAKER) 1.73 mmol/L  0.50-2.20           

      



             (test code = 2872)                                        



 ID - DBSpecimen moderately ictericBASIC METABOLIC [id number] 
17:54:18





             Test Item    Value        Reference Range Interpretation Comments

 

             SODIUM (BEAKER) 151 meq/L    136-145      H            



             (test code = 381)                                        

 

             POTASSIUM (BEAKER) 4.4 meq/L    3.5-5.1                   



             (test code = 379)                                        

 

             CHLORIDE (BEAKER) 119 meq/L           H            



             (test code = 382)                                        

 

             CO2 (BEAKER) (test 27 meq/L     22-29                     



             code = 355)                                         

 

             BLOOD UREA NITROGEN 30 mg/dL     7-21         H            



             (BEAKER) (test code                                        



             = 354)                                              

 

             CREATININE (BEAKER) 0.66 mg/dL   0.57-1.25                 



             (test code = 358)                                        

 

             GLUCOSE RANDOM 156 mg/dL           H            



             (BEAKER) (test code                                        



             = 652)                                              

 

             CALCIUM (BEAKER) 8.9 mg/dL    8.4-10.2                  



             (test code = 697)                                        

 

             EGFR (BEAKER) (test 116 mL/min/1.73                           ESTIM

ATED GFR IS



             code = 1092) sq m                                   NOT AS ACCURATE

 AS



                                                                 CREATININE



                                                                 CLEARANCE IN



                                                                 PREDICTING



                                                                 GLOMERULAR



                                                                 FILTRATION RATE

.



                                                                 ESTIMATED GFR I

S



                                                                 NOT APPLICABLE 

FOR



                                                                 DIALYSIS PATIEN

TS.



 ID - DBSpecimen moderately [id number] 17:54:17





             Test Item    Value        Reference Range Interpretation Comments

 

             PHOSPHORUS (BEAKER) (test code = 4.6 mg/dL    2.3-4.7              

     



             604)                                                



 ID - [id number] 17:54:16





             Test Item    Value        Reference Range Interpretation Comments

 

             MAGNESIUM (BEAKER) (test code = 1.8 mg/dL    1.6-2.6               

    



             627)                                                



 ID - DBLACTATE DEHYDROGENASE (LDH)2022 17:52:10





             Test Item    Value        Reference Range Interpretation Comments

 

             LACTATE DEHYDROGENASE (BEAKER) (test 1683 U/L     125-220      H   

         



             code = 635)                                         



 ID - DBCBC (HEMOGRAM ONLY)2022 17:41:44





             Test Item    Value        Reference Range Interpretation Comments

 

             WHITE BLOOD CELL COUNT (BEAKER) 36.1 K/ L    3.5-10.5     H        

    



             (test code = 775)                                        

 

             RED BLOOD CELL COUNT (BEAKER) 2.72 M/ L    3.93-5.22    L          

  



             (test code = 761)                                        

 

             HEMOGLOBIN (BEAKER) (test code = 7.9 GM/DL    11.2-15.7    L       

     



             410)                                                

 

             HEMATOCRIT (BEAKER) (test code = 24.1 %       34.1-44.9    L       

     



             411)                                                

 

             MEAN CORPUSCULAR VOLUME (BEAKER) 88.6 fL      79.4-94.8            

     



             (test code = 753)                                        

 

             MEAN CORPUSCULAR HEMOGLOBIN 29.0 pg      25.6-32.2                 



             (BEAKER) (test code = 751)                                        

 

             MEAN CORPUSCULAR HEMOGLOBIN CONC 32.8 GM/DL   32.2-35.5            

     



             (BEAKER) (test code = 752)                                        

 

             RED CELL DISTRIBUTION WIDTH 20.3 %       11.7-14.4    H            



             (BEAKER) (test code = 412)                                        

 

             PLATELET COUNT (BEAKER) (test 98 K/CU MM   150-450      L          

  



             code = 756)                                         

 

             NUCLEATED RED BLOOD CELLS 156 /100 WBC 0-0          H            



             (BEAKER) (test code = 413)                                        



PERIPHERAL BLOOD SMEAR - PATHOLOGIST [id number] 17:22:14





             Test Item    Value        Reference Range Interpretation Comments

 

             RBC MORPHOLOGY Polychromasia                           



             (BEAKER) (test                                        



             code = 2846)                                        

 

             RBC MORPHOLOGY Anisocytosis                           



             (BEAKER) (test                                        



             code = 01618)                                        

 

             RBC MORPHOLOGY Poikilocytosis                           



             (BEAKER) (test                                        



             code = 48740)                                        

 

             RBC MORPHOLOGY Nucleated Red Blood Cells                           



             (BEAKER) (test                                        



             code = 58203)                                        

 

             RBC MORPHOLOGY Target Cells                           



             (BEAKER) (test                                        



             code = 876873)                                        

 

             RBC MORPHOLOGY Elliptocytes                           



             (BEAKER) (test                                        



             code = 116494)                                        

 

             WBC MORPHOLOGY Left Shift                             



             (BEAKER) (test                                        



             code = 2847)                                        

 

             WBC MORPHOLOGY Dohle Bodies                           



             (BEAKER) (test                                        



             code = 866280)                                        

 

             WBC MORPHOLOGY Hypersegmented Neutrophils                          

 



             (BEAKER) (test                                        



             code = 741403)                                        

 

             PLT MORPHOLOGY No Clumping                            



             (BEAKER) (test                                        



             code = 2848)                                        

 

             PLT MORPHOLOGY No Satellitosis                           



             (BEAKER) (test                                        



             code = 546935)                                        

 

             PERIPHERAL SMR Cell count confirmed. There                         

  



             REVIEW (BEAKER) is normochromic normocytic                         

  



             (test code = 2640) anemia with polychromasia                       

    



                          and moderate                           



                          anisopoikilocytosis                           



                          including frequent                           



                          nucleated red blood cells,                           



                          some with dyspoietic                           



                          features. Schistocytes are                           



                          not increased (<1%).                           

 

             [id number] Forrest Montano M.D.                        

   



             112 (BEAKER) (test                                        



             code = 2849)                                        



BLOOD [id number] 15:00:41





             Test Item    Value        Reference Range Interpretation Comments

 

             CULTURE (ARIANA) (test No growth in 5 days                         

  



             code = 1095)                                        



The specimen volume collected for this blood culture was below the optimum (10 
mL per bottle or 20 mL total). Use of lower volumes may adversely affect 
recovery and/or detection times of some organisms.CMV PCR, QUANTITATIVE
2022 13:21:44





             Test Item    Value        Reference Range Interpretation Comments

 

             CMV VIRAL LOAD - Negative or below See_Comment                [Auto

mated message]



             NEGATIVE (ARIANA) the linear range                           The sy

stem which



             (test code = of the assay                           generated this



             2558)        (<300 IU/mL)                           result transmit

carmelita



                                                                 reference range

:



                                                                 <300 - &gt;3,00

0,000



                                                                 IU/mL. The refe

rence



                                                                 range was not u

sed



                                                                 to interpret th

is



                                                                 result as



                                                                 normal/abnormal

.



Cytomegalovirus (CMV) infection can cause significant disease in 
immunosuppressed patients. However,it is common for CMV to manifest as a limited
infection which is of no clinical significance in immunosuppressed patients or 
in healthy individuals.Viral load measurements are helpful to identify clinical 
CMV infection and to guide the pre-emptive management of antiviral therapy. For 
treatment of CMV infection due to reactivation in transplant recipients, a 
threshold between 4,000 and 5,000 copies/mLis suggested. For treatment of 
primary CMV infection, a lower threshold can be used.CMV infection may also be 
monitored using weekly serial measurements. Serial measurements of CMV DNA viral
load can be evaluated by identifying a 10-fold change, as well as assessing the 
CMV DNA viral load and the clinical context for each patient.The plasma CMV DNA 
viral load was detected using quantitative polymerase chain reaction and 
fluorescent monitoring of a specific hybridized probe. Genetic variation and 
other factors can affect the accuracy of nucleic acid testing. Therefore, the 
results should be interpreted in light of clinical data. A negative result may 
not exclude the presence of CMV disease.This test was developed and its 
performance characteristics determined by the [facility] Pathol
ogy Department, Section of Molecular Pathology. It has not been cleared or 
approved by the U.S. Foodand Drug Administration (FDA), since FDA approval is 
not required for clinical use of the test. Validation was done as required by 
The Clinical Laboratory Improvement Amendments of 1988.POCT-GLUCOSE METER
2022 12:37:00





             Test Item    Value        Reference Range Interpretation Comments

 

             POC-GLUCOSE METER 170 mg/dL           H            : TESTED A

T [facility] [address]



             (BEAKER) (test code =                                        [place] [address] [facility],



             1538)                                               [address]:



                                                                 /Techni

alexis ID



                                                                 = [id number] for ELZA RICHARDSON



[id number] 11:00:17Medical Cytology Report Case: [id number]  Authorizing 
Provider: Meena Ervin MD Collected: 02/15/2022 03:01 PM Ordering 
Location: [address] Received: 2022 08:50 AM Cardiovascular Pa
thologist: Christine Galarza MD  Specimen: BAL, Laterality not designated LUNG,
NOT FURTHER SPECIFIED, BRONCHOALVEOLAR LAVAGE: - NEGATIVE FOR MALIGNANCY  -
PULMONARY MACROPHAGES, MANY HEMOSIDERIN-LADEN(AT LEAST 20%), AND PROMINENT 
BACKGROUND MIXED INFLAMMATORY CELLS -NEGATIVE FOR PNEUMOCYSTIS AND FUNGAL 
ORGANISMS ON GMS STAIN -NO VIRAL INCLUSIONS SEEN Signing Pathologist Direct 
Phone Line: [phone] signed by Christine Galarza MD on 
2022 at 11:00 [id number], [id number] hypoxemicrespiratory failure due to 
COVID-19 LUNG, BAL (LATERALITY NOT DESIGNATED)Received 11 mls a little bit 
cloudy fluid, prepared 3 cytospins and 1 GMS stain Performed. SatisfactoryThe 
interpretation of this case included the use of immunohistochemistry or special 
stains.GMSControl Slides Examined: In-house known positive controls were 
evaluated along with the test tissue. These control slides run alongside of the 
patients sample show appropriate staining. Internal positive and negative 
controls when available are evaluatedBaylWhittier Hospital Medical Center, 
Department of Pathology, [address] [place] [address], Tel 
[phone], Department of Path[facility], [address] [place] [address], Tel [phone] Surgery Center,
Department of Pathology, [address] [place] [address], Tel 
[phone] [id number] 10:01:49





             Test Item    Value        Reference    Interpretation Comments



                                       Range                     

 

             CULTURE (BEAKER) STAPHYLOCOCCUS              A            From Anae

robic



             (test code = 1095) AUREUS                                 Bottle On

ly



                                                                 Staphylococcus



                                                                 aureus

 

             Clindamycin (test                           S            



             code = 10)                                          

 

             Erythromycin (test                           S            



             code = 4)                                           

 

             Linezolid (test code                           S            



             = 40)                                               

 

             Oxacillin (test code                           S            



             = 14)                                               

 

             Rifampin (test code =                           S            



             43)                                                 

 

             Tetracycline (test                           S            



             code = 2)                                           

 

             Trimethoprim +                           S            



             Sulfamethoxazole                                        



             (test code = 47)                                        

 

             Vancomycin (test code                           S            



             = 13)                                               

 

             GRAM STAIN RESULT From anaerobic                           



             (BEAKER) (test code = bottle only: gram                           



             1123)        positive cocci in                           



                          clusters                               



RAD, CHEST, 1 VIEW, NON [id number] 09:44:00Reason for exam:-&gt;acute 
hypoxemic respiratory failure, pneumonia, pulm edemaShould this be performed at 
the bedside?-&gt;Yes
************************************************************[facility] : 1975 Sex: 
F************************************************************FINALREPORT PATIENT
ID: [id number] CLINICAL HISTORY: acute hypoxemic respiratory failure, pneumonia, 
pulm edema TECHNIQUE: 1 view of the chest. COMPARISON: 2022 IMPRESSION: ETT
2 cm above the ena and NGT below the diaphragm. Slightly increased right 
asymmetric airspace opacities. Subpulmonic pleural effusions cannot be excluded.
The cardiomediastinal silhouette is magnified by technique. Signed: Naila Mancia MDReport Verified Date/Time: 2022 09:44:40 [facility] Electronically signed by: NAILA MANCIA M.D. 
on 2022 09:44 AM(CELLAVISION MANUAL DIFF)2022 09:21:16





             Test Item    Value        Reference Range Interpretation Comments

 

             NEUTROPHILS - REL 67 %                                   



             (CELLAVISION)(BEAKER) (test code                                   

     



             = 2816)                                             

 

             LYMPHOCYTES - REL 8 %                                    



             (CELLAVISION)(BEAKER) (test code                                   

     



             = 2817)                                             

 

             MONOCYTES - REL 9 %                                    



             (CELLAVISION)(BEAKER) (test code                                   

     



             = 2818)                                             

 

             METAMYELOCYTES - REL 5 %          0-0          H            



             (CELLAVISION)(BEAKER) (test code                                   

     



             = 2821)                                             

 

             MYELOCYTES - REL 1 %          0-0          H            



             (CELLAVISION)(BEAKER) (test code                                   

     



             = 2822)                                             

 

             BANDS - REL  10 %         0-10                      



             (CELLAVISION)(BEAKER) (test code                                   

     



             = 2826)                                             

 

             NEUTROPHILS - ABS 26.06 K/ul   1.56-6.13    H            



             (CELLAVISION)(BEAKER) (test code                                   

     



             = 2830)                                             

 

             LYMPHOCYTES - ABS 3.11 K/ul    1.18-3.74                 



             (CELLAVISION)(BEAKER) (test code                                   

     



             = 2831)                                             

 

             MONOCYTES - ABS 3.50 K/uL    0.24-0.36    H            



             (CELLAVISION)(BEAKER) (test code                                   

     



             = 2832)                                             

 

             METAMYELOCYTES - ABS 1.95 K/uL    0.00-0.00    H            



             (CELLAVISION)(BEAKER) (test code                                   

     



             = 2836)                                             

 

             MYELOCYTES-ABS 0.39 K/uL    0.00-0.00    H            



             (CELLAVISION)(BEAKER) (test code                                   

     



             = 2837)                                             

 

             BANDS - ABS  3.89 K/uL    0.00-0.80    H            



             (CELLAVISION)(BEAKER) (test code                                   

     



             = 2840)                                             

 

             TOTAL COUNTED (BEAKER) (test 100                                   

 



             code = 1351)                                        

 

             MANUAL NRBC  CELLS 201 /100 WBC 0-0          H            



             (BEAKER) (test code = 1353)                                        

 

             WBC MORPHOLOGY (BEAKER) (test Normal                               

  



             code = 487)                                         

 

             PLT MORPHOLOGY (BEAKER) (test Normal                               

  



             code = 486)                                         

 

             POLYCHROMATOPHILLIC RBCS(BEAKER) 3+ many                           

     



             (test code = 478)                                        

 

             HYPOCHROMIA (BEAKER) (test code 2+ moderate                        

    



             = 963)                                              

 

             ANISOCYTOSIS (BEAKER) (test code 3+ many                           

     



             = 961)                                              

 

             MICROCYTES (BEAKER) (test code = 2+ moderate                       

     



             965)                                                

 

             MACROCYTES (BEAKER) (test code = 2+ moderate                       

     



             964)                                                

 

             POIKILOCYTES (BEAKER) (test code 3+ many                           

     



             = 966)                                              

 

             TARGET CELLS (BEAKER) (test code 3+ many                           

     



             = 480)                                              

 

             SCHISTOCYTES (BEAKER) (test code 1+ few                            

     



             = 765)                                              

 

             SPHEROCYTES (BEAKER) (test code 2+ moderate                        

    



             = 768)                                              

 

             OVALOCYTES (BEAKER) (test code = 3+ many                           

     



             477)                                                

 

             ARTIFACT (CELLAVISION)(BEAKER) Present                             

   



             (test code = 3432)                                        

 

             PLATELET CONCENTRATION Decreased                              



             (CELLAVISION)(BEAKER) (test code                                   

     



             = 3438)                                             



 ID - [id number] ID - YohannesBennyoleg comments: Slide comments:CBC W/PLT 
COUNT &amp; AUTO [id number] 09:21:15





             Test Item    Value        Reference Range Interpretation Comments

 

             WHITE BLOOD CELL COUNT 38.9 K/ L    3.5-10.5     H            



             (BEAKER) (test code =                                        



             775)                                                

 

             RED BLOOD CELL COUNT 2.18 M/ L    3.93-5.22    L            



             (BEAKER) (test code =                                        



             761)                                                

 

             HEMOGLOBIN (BEAKER) 6.7 GM/DL    11.2-15.7    L            



             (test code = 410)                                        

 

             HEMATOCRIT (BEAKER) 19.4 %       34.1-44.9    L            



             (test code = 411)                                        

 

             MEAN CORPUSCULAR 89.0 fL      79.4-94.8                 



             VOLUME (BEAKER) (test                                        



             code = 753)                                         

 

             MEAN CORPUSCULAR 30.7 pg      25.6-32.2                 



             HEMOGLOBIN (BEAKER)                                        



             (test code = 751)                                        

 

             MEAN CORPUSCULAR 34.5 GM/DL   32.2-35.5                 



             HEMOGLOBIN CONC                                        



             (BEAKER) (test code =                                        



             752)                                                

 

             RED CELL DISTRIBUTION 19.5 %       11.7-14.4    H            



             WIDTH (BEAKER) (test                                        



             code = 412)                                         

 

             PLATELET COUNT 92 K/CU MM   150-450      L            



             (BEAKER) (test code =                                        



             756)                                                

 

             MEAN PLATELET VOLUME                                        Unable 

to report



             (BEAKER) (test code =                                        due to

 abnormal



             754)                                                Platelet popula

tion



                                                                 distribution.

 

             NUCLEATED RED BLOOD 128 /100 WBC 0-0          H            



             CELLS (BEAKER) (test                                        



             code = 413)                                         



POCT-GLUCOSE [id number] 08:59:43





             Test Item    Value        Reference Range Interpretation Comments

 

             POC-GLUCOSE METER 211 mg/dL           H            : TESTED A

T [facility] [address]



             (BEAKER) (test code =                                        [place],



             [address]38)                                               [address]:



                                                                 /Techni

alexis ID



                                                                 = [id number] for Reji basurto



                                                                 (contract)Robert



COMPREHENSIVE METABOLIC [id number] 06:45:56





             Test Item    Value        Reference Range Interpretation Comments

 

             TOTAL PROTEIN 7.0 gm/dL    6.0-8.3                   



             (BEAKER) (test code =                                        



             770)                                                

 

             ALBUMIN (BEAKER) 2.4 g/dL     3.5-5.0      L            



             (test code = 1145)                                        

 

             ALKALINE PHOSPHATASE 111 U/L                          



             (BEAKER) (test code =                                        



             346)                                                

 

             BILIRUBIN TOTAL 8.8 mg/dL    0.2-1.2      H            



             (BEAKER) (test code =                                        



             377)                                                

 

             SODIUM (BEAKER) (test 151 meq/L    136-145      H            



             code = 381)                                         

 

             POTASSIUM (BEAKER) 4.5 meq/L    3.5-5.1                   



             (test code = 379)                                        

 

             CHLORIDE (BEAKER) 120 meq/L           H            



             (test code = 382)                                        

 

             CO2 (BEAKER) (test 25 meq/L     22-29                     



             code = 355)                                         

 

             BLOOD UREA NITROGEN 40 mg/dL     7-21         H            



             (BEAKER) (test code =                                        



             354)                                                

 

             CREATININE (BEAKER) 0.64 mg/dL   0.57-1.25                 



             (test code = 358)                                        

 

             GLUCOSE RANDOM 176 mg/dL           H            



             (BEAKER) (test code =                                        



             652)                                                

 

             CALCIUM (BEAKER) 9.0 mg/dL    8.4-10.2                  



             (test code = 697)                                        

 

             AST (SGOT) (BEAKER) 97 U/L       5-34         H            



             (test code = 353)                                        

 

             ALT (SGPT) (BEAKER) 264 U/L      6-55         H            



             (test code = 347)                                        

 

             EGFR (BEAKER) (test 121                                    ESTIMATE

D GFR IS



             code = 1092) mL/min/1.73 sq                           NOT AS ACCURA

TE AS



                          m                                      CREATININE



                                                                 CLEARANCE IN



                                                                 PREDICTING



                                                                 GLOMERULAR



                                                                 FILTRATION RATE

.



                                                                 ESTIMATED GFR I

S



                                                                 NOT APPLICABLE 

FOR



                                                                 DIALYSIS PATIEN

TS.



 ID - PIAYA LSpecimen moderately [id number] 06:45:55





             Test Item    Value        Reference Range Interpretation Comments

 

             PHOSPHORUS (BEAKER) (test code = 1.9 mg/dL    2.3-4.7      L       

     



             604)                                                



 ID - PIAYA [id number] 06:45:55





             Test Item    Value        Reference Range Interpretation Comments

 

             TRIGLYCERIDES (BEAKER) (test code = 235 mg/dL                      

        



             540)                                                



TRIGLYCERIDE REFERENCE RANGELow Risk &lt;150Borderline Risk 150-199High Risk 
200-499Very High Risk &gt;=500Operator ID - GALILEO Pantoja moderately icteric
[id number] 06:45:54





             Test Item    Value        Reference Range Interpretation Comments

 

             MAGNESIUM (BEAKER) (test code = 1.9 mg/dL    1.6-2.6               

    



             627)                                                



 ID - GALILEO [id number] 06:09:08





             Test Item    Value        Reference Range Interpretation Comments

 

             PARTIAL THROMBOPLASTIN TIME 30.3 seconds 22.5-36.0                 



             (BEAKER) (test code = 760)                                        



[id number] 06:09:01





             Test Item    Value        Reference Range Interpretation Comments

 

             FIBRINOGEN LEVEL (BEAKER) (test 459 mg/dl    225-434      H        

    



             code = 658)                                         



PROTHROMBIN [id number] 06:08:40





             Test Item    Value        Reference Range Interpretation Comments

 

             PROTIME (BEAKER) 16.7 seconds 11.9-14.2    H            



             (test code = 759)                                        

 

             INR (BEAKER) (test 1.37         See_Comment                [Automat

ed message]



             code = 370)                                         The system [employer]



                                                                 generated this 

result



                                                                 transmitted ref

erence



                                                                 range: <=5.90. 

The



                                                                 reference range

 was



                                                                 not used to int

erpret



                                                                 this result as



                                                                 normal/abnormal

.



RECOMMENDED COUMADIN/WARFARIN INR THERAPY RANGESSTANDARD DOSE: 2.0 - 3.0 
Includes: PROPHYLAXIS for venous thrombosis, systemic embolization; TREATMENT 
for venous thrombosis and/or pulmonary embolus.HIGH RISK: Target INR is 2.5-3.5 
for patients with mechanical heart valves.LACTIC ACID, [id number] 06:06:20





             Test Item    Value        Reference Range Interpretation Comments

 

             LACTATE BLOOD VENOUS (2) (BEAKER) 1.64 mmol/L  0.50-2.20           

      



             (test code = 2872)                                        



 ID - GALILEO M[place]ime[place] moderately ictericBLOOD GAS, [id number] 
05:44:56





             Test Item    Value        Reference Range Interpretation Comments

 

             PH ARTERIAL (BEAKER) (test code = 7.44         7.35-7.45           

      



             383)                                                

 

             PCO2 ARTERIAL (BEAKER) (test code 39 mm Hg     35-45               

      



             = 384)                                              

 

             PO2 ARTERIAL (BEAKER) (test code = 63 mm Hg     80-90        L     

       



             385)                                                

 

             O2 SATURATION ARTERIAL (BEAKER) 93.5 %       96.0-97.0    L        

    



             (test code = 386)                                        

 

             HCO3 ARTERIAL (BEAKER) (test code 26 mmol/L    21-29               

      



             = 388)                                              

 

             BASE EXCESS ARTERIAL (BEAKER) 1.7 mmol/L   -2.0-3.0                

  



             (test code = 387)                                        

 

             PATIENT TEMPERATURE (BEAKER) (test 36.3                            

       



             code = 1818)                                        

 

             FIO2 (BEAKER) (test code = 1819) 40.0                              

     



POCT-GLUCOSE [id number] 05:35:36





             Test Item    Value        Reference Range Interpretation Comments

 

             POC-GLUCOSE METER 181 mg/dL           H            : TESTED A

T [facility] [address]



             (BEAKER) (test code =                                        [place],



             [address]38)                                               [id number]:



                                                                 /Techni

alexis ID



                                                                 = [id number] for Sc

sb



                                                                 (contract), Robert gu



U/S, HEPATIC PORTAL VESSEL WITH [id number] 02:48:00Reason for exam:-
&gt;Please evaluate for PVT
********************************************************[facility]AL[facility]O BLAYNEEDEN HERNÁNDEZ : 1975 Sex: 
F************************************************************FINALREPORT PATIENT
ID: [id number] INDICATION: Please evaluate for PVT COMPARISON: None TECHNIQUE: 
Real-time gray-scale transabdominal and color and spectral Doppler ultrasound. 
FINDINGS:Liver: Size: 14.4cm. Echogenicity: Normal. Masses/lesions: None. 
Surface Nodularity: None. Intrahepatic bile ducts: Normal. Common bile duct: 0.5
cm. MPV: 1.2cm. Gallbladder: Stones: None. Sludge: None. Wall thickness: 0.3 cm.
The gallbladder lumen is nondistended. Pericholecystic fluid: None. Sonographic 
Quinteros's sign: Unable to be obtained due to patient medication. Pancreas: Head 
and uncinate process: Not well-seen secondary to poor acoustic windowing. Body 
and tail: Not well-seen. Right kidney: Size: 10.3 x 4.3 x 5.1cm. Parenchyma: 
Normal echogenicity. No cysts. No stones. Hydronephrosis: None. Ascites: None. 
Regional Vasculature: The visible abdominal aorta, IVC and hepatic veins are 
patent. The aorta measures 2.2 cm proximally, 1.7 cm in the midportion and 1.4 
cm distally. Color and Spectral imaging:MPV: Diameter: 1.2 cm Flow: Hepatopedal.
Velocity: 18.5 cm/sec Filling defects: NoneLeft and right portal veins:Patent. 
Flow: Antegrade Filling defects: None. Hepatic arteries: Patent RI proper 
hepatic: 0.7 RI right hepatic: 0.6 RI left hepatic: 0.5 IVC, Hepatic venous 
confluence, right HV, middle HV and left HVare patent. Additional findings: 
None. IMPRESSION: Unremarkable abdominal ultrasound. Signed: [facility]Green[facility] Verified Date/Time: 2022 02:48:18 Electronically signed by: 
LUZ GARCES MD on 2022 02:48 AMBASIC METABOLIC PANEL
2022 00:51:06





             Test Item    Value        Reference Range Interpretation Comments

 

             SODIUM (BEAKER) 150 meq/L    136-145      H            



             (test code = 381)                                        

 

             POTASSIUM (BEAKER) 3.7 meq/L    3.5-5.1                   



             (test code = 379)                                        

 

             CHLORIDE (BEAKER) 120 meq/L           H            



             (test code = 382)                                        

 

             CO2 (BEAKER) (test 25 meq/L     22-29                     



             code = 355)                                         

 

             BLOOD UREA NITROGEN 45 mg/dL     7-21         H            



             (BEAKER) (test code                                        



             = 354)                                              

 

             CREATININE (BEAKER) 0.63 mg/dL   0.57-1.25                 



             (test code = 358)                                        

 

             GLUCOSE RANDOM 169 mg/dL           H            



             (BEAKER) (test code                                        



             = 652)                                              

 

             CALCIUM (BEAKER) 9.0 mg/dL    8.4-10.2                  



             (test code = 697)                                        

 

             EGFR (BEAKER) (test 123 mL/min/1.73                           ESTIM

ATED GFR IS



             code = 1092) sq m                                   NOT AS ACCURATE

 AS



                                                                 CREATININE



                                                                 CLEARANCE IN



                                                                 PREDICTING



                                                                 GLOMERULAR



                                                                 FILTRATION RATE

.



                                                                 ESTIMATED GFR I

S



                                                                 NOT APPLICABLE 

FOR



                                                                 DIALYSIS PATIEN

TS.



 ID - BSSpecimen moderately [id number] 00:51:05





             Test Item    Value        Reference Range Interpretation Comments

 

             MAGNESIUM (BEAKER) (test code = 2.0 mg/dL    1.6-2.6               

    



             627)                                                



 ID - BSPOCT-GLUCOSE [id number] 23:47:45





             Test Item    Value        Reference Range Interpretation Comments

 

             POC-GLUCOSE METER 176 mg/dL           H            : TESTED A

[facility] [address]



             (BEAKER) (test code =                                        [place],



             [address])                                               [address]:



                                                                 /Techni

alexis ID



                                                                 = [id number] for Sc

sb



                                                                 (contract), Robert gu



[id number] 23:41:19





             Test Item    Value        Reference Range Interpretation Comments

 

             MAGNESIUM (BEAKER) 2.1 mg/dL    1.6-2.6                   Specimen 

moderately



             (test code = 627)                                        hemolyzed



 ID - BSHEMOGLOBIN AND [id number] 18:44:50





             Test Item    Value        Reference Range Interpretation Comments

 

             HEMOGLOBIN (BEAKER) (test code = 7.8 GM/DL    11.2-15.7    L       

     



             410)                                                

 

             HEMATOCRIT (BEAKER) (test code = 22.1 %       34.1-44.9    L       

     



             411)                                                



 ID - [id number] METABOLIC [id number] 17:17:13





             Test Item    Value        Reference Range Interpretation Comments

 

             SODIUM (BEAKER) 150 meq/L    136-145      H            



             (test code = 381)                                        

 

             POTASSIUM (BEAKER) 5.5 meq/L    3.5-5.1      H            Specimen 

slightly



             (test code = 379)                                        hemolyzed

 

             CHLORIDE (BEAKER) 120 meq/L           H            



             (test code = 382)                                        

 

             CO2 (BEAKER) (test 27 meq/L     22-29                     



             code = 355)                                         

 

             BLOOD UREA NITROGEN 50 mg/dL     7-21         H            



             (BEAKER) (test code                                        



             = 354)                                              

 

             CREATININE (BEAKER) 0.71 mg/dL   0.57-1.25                 Specimen

 slightly



             (test code = 358)                                        hemolyzed

 

             GLUCOSE RANDOM 188 mg/dL           H            



             (BEAKER) (test code                                        



             = 652)                                              

 

             CALCIUM (BEAKER) 8.8 mg/dL    8.4-10.2                  



             (test code = 697)                                        

 

             EGFR (BEAKER) (test 107 mL/min/1.73                           ESTIM

ATED GFR IS



             code = 1092) sq m                                   NOT AS ACCURATE

 AS



                                                                 CREATININE



                                                                 CLEARANCE IN



                                                                 PREDICTING



                                                                 GLOMERULAR



                                                                 FILTRATION RATE

.



                                                                 ESTIMATED GFR I

S



                                                                 NOT APPLICABLE 

FOR



                                                                 DIALYSIS PATIEN

TS.



 ID - BSSpecimen markedly [id number] 17:17:12





             Test Item    Value        Reference Range Interpretation Comments

 

             MAGNESIUM (BEAKER) 2.1 mg/dL    1.6-2.6                   Specimen 

slightly



             (test code = 627)                                        hemolyzed



 ID - BSLACTIC ACID, [id number] 17:13:50





             Test Item    Value        Reference Range Interpretation Comments

 

             LACTATE BLOOD VENOUS 1.75 mmol/L  0.50-2.20                 Specime

n slightly



             (2) (BEAKER) (test                                        hemolyzed



             code = 2872)                                        



 ID - BSSpecimen moderately ictericCBC (HEMOGRAM ONLY)2022 
17:10:48





             Test Item    Value        Reference Range Interpretation Comments

 

             WHITE BLOOD CELL COUNT (BEAKER) 49.1 K/ L    3.5-10.5     H        

    



             (test code = 775)                                        

 

             RED BLOOD CELL COUNT (BEAKER) 2.11 M/ L    3.93-5.22    L          

  



             (test code = 761)                                        

 

             HEMOGLOBIN (BEAKER) (test code = 5.9 GM/DL    11.2-15.7    LL      

     



             410)                                                

 

             HEMATOCRIT (BEAKER) (test code = 18.2 %       34.1-44.9    L       

     



             411)                                                

 

             MEAN CORPUSCULAR VOLUME (BEAKER) 86.3 fL      79.4-94.8            

     



             (test code = 753)                                        

 

             MEAN CORPUSCULAR HEMOGLOBIN 28.0 pg      25.6-32.2                 



             (BEAKER) (test code = 751)                                        

 

             MEAN CORPUSCULAR HEMOGLOBIN CONC 32.4 GM/DL   32.2-35.5            

     



             (BEAKER) (test code = 752)                                        

 

             RED CELL DISTRIBUTION WIDTH 19.3 %       11.7-14.4    H            



             (BEAKER) (test code = 412)                                        

 

             PLATELET COUNT (BEAKER) (test 102 K/CU MM  150-450      L          

  



             code = 756)                                         

 

             NUCLEATED RED BLOOD CELLS 88 /100 WBC  0-0          H            



             (BEAKER) (test code = 413)                                        



BASIC METABOLIC [id number] 13:39:17





             Test Item    Value        Reference Range Interpretation Comments

 

             SODIUM (BEAKER) 150 meq/L    136-145      H            



             (test code = 381)                                        

 

             POTASSIUM (BEAKER) 4.6 meq/L    3.5-5.1                   



             (test code = 379)                                        

 

             CHLORIDE (BEAKER) 118 meq/L           H            



             (test code = 382)                                        

 

             CO2 (BEAKER) (test 27 meq/L     22-29                     



             code = 355)                                         

 

             BLOOD UREA NITROGEN 52 mg/dL     7-21         H            



             (BEAKER) (test code                                        



             = 354)                                              

 

             CREATININE (BEAKER) 0.75 mg/dL   0.57-1.25                 



             (test code = 358)                                        

 

             GLUCOSE RANDOM 182 mg/dL           H            



             (BEAKER) (test code                                        



             = 652)                                              

 

             CALCIUM (BEAKER) 9.1 mg/dL    8.4-10.2                  



             (test code = 697)                                        

 

             EGFR (BEAKER) (test 100 mL/min/1.73                           ESTIM

ATED GFR IS



             code = 1092) sq m                                   NOT AS ACCURATE

 AS



                                                                 CREATININE



                                                                 CLEARANCE IN



                                                                 PREDICTING



                                                                 GLOMERULAR



                                                                 FILTRATION RATE

.



                                                                 ESTIMATED GFR I

S



                                                                 NOT APPLICABLE 

FOR



                                                                 DIALYSIS PATIEN

TS.



 ID - ALAYNA MSpecimen markedly [id number] 13:39:16





             Test Item    Value        Reference Range Interpretation Comments

 

             MAGNESIUM (BEAKER) (test code = 2.0 mg/dL    1.6-2.6               

    



             627)                                                



 ID - ALAYNA MPOCT-GLUCOSE [id number] 11:37:07





             Test Item    Value        Reference Range Interpretation Comments

 

             POC-GLUCOSE METER 169 mg/dL           H            : Notified

 RN/MD:



             (BEAKER) (test code =                                        TESTED

 AT [facility] [id number])                                               [facility],



                                                                 [address]:



                                                                 /Techni

alexis ID



                                                                 = [id number] for Si

ms



                                                                 (contract), Nikita lyons



SPIN/CONCENTRATION [id number] 08:53:11





             Test Item    Value        Reference Range Interpretation Comments

 

             CONCENTRATION CHARGED (BEAKER) (test Done                          

         



             code = 2657)                                        



SPIN/CONCENTRATION [id number] 08:52:28





             Test Item    Value        Reference Range Interpretation Comments

 

             CONCENTRATION CHARGED (BEAKER) (test Done                          

         



             code = 2657)                                        



(CELLAVISION MANUAL DIFF)2022 07:07:01





             Test Item    Value        Reference Range Interpretation Comments

 

             NEUTROPHILS - REL 51 %                                   



             (CELLAVISION)(BEAKER) (test code                                   

     



             = 2816)                                             

 

             LYMPHOCYTES - REL 14 %                                   



             (CELLAVISION)(BEAKER) (test code                                   

     



             = 2817)                                             

 

             MONOCYTES - REL 14 %                                   



             (CELLAVISION)(BEAKER) (test code                                   

     



             = 2818)                                             

 

             METAMYELOCYTES - REL 9 %          0-0          H            



             (CELLAVISION)(BEAKER) (test code                                   

     



             = 2821)                                             

 

             MYELOCYTES - REL 7 %          0-0          H            



             (CELLAVISION)(BEAKER) (test code                                   

     



             = 2822)                                             

 

             PROMYELOCYTES - REL 1 %          0-0          H            



             (CELLAVSION)(BEAKER) (test code =                                  

      



             2825)                                               

 

             BANDS - REL (CELLAVISION)(BEAKER) 3 %          0-10                

      



             (test code = 2826)                                        

 

             ATYPICAL LYMPHOCYTES - REL 2 %          0-0          H            



             (CELLAVISION)(BEAKER) (test code                                   

     



             = 2829)                                             

 

             NEUTROPHILS - ABS 26.52 K/ul   1.56-6.13    H            



             (CELLAVISION)(BEAKER) (test code                                   

     



             = 2830)                                             

 

             LYMPHOCYTES - ABS 7.28 K/ul    1.18-3.74    H            



             (CELLAVISION)(BEAKER) (test code                                   

     



             = 2831)                                             

 

             MONOCYTES - ABS 7.28 K/uL    0.24-0.36    H            



             (CELLAVISION)(BEAKER) (test code                                   

     



             = 2832)                                             

 

             METAMYELOCYTES - ABS 4.68 K/uL    0.00-0.00    H            



             (CELLAVISION)(BEAKER) (test code                                   

     



             = 2836)                                             

 

             MYELOCYTES-ABS 3.64 K/uL    0.00-0.00    H            



             (CELLAVISION)(BEAKER) (test code                                   

     



             = 2837)                                             

 

             PROMYELOCYTES - ABS 0.52 K/uL    0.00-0.00    H            



             (CELLAVISION)(BEAKER) (test code                                   

     



             = 2838)                                             

 

             BANDS - ABS (CELLAVISION)(BEAKER) 1.56 K/uL    0.00-0.80    H      

      



             (test code = 2840)                                        

 

             ATYPICAL LYMPHOCYTES - ABS 1.04 K/uL    0.00-0.00    H            



             (CELLAVISION)(BEAKER) (test code                                   

     



             = 2858)                                             

 

             TOTAL COUNTED (BEAKER) (test code 100                              

      



             = 1351)                                             

 

             MANUAL NRBC  CELLS 83 /100 WBC  0-0          H            



             (BEAKER) (test code = 1353)                                        

 

             WBC MORPHOLOGY (BEAKER) (test Normal                               

  



             code = 487)                                         

 

             GIANT PLATELETS (BEAKER) (test Present                             

   



             code = 313)                                         

 

             POLYCHROMATOPHILLIC RBCS(BEAKER) 1+ few                            

     



             (test code = 478)                                        

 

             HYPOCHROMIA (BEAKER) (test code = 1+ few                           

      



             963)                                                

 

             ANISOCYTOSIS (BEAKER) (test code 3+ many                           

     



             = 961)                                              

 

             MICROCYTES (BEAKER) (test code = 1+ few                            

     



             965)                                                

 

             MACROCYTES (BEAKER) (test code = 3+ many                           

     



             964)                                                

 

             POIKILOCYTES (BEAKER) (test code 2+ moderate                       

     



             = 966)                                              

 

             SPHEROCYTES (BEAKER) (test code = 1+ few                           

      



             768)                                                

 

             ELLIPTOCYTES (BEAKER) (test code 1+ few                            

     



             = 962)                                              

 

             ARTIFACT (CELLAVISION)(BEAKER) Present                             

   



             (test code = 3432)                                        

 

             PLATELET CONCENTRATION Decreased                              



             (CELLAVISION)(BEAKER) (test code                                   

     



             = 3438)                                             



 ID - [id number] ID - nova Vásquez comments: Slide comments:CBC 
W/PLT COUNT &amp; AUTO [id number] 07:06:46





             Test Item    Value        Reference Range Interpretation Comments

 

             WHITE BLOOD CELL COUNT (BEAKER) 52.0 K/ L    3.5-10.5     HH       

    



             (test code = 775)                                        

 

             RED BLOOD CELL COUNT (BEAKER) 2.72 M/ L    3.93-5.22    L          

  



             (test code = 761)                                        

 

             HEMOGLOBIN (BEAKER) (test code = 7.4 GM/DL    11.2-15.7    L       

     



             410)                                                

 

             HEMATOCRIT (BEAKER) (test code = 23.0 %       34.1-44.9    L       

     



             411)                                                

 

             MEAN CORPUSCULAR VOLUME (BEAKER) 84.6 fL      79.4-94.8            

     



             (test code = 753)                                        

 

             MEAN CORPUSCULAR HEMOGLOBIN 27.2 pg      25.6-32.2                 



             (BEAKER) (test code = 751)                                        

 

             MEAN CORPUSCULAR HEMOGLOBIN CONC 32.2 GM/DL   32.2-35.5            

     



             (BEAKER) (test code = 752)                                        

 

             RED CELL DISTRIBUTION WIDTH 18.4 %       11.7-14.4    H            



             (BEAKER) (test code = 412)                                        

 

             PLATELET COUNT (BEAKER) (test 117 K/CU MM  150-450      L          

  



             code = 756)                                         

 

             MEAN PLATELET VOLUME (BEAKER) 13.6 fL      9.4-12.3     H          

  



             (test code = 754)                                        

 

             NUCLEATED RED BLOOD CELLS 62 /100 WBC  0-0          H            



             (BEAKER) (test code = 413)                                        



POCT-GLUCOSE [id number] 06:21:24





             Test Item    Value        Reference Range Interpretation Comments

 

             POC-GLUCOSE METER 165 mg/dL           H            : [facility]ED A

[facility]



             ([facility]) (test code =                                        [place],



             [address])                                               [address]:



                                                                 /Techni

alexis ID



                                                                 = [id number] for Reynaldo hsu



                                                                 (contract), Mar

ia



RAD, CHEST, 1 VIEW, NON [id number] 04:08:00Reason for exam:-&gt;acute 
hypoxemic respiratory failure, pneumonia, pulm edemaShould this be performed at 
the bedside?-&gt;Yes
********************************[facility] : 1975 Sex: 
F************************************************************FINALREPORT PATIENT
ID: [id number] CLINICAL INDICATION: acute hypoxemic respiratory failure, 
pneumonia, pulm edema Comparison: 2/15/2022 The cardiomediastinal contours are 
stable. The lung volumes remain low.Patchy infrahilar parenchymal opacities are 
similar to previous. There is no pneumothorax. Support lines are stable. Signed:
Geovanni Stewart MDRepSS[facility] Verified Date/Time: 2022 04:08:48 Electronically 
signed by: GEOVANNI STEWART M.D. on 2022 04:08 AMCOMPREHENSIVE METABOLIC 
[id number] 03:28:13





             Test Item    Value        Reference Range Interpretation Comments

 

             TOTAL PROTEIN 7.4 gm/dL    6.0-8.3                   



             (BEAKER) (test code =                                        



             770)                                                

 

             ALBUMIN (BEAKER) 2.4 g/dL     3.5-5.0      L            



             (test code = 1145)                                        

 

             ALKALINE PHOSPHATASE 126 U/L                          



             (BEAKER) (test code =                                        



             346)                                                

 

             BILIRUBIN TOTAL 19.4 mg/dL   0.2-1.2      H            



             (BEAKER) (test code =                                        



             377)                                                

 

             SODIUM (BEAKER) (test 150 meq/L    136-145      H            



             code = 381)                                         

 

             POTASSIUM (BEAKER) 3.4 meq/L    3.5-5.1      L            



             (test code = 379)                                        

 

             CHLORIDE (BEAKER) 113 meq/L           H            



             (test code = 382)                                        

 

             CO2 (BEAKER) (test 28 meq/L     22-29                     



             code = 355)                                         

 

             BLOOD UREA NITROGEN 58 mg/dL     7-21         H            



             (BEAKER) (test code =                                        



             354)                                                

 

             CREATININE (BEAKER) 0.98 mg/dL   0.57-1.25                 



             (test code = 358)                                        

 

             GLUCOSE RANDOM 161 mg/dL           H            



             (BEAKER) (test code =                                        



             652)                                                

 

             CALCIUM (BEAKER) 9.1 mg/dL    8.4-10.2                  



             (test code = 697)                                        

 

             AST (SGOT) (BEAKER) 103 U/L      5-34         H            



             (test code = 353)                                        

 

             ALT (SGPT) (BEAKER) 395 U/L      6-55         H            



             (test code = 347)                                        

 

             EGFR (BEAKER) (test 74 mL/min/1.73                           ESTIMA

CARMELITA GFR IS



             code = 1092) sq m                                   NOT AS ACCURATE

 AS



                                                                 CREATININE



                                                                 CLEARANCE IN



                                                                 PREDICTING



                                                                 GLOMERULAR



                                                                 FILTRATION RATE

.



                                                                 ESTIMATED GFR I

S



                                                                 NOT APPLICABLE 

FOR



                                                                 DIALYSIS PATIEN

TS.



 ID - ALAYNA MSpecimen markedly ictericCREATINE KINASE (CK)2022 
03:28:13





             Test Item    Value        Reference Range Interpretation Comments

 

             CREATINE KINASE TOTAL (BEAKER) (test 80 U/L                  

         



             code = 380)                                         



 ID - ALAYNA [id number] 03:28:12





             Test Item    Value        Reference Range Interpretation Comments

 

             MAGNESIUM (BEAKER) (test code = 2.1 mg/dL    1.6-2.6               

    



             627)                                                



 ID - ALAYNA [id number] 03:28:12





             Test Item    Value        Reference Range Interpretation Comments

 

             PHOSPHORUS (BEAKER) (test code = 3.1 mg/dL    2.3-4.7              

     



             604)                                                



 ID - ALAYNA [id number] 03:26:47





             Test Item    Value        Reference Range Interpretation Comments

 

             FIBRINOGEN LEVEL (BEAKER) (test 490 mg/dl    225-434      H        

    



             code = 658)                                         



[id number] 03:24:45





             Test Item    Value        Reference Range Interpretation Comments

 

             AMMONIA (BEAKER) (test code = 348) 50 mol/L     18-72              

       



 ID - ALAYNA MOperator ID - ALAYNA MLACTIC ACID, [id number] 03:05:43





             Test Item    Value        Reference Range Interpretation Comments

 

             LACTATE BLOOD VENOUS (2) (BEAKER) 1.69 mmol/L  0.50-2.20           

      



             (test code = 2872)                                        



 ID - BSSpecimen markedly [id number] 03:05:06





             Test Item    Value        Reference Range Interpretation Comments

 

             PARTIAL THROMBOPLASTIN TIME 28.6 seconds 22.5-36.0                 



             (BEAKER) (test code = 760)                                        



PROTHROMBIN [id number] 03:04:26





             Test Item    Value        Reference Range Interpretation Comments

 

             PROTIME (BEAKER) 15.5 seconds 11.9-14.2    H            



             (test code = 759)                                        

 

             INR (BEAKER) (test 1.25         See_Comment                [Automat

ed message]



             code = 370)                                         The system [employer]



                                                                 generated this 

result



                                                                 transmitted ref

erence



                                                                 range: <=5.90. 

The



                                                                 reference range

 was



                                                                 not used to int

erpret



                                                                 this result as



                                                                 normal/abnormal

.



RECOMMENDED COUMADIN/WARFARIN INR THERAPY RANGESSTANDARD DOSE: 2.0 - 3.0 
Includes: PROPHYLAXIS for venous thrombosis, systemic embolization; TREATMENT 
for venous thrombosis and/or pulmonary embolus.HIGH RISK: Target INR is 2.5-3.5 
for patients with mechanical heart valves.POCT-GLUCOSE [id number] 00:06:32





             Test Item    Value        Reference Range Interpretation Comments

 

             POC-GLUCOSE METER 182 mg/dL           H            : TESTED A

[facility]



             ([facility]ER) (test code =                                        [place],



             [address])                                               [address]:



                                                                 /Techni

alexis ID



                                                                 = [id number] for Reynaldo hsu



                                                                 (contract)Anel



HEPATITIS C PCR, QUANTITATIVE2022-02-15 20:05:50





             Test Item    Value        Reference Range Interpretation Comments

 

             HCV NUMERIC RESULT (BEAKER) (test 61954 IU/mL  <15          H      

      



             code = 5270)                                        



This test uses a Real-Time Polymerase Chain Reaction (RT-PCR) methodology and 
was performed using [employer] Ampliprep/[employer] TaqMan HCV test kit version 2.0 (Roche
[employer]).Reportable range for this assay is 15 - 100,000,000 IU 
per mL (1.18 - 8.00 Log IU/mL).BLOOD CULTURE IDENTIFICATION PANEL2022-02-15 
17:17:29





             Test Item    Value        Reference    Interpretation Comments



                                       Range                     

 

             LISTERIA MONOCYTOGENES Not          Not detected              



             (test code = 8796308) detected                               

 

             STAPHYLOCOCCUS (test Detected     Not detected A            



             code = 5144436)                                        

 

             STAPHYLOCOCCUS AUREUS Detected     Not detected A            Methic

illin-susceptible



             (test code = 5938673)                                        S. aur

eus



                                                                 (MSSA)First-rolo

e therapy:



                                                                 Cefazolin or Ox

acillin



                                                                 (Oxacillin pref

erred if



                                                                 CNS involvement

) ID



                                                                 CONSULTATION



                                                                 REQUIREDStaphyl

ococcus



                                                                 aureus DETECTED

MecA NOT



                                                                 DETECTED Refere

nce Range:



                                                                 Not Detected

 

             STREPTOCOCCUS (test Not          Not detected              



             code = 0416176) detected                               

 

             STREPTOCOCCUS Not          Not detected              



             AGALACTIAE (GROUP B) detected                               



             (test code = 0237804)                                        

 

             STREPTOCOCCUS Not          Not detected              



             PNEUMONIAE (test code detected                               



             = 1803162)                                          

 

             STREPTOCOCCUS PYOGENES Not          Not detected              



             (GROUP A) (test code = detected                               



             5282510)                                            

 

             ACINETOBACTER Not          Not detected              



             BAUMANNII (test code = detected                               



             7231208)                                            

 

             HAEMOPHILUS INFLUENZAE Not          Not detected              



             (test code = 4979784) detected                               

 

             NEISSERIA MENINGITIDIS Not          Not detected              



             (test code = 4736191) detected                               

 

             ENTEROBACTERIACEAE Not          Not detected              



             (test code = 0941655) detected                               

 

             ENTEROBACTER CLOACOE Not          Not detected              



             COMPLEX (test code = detected                               



             0332655)                                            

 

             KLEBSIELLA OXYTOCA Not          Not detected              



             (test code = 1731797) detected                               

 

             KLEBSIELLA PNEUMONIAE Not          Not detected              



             (test code = 1650) detected                               

 

             PROTEUS (test code = Not          Not detected              



             0916034)     detected                               

 

             SERRATIA MARCESCENS Not          Not detected              



             (test code = 0442319) detected                               

 

             JASBIR ALBICANS (test Not          Not detected              



             code = 4473580) detected                               

 

             JASBIR GLABRATA (test Not          Not detected              



             code = 5355585) detected                               

 

             JASBIR KRUSEI (test Not          Not detected              



             code = 3676007) detected                               

 

             JASBIR PARAPSILOSIS Not          Not detected              



             (test code = 2450160) detected                               

 

             JASBIR TROPICALIS Not          Not detected              



             (test code = 2498568) detected                               

 

             ESCHERICHIA COLI (test Not          Not detected              



             code = 4434025) detected                               

 

             METHICILLIN-RESISTANCE Not          Not detected              Note:

 Antimicrobial



             GENE (test code = detected                               resistance

 can occur via



             )                                            multiple mechan

isms. A



                                                                 Not Detected re

sult for



                                                                 the [employer]



                                                                 antimicrobial r

esistance



                                                                 gene assays yoo

s not



                                                                 indicate antimi

crobial



                                                                 susceptibility.



                                                                 Subculturing is

 required



                                                                 for species



                                                                 identification 

and



                                                                 susceptibility 

testing of



                                                                 isolates.

 

             VANCOMYCIN-RESISTANCE                                        



             GENE (test code =                                        



             8417485)                                            

 

             CARBAPENEM-RESISTANCE                                        



             GENE (test code =                                        



             5991321)                                            

 

             ENTEROCOCCUS-BEAKER Not          Not detected              



             (test code = 4654860) detected                               

 

             PSEUDOMONAS  Not          Not detected              



             AERUGINOSA-BEAKER detected                               



             (test code = 4331283)                                        



Other bacteria and resistance markers not targeted by this PCR panel cannot be 
excluded; therefore clinical correlation and follow up of serology, culture 
results, and other molecular studies is required. The results are not intended 
to be used as the sole means for clinical diagnosis or patient management 
decisions. This sample was tested at the [facility] 
using the [employer] Blood Culture ID Panel. It is FDA cleared and has 
been verified and approved by the [facility] for 
clinical use. This laboratory is CLIA-certified and College ofAmerican 
Pathologists (CAP)-accredited to perform high complexity testing.BODY FLUID CELL
COUNT WITH DIFFERENTIAL2022-02-15 17:06:24





             Test Item    Value        Reference Range Interpretation Comments

 

             APPEARANCE FLUID Clear        Clear                     



             (BEAKER) (test code =                                        



             510)                                                

 

             COLOR FLUID (BEAKER) Colorless    Colorless, Straw              



             (test code = 511)                                        

 

             RBC FLUID (BEAKER) 116 /cu mm   See_Comment  H             [Automat

ed message]



             (test code = 513)                                        The system

 which



                                                                 generated this 

result



                                                                 transmitted ref

erence



                                                                 range: <=1. The



                                                                 reference range

 was



                                                                 not used to int

erpret



                                                                 this result as



                                                                 normal/abnormal

.

 

             ADJUSTED WBC FLUID 164 /cu mm   See_Comment  H             [Automat

ed message]



             (BEAKER) (test code =                                        The sy

stem which



             1690)                                               generated this 

result



                                                                 transmitted ref

erence



                                                                 range: <=5. The



                                                                 reference range

 was



                                                                 not used to int

erpret



                                                                 this result as



                                                                 normal/abnormal

.

 

             LINING CELLS (BEAKER) 0 /cu mm     See_Comment                [Auto

mated message]



             (test code = 1590)                                        The syste

m which



                                                                 generated this 

result



                                                                 transmitted ref

erence



                                                                 range: <=1. The



                                                                 reference range

 was



                                                                 not used to int

erpret



                                                                 this result as



                                                                 normal/abnormal

.

 

             NEUTROPHILS FLUID 56 %                                   



             (BEAKER) (test code =                                        



             1656)                                               

 

             LYMPHS FLUID (BEAKER) 2 %                                    



             (test code = 488)                                        

 

             MONO/MACROPHAGE FLUID 42 %                                   



             (BEAKER) (test code =                                        



             489)                                                

 

             EOSINOPHILS FLUID 0 %                                    



             (BEAKER) (test code =                                        



             491)                                                

 

             BASO FLUID (BEAKER) 0 %                                    



             (test code = 492)                                        

 

             CONTAINER BODY FLUID EDTA Tube                              



             (BEAKER) (test code =                                        



             2873)                                               



BASIC METABOLIC PANEL2022-02-15 15:56:20





             Test Item    Value        Reference Range Interpretation Comments

 

             SODIUM (BEAKER) 146 meq/L    136-145      H            



             (test code = 381)                                        

 

             POTASSIUM (BEAKER) 4.2 meq/L    3.5-5.1                   



             (test code = 379)                                        

 

             CHLORIDE (BEAKER) 112 meq/L           H            



             (test code = 382)                                        

 

             CO2 (BEAKER) (test 27 meq/L     22-29                     



             code = 355)                                         

 

             BLOOD UREA NITROGEN 56 mg/dL     7-21         H            



             (BEAKER) (test code                                        



             = 354)                                              

 

             CREATININE (BEAKER) 1.04 mg/dL   0.57-1.25                 



             (test code = 358)                                        

 

             GLUCOSE RANDOM 153 mg/dL           H            



             (BEAKER) (test code                                        



             = 652)                                              

 

             CALCIUM (BEAKER) 8.7 mg/dL    8.4-10.2                  



             (test code = 697)                                        

 

             EGFR (BEAKER) (test 69 mL/min/1.73                           ESTIMA

CARMELITA GFR IS



             code = 1092) sq m                                   NOT AS ACCURATE

 AS



                                                                 CREATININE



                                                                 CLEARANCE IN



                                                                 PREDICTING



                                                                 GLOMERULAR



                                                                 FILTRATION RATE

.



                                                                 ESTIMATED GFR I

S



                                                                 NOT APPLICABLE 

FOR



                                                                 DIALYSIS PATIEN

TS.



 ID -  CSpecimen markedly ictericMAGNESIUM2022-02-15 15:56:19





             Test Item    Value        Reference Range Interpretation Comments

 

             MAGNESIUM (BEAKER) (test code = 2.4 mg/dL    1.6-2.6               

    



             627)                                                



 ID -  CLACTIC ACID, VENOUS2022-02-15 15:51:58





             Test Item    Value        Reference Range Interpretation Comments

 

             LACTATE BLOOD VENOUS (2) (BEAKER) 1.37 mmol/L  0.50-2.20           

      



             (test code = 2872)                                        



 ID - ARELY CSpecimen markedly ictericCBC (HEMOGRAM ONLY)2022-02-15 
15:44:08





             Test Item    Value        Reference Range Interpretation Comments

 

             WHITE BLOOD CELL COUNT 44.9 K/ L    3.5-10.5     H            



             (BEAKER) (test code =                                        



             775)                                                

 

             RED BLOOD CELL COUNT 2.93 M/ L    3.93-5.22    L            



             (BEAKER) (test code =                                        



             761)                                                

 

             HEMOGLOBIN (BEAKER) 7.8 GM/DL    11.2-15.7    L            



             (test code = 410)                                        

 

             HEMATOCRIT (BEAKER) 24.3 %       34.1-44.9    L            



             (test code = 411)                                        

 

             MEAN CORPUSCULAR 82.9 fL      79.4-94.8                 



             VOLUME (BEAKER) (test                                        



             code = 753)                                         

 

             MEAN CORPUSCULAR 26.6 pg      25.6-32.2                 



             HEMOGLOBIN (BEAKER)                                        



             (test code = 751)                                        

 

             MEAN CORPUSCULAR 32.1 GM/DL   32.2-35.5    L            



             HEMOGLOBIN CONC                                        



             (BEAKER) (test code =                                        



             752)                                                

 

             RED CELL DISTRIBUTION 17.6 %       11.7-14.4    H            



             WIDTH (BEAKER) (test                                        



             code = 412)                                         

 

             PLATELET COUNT 117 K/CU MM  150-450      L            



             (BEAKER) (test code =                                        



             756)                                                

 

             MEAN PLATELET VOLUME                                        Unable 

to report due



             (BEAKER) (test code =                                        to abn

ormal Platelet



             754)                                                population



                                                                 distribution.

 

             NUCLEATED RED BLOOD 33 /100 WBC  0-0          H            



             CELLS (BEAKER) (test                                        



             code = 413)                                         



SPUTUM CULTURE + GRAM STAIN2022-02-15 12:42:24





             Test Item    Value        Reference    Interpretation Comments



                                       Range                     

 

             CULTURE (BEAKER) STAPHYLOCOCCUS              A            2+ Staphy

lococcus



             (test code = 1095) AUREUS                                 aureus

 

             Clindamycin (test                           S            



             code = 10)                                          

 

             Erythromycin (test                           S            



             code = 4)                                           

 

             Linezolid (test code                           S            



             = 40)                                               

 

             Nitrofurantoin (test                           S            



             code = 23)                                          

 

             Oxacillin (test code                           S            



             = 14)                                               

 

             Rifampin (test code =                           S            



             43)                                                 

 

             Tetracycline (test                           S            



             code = 2)                                           

 

             Trimethoprim +                           S            



             Sulfamethoxazole                                        



             (test code = 47)                                        

 

             Vancomycin (test code                           S            



             = 13)                                               

 

             CULTURE (BEAKER)                           A            4+ Moraxell

a



             (test code = 1095)                                        catarrhal

isBeta-la



                                                                 ctamase positiv

e

 

             GRAM STAIN RESULT 3+ WBCs                                



             (BEAKER) (test code =                                        



             1123)                                               

 

             GRAM STAIN RESULT 0-5 epithelial                           



             (BEAKER) (test code = cells                                  



             393081)                                             

 

             GRAM STAIN RESULT 4+ gram positive                           



             (BEAKER) (test code = cocci in chains,                           



             690855)      pairs and clusters                           



3+ Normal respiratory anju presentBLOOD GAS, [id number] 12:00:47





             Test Item    Value        Reference Range Interpretation Comments

 

             PH ARTERIAL (BEAKER) (test code = 7.41         7.35-7.45           

      



             383)                                                

 

             PCO2 ARTERIAL (BEAKER) (test code 42 mm Hg     35-45               

      



             = 384)                                              

 

             PO2 ARTERIAL (BEAKER) (test code = 74 mm Hg     80-90        L     

       



             385)                                                

 

             O2 SATURATION ARTERIAL (BEAKER) 95.4 %       96.0-97.0    L        

    



             (test code = 386)                                        

 

             HCO3 ARTERIAL (BEAKER) (test code 26 mmol/L    21-29               

      



             = 388)                                              

 

             BASE EXCESS ARTERIAL (BEAKER) 0.9 mmol/L   -2.0-3.0                

  



             (test code = 387)                                        

 

             PATIENT TEMPERATURE (BEAKER) (test 36.4                            

       



             code = 1818)                                        

 

             FIO2 (BEAKER) (test code = 1819) 70.0                              

     



(CELLAVISION MANUAL DIFF)2022-02-15 10:54:07





             Test Item    Value        Reference Range Interpretation Comments

 

             NEUTROPHILS - REL 71 %                                   



             (CELLAVISION)(BEAKER) (test code                                   

     



             = 2816)                                             

 

             LYMPHOCYTES - REL 8 %                                    



             (CELLAVISION)(BEAKER) (test code                                   

     



             = 2817)                                             

 

             MONOCYTES - REL 14 %                                   



             (CELLAVISION)(BEAKER) (test code                                   

     



             = 2818)                                             

 

             METAMYELOCYTES - REL 3 %          0-0          H            



             (CELLAVISION)(BEAKER) (test code                                   

     



             = 2821)                                             

 

             PROMYELOCYTES - REL 2 %          0-0          H            



             (CELLAVSION)(BEAKER) (test code =                                  

      



             2825)                                               

 

             ATYPICAL LYMPHOCYTES - REL 1 %          0-0          H            



             (CELLAVISION)(BEAKER) (test code                                   

     



             = 2829)                                             

 

             NEUTROPHILS - ABS 29.68 K/ul   1.56-6.13    H            



             (CELLAVISION)(BEAKER) (test code                                   

     



             = 2830)                                             

 

             LYMPHOCYTES - ABS 3.34 K/ul    1.18-3.74                 



             (CELLAVISION)(BEAKER) (test code                                   

     



             = 2831)                                             

 

             MONOCYTES - ABS 5.85 K/uL    0.24-0.36    H            



             (CELLAVISION)(BEAKER) (test code                                   

     



             = 2832)                                             

 

             METAMYELOCYTES - ABS 1.25 K/uL    0.00-0.00    H            



             (CELLAVISION)(BEAKER) (test code                                   

     



             = 2836)                                             

 

             PROMYELOCYTES - ABS 0.84 K/uL    0.00-0.00    H            



             (CELLAVISION)(BEAKER) (test code                                   

     



             = 2838)                                             

 

             ATYPICAL LYMPHOCYTES - ABS 0.42 K/uL    0.00-0.00    H            



             (CELLAVISION)(BEAKER) (test code                                   

     



             = 2858)                                             

 

             TOTAL COUNTED (BEAKER) (test code 100                              

      



             = 1351)                                             

 

             MANUAL NRBC  CELLS 37 /100 WBC  0-0          H            



             (BEAKER) (test code = 1353)                                        

 

             WBC MORPHOLOGY (BEAKER) (test Normal                               

  



             code = 487)                                         

 

             CLUMPED PLATELETS (BEAKER) (test Present                           

     



             code = 436)                                         

 

             GIANT PLATELETS (BEAKER) (test Present                             

   



             code = 313)                                         

 

             POLYCHROMATOPHILLIC RBCS(BEAKER) 1+ few                            

     



             (test code = 478)                                        

 

             HYPOCHROMIA (BEAKER) (test code = 2+ moderate                      

      



             963)                                                

 

             ANISOCYTOSIS (BEAKER) (test code 3+ many                           

     



             = 961)                                              

 

             MICROCYTES (BEAKER) (test code = 3+ many                           

     



             965)                                                

 

             MACROCYTES (BEAKER) (test code = 3+ many                           

     



             964)                                                

 

             POIKILOCYTES (BEAKER) (test code 2+ moderate                       

     



             = 966)                                              

 

             TARGET CELLS (BEAKER) (test code 1+ few                            

     



             = 480)                                              

 

             ELLIPTOCYTES (BEAKER) (test code 1+ few                            

     



             = 962)                                              

 

             ARTIFACT (CELLAVISION)(BEAKER) Present                             

   



             (test code = 3432)                                        

 

             PLATELET CONCENTRATION Decreased                              



             (CELLAVISION)(BEAKER) (test code                                   

     



             = 3438)                                             



 ID - [id number] ID - nova Vásquez comments: Slide comments:CBC 
W/PLT COUNT &amp; AUTO DIFFERENTIAL2022-02-15 10:52:23





             Test Item    Value        Reference Range Interpretation Comments

 

             WHITE BLOOD CELL COUNT 41.8 K/ L    3.5-10.5     H            



             (BEAKER) (test code =                                        



             775)                                                

 

             RED BLOOD CELL COUNT 2.60 M/ L    3.93-5.22    L            



             (BEAKER) (test code =                                        



             761)                                                

 

             HEMOGLOBIN (BEAKER) 6.9 GM/DL    11.2-15.7    L            



             (test code = 410)                                        

 

             HEMATOCRIT (BEAKER) 21.4 %       34.1-44.9    L            



             (test code = 411)                                        

 

             MEAN CORPUSCULAR 82.3 fL      79.4-94.8                 



             VOLUME (BEAKER) (test                                        



             code = 753)                                         

 

             MEAN CORPUSCULAR 26.5 pg      25.6-32.2                 



             HEMOGLOBIN (BEAKER)                                        



             (test code = 751)                                        

 

             MEAN CORPUSCULAR 32.2 GM/DL   32.2-35.5                 



             HEMOGLOBIN CONC                                        



             (BEAKER) (test code =                                        



             752)                                                

 

             RED CELL DISTRIBUTION 17.7 %       11.7-14.4    H            



             WIDTH (BEAKER) (test                                        



             code = 412)                                         

 

             PLATELET COUNT 121 K/CU MM  150-450      L            



             (BEAKER) (test code =                                        



             756)                                                

 

             MEAN PLATELET VOLUME                                        Unable 

to report due



             (BEAKER) (test code =                                        to abn

ormal Platelet



             754)                                                population



                                                                 distribution.

 

             NUCLEATED RED BLOOD 27 /100 WBC  0-0          H            



             CELLS (BEAKER) (test                                        



             code = 413)                                         



LACTIC ACID, VENOUS2022-02-15 10:40:33





             Test Item    Value        Reference Range Interpretation Comments

 

             LACTATE BLOOD VENOUS 1.54 mmol/L  0.50-2.20                 Specime

n slightly



             (2) (BEAKER) (test                                        hemolyzed



             code = 0172)                                        



 ID - GALILEO LSpecimen markedly [id number] 09:21:07





             Test Item    Value        Reference Range Interpretation Comments

 

             TRIGLYCERIDES (BEAKER) 181 mg/dL                              Speci

men slightly



             (test code = 540)                                        hemolyzed



TRIGLYCERIDE REFERENCE RANGELow Risk &lt;150Borderline Risk 150-199High Risk 
200-499Very High Risk &gt;=500Operator ID - GALILEO HOODpecimen markedly icteric
VANCOMYCIN LEVEL, TROUGH2022-02-15 08:48:16





             Test Item    Value        Reference Range Interpretation Comments

 

             VANCOMYCIN TROUGH (BEAKER) (test 27.2 ug/mL   10.0-20.0    H       

     



             code = 522)                                         



 ID - ARELY KEYUR, CHEST, 1 VIEW, NON DEPT2022-02-15 07:36:00Reason for 
exam:-&gt;acute hypoxemic respiratory failure, pneumonia, pulm edemaShould this 
be performed at the bedside?-&gt;Yes
********************************************************[facility] : 1975 Sex: 
F************************************************************FINALREPORT PATIENT
ID: [id number] CLINICAL HISTORY: acute hypoxemic respiratory failure, pneumonia, 
pulm edema TECHNIQUE: 1 view of the chest. COMPARISON: 2022 IMPRESSION: The
ETT and NGT are unchanged.Diffuse bilateral airspace opacities are slightly 
decreased. Subpulmonic pleural effusions cannot beexcluded. The 
cardiomediastinal silhouette is magnified by technique. Signed: Naila Mancia 
MDReportVerified Date/Time: 02/15/2022 07:36:10 Reading Location: [facility] 
Radiology Reading Room Electronically signed by: NAILA MANCIA M.D. on 
02/15/2022 07:36 AM(CELLAVISION MANUAL DIFF)2022-02-15 07:09:43





             Test Item    Value        Reference Range Interpretation Comments

 

             NEUTROPHILS - REL 71 %                                   



             (CELLAVISION)(BEAKER) (test code                                   

     



             = 2816)                                             

 

             LYMPHOCYTES - REL 7 %                                    



             (CELLAVISION)(BEAKER) (test code                                   

     



             = 2817)                                             

 

             MONOCYTES - REL 14 %                                   



             (CELLAVISION)(BEAKER) (test code                                   

     



             = 2818)                                             

 

             EOSINOPHILS - REL 1 %                                    



             (CELLAVISION)(BEAKER) (test code                                   

     



             = 2819)                                             

 

             METAMYELOCYTES - REL 1 %          0-0          H            



             (CELLAVISION)(BEAKER) (test code                                   

     



             = 2821)                                             

 

             MYELOCYTES - REL 3 %          0-0          H            



             (CELLAVISION)(BEAKER) (test code                                   

     



             = 2822)                                             

 

             ATYPICAL LYMPHOCYTES - REL 2 %          0-0          H            



             (CELLAVISION)(BEAKER) (test code                                   

     



             = 2829)                                             

 

             NEUTROPHILS - ABS 24.64 K/ul   1.56-6.13    H            



             (CELLAVISION)(BEAKER) (test code                                   

     



             = 2830)                                             

 

             LYMPHOCYTES - ABS 2.43 K/ul    1.18-3.74                 



             (CELLAVISION)(BEAKER) (test code                                   

     



             = 2831)                                             

 

             MONOCYTES - ABS 4.86 K/uL    0.24-0.36    H            



             (CELLAVISION)(BEAKER) (test code                                   

     



             = 2832)                                             

 

             EOSINOPHILS - ABS 0.35 K/uL    0.04-0.36                 



             (CELLAVISION)(BEAKER) (test code                                   

     



             = 2834)                                             

 

             METAMYELOCYTES - ABS 0.35 K/uL    0.00-0.00    H            



             (CELLAVISION)(BEAKER) (test code                                   

     



             = 2836)                                             

 

             MYELOCYTES-ABS 1.04 K/uL    0.00-0.00    H            



             (CELLAVISION)(BEAKER) (test code                                   

     



             = 2837)                                             

 

             ATYPICAL LYMPHOCYTES - ABS 0.69 K/uL    0.00-0.00    H            



             (CELLAVISION)(BEAKER) (test code                                   

     



             = 2858)                                             

 

             TOTAL COUNTED (BEAKER) (test code 100                              

      



             = 1351)                                             

 

             MANUAL NRBC  CELLS 30 /100 WBC  0-0          H            



             (BEAKER) (test code = 1353)                                        

 

             WBC MORPHOLOGY (BEAKER) (test Normal                               

  



             code = 487)                                         

 

             CLUMPED PLATELETS (BEAKER) (test Present                           

     



             code = 436)                                         

 

             GIANT PLATELETS (BEAKER) (test Present                             

   



             code = 313)                                         

 

             HYPOCHROMIA (BEAKER) (test code = 2+ moderate                      

      



             963)                                                

 

             ANISOCYTOSIS (BEAKER) (test code 3+ many                           

     



             = 961)                                              

 

             MICROCYTES (BEAKER) (test code = 2+ moderate                       

     



             965)                                                

 

             MACROCYTES (BEAKER) (test code = 3+ many                           

     



             964)                                                

 

             POIKILOCYTES (BEAKER) (test code 3+ many                           

     



             = 966)                                              

 

             TARGET CELLS (BEAKER) (test code 1+ few                            

     



             = 480)                                              

 

             SPHEROCYTES (BEAKER) (test code = 1+ few                           

      



             768)                                                

 

             ELLIPTOCYTES (BEAKER) (test code 1+ few                            

     



             = 962)                                              

 

             ARTIFACT (CELLAVISION)(BEAKER) Present                             

   



             (test code = 3432)                                        

 

             PLATELET CONCENTRATION Decreased                              



             (CELLAVISION)(BEAKER) (test code                                   

     



             = 3438)                                             



 ID - nova Vásquez comments: Slide comments:CBC W/PLT COUNT &amp; 
AUTO DIFFERENTIAL2022-02-15 07:09:42





             Test Item    Value        Reference Range Interpretation Comments

 

             WHITE BLOOD CELL COUNT 34.7 K/ L    3.5-10.5     H            



             (BEAKER) (test code =                                        



             775)                                                

 

             RED BLOOD CELL COUNT 2.85 M/ L    3.93-5.22    L            



             (BEAKER) (test code =                                        



             761)                                                

 

             HEMOGLOBIN (BEAKER) 7.1 GM/DL    11.2-15.7    L            



             (test code = 410)                                        

 

             HEMATOCRIT (BEAKER) 23.0 %       34.1-44.9    L            



             (test code = 411)                                        

 

             MEAN CORPUSCULAR 80.7 fL      79.4-94.8                 Discordant 

MCV



             VOLUME (BEAKER) (test                                        result

s compared to



             code = 753)                                         previous result

s;



                                                                 clinical correl

ation



                                                                 required.

 

             MEAN CORPUSCULAR 24.9 pg      25.6-32.2    L            



             HEMOGLOBIN (BEAKER)                                        



             (test code = 751)                                        

 

             MEAN CORPUSCULAR 30.9 GM/DL   32.2-35.5    L            



             HEMOGLOBIN CONC                                        



             (BEAKER) (test code =                                        



             752)                                                

 

             RED CELL DISTRIBUTION 17.2 %       11.7-14.4    H            



             WIDTH (BEAKER) (test                                        



             code = 412)                                         

 

             PLATELET COUNT 138 K/CU MM  150-450      L            



             (BEAKER) (test code =                                        



             756)                                                

 

             MEAN PLATELET VOLUME 12.4 fL      9.4-12.3     H            



             (BEAKER) (test code =                                        



             754)                                                

 

             NUCLEATED RED BLOOD 20 /100 WBC  0-0          H            



             CELLS (BEAKER) (test                                        



             code = 413)                                         



POCT-GLUCOSE [id number] 06:46:40





             Test Item    Value        Reference Range Interpretation Comments

 

             POC-GLUCOSE METER 131 mg/dL           H            : TESTED A

T [facility] [address]



             (BEAKER) (test code =                                        [place],



             [address]38)                                               [id number]:



                                                                 /Techni

alexis ID



                                                                 = [id number] for 

julia



                                                                 (contract) Loren

grecia



BLOOD GAS, [id number] 05:52:21





             Test Item    Value        Reference Range Interpretation Comments

 

             PH ARTERIAL (BEAKER) (test code = 7.44         7.35-7.45           

      



             383)                                                

 

             PCO2 ARTERIAL (BEAKER) (test code 39 mm Hg     35-45               

      



             = 384)                                              

 

             PO2 ARTERIAL (BEAKER) (test code = 164 mm Hg    80-90        H     

       



             385)                                                

 

             O2 SATURATION ARTERIAL (BEAKER) 99.2 %       96.0-97.0    H        

    



             (test code = 386)                                        

 

             HCO3 ARTERIAL (BEAKER) (test code 26 mmol/L    21-29               

      



             = 388)                                              

 

             BASE EXCESS ARTERIAL (BEAKER) 2.0 mmol/L   -2.0-3.0                

  



             (test code = 387)                                        

 

             PATIENT TEMPERATURE (BEAKER) (test 37.0                            

       



             code = 1818)                                        

 

             FIO2 (BEAKER) (test code = 1819) 60.0                              

     



[id number] 04:02:49





             Test Item    Value        Reference Range Interpretation Comments

 

             AMMONIA (BEAKER) (test code = 348) 68 mol/L     18-72              

       



 ID - DBOperator ID - DBCOMPREHENSIVE METABOLIC PANEL2022-02-15 03:40:11





             Test Item    Value        Reference Range Interpretation Comments

 

             TOTAL PROTEIN 6.4 gm/dL    6.0-8.3                   Specimen sligh

tly



             (BEAKER) (test code =                                        hemoly

zed



             770)                                                

 

             ALBUMIN (BEAKER) 2.1 g/dL     3.5-5.0      L            Specimen sl

ightly



             (test code = 1145)                                        hemolyzed

 

             ALKALINE PHOSPHATASE 83 U/L                           



             (BEAKER) (test code =                                        



             346)                                                

 

             BILIRUBIN TOTAL 18.7 mg/dL   0.2-1.2      H            Specimen sli

ghtly



             (BEAKER) (test code =                                        hemoly

zed



             377)                                                

 

             SODIUM (BEAKER) (test 145 meq/L    136-145                   



             code = 381)                                         

 

             POTASSIUM (BEAKER) 3.7 meq/L    3.5-5.1                   Specimen 

slightly



             (test code = 379)                                        hemolyzed

 

             CHLORIDE (BEAKER) 111 meq/L           H            



             (test code = 382)                                        

 

             CO2 (BEAKER) (test 24 meq/L     22-29                     



             code = 355)                                         

 

             BLOOD UREA NITROGEN 60 mg/dL     7-21         H            



             (BEAKER) (test code =                                        



             354)                                                

 

             CREATININE (BEAKER) 0.93 mg/dL   0.57-1.25                 Specimen

 slightly



             (test code = 358)                                        hemolyzed

 

             GLUCOSE RANDOM 168 mg/dL           H            



             (BEAKER) (test code =                                        



             652)                                                

 

             CALCIUM (BEAKER) 8.3 mg/dL    8.4-10.2     L            



             (test code = 697)                                        

 

             AST (SGOT) (BEAKER) 96 U/L       5-34         H            Specimen

 slightly



             (test code = 353)                                        hemolyzed

 

             ALT (SGPT) (BEAKER) 485 U/L      6-55         H            Specimen

 slightly



             (test code = 347)                                        hemolyzed

 

             EGFR (BEAKER) (test 78 mL/min/1.73                           ESTIMA

CARMELITA GFR IS



             code = 1092) sq m                                   NOT AS ACCURATE

 AS



                                                                 CREATININE



                                                                 CLEARANCE IN



                                                                 PREDICTING



                                                                 GLOMERULAR



                                                                 FILTRATION RATE

.



                                                                 ESTIMATED GFR I

S



                                                                 NOT APPLICABLE 

FOR



                                                                 DIALYSIS PATIEN

TS.



 ID - CHAN WSpecimen markedly ictericCREATINE KINASE (CK)2022-02-15 
03:40:11





             Test Item    Value        Reference Range Interpretation Comments

 

             CREATINE KINASE TOTAL (BEAKER) (test 71 U/L                  

         



             code = 380)                                         



 ID - CHAN WPHOSPHORUS2022-02-15 03:40:10





             Test Item    Value        Reference Range Interpretation Comments

 

             PHOSPHORUS (BEAKER) 2.2 mg/dL    2.3-4.7      L            Specimen

 slightly



             (test code = 604)                                        hemolyzed



 ID - CHAN [id number] 03:40:09





             Test Item    Value        Reference Range Interpretation Comments

 

             MAGNESIUM (BEAKER) 1.8 mg/dL    1.6-2.6                   Specimen 

slightly



             (test code = 627)                                        hemolyzed



 ID - CHAN [id number] 03:32:43





             Test Item    Value        Reference Range Interpretation Comments

 

             PARTIAL THROMBOPLASTIN TIME 43.2 seconds 22.5-36.0    H            



             (BEAKER) (test code = 760)                                        



FIBRINOGEN2022-02-15 03:32:07





             Test Item    Value        Reference Range Interpretation Comments

 

             FIBRINOGEN LEVEL (BEAKER) (test 462 mg/dl    225-434      H        

    



             code = 658)                                         



PROTHROMBIN TIME/INR2022-02-15 03:31:41





             Test Item    Value        Reference Range Interpretation Comments

 

             PROTIME (BEAKER) 17.2 seconds 11.9-14.2    H            



             (test code = 759)                                        

 

             INR (BEAKER) (test 1.43         See_Comment                [Automat

ed message]



             code = 370)                                         The system [employer]



                                                                 generated this 

result



                                                                 transmitted ref

erence



                                                                 range: <=5.90. 

The



                                                                 reference range

 was



                                                                 not used to int

erpret



                                                                 this result as



                                                                 normal/abnormal

.



RECOMMENDED COUMADIN/WARFARIN INR THERAPY RANGESSTANDARD DOSE: 2.0 - 3.0 
Includes: PROPHYLAXIS for venous thrombosis, systemic embolization; TREATMENT 
for venous thrombosis and/or pulmonary embolus.HIGH RISK: Target INR is 2.5-3.5 
for patients with mechanical heart valves.POCT-GLUCOSE METER2022-02-15 03:20:32





             Test Item    Value        Reference Range Interpretation Comments

 

             POC-GLUCOSE METER 169 mg/dL           H            : TESTED A

T [facility] [address]



             (BEAKER) (test code =                                        [place],



             [address])                                               [id number]:



                                                                 /Techni

alexis ID



                                                                 = [id number] for Anton dumont



                                                                 (contract)Loren



LACTIC ACID, [id number] 19:21:06





             Test Item    Value        Reference Range Interpretation Comments

 

             LACTATE BLOOD VENOUS 2.50 mmol/L  0.50-2.20    H            Specime

n slightly



             (2) (BEAKER) (test                                        hemolyzed



             code = 2872)                                        



 ID - DBSpecimen markedly ictericBASIC METABOLIC [id number] 
19:18:05





             Test Item    Value        Reference Range Interpretation Comments

 

             SODIUM (BEAKER) 144 meq/L    136-145                   



             (test code = 381)                                        

 

             POTASSIUM (BEAKER) 4.2 meq/L    3.5-5.1                   Specimen 

slightly



             (test code = 379)                                        hemolyzed

 

             CHLORIDE (BEAKER) 112 meq/L           H            



             (test code = 382)                                        

 

             CO2 (BEAKER) (test 22 meq/L     22-29                     



             code = 355)                                         

 

             BLOOD UREA NITROGEN 55 mg/dL     7-21         H            



             (BEAKER) (test code                                        



             = 354)                                              

 

             CREATININE (BEAKER) 0.91 mg/dL   0.57-1.25                 Specimen

 slightly



             (test code = 358)                                        hemolyzed

 

             GLUCOSE RANDOM 170 mg/dL           H            



             (BEAKER) (test code                                        



             = 652)                                              

 

             CALCIUM (BEAKER) 8.4 mg/dL    8.4-10.2                  



             (test code = 697)                                        

 

             EGFR (BEAKER) (test 80 mL/min/1.73                           ESTIMA

CARMELITA GFR IS



             code = 1092) sq m                                   NOT AS ACCURATE

 AS



                                                                 CREATININE



                                                                 CLEARANCE IN



                                                                 PREDICTING



                                                                 GLOMERULAR



                                                                 FILTRATION RATE

.



                                                                 ESTIMATED GFR I

S



                                                                 NOT APPLICABLE 

FOR



                                                                 DIALYSIS PATIEN

TS.



 ID - DBSpecimen markedly ictericPOCT-GLUCOSE [id number] 16:41:33





             Test Item    Value        Reference Range Interpretation Comments

 

             POC-GLUCOSE METER 168 mg/dL           H            : TESTED A

T [facility] [address]



             (BEAKER) (test code                                        [facility],



             = 1538)                                             [address]:



                                                                 /Techni

alexis ID =



                                                                 [id number] for Winnie burns



                                                                 (contract)Lilia

harlan



HIGH SENSITIVITY TROPONIN -00-05 16:06:28





             Test Item    Value        Reference Range Interpretation Comments

 

             HIGH SENSITIVITY < pg/ml      See_Comment                [Automated

 message]



             TROPONIN I (test code =                                        The 

system which



             [id number])                                            generated this 

result



                                                                 transmitted ref

erence



                                                                 range: <=17. Th

e



                                                                 reference range

 was not



                                                                 used to interpr

et this



                                                                 result as



                                                                 normal/abnormal

.



 ID - DBThe  STAT High Sensitivity Troponin-I results should be
used in conjunctionwith other diagnostic information such as ECG, clinical 
observations and information, and patient symptoms to aid in the diagnosis of 
MI.BASIC METABOLIC [id number] 15:59:03





             Test Item    Value        Reference Range Interpretation Comments

 

             SODIUM (BEAKER) 141 meq/L    136-145                   



             (test code = 381)                                        

 

             POTASSIUM (BEAKER) 5.9 meq/L    3.5-5.1      H            Specimen 

markedly



             (test code = 379)                                        hemolyzed

 

             CHLORIDE (BEAKER) 109 meq/L           H            



             (test code = 382)                                        

 

             CO2 (BEAKER) (test 25 meq/L     22-29                     



             code = 355)                                         

 

             BLOOD UREA NITROGEN 56 mg/dL     7-21         H            



             (BEAKER) (test code                                        



             = 354)                                              

 

             CREATININE (BEAKER) 0.82 mg/dL   0.57-1.25                 Specimen

 markedly



             (test code = 358)                                        hemolyzed

 

             GLUCOSE RANDOM 167 mg/dL           H            



             (BEAKER) (test code                                        



             = 652)                                              

 

             CALCIUM (BEAKER) 8.2 mg/dL    8.4-10.2     L            



             (test code = 697)                                        

 

             EGFR (BEAKER) (test 91 mL/min/1.73                           ESTIMA

CARMELITA GFR IS



             code = 1092) sq m                                   NOT AS ACCURATE

 AS



                                                                 CREATININE



                                                                 CLEARANCE IN



                                                                 PREDICTING



                                                                 GLOMERULAR



                                                                 FILTRATION RATE

.



                                                                 ESTIMATED GFR I

S



                                                                 NOT APPLICABLE 

FOR



                                                                 DIALYSIS PATIEN

TS.



 ID - DBSpecimen markedly [id number] 15:59:02





             Test Item    Value        Reference Range Interpretation Comments

 

             MAGNESIUM (BEAKER) 2.1 mg/dL    1.6-2.6                   Specimen 

markedly



             (test code = 627)                                        hemolyzed



 ID - DBANA TITER AND [id number] 14:40:13





             Test Item    Value        Reference Range Interpretation Comments

 

             JESÚS TITER    :40                                    



             (BEAKER) (test                                        



             code = 1541)                                        

 

             JESÚS PATTERN  Cytoplasmic/Anti-sm                           Cytoplas

tucker



             (BEAKER) (test ooth muscle                            staining is p

resent



             code = 1781) antibodies - see                           suggestive 

of



                          comment                                Anti-smooth mus

howie



                                                                 antibodies. If



                                                                 clinically



                                                                 indicated,



                                                                 recommend testi

ng



                                                                 for Anti-smooth



                                                                 muscle antibodi

es.



ANTI-NUCLEAR ANTIBODY (JESÚS)2022 14:39:51





             Test Item    Value        Reference Range Interpretation Comments

 

             ANTI-NUCLEAR ANTIBODY (JESÚS) (BEAKER) Negative     Negative         

         



             (test code = 418)                                        



Test performed by IFA method.[id number] 14:26:54





             Test Item    Value        Reference Range Interpretation Comments

 

             RPR SCREEN (BEAKER) (test code = Nonreactive  Nonreactive          

     



             420)                                                



EBV ANTIBODY, [id number] 14:14:16





             Test Item    Value        Reference Range Interpretation Comments

 

             HAROLDO FRANCIS VIRAL CAPSID Negative     Negative, Equivocal         

     



             ANTIGEN IGM (BEAKER) (test code                                    

    



             = 3418)                                             



Haroldo Francis Viral Capsid Antigen IgM Result Interpretation: &lt;/= 0.8 Al 
Negative 0.9-1.0 Al Equivocal &gt;/= 1.1 Al PositiveEBV ANTIBODY, [id number] 
14:14:15





             Test Item    Value        Reference Range Interpretation Comments

 

             HAROLDO FRANCIS VIRAL CAPSID Positive     Negative, Equivocal A       

     



             ANTIGEN IGG (BEAKER) (test code                                    

    



             = 3415)                                             



Haroldo Francis Viral Capsid Antigen IgG Result Interpretation: &lt;/= 0.8 Al 
Negative 0.9-1.0 Al Equivocal &gt;/= 1.1 Al PositiveCYTOMEGALOVIRUS ANTIBODY, 
[id number] 14:14:14





             Test Item    Value        Reference Range Interpretation Comments

 

             CYTOMEGALOVIRUS, IGG (BEAKER) Positive     Negative, Equivocal A   

         



             (test code = 3429)                                        



CMV IgG Result Interpretation: &lt;/= 0.8 Al Negative 0.9-1.0 Al Equivocal 
&gt;/=1.1 Al PositiveBLOOD GAS, [id number] 12:52:10





             Test Item    Value        Reference Range Interpretation Comments

 

             PH ARTERIAL (BEAKER) (test code = 7.38         7.35-7.45           

      



             383)                                                

 

             PCO2 ARTERIAL (BEAKER) (test code 40 mm Hg     35-45               

      



             = 384)                                              

 

             PO2 ARTERIAL (BEAKER) (test code 53 mm Hg     80-90        L       

     



             = 385)                                              

 

             O2 SATURATION ARTERIAL (BEAKER) 85.4 %       96.0-97.0    L        

    



             (test code = 386)                                        

 

             HCO3 ARTERIAL (BEAKER) (test code 23 mmol/L    21-29               

      



             = 388)                                              

 

             BASE EXCESS ARTERIAL (BEAKER) -1.7 mmol/L  -2.0-3.0                

  



             (test code = 387)                                        

 

             PATIENT TEMPERATURE (BEAKER) 38.0                                  

 



             (test code = 1818)                                        

 

             FIO2 (BEAKER) (test code = 1819) 55.0                              

     



POCT-GLUCOSE [id number] 12:07:38





             Test Item    Value        Reference Range Interpretation Comments

 

             POC-GLUCOSE METER 183 mg/dL           H            : TESTED A

T [facility] [address]



             (BEAKER) (test code                                        [address] 

[place],



             = 1538)                                             [address]:



                                                                 /Techni

alexis ID =



                                                                 [id number] for Winnie burns



                                                                 (contract)Lilia



MRSA [id number] 09:58:35





             Test Item    Value        Reference Range Interpretation Comments

 

             CULTURE (BEAKER) (test code No MRSA isolated                       

    



             = 1095)                                             



RAD, CHEST, 1 VIEW, NON [id number] 09:44:00Reason for exam:-&gt;acute 
hypoxemic respiratory failure, pneumonia, pulm edemaShould this be performed at 
the bedside?-&gt;Yes
************************************************************[facility] : 1975 Sex: 
F************************************************************FINALREPORT PATIENT
ID: [id number] CLINICAL HISTORY: acute hypoxemic respiratory failure, pneumonia, 
pulm edema TECHNIQUE: 1 view of the chest. COMPARISON: 2022 IMPRESSION: An 
ETT and NGT are again seen.Diffuse right asymmetric airspace opacities appear 
slightly increased. Small pleural effusions are suspected. The cardiomediastinal
silhouette is magnified by technique. Signed: Naila Manciaeppuma Verified 
Date/Time: 2022 09:44:30 Reading Location: [facility] Radiology Reading
Room Electronically signed by: NAILA MANCIA M.D. on 2022 09:44 AM
VANCOMYCIN LEVEL, [id number] 09:27:06





             Test Item    Value        Reference Range Interpretation Comments

 

             VANCOMYCIN TROUGH (MARCIEAKER) (test 6.8 ug/mL    10.0-20.0    L       

     



             code = 522)                                         



 ID - [id number] 09:08:42





             Test Item    Value        Reference Range Interpretation Comments

 

             FIBRINOGEN LEVEL (BEAKER) (test 472 mg/dl    225-434      H        

    



             code = 658)                                         



B-TYPE NATRIURETIC FACTOR (BNP)2022 09:02:42





             Test Item    Value        Reference Range Interpretation Comments

 

             B-TYPE NATRIURETIC PEPTIDE (BEAKER) 142 pg/mL    0-100        H    

        



             (test code = 700)                                        



 ID - DBCREATINE KINASE (CK)2022 09:00:22





             Test Item    Value        Reference Range Interpretation Comments

 

             CREATINE KINASE TOTAL (BEAKER) (test 92 U/L                  

         



             code = 380)                                         



 ID - [id number] 08:51:39





             Test Item    Value        Reference Range Interpretation Comments

 

             PARTIAL THROMBOPLASTIN TIME 40.0 seconds 22.5-36.0    H            



             (BEAKER) (test code = 760)                                        



PROTHROMBIN TIME/[id number] 08:50:39





             Test Item    Value        Reference Range Interpretation Comments

 

             PROTIME (BEAKER) 17.9 seconds 11.9-14.2    H            



             (test code = 759)                                        

 

             INR (BEAKER) (test 1.50         See_Comment                [Automat

ed message]



             code = 370)                                         The system [employer]



                                                                 generated this 

result



                                                                 transmitted ref

erence



                                                                 range: <=5.90. 

The



                                                                 reference range

 was



                                                                 not used to int

erpret



                                                                 this result as



                                                                 normal/abnormal

.



RECOMMENDED COUMADIN/WARFARIN INR THERAPY RANGESSTANDARD DOSE: 2.0 - 3.0 
Includes: PROPHYLAXIS for venous thrombosis, systemic embolization; TREATMENT 
for venous thrombosis and/or pulmonary embolus.HIGH RISK: Target INR is 2.5-3.5 
for patients with mechanical heart valves.POCT-GLUCOSE [id number] 08:16:52





             Test Item    Value        Reference Range Interpretation Comments

 

             POC-GLUCOSE METER 178 mg/dL           H            : Notified

 RN/MD: TESTED



             (BEAKER) (test code                                        AT [facility]

 [address]



             = 1538)                                             [facility], [address]

30:



                                                                 /Techni

alexis ID =



                                                                 [id number] for Winnie burns



                                                                 (contract)Lilia



(CELLAVISION MANUAL DIFF)2022 06:54:44





             Test Item    Value        Reference Range Interpretation Comments

 

             NEUTROPHILS - REL 72 %                                   



             (CELLAVISION)(BEAKER) (test code                                   

     



             = 2816)                                             

 

             LYMPHOCYTES - REL 10 %                                   



             (CELLAVISION)(BEAKER) (test code                                   

     



             = 2817)                                             

 

             MONOCYTES - REL 6 %                                    



             (CELLAVISION)(BEAKER) (test code                                   

     



             = 2818)                                             

 

             METAMYELOCYTES - REL 1 %          0-0          H            



             (CELLAVISION)(BEAKER) (test code                                   

     



             = 2821)                                             

 

             PROMYELOCYTES - REL 1 %          0-0          H            



             (CELLAVSION)(BEAKER) (test code =                                  

      



             2825)                                               

 

             BANDS - REL (CELLAVISION)(BEAKER) 8 %          0-10                

      



             (test code = 2826)                                        

 

             ATYPICAL LYMPHOCYTES - REL 1 %          0-0          H            



             (CELLAVISION)(BEAKER) (test code                                   

     



             = 2829)                                             

 

             NEUTROPHILS - ABS 12.31 K/ul   1.56-6.13    H            



             (CELLAVISION)(BEAKER) (test code                                   

     



             = 2830)                                             

 

             LYMPHOCYTES - ABS 1.71 K/ul    1.18-3.74                 



             (CELLAVISION)(BEAKER) (test code                                   

     



             = 2831)                                             

 

             MONOCYTES - ABS 1.03 K/uL    0.24-0.36    H            



             (CELLAVISION)(BEAKER) (test code                                   

     



             = 2832)                                             

 

             METAMYELOCYTES - ABS 0.17 K/uL    0.00-0.00    H            



             (CELLAVISION)(BEAKER) (test code                                   

     



             = 2836)                                             

 

             PROMYELOCYTES - ABS 0.17 K/uL    0.00-0.00    H            



             (CELLAVISION)(BEAKER) (test code                                   

     



             = 2838)                                             

 

             BANDS - ABS (CELLAVISION)(BEAKER) 1.37 K/uL    0.00-0.80    H      

      



             (test code = 2840)                                        

 

             ATYPICAL LYMPHOCYTES - ABS 0.17 K/uL    0.00-0.00    H            



             (CELLAVISION)(BEAKER) (test code                                   

     



             = 3458)                                             

 

             TOTAL COUNTED (BEAKER) (test code 100                              

      



             = 1351)                                             

 

             MANUAL NRBC  CELLS 29 /100 WBC  0-0          H            



             (BEAKER) (test code = 1353)                                        

 

             WBC MORPHOLOGY (BEAKER) (test Normal                               

  



             code = 487)                                         

 

             PLT MORPHOLOGY (BEAKER) (test Normal                               

  



             code = 486)                                         

 

             POLYCHROMATOPHILLIC RBCS(BEAKER) 1+ few                            

     



             (test code = 478)                                        

 

             ANISOCYTOSIS (BEAKER) (test code 3+ many                           

     



             = 961)                                              

 

             MACROCYTES (BEAKER) (test code = 3+ many                           

     



             964)                                                

 

             POIKILOCYTES (BEAKER) (test code 3+ many                           

     



             = 966)                                              

 

             TARGET CELLS (BEAKER) (test code 2+ moderate                       

     



             = 480)                                              

 

             SCHISTOCYTES (BEAKER) (test code 1+ few                            

     



             = 765)                                              

 

             OVALOCYTES (BEAKER) (test code = 3+ many                           

     



             477)                                                

 

             JARETT CELLS (BEAKER) (test code = 2+ moderate                       

     



             474)                                                

 

             BASOPHILIC STIPPLING (BEAKER) Present                              

  



             (test code = 473)                                        

 

             ARTIFACT (CELLAVISION)(BEAKER) Present                             

   



             (test code = 3432)                                        

 

             PAPPENHEIMER 1+ few                                 



             (CELLAVISION)(BEAKER) (test code                                   

     



             = 3435)                                             

 

             PLATELET CONCENTRATION Adequate                               



             (CELLAVISION)(BEAKER) (test code                                   

     



             = 3438)                                             



 ID - Nitish comments: Slide comments:CBC W/PLT COUNT &amp; AUTO 
[id number] 06:54:43





             Test Item    Value        Reference Range Interpretation Comments

 

             WHITE BLOOD CELL COUNT (BEAKER) 17.1 K/ L    3.5-10.5     H        

    



             (test code = 775)                                        

 

             RED BLOOD CELL COUNT (BEAKER) 3.78 M/ L    3.93-5.22    L          

  



             (test code = 761)                                        

 

             HEMOGLOBIN (BEAKER) (test code = 9.5 GM/DL    11.2-15.7    L       

     



             410)                                                

 

             HEMATOCRIT (BEAKER) (test code = 28.8 %       34.1-44.9    L       

     



             411)                                                

 

             MEAN CORPUSCULAR VOLUME (BEAKER) 76.2 fL      79.4-94.8    L       

     



             (test code = 753)                                        

 

             MEAN CORPUSCULAR HEMOGLOBIN 25.1 pg      25.6-32.2    L            



             (BEAKER) (test code = 751)                                        

 

             MEAN CORPUSCULAR HEMOGLOBIN CONC 33.0 GM/DL   32.2-35.5            

     



             (BEAKER) (test code = 752)                                        

 

             RED CELL DISTRIBUTION WIDTH 14.3 %       11.7-14.4                 



             (BEAKER) (test code = 412)                                        

 

             PLATELET COUNT (BEAKER) (test 171 K/CU MM  150-450                 

  



             code = 756)                                         

 

             MEAN PLATELET VOLUME (BEAKER) 12.0 fL      9.4-12.3                

  



             (test code = 754)                                        

 

             NUCLEATED RED BLOOD CELLS 19 /100 WBC  0-0          H            



             (BEAKER) (test code = 413)                                        



POCT-GLUCOSE [id number] 04:58:50





             Test Item    Value        Reference Range Interpretation Comments

 

             POC-GLUCOSE METER 146 mg/dL           H            : TESTED A

T [facility] [address]



             (BEAKER) (test code =                                        [place],



             1538)                                               [address]:



                                                                 /Techni

alexis ID



                                                                 = [id number] for Yamel worthington



                                                                 (contract), Greg

eden



COMPREHENSIVE METABOLIC [id number] 03:04:11





             Test Item    Value        Reference Range Interpretation Comments

 

             TOTAL PROTEIN 6.7 gm/dL    6.0-8.3                   



             (BEAKER) (test code =                                        



             770)                                                

 

             ALBUMIN (BEAKER) 2.2 g/dL     3.5-5.0      L            



             (test code = 1145)                                        

 

             ALKALINE PHOSPHATASE 79 U/L                           



             (BEAKER) (test code =                                        



             346)                                                

 

             BILIRUBIN TOTAL 16.6 mg/dL   0.2-1.2      H            



             (BEAKER) (test code =                                        



             377)                                                

 

             SODIUM (BEAKER) (test 141 meq/L    136-145                   



             code = 381)                                         

 

             POTASSIUM (BEAKER) 3.4 meq/L    3.5-5.1      L            



             (test code = 379)                                        

 

             CHLORIDE (BEAKER) 110 meq/L           H            



             (test code = 382)                                        

 

             CO2 (BEAKER) (test 23 meq/L     22-29                     



             code = 355)                                         

 

             BLOOD UREA NITROGEN 60 mg/dL     7-21         H            



             (BEAKER) (test code =                                        



             354)                                                

 

             CREATININE (BEAKER) 0.97 mg/dL   0.57-1.25                 



             (test code = 358)                                        

 

             GLUCOSE RANDOM 159 mg/dL           H            



             (BEAKER) (test code =                                        



             652)                                                

 

             CALCIUM (BEAKER) 8.1 mg/dL    8.4-10.2     L            



             (test code = 697)                                        

 

             AST (SGOT) (BEAKER) 174 U/L      5-34         H            



             (test code = 353)                                        

 

             ALT (SGPT) (BEAKER) 725 U/L      6-55         H            



             (test code = 347)                                        

 

             EGFR (BEAKER) (test 75 mL/min/1.73                           ESTIMA

CARMELITA GFR IS



             code = 1092) sq m                                   NOT AS ACCURATE

 AS



                                                                 CREATININE



                                                                 CLEARANCE IN



                                                                 PREDICTING



                                                                 GLOMERULAR



                                                                 FILTRATION RATE

.



                                                                 ESTIMATED GFR I

S



                                                                 NOT APPLICABLE 

FOR



                                                                 DIALYSIS PATIEN

TS.



 ID - DBSpecimen markedly [id number] 03:04:10





             Test Item    Value        Reference Range Interpretation Comments

 

             MAGNESIUM (BEAKER) (test code = 2.1 mg/dL    1.6-2.6               

    



             627)                                                



 ID - [id number] 03:04:10





             Test Item    Value        Reference Range Interpretation Comments

 

             PHOSPHORUS (BEAKER) (test code = 2.4 mg/dL    2.3-4.7              

     



             604)                                                



 ID - [id number] 02:53:08





             Test Item    Value        Reference Range Interpretation Comments

 

             AMMONIA (BEAKER) (test code = 348) 66 mol/L     18-72              

       



 ID - DBPOCT-GLUCOSE [id number] 23:49:23





             Test Item    Value        Reference Range Interpretation Comments

 

             POC-GLUCOSE METER 160 mg/dL           H            : TESTED A

T [facility] [address]



             (BEAKER) (test code =                                        [place],



             1538)                                               [address]:



                                                                 /Techni

alexis ID



                                                                 = [id number] for Ro

tic



                                                                 (contract), Greg

lla



U/S, ABDOMINAL, [id number] 23:49:00Abdomen limited area? Add comment if 
clarification is needed.-&gt;LiverReason for exam:-&gt;abnormalLFT
************************************************************[facility] BLAYNEEDEN HERNÁNDEZ : 1975 Sex: 
F************************************************************FINALREPORT PATIENT
ID: [id number] History: abnormal LFT Abdominal ultrasound dated 2022 
Comparison: None Comment: Real-time transabdominal ultrasound of the right upper
quadrant abdomen was performed. Liver: 15.7 cm , normal. Normal echogenicity. No
focal lesions. Gallbladder: No gallstones. No gallbladder wall thickening. No 
pericholecystic fluid. No sonographic Quinteros's sign. Transverse diameter: 3.0 
cm. Biliary tree: No intrahepatic ductal dilatation. CBD: 5 mm. MPV: 13 mm 
Pancreas: Partially obscured by bowel gas but unremarkable where visualized. 
Right kidney: 11.2 x 4.9 x 4.8 cm. Normal echogenicity. Trace ascites is present
in the gallbladder fossa. The visualized abdominal aorta is normal in caliber. 
The IVC and Hepatic veins are patent. Impression: No ultrasound abnormality to 
explain thepatient's abnormal LFTs. Trace ascites in the gallbladder fossa. 
Signed: Geovanni Stewart Verified Date/Time: 2022 23:49:05 
Electronically signed by: GEOVANNI STEWART M.D. on 2022 11:49 PMPOCT-
GLUCOSE [id number] 22:54:14





             Test Item    Value        Reference Range Interpretation Comments

 

             POC-GLUCOSE METER 155 mg/dL           H            : TESTED A

T [facility] [address]



             (BEAKER) (test code =                                        [facility]

[employer] [facility],



             1538)                                               [id number]:



                                                                 /Techni

alexis ID



                                                                 = [id number] for Yamel worthington



                                                                 (contract), Greg

lla



POCT-GLUCOSE [id number] 21:27:13





             Test Item    Value        Reference Range Interpretation Comments

 

             POC-GLUCOSE METER 165 mg/dL           H            : TESTED A

T [facility] [address]



             (BEAKER) (test code =                                        [employer] [facility],



             1538)                                               [address]:



                                                                 /Techni

alexis ID



                                                                 = [id number] for Dmitry ledesma



                                                                 (contract), Dwi

ght



[id number] 17:44:01





             Test Item    Value        Reference Range Interpretation Comments

 

             PARTIAL THROMBOPLASTIN TIME 34.4 seconds 22.5-36.0                 



             (BEAKER) (test code = 760)                                        



PROTHROMBIN TIME/[id number] 17:43:59





             Test Item    Value        Reference Range Interpretation Comments

 

             PROTIME (BEAKER) 22.6 seconds 11.9-14.2    H            



             (test code = 759)                                        

 

             INR (BEAKER) (test 2.02         See_Comment                [Automat

ed message]



             code = 370)                                         The system [employer]



                                                                 generated this 

result



                                                                 transmitted ref

erence



                                                                 range: <=5.90. 

The



                                                                 reference range

 was



                                                                 not used to int

erpret



                                                                 this result as



                                                                 normal/abnormal

.



RECOMMENDED COUMADIN/WARFARIN INR THERAPY RANGESSTANDARD DOSE: 2.0 - 3.0 
Includes: PROPHYLAXIS for venous thrombosis, systemic embolization; TREATMENT 
for venous thrombosis and/or pulmonary embolus.HIGH RISK: Target INR is 2.5-3.5 
for patients with mechanical heart valves.POCT-GLUCOSE [id number] 17:12:23





             Test Item    Value        Reference Range Interpretation Comments

 

             POC-GLUCOSE METER 183 mg/dL           H            : Notified

 RN/MD: TESTED



             (ARIANA) (test code                                        AT [facility]

 [address]



             = 1538)                                             [facility], [address]

30:



                                                                 /Techni

alexis ID =



                                                                 [id number] for Winnie burns



                                                                 (contract)Lilia



CT, BRAIN, WITHOUT [id number] 13:29:00Unlisted Reason for Exam - Click 
Yes and Enter Reason Below-&gt;No
************************************************************[facility] : 1975 Sex: 
F************************************************************FINALREPORT PATIENT
ID: [id number] CT Head without contrast CLINICAL HISTORY: Stroke, follow up 
TECHNIQUE: Contiguous axial CT images through the head without contrast. This 
exam was performed according to the departmental dose optimization program which
includes automated exposure control, adjustment of themA and/or kV according to 
the patient size, and/or use of an iterative reconstruction technique. COMP
ARISON: None FINDINGS: There is no CT evidence of acute infarct or intracranial 
hemorrhage. There isatherosclerotic calcification of the intracranial 
circulation. There is generalized parenchymal volume loss without hydrocephalus,
midline shift, or apparent mass effect. There are no extra-axial fluid
collections. The skull is intact. The visualized paranasal sinuses are well-
aerated. IMPRESSION: No CT evidence of acute infarct, hemorrhage, or 
hydrocephalus. Signed: Naila Mancia MDReport Verified Date/Time: 2022 
13:29:18 Electronically signed by: NAILA MANCIA M.D. on 2022 01:29 PM
SARS-COV2/RT-PCR ([facility])2022 11:06:35





             Test Item    Value        Reference Range Interpretation Comments

 

             SARS-COV2/RT-PCR (test Positive     Not Detected, Negative, AA     

      



             code = 6025314)              See external report for              



                                       linked test               

 

             SARS-COV-2 PERFORMING LAB [facility] SONAL                             



             (test code = 9787996)                                        



Results are for the detection of SARS-CoV-2 RNA. The SARS-CoV-2 RNA is generally
detectable in nasopharyngeal swab specimens during the acute phase of infection.
Positive results are indicative of active infection with SARS-CoV-2 and the 
patient is presumed to be contagious. Laboratory test results should always be 
considered in the clinical correlation with patient history and other 
epidemiological and diagnostic information that is necessary to determine 
patient infection status. Patient management decisions should follow current CDC
guidelines. Positive results do not rule out bacterial infection or co-infection
with other viruses. The agent detected may not be the definite cause of disease.
The limit of detection for this assay is 800 copies/mL.This SARS CoV-2 test is a
real-time RT-PCR test intended for the qualitative detection of nucleic acid 
from SARS-CoV-2 in a nasopharyngeal swab specimen collected from individuals 
suspected of COVID-19 by their healthcare provider.This test has not been Food 
and Drug Administration (FDA) cleared or approved. This is a modified version of
an approvedEmergency Use Authorization (EUA) and is in the process of review by 
the FDA. Once authorized by theFDA, the issued EUA will be effective until the 
declaration that circumstances exist justifying the authorization of the 
emergency use of in vitro diagnostic tests for detection and/or diagnosis of 
COVID-19 is terminated under Section 564(b)(2) of the Act or the EUA is revoked 
under Section 564(g) of the Act.Fact Sheet for Healthcare 
Providers:https://www.[employer].com/sites/default/files/product/documen
ts/Njll_Qtrpk_GE_Jukqgfxgy_Qjjg_NUDI-MpD-4.pdfFact Sheet for Healthcare 
Patients:https://www.[employer].c
om/sites/default/files/product/documents/Btyr_Vkylx_Ydeylbxc_Yshe_CEVI-YdU-7.pdf

Performing Laboratory:[facility], [place] [address]AVNJNEHNBA [id number] 10:23:48





             Test Item    Value        Reference Range Interpretation Comments

 

             HEMOGLOBIN A1C 4.6 %        See_Comment                [Automated m

essage]



             ELECTROPHORESIS (BEAKER)                                        The

 system which



             (test code = 3811)                                        generated

 this result



                                                                 transmitted ref

erence



                                                                 range: <=5.6%. 

The



                                                                 reference range

 was



                                                                 not used to int

erpret



                                                                 this result as



                                                                 normal/abnormal

.



"The A1c is measured using a [facility]-certified method. HbA1c value equal to or 
greater than 6.5% as thediagnosis cutoff for diabetes. An HbA1c value of 5.7-
6.4% indicates increased risk for diabetes (prediabetes)." ID - ADM
(CELLAVISION MANUAL DIFF)2022 09:58:10





             Test Item    Value        Reference Range Interpretation Comments

 

             NEUTROPHILS - REL 35 %                                   



             (CELLAVISION)(BEAKER) (test code                                   

     



             = 2816)                                             

 

             LYMPHOCYTES - REL 6 %                                    



             (CELLAVISION)(BEAKER) (test code                                   

     



             = 2817)                                             

 

             MONOCYTES - REL 10 %                                   



             (CELLAVISION)(BEAKER) (test code                                   

     



             = 2818)                                             

 

             METAMYELOCYTES - REL 6 %          0-0          H            



             (CELLAVISION)(BEAKER) (test code                                   

     



             = 2821)                                             

 

             MYELOCYTES - REL 1 %          0-0          H            



             (CELLAVISION)(BEAKER) (test code                                   

     



             = 2822)                                             

 

             PROMYELOCYTES - REL 1 %          0-0          H            



             (CELLAVSION)(BEAKER) (test code =                                  

      



             2825)                                               

 

             BANDS - REL (CELLAVISION)(BEAKER) 38 %         0-10         H      

      



             (test code = 2826)                                        

 

             ATYPICAL LYMPHOCYTES - REL 2 %          0-0          H            



             (CELLAVISION)(BEAKER) (test code                                   

     



             = 2829)                                             

 

             NEUTROPHILS - ABS 3.40 K/ul    1.56-6.13                 



             (CELLAVISION)(BEAKER) (test code                                   

     



             = 2830)                                             

 

             LYMPHOCYTES - ABS 0.58 K/ul    1.18-3.74    L            



             (CELLAVISION)(BEAKER) (test code                                   

     



             = 2831)                                             

 

             MONOCYTES - ABS 0.97 K/uL    0.24-0.36    H            



             (CELLAVISION)(BEAKER) (test code                                   

     



             = 2832)                                             

 

             METAMYELOCYTES - ABS 0.58 K/uL    0.00-0.00    H            



             (CELLAVISION)(BEAKER) (test code                                   

     



             = 2836)                                             

 

             MYELOCYTES-ABS 0.10 K/uL    0.00-0.00    H            



             (CELLAVISION)(BEAKER) (test code                                   

     



             = 2837)                                             

 

             PROMYELOCYTES - ABS 0.10 K/uL    0.00-0.00    H            



             (CELLAVISION)(BEAKER) (test code                                   

     



             = 2838)                                             

 

             BANDS - ABS (CELLAVISION)(BEAKER) 3.69 K/uL    0.00-0.80    H      

      



             (test code = 2840)                                        

 

             ATYPICAL LYMPHOCYTES - ABS 0.19 K/uL    0.00-0.00    H            



             (CELLAVISION)(BEAKER) (test code                                   

     



             = 2858)                                             

 

             TOTAL COUNTED (BEAKER) (test code 100                              

      



             = 1351)                                             

 

             MANUAL NRBC  CELLS 28 /100 WBC  0-0          H            



             (BEAKER) (test code = 1353)                                        

 

             WBC MORPHOLOGY (BEAKER) (test Normal                               

  



             code = 487)                                         

 

             GIANT PLATELETS (BEAKER) (test Present                             

   



             code = 313)                                         

 

             ANISOCYTOSIS (BEAKER) (test code 3+ many                           

     



             = 961)                                              

 

             MACROCYTES (BEAKER) (test code = 3+ many                           

     



             964)                                                

 

             POIKILOCYTES (BEAKER) (test code 2+ moderate                       

     



             = 966)                                              

 

             SPHEROCYTES (BEAKER) (test code = 1+ few                           

      



             768)                                                

 

             ELLIPTOCYTES (BEAKER) (test code 1+ few                            

     



             = 962)                                              

 

             ARTIFACT (CELLAVISION)(BEAKER) Present                             

   



             (test code = 3432)                                        

 

             PLATELET CONCENTRATION Adequate                               



             (CELLAVISION)(BEAKER) (test code                                   

     



             = 3438)                                             



 ID - nova Vásquez comments: Slide comments:CBC W/PLT COUNT &amp; 
AUTO [id number] 09:42:37





             Test Item    Value        Reference Range Interpretation Comments

 

             WHITE BLOOD CELL COUNT (BEAKER) 9.7 K/ L     3.5-10.5              

    



             (test code = 775)                                        

 

             RED BLOOD CELL COUNT (BEAKER) 4.03 M/ L    3.93-5.22               

  



             (test code = 761)                                        

 

             HEMOGLOBIN (BEAKER) (test code = 10.2 GM/DL   11.2-15.7    L       

     



             410)                                                

 

             HEMATOCRIT (BEAKER) (test code = 29.6 %       34.1-44.9    L       

     



             411)                                                

 

             MEAN CORPUSCULAR VOLUME (BEAKER) 73.4 fL      79.4-94.8    L       

     



             (test code = 753)                                        

 

             MEAN CORPUSCULAR HEMOGLOBIN 25.3 pg      25.6-32.2    L            



             (BEAKER) (test code = 751)                                        

 

             MEAN CORPUSCULAR HEMOGLOBIN CONC 34.5 GM/DL   32.2-35.5            

     



             (BEAKER) (test code = 752)                                        

 

             RED CELL DISTRIBUTION WIDTH 13.9 %       11.7-14.4                 



             (BEAKER) (test code = 412)                                        

 

             PLATELET COUNT (BEAKER) (test 176 K/CU MM  150-450                 

  



             code = 756)                                         

 

             MEAN PLATELET VOLUME (BEAKER) 11.2 fL      9.4-12.3                

  



             (test code = 754)                                        

 

             NUCLEATED RED BLOOD CELLS 21 /100 WBC  0-0          H            



             (BEAKER) (test code = 413)                                        



POCT-GLUCOSE [id number] 09:11:00





             Test Item    Value        Reference Range Interpretation Comments

 

             POC-GLUCOSE METER 174 mg/dL           H            : [facility] A

[facility]



             (BEAKER) (test code                                        [facility],



             = 1538)                                             [id number]:



                                                                 /Techni

alexis ID =



                                                                 [id number] for Vikas grossman



                                                                 (contract)Sissy



BLOOD GAS, [id number] 08:57:13





             Test Item    Value        Reference Range Interpretation Comments

 

             PH ARTERIAL (BEAKER) (test code = 7.42         7.35-7.45           

      



             383)                                                

 

             PCO2 ARTERIAL (BEAKER) (test code 29 mm Hg     35-45        L      

      



             = 384)                                              

 

             PO2 ARTERIAL (BEAKER) (test code 166 mm Hg    80-90        H       

     



             = 385)                                              

 

             O2 SATURATION ARTERIAL (BEAKER) 99.2 %       96.0-97.0    H        

    



             (test code = 386)                                        

 

             HCO3 ARTERIAL (BEAKER) (test code 18 mmol/L    21-29        L      

      



             = 388)                                              

 

             BASE EXCESS ARTERIAL (BEAKER) -4.6 mmol/L  -2.0-3.0     L          

  



             (test code = 387)                                        

 

             PATIENT TEMPERATURE (BEAKER) 37.0                                  

 



             (test code = 1818)                                        

 

             FIO2 (BEAKER) (test code = 1819) 60.0                              

     



RAD, CHEST, 1 VIEW, NON [id number] 08:18:00Reason for exam:-&gt;pna 
intubationShould this be performed at the bedside?-&gt;Yes
************************************************************[facility] : 1975 Sex: 
F************************************************************FINALREPORT PATIENT
ID: [id number] RAD, CHEST, 1 VIEW, NON DEPT INDICATION: pna intubation COMPARISON:
Prior day's exam FINDINGS: Portable frontal view of the chest. IMPRESSION: 
Support Lines: ET tube tip is 4 cm superior to the ena. NG tube descends 
below the diaphragm. Lungs and pleura: Mild bibasilar atelectasis. Airspace 
disease within the right middle lobe is unchanged. No significant pneumothorax. 
Heart and mediastinum: Stable contours. Additional findings: None. Signed: 
Marybel Pepe MDReppuma Verified Date/Time: 2022 08:18:19 Electronically 
signed by: MARYBEL PEPE MD on 2022 08:18 AM
[id number] 07:57:26





             Test Item    Value        Reference Range Interpretation Comments

 

             ALPHA-1 ANTITRYPSIN (BEAKER) 287.00 mg/dL 90..00 H           

 



             (test code = 502)                                        



 ID - [id number] 07:44:50





             Test Item    Value        Reference Range Interpretation Comments

 

             PHOSPHORUS (BEAKER) 0.8 mg/dL    2.3-4.7      LL           Specimen

 slightly



             (test code = 604)                                        hemolyzed



 ID - DBVANCOMYCIN LEVEL, [id number] 07:25:51





             Test Item    Value        Reference Range Interpretation Comments

 

             VANCOMYCIN RANDOM (BEAKER) (test 12.3 ug/mL                        

     



             code = 523)                                         



Reference Range: No NormalsOperator ID - PIAYA LGAMMA GLUTAMYL TRANSFERASE (GGT)
2022 07:25:15





             Test Item    Value        Reference Range Interpretation Comments

 

             GAMMA GLUTAMYL 107 U/L      9-64         H            Specimen slig

htly



             TRANSFERASE (BEAKER)                                        hemolyz

ed



             (test code = 364)                                        



 ID - DBSpecimen markedly ictericCOMPREHENSIVE METABOLIC [id number]
07:25:14





             Test Item    Value        Reference Range Interpretation Comments

 

             TOTAL PROTEIN 6.9 gm/dL    6.0-8.3                   Specimen sligh

tly



             (BEAKER) (test code =                                        hemoly

zed



             770)                                                

 

             ALBUMIN (BEAKER) 2.3 g/dL     3.5-5.0      L            Specimen sl

ightly



             (test code = 1145)                                        hemolyzed

 

             ALKALINE PHOSPHATASE 70 U/L                           



             (BEAKER) (test code =                                        



             346)                                                

 

             BILIRUBIN TOTAL 14.0 mg/dL   0.2-1.2      H            Specimen sli

ghtly



             (BEAKER) (test code =                                        hemoly

zed



             377)                                                

 

             SODIUM (BEAKER) (test 142 meq/L    136-145                   



             code = 381)                                         

 

             POTASSIUM (BEAKER) 4.2 meq/L    3.5-5.1                   Specimen 

slightly



             (test code = 379)                                        hemolyzed

 

             CHLORIDE (BEAKER) 111 meq/L           H            



             (test code = 382)                                        

 

             CO2 (BEAKER) (test 20 meq/L     22-29        L            



             code = 355)                                         

 

             BLOOD UREA NITROGEN 74 mg/dL     7-21         H            



             (BEAKER) (test code =                                        



             354)                                                

 

             CREATININE (BEAKER) 1.87 mg/dL   0.57-1.25    H            Specimen

 slightly



             (test code = 358)                                        hemolyzed

 

             GLUCOSE RANDOM 180 mg/dL           H            



             (BEAKER) (test code =                                        



             652)                                                

 

             CALCIUM (BEAKER) 8.2 mg/dL    8.4-10.2     L            



             (test code = 697)                                        

 

             AST (SGOT) (BEAKER) 465 U/L      5-34         H            Specimen

 slightly



             (test code = 353)                                        hemolyzed

 

             ALT (SGPT) (BEAKER) 1097 U/L     6-55         H            Specimen

 slightly



             (test code = 347)                                        hemolyzed

 

             EGFR (BEAKER) (test 35 mL/min/1.73                           ESTIMA

CARMELITA GFR IS



             code = 1092) sq m                                   NOT AS ACCURATE

 AS



                                                                 CREATININE



                                                                 CLEARANCE IN



                                                                 PREDICTING



                                                                 GLOMERULAR



                                                                 FILTRATION RATE

.



                                                                 ESTIMATED GFR I

S



                                                                 NOT APPLICABLE 

FOR



                                                                 DIALYSIS PATIEN

TS.



 ID - DBSpecimen markedly ictericCREATINE KINASE (CK)2022 07:25:14





             Test Item    Value        Reference Range Interpretation Comments

 

             CREATINE KINASE TOTAL (BEAKER) (test 160 U/L                 

         



             code = 380)                                         



 ID - [id number] 07:25:13





             Test Item    Value        Reference Range Interpretation Comments

 

             MAGNESIUM (BEAKER) 2.2 mg/dL    1.6-2.6                   Specimen 

slightly



             (test code = 627)                                        hemolyzed



 ID - [id number] 07:25:13





             Test Item    Value        Reference Range Interpretation Comments

 

             TRIGLYCERIDES (BEAKER) 148 mg/dL                              Speci

men slightly



             (test code = 540)                                        hemolyzed



TRIGLYCERIDE REFERENCE RANGELow Risk &lt;150Borderline Risk 150-199High Risk 
200-499Very High Risk &gt;=500Operator ID - DBSpecimen markedly ictericPOCT-
GLUCOSE [id number] 04:20:18





             Test Item    Value        Reference Range Interpretation Comments

 

             POC-GLUCOSE METER 150 mg/dL           H            : TESTED A

T [facility] [address]



             (BEAKER) (test code =                                        [place],



             1538)                                               [address]:



                                                                 /Techni

alexis ID



                                                                 = [id number] for Patrick Parks



BASIC METABOLIC [id number] 02:23:22





             Test Item    Value        Reference Range Interpretation Comments

 

             SODIUM (BEAKER) 140 meq/L    136-145                   



             (test code = 381)                                        

 

             POTASSIUM (BEAKER) 4.2 meq/L    3.5-5.1                   Specimen 

slightly



             (test code = 379)                                        hemolyzed

 

             CHLORIDE (BEAKER) 110 meq/L           H            



             (test code = 382)                                        

 

             CO2 (BEAKER) (test 21 meq/L     22-29        L            



             code = 355)                                         

 

             BLOOD UREA NITROGEN 74 mg/dL     7-21         H            



             (BEAKER) (test code                                        



             = 354)                                              

 

             CREATININE (BEAKER) 2.44 mg/dL   0.57-1.25    H            Specimen

 slightly



             (test code = 358)                                        hemolyzed

 

             GLUCOSE RANDOM 160 mg/dL           H            



             (BEAKER) (test code                                        



             = 652)                                              

 

             CALCIUM (BEAKER) 8.3 mg/dL    8.4-10.2     L            



             (test code = 697)                                        

 

             EGFR (BEAKER) (test 26 mL/min/1.73                           ESTIMA

CARMELITA GFR IS



             code = 1092) sq m                                   NOT AS ACCURATE

 AS



                                                                 CREATININE



                                                                 CLEARANCE IN



                                                                 PREDICTING



                                                                 GLOMERULAR



                                                                 FILTRATION RATE

.



                                                                 ESTIMATED GFR I

S



                                                                 NOT APPLICABLE 

FOR



                                                                 DIALYSIS PATIEN

TS.



 ID - DBSpecimen markedly [id number] 02:15:01





             Test Item    Value        Reference Range Interpretation Comments

 

             ETHANOL (BEAKER) < mg/dL      See_Comment                [Automated

 message] The



             (test code = 400)                                        system [employer]

[employer] generated



                                                                 this result tra

nsmitted



                                                                 reference range

: <=10.



                                                                 The reference r

molly was



                                                                 not used to int

erpret



                                                                 this result as



                                                                 normal/abnormal

.



 ID - DBBLOOD GAS, [id number] 01:44:41





             Test Item    Value        Reference Range Interpretation Comments

 

             PH ARTERIAL (BEAKER) (test code = 7.47         7.35-7.45    H      

      



             383)                                                

 

             PCO2 ARTERIAL (BEAKER) (test code 31 mm Hg     35-45        L      

      



             = 384)                                              

 

             PO2 ARTERIAL (BEAKER) (test code 83 mm Hg     80-90                

     



             = 385)                                              

 

             O2 SATURATION ARTERIAL (BEAKER) 97.0 %       96.0-97.0             

    



             (test code = 386)                                        

 

             HCO3 ARTERIAL (BEAKER) (test code 22 mmol/L    21-29               

      



             = 388)                                              

 

             BASE EXCESS ARTERIAL (BEAKER) -1.0 mmol/L  -2.0-3.0                

  



             (test code = 387)                                        

 

             PATIENT TEMPERATURE (BEAKER) 36.5                                  

 



             (test code = 1818)                                        

 

             FIO2 (BEAKER) (test code = 1819) 60.0                              

     



POCT-GLUCOSE [id number] 23:17:39





             Test Item    Value        Reference Range Interpretation Comments

 

             POC-GLUCOSE METER 147 mg/dL           H            : TESTED A

T [facility] [address]



             (BEAKER) (test code =                                        [employer] [place],



             1538)                                               [address]:



                                                                 /Techni

alexis ID



                                                                 = [id number] for Patrick Parks



LACTIC ACID, [id number] 22:12:04





             Test Item    Value        Reference Range Interpretation Comments

 

             LACTATE BLOOD 2.7 mmol/L   0.5-2.2      H            Specimen sligh

tly



             ARTERIAL (2) (BEAKER)                                        hemoly

zed



             (test code = 2874)                                        



 ID - DBSpecimen markedly ictericPOCT-GLUCOSE [id number] 21:54:48





             Test Item    Value        Reference Range Interpretation Comments

 

             POC-GLUCOSE METER 124 mg/dL           H            : TESTED A

T [facility] [address]



             (BEAKER) (test code =                                        [employer] [place],



             1538)                                               [address]:



                                                                 /Techni

alexis ID



                                                                 = [id number] for Patrick Parks



RESPIRATORY PANEL [id number] 21:27:54





             Test Item    Value        Reference Range Interpretation Comments

 

             HUMAN METAPNEUMOVIRUS Not detected Not detected,              



             (BEAKER) (test code = 2683)              Equivocal                 

 

             RHINOVIRUS (BEAKER) (test Not detected Not detected,              



             code = 2684)              Equivocal                 

 

             INFLUENZA A (BEAKER) (test Not detected Not detected,              



             code = 2685)              Equivocal                 

 

             INFLUENZA A (NO SUBTYPE)                                        



             (test code = 3606)                                        

 

             INFLUENZA A SUBTYPE H1                                        



             (BEAKER) (test code = 2686)                                        

 

             INFLUENZA A SUBTYPE H3                                        



             (BEAKER) (test code = 2687)                                        

 

             INFLUENZA A SUBTYPE H1-2009                                        



             (BEAKER) (test code = 3198)                                        

 

             INFLUENZA B (BEAKER) (test Not detected Not detected,              



             code = 2688)              Equivocal                 

 

             RESPIRATORY SYNCYTIAL VIRUS Not detected Not detected,             

 



             (BEAKER) (test code = 3199)              Equivocal                 

 

             PARAINFLUENZA VIRUS 1 Not detected Not detected,              



             (BEAKER) (test code = 2691)              Equivocal                 

 

             PARAINFLUENZA VIRUS 2 Not detected Not detected,              



             (BEAKER) (test code = 2692)              Equivocal                 

 

             PARAINFLUENZA VIRUS 3 Not detected Not detected,              



             (BEAKER) (test code = 2693)              Equivocal                 

 

             PARAINFLUENZA VIRUS 4 Not detected Not detected,              



             (BEAKER) (test code = 3200)              Equivocal                 

 

             ADENOVIRUS (BEAKER) (test Not detected Not detected,              



             code = 2694)              Equivocal                 

 

             CORONAVIRUS 229E (BEAKER) Not detected Not detected,              



             (test code = 3201)              Equivocal                 

 

             CORONAVIRUS HKU1 (BEAKER) Not detected Not detected,              



             (test code = 3202)              Equivocal                 

 

             CORONAVIRUS NL63 (BEAKER) Not detected Not detected,              



             (test code = 3203)              Equivocal                 

 

             CORONAVIRUS OC43 (BEAKER) Not detected Not detected,              



             (test code = 3204)              Equivocal                 

 

             BORDETELLA PERTUSSIS Not detected Not detected,              



             (BEAKER) (test code = 3205)              Equivocal                 

 

             CHLAMYDOPHILA PNEUMONIAE Not detected Not detected,              



             (BEAKER) (test code = 3206)              Equivocal                 

 

             MYCOPLASMA PNEUMONIAE Not detected Not detected,              



             (BEAKER) (test code = 3207)              Equivocal                 



Other viruses and bacteria not targeted by this PCR panel cannot be excluded; 
therefore clinical correlation and follow up of serology, culture results, and 
other molecular studies is required. The results are not intended to be used as 
the sole means for clinical diagnosis or patient management decisions. This 
sample was tested at the [facility] using the 
[employer] FilmArray Respiratory Panel. It is FDA cleared and has been verified and
approved by the [facility] for clinical use on 
nasopharyngeal swab specimens.The performance of the FilmArrayRP has not been 
established in individuals who received influenza vaccine. Recent administration
of a nasal influenza vaccine may cause false positive results for Influenza A 
and/orInfluenza B.LACTIC ACID, [id number] 19:27:30





             Test Item    Value        Reference Range Interpretation Comments

 

             LACTATE BLOOD ARTERIAL (2) 2.8 mmol/L   0.5-2.2      H            



             (BEAKER) (test code = 2874)                                        



 ID - DBSpecimen markedly [id number] 17:50:48





             Test Item    Value        Reference Range Interpretation Comments

 

             FERRITIN (BEAKER) (test code = 3805.22 ng/mL 5..00  H        

    



             361)                                                



 ID - DBOperator ID - DBVITAMIN [id number] 17:49:53





             Test Item    Value        Reference Range Interpretation Comments

 

             VITAMIN B12 (BEAKER) (test code = 1630 pg/mL   213-816      H      

      



             774)                                                



 ID - DBHEPATITIS B [id number] 17:45:59





             Test Item    Value        Reference Range Interpretation Comments

 

             HEPATITIS B CORE TOTAL ANTIBODY Nonreactive  Nonreactive           

    



             (BEAKER) (test code = 497)                                        

 

             HEPATITIS B SURFACE ANTIBODY < mIU/mL     <8.0                     

 



             (BEAKER) (test code = 647)                                        

 

             HEPATITIS B SURFACE ANTIGEN (2) Nonreactive  Nonreactive           

    



             (BEAKER) (test code = 2585)                                        



 ID - DBHEPATITIS PANEL, [id number] 17:45:48





             Test Item    Value        Reference Range Interpretation Comments

 

             HEPATITIS A IGM ANTIBODY (BEAKER) Nonreactive  Nonreactive         

      



             (test code = 498)                                        

 

             HEPATITIS B CORE IGM ANTIBODY Nonreactive  Nonreactive             

  



             (BEAKER) (test code = 645)                                        

 

             HEPATITIS C ANTIBODY (BEAKER) Reactive     Nonreactive  A          

  



             (test code = 367)                                        

 

             HEPATITIS B SURFACE ANTIGEN (2) Nonreactive  Nonreactive           

    



             (BEAKER) (test code = 2585)                                        



 ID - DBHEPATITIS A [id number] 17:44:19





             Test Item    Value        Reference Range Interpretation Comments

 

             HEPATITIS A IGM ANTIBODY (BEAKER) Nonreactive  Nonreactive         

      



             (test code = 498)                                        

 

             HEPATITIS A IGG ANTIBODY (BEAKER) Reactive     Nonreactive  A      

      



             (test code = 2797)                                        



 ID - DBHIV-1 ANTIGEN WITH HIV-1/2 [id number] 17:41:36





             Test Item    Value        Reference Range Interpretation Comments

 

             HIV-1 ANTIGEN WITH HIV 1\T\2 Nonreactive  Nonreactive              

 



             ANTIBODY (2) (BEAKER) (test code                                   

     



             = 2586)                                             



 ID - DBCREATININE, RANDOM [id number] 17:13:22





             Test Item    Value        Reference Range Interpretation Comments

 

             CREATININE URINE (BEAKER) (test 441.6 mg/dL                        

    



             code = 375)                                         



Reference Range: No NormalsOperator ID - DBSODIUM, RANDOM [id number] 
17:13:21





             Test Item    Value        Reference Range Interpretation Comments

 

             SODIUM URINE (BEAKER) (test code = < meq/L                         

       



             243)                                                



Reference Range: No NormalsOperator ID - DBRAPID DRUG SCREEN, [id number] 
17:13:15





             Test Item    Value        Reference Range Interpretation Comments

 

             BARBITURATE URINE (BEAKER) (test Negative     Negative             

     



             code = 725)                                         

 

             BENZODIAZEPINE SCREEN URINE (BEAKER) Negative     Negative         

         



             (test code = 726)                                        

 

             COCAINE (METAB.) SCREEN (BEAKER) Negative     Negative             

     



             (test code = 1164)                                        

 

             METHADONE SCREEN (BEAKER) (test code Negative     Negative         

         



             = 1436)                                             

 

             OPIATE SCREEN URINE (BEAKER) (test Positive     Negative     A     

       



             code = 734)                                         

 

             CANNABINOID SCREEN URINE (BEAKER) Positive     Negative     A      

      



             (test code = 727)                                        

 

             AMPH/METHAMPH SCREEN (BEAKER) (test Negative     Negative          

        



             code = 1438)                                        

 

             PHENCYCLIDINE SCREEN URINE (BEAKER) Negative     Negative          

        



             (test code = 608)                                        

 

             PH UA (BEAKER) (test code = 467) 5.5          5.0-8.0              

     



DRUG  CUTOFF CONC.Cocaine 300 ng/mL Cannabinoid 50 ng/mLBenzodiazepine 200 
ng/mLBarbiturate 200 ng/mLPhencyclidine 25 ng/mLOpiate  300 ng/mLMethadone 300 
ng/mLAmphetamine/ 1000 ng/mL MethamphetamineThis assay provides an unconfirmed 
qualitative test result for the clinical management of patients in emergency 
situations. Chain of custody not maintained. Some over-the-counter medications, 
as well as adulterants, may cause inaccurate results. Clinical correlation 
should be applied. A more comprehensivedrug screen or confirmation of a detected
drug may be performed upon request. ID - DBPROTEIN, RANDOM URINE
2022 17:11:12





             Test Item    Value        Reference Range Interpretation Comments

 

             PROTEIN, URINE (BEAKER) (test code = 65 mg/dL     0-14         H   

         



             1569)                                               



 ID - DBPOTASSIUM, RANDOM [id number] 17:11:11





             Test Item    Value        Reference Range Interpretation Comments

 

             POTASSIUM URINE (BEAKER) (test 71.4 meq/L                          

   



             code = 195)                                         



Reference Range: No NormalsOperator ID - DBIRON, TIBC, % SAT. (WITHOUT FERRITIN)
2022 16:57:50





             Test Item    Value        Reference Range Interpretation Comments

 

             IRON (BEAKER) (test code = 547) 62.0 ug/dL   40.0-160.0            

    

 

             TOTAL IRON BINDING CAPACITY 208 ug/dL    250-450      L            



             (BEAKER) (test code = 769)                                        

 

             IRON % SATURATION (2) (BEAKER) 30 %         20-55                  

   



             (test code = 2590)                                        



 ID - DBLACTIC ACID, [id number] 16:50:44





             Test Item    Value        Reference Range Interpretation Comments

 

             LACTATE BLOOD ARTERIAL (2) 3.3 mmol/L   0.5-2.2      H            



             (BEAKER) (test code = 2374)                                        



 ID - DBSpecimen moderately ictericRETICULOCYTE [id number] 16:43:05





             Test Item    Value        Reference Range Interpretation Comments

 

             RETICULOCYTE COUNT PCT (BEAKER) (test 2.4 %        0.5-1.7      H  

          



             code = 575)                                         



 ID - 6000POCT-GLUCOSE [id number] 16:35:45





             Test Item    Value        Reference Range Interpretation Comments

 

             POC-GLUCOSE METER 141 mg/dL           H            : TESTED A

T [facility] [address]



             (BEAKER) (test code =                                        [address] [facility],



             1538)                                               [address]:



                                                                 /Techni

alexis ID



                                                                 = [id number] for



                                                                 Humberto(contract

)Ny



LEGIONELLA ANTIGEN, [id number] 16:12:42





             Test Item    Value        Reference Range Interpretation Comments

 

             L. PNEUMOPHILA Negative - see                           Negative fo

r L.



             SEROGP 1 UR AG comment                                pneumophila



             (BEAKER) (test code                                        serogrou

p 1 antigen,



             = 1156)                                             suggesting no r

ecent



                                                                 or current infe

ction



                                                                 with this serog

roup.



                                                                 Legionellosis c

annot



                                                                 be ruled out si

nce



                                                                 other serogroup

s and



                                                                 species may cau

se



                                                                 disease.



STREP PNEUMONIAE [id number] 16:10:53





             Test Item    Value        Reference Range Interpretation Comments

 

             STREP PNEUMONIAE Positive for Presumptive negative A            



             ANTIGEN (BEAKER) pneumococcal for pneumococcal              



             (test code = 1615) pneumonia    pneumonia - see              



                                       commen                    



TSH/FREE T4 IF [id number] 15:15:13





             Test Item    Value        Reference Range Interpretation Comments

 

             THYROID STIMULATING HORMONE 0.798 uIU/mL 0.350-4.940               



             (BEAKER) (test code = 772)                                        



 ID - DBOSMOLALITY, [id number] 14:57:49





             Test Item    Value        Reference Range Interpretation Comments

 

             OSMOLALITY URINE 392 mOsm/kg  See_Comment                [Automated

 message]



             (BEAKER) (test code =                                        The sy

stem which



             614)                                                generated this 

result



                                                                 transmitted ref

erence



                                                                 range: 50-1,200



                                                                 mOsm/kg. The



                                                                 reference range

 was



                                                                 not used to int

erpret



                                                                 this result as



                                                                 normal/abnormal

.



URINALYSIS W/ REFLEX URINE [id number] 14:57:43





             Test Item    Value        Reference Range Interpretation Comments

 

             COLOR (BEAKER) (test code = 470) Brown                             

     

 

             CLARITY (BEAKER) (test code = 469) Hazy                            

       

 

             SPECIFIC GRAVITY UA (BEAKER) (test 1.026        1.001-1.035        

       



             code = 468)                                         

 

             PH UA (BEAKER) (test code = 467) 5.5          5.0-8.0              

     

 

             PROTEIN UA (BEAKER) (test code = 50 mg/dL     Negative     A       

     



             464)                                                

 

             GLUCOSE UA (BEAKER) (test code = Negative     Negative             

     



             365)                                                

 

             KETONES UA (BEAKER) (test code = Trace        Negative     A       

     



             371)                                                

 

             BILIRUBIN UA (BEAKER) (test code = Positive     Negative     A     

       



             462)                                                

 

             BLOOD UA (BEAKER) (test code = 461) Negative     Negative          

        

 

             NITRITE UA (BEAKER) (test code = Negative     Negative             

     



             465)                                                

 

             LEUKOCYTE ESTERASE UA (BEAKER) Negative     Negative               

   



             (test code = 466)                                        

 

             UROBILINOGEN UA (BEAKER) (test code 3.0 mg/dL    0.2-1.0      H    

        



             = 463)                                              

 

             RBC UA (BEAKER) (test code = 519) 0 /HPF                           

      

 

             WBC UA (BEAKER) (test code = 520) 0 /HPF                           

      

 

             BACTERIA (BEAKER) (test code = 517) None Seen                      

        

 

             MUCUS (BEAKER) (test code = 1574) Rare                             

      

 

             SQUAMOUS EPITHELIAL (BEAKER) (test 3 /HPF                          

       



             code = 516)                                         

 

             HYALINE CASTS (BEAKER) (test code = 21 /LPF                        

        



             514)                                                

 

             CRYSTALS, URINE (BEAKER) (test code None Seen                      

        



             = 1521)                                             

 

             SOURCE(BEAKER) (test code = 2795)                                  

      



 ID - [auto] ID - techPREGNANCY SCREEN, [id number] 14:57:37





             Test Item    Value        Reference Range Interpretation Comments

 

             PREGNANCY TEST URINE (BEAKER) (test Negative                       

        



             code = 583)                                         



ACETAMINOPHEN [id number] 14:54:46





             Test Item    Value        Reference Range Interpretation Comments

 

             ACETAMINOPHEN LEVEL (BEAKER) (test < ug/mL      10.0-30.0    L     

       



             code = 344)                                         



Therapeutic Range: 10.0-30.0 g/mLToxic Levels: &gt;200.0 g/mLOperator ID - ALAYNA 
MOperator ID - ALAYNA MLACTIC ACID, [id number] 14:45:08





             Test Item    Value        Reference Range Interpretation Comments

 

             LACTATE BLOOD 3.9 mmol/L   0.5-2.2      H            Specimen sligh

tly



             ARTERIAL (2) (BEAKER)                                        hemoly

zed



             (test code = 2874)                                        



 ID - ALAYNA MSpecimen moderately icteric(CELLAVISION MANUAL DIFF)
2022 14:31:03





             Test Item    Value        Reference Range Interpretation Comments

 

             NEUTROPHILS - REL 21 %                                   



             (CELLAVISION)(BEAKER) (test code                                   

     



             = 2816)                                             

 

             LYMPHOCYTES - REL 12 %                                   



             (CELLAVISION)(BEAKER) (test code                                   

     



             = 2817)                                             

 

             MONOCYTES - REL 16 %                                   



             (CELLAVISION)(BEAKER) (test code                                   

     



             = 2818)                                             

 

             BANDS - REL  51 %         0-10         H            



             (CELLAVISION)(BEAKER) (test code                                   

     



             = 2826)                                             

 

             NEUTROPHILS - ABS 1.32 K/ul    1.56-6.13    L            



             (CELLAVISION)(BEAKER) (test code                                   

     



             = 2830)                                             

 

             LYMPHOCYTES - ABS 0.76 K/ul    1.18-3.74    L            



             (CELLAVISION)(BEAKER) (test code                                   

     



             = 2831)                                             

 

             MONOCYTES - ABS 1.01 K/uL    0.24-0.36    H            



             (CELLAVISION)(BEAKER) (test code                                   

     



             = 2832)                                             

 

             BANDS - ABS  3.21 K/uL    0.00-0.80    H            



             (CELLAVISION)(BEAKER) (test code                                   

     



             = 2840)                                             

 

             TOTAL COUNTED (BEAKER) (test 100                                   

 



             code = 1351)                                        

 

             MANUAL NRBC  CELLS 106 /100 WBC 0-0          H            



             (BEAKER) (test code = 1353)                                        

 

             WBC MORPHOLOGY (BEAKER) (test Normal                               

  



             code = 487)                                         

 

             GIANT PLATELETS (BEAKER) (test Present                             

   



             code = 313)                                         

 

             LARGE PLT(BEAKER) (test code = Present                             

   



             2156)                                               

 

             POLYCHROMATOPHILLIC RBCS(BEAKER) 2+ moderate                       

     



             (test code = 478)                                        

 

             HYPOCHROMIA (BEAKER) (test code 1+ few                             

    



             = 963)                                              

 

             ANISOCYTOSIS (BEAKER) (test code 2+ moderate                       

     



             = 961)                                              

 

             MICROCYTES (BEAKER) (test code = 2+ moderate                       

     



             965)                                                

 

             MACROCYTES (BEAKER) (test code = 1+ few                            

     



             964)                                                

 

             POIKILOCYTES (BEAKER) (test code 3+ many                           

     



             = 966)                                              

 

             TARGET CELLS (BEAKER) (test code 3+ many                           

     



             = 480)                                              

 

             SPHEROCYTES (BEAKER) (test code 1+ few                             

    



             = 768)                                              

 

             OVALOCYTES (BEAKER) (test code = 1+ few                            

     



             477)                                                

 

             TEAR DROP CELLS (BEAKER) (test 1+ few                              

   



             code = 481)                                         

 

             ARTIFACT (CELLAVISION)(BEAKER) Present                             

   



             (test code = 3432)                                        

 

             PLATELET CONCENTRATION Adequate                               



             (CELLAVISION)(BEAKER) (test code                                   

     



             = 3438)                                             



 ID - Sujit-Noam Gilberto comments: Slide comments:CBC W/PLT COUNT &amp; 
AUTO [id number] 14:31:02





             Test Item    Value        Reference Range Interpretation Comments

 

             WHITE BLOOD CELL COUNT (BEAKER) 6.3 K/ L     3.5-10.5              

    



             (test code = 775)                                        

 

             RED BLOOD CELL COUNT (BEAKER) 4.12 M/ L    3.93-5.22               

  



             (test code = 761)                                        

 

             HEMOGLOBIN (BEAKER) (test code = 10.3 GM/DL   11.2-15.7    L       

     



             410)                                                

 

             HEMATOCRIT (BEAKER) (test code = 30.3 %       34.1-44.9    L       

     



             411)                                                

 

             MEAN CORPUSCULAR VOLUME (BEAKER) 73.5 fL      79.4-94.8    L       

     



             (test code = 753)                                        

 

             MEAN CORPUSCULAR HEMOGLOBIN 25.0 pg      25.6-32.2    L            



             (BEAKER) (test code = 751)                                        

 

             MEAN CORPUSCULAR HEMOGLOBIN CONC 34.0 GM/DL   32.2-35.5            

     



             (BEAKER) (test code = 752)                                        

 

             RED CELL DISTRIBUTION WIDTH 14.3 %       11.7-14.4                 



             (BEAKER) (test code = 412)                                        

 

             PLATELET COUNT (BEAKER) (test 183 K/CU MM  150-450                 

  



             code = 756)                                         

 

             MEAN PLATELET VOLUME (BEAKER) 11.3 fL      9.4-12.3                

  



             (test code = 754)                                        

 

             NUCLEATED RED BLOOD CELLS 73 /100 WBC  0-0          H            



             (BEAKER) (test code = 413)                                        



[id number] 14:11:24





             Test Item    Value        Reference Range Interpretation Comments

 

             PARTIAL THROMBOPLASTIN TIME 36.9 seconds 22.5-36.0    H            



             (BEAKER) (test code = 760)                                        



[id number] 14:11:03





             Test Item    Value        Reference Range Interpretation Comments

 

             FIBRINOGEN LEVEL (BEAKER) (test 382 mg/dl    225-434               

    



             code = 658)                                         



PROTHROMBIN [id number] 14:10:52





             Test Item    Value        Reference Range Interpretation Comments

 

             PROTIME (BEAKER) 23.9 seconds 11.9-14.2    H            



             (test code = 759)                                        

 

             INR (BEAKER) (test 2.16         See_Comment                [Automat

ed message]



             code = 370)                                         The system [employer]



                                                                 generated this 

result



                                                                 transmitted ref

erence



                                                                 range: <=5.90. 

The



                                                                 reference range

 was



                                                                 not used to int

erpret



                                                                 this result as



                                                                 normal/abnormal

.



RECOMMENDED COUMADIN/WARFARIN INR THERAPY RANGESSTANDARD DOSE: 2.0 - 3.0 
Includes: PROPHYLAXIS for venous thrombosis, systemic embolization; TREATMENT 
for venous thrombosis and/or pulmonary embolus.HIGH RISK: Target INR is 2.5-3.5 
for patients with mechanical heart valves.COMPREHENSIVE METABOLIC PANEL
2022 14:04:49





             Test Item    Value        Reference Range Interpretation Comments

 

             TOTAL PROTEIN 6.8 gm/dL    6.0-8.3                   



             (BEAKER) (test code =                                        



             770)                                                

 

             ALBUMIN (BEAKER) 2.5 g/dL     3.5-5.0      L            



             (test code = 1145)                                        

 

             ALKALINE PHOSPHATASE 93 U/L                           



             (BEAKER) (test code =                                        



             346)                                                

 

             BILIRUBIN TOTAL 12.0 mg/dL   0.2-1.2      H            



             (BEAKER) (test code =                                        



             377)                                                

 

             SODIUM (BEAKER) (test 141 meq/L    136-145                   



             code = 381)                                         

 

             POTASSIUM (BEAKER) 3.8 meq/L    3.5-5.1                   



             (test code = 379)                                        

 

             CHLORIDE (BEAKER) 109 meq/L           H            



             (test code = 382)                                        

 

             CO2 (BEAKER) (test 22 meq/L     22-29                     



             code = 355)                                         

 

             BLOOD UREA NITROGEN 72 mg/dL     7-21         H            



             (BEAKER) (test code =                                        



             354)                                                

 

             CREATININE (BEAKER) 4.59 mg/dL   0.57-1.25    H            



             (test code = 358)                                        

 

             GLUCOSE RANDOM 157 mg/dL           H            



             (BEAKER) (test code =                                        



             652)                                                

 

             CALCIUM (BEAKER) 8.2 mg/dL    8.4-10.2     L            



             (test code = 697)                                        

 

             AST (SGOT) (BEAKER) 1031 U/L     5-34         H            



             (test code = 353)                                        

 

             ALT (SGPT) (BEAKER) 1445 U/L     6-55         H            



             (test code = 347)                                        

 

             EGFR (BEAKER) (test 12 mL/min/1.73                           ESTIMA

CARMELITA GFR IS



             code = 1092) sq m                                   NOT AS ACCURATE

 AS



                                                                 CREATININE



                                                                 CLEARANCE IN



                                                                 PREDICTING



                                                                 GLOMERULAR



                                                                 FILTRATION RATE

.



                                                                 ESTIMATED GFR I

S



                                                                 NOT APPLICABLE 

FOR



                                                                 DIALYSIS PATIEN

TS.



 ID - ALAYNA Casarezecimen markedly ictericLIPID [id number] 13:59:42





             Test Item    Value        Reference Range Interpretation Comments

 

             TRIGLYCERIDES (BEAKER) (test code = 157 mg/dL                      

        



             540)                                                

 

             CHOLESTEROL (BEAKER) (test code = 94 mg/dL                         

      



             631)                                                

 

             HDL CHOLESTEROL (BEAKER) (test code 11 mg/dL                       

        



             = 976)                                              

 

             LDL CHOLESTEROL CALCULATED (BEAKER) 52 mg/dL                       

        



             (test code = 633)                                        



Triglyceride Reference Range: Low Risk &lt;150 Borderline 150-199 High Risk 200-
499 Very High Risk &gt;=500Cholesterol Reference Range: Low Risk &lt;200 
Borderline 200-239 High Risk &gt;240HDL Cholesterol Reference Range: Low Risk 
&gt;=60 High Risk &lt;40LDL Cholesterol Reference Range: Optimal &lt;100 Near 
Optimal 100-129 Borderline 130-159 High 160-189 Very High &gt;=190  ID -
ALAYNA Casarezecimen markedly ictericCREATINE KINASE (CK)2022 13:59:42





             Test Item    Value        Reference Range Interpretation Comments

 

             CREATINE KINASE TOTAL (BEAKER) (test 583 U/L             H   

         



             code = 380)                                         



 ID - ALAYNA [id number] 13:59:41





             Test Item    Value        Reference Range Interpretation Comments

 

             MAGNESIUM (BEAKER) (test code = 1.9 mg/dL    1.6-2.6               

    



             627)                                                



 ID - ALAYNA [id number] 13:59:41





             Test Item    Value        Reference Range Interpretation Comments

 

             PHOSPHORUS (BEAKER) (test code = 1.6 mg/dL    2.3-4.7      L       

     



             604)                                                



 ID - ALAYNA MVANCOMYCIN LEVEL, [id number] 13:54:18





             Test Item    Value        Reference Range Interpretation Comments

 

             VANCOMYCIN RANDOM (BEAKER) (test 13.4 ug/mL                        

     



             code = 523)                                         



Reference Range: No NormalsOperator ID - ALAYNA [id number] 13:51:45





             Test Item    Value        Reference Range Interpretation Comments

 

             AMMONIA (BEAKER) (test code = 348) 117 mol/L    18-72        H     

       



 ID - ALAYNA MBLOOD GAS, [id number] 13:34:49





             Test Item    Value        Reference Range Interpretation Comments

 

             PH ARTERIAL (BEAKER) (test code = 7.43         7.35-7.45           

      



             383)                                                

 

             PCO2 ARTERIAL (BEAKER) (test code 34 mm Hg     35-45        L      

      



             = 384)                                              

 

             PO2 ARTERIAL (BEAKER) (test code 109 mm Hg    80-90        H       

     



             = 385)                                              

 

             O2 SATURATION ARTERIAL (BEAKER) 98.2 %       96.0-97.0    H        

    



             (test code = 386)                                        

 

             HCO3 ARTERIAL (BEAKER) (test code 22 mmol/L    21-29               

      



             = 388)                                              

 

             BASE EXCESS ARTERIAL (BEAKER) -1.6 mmol/L  -2.0-3.0                

  



             (test code = 387)                                        

 

             PATIENT TEMPERATURE (BEAKER) 37.0                                  

 



             (test code = 1818)                                        

 

             FIO2 (BEAKER) (test code = 1819) 80.0                              

     



CALCIUM, [id number] 13:34:48





             Test Item    Value        Reference Range Interpretation Comments

 

             CALCIUM IONIZED (BEAKER) (test 1.05 mmol/L  1.12-1.27    L         

   



             code = 698)                                         

 

             PH, BLOOD (BEAKER) (test code = 7.00                               

    



             1810)                                               



RAD, CHEST, 1 VIEW, NON [id number] 12:15:00Reason for exam:-&gt;post 
intubationShould this be performed at the bedside?-&gt;Yes
********************************************************[facility] : 1975 Sex: 
F************************************************************FINALREPORT PATIENT
ID: [id number] AP view of the chest dated 2022 CLINICAL INFORMATION: post 
intubation Comment: Heart is normal in size. Pulmonary vasculature is 
unremarkable. Airspace disease is seen in the right upper and both lower lobes 
suggestive of pneumonia. No pleural effusion or pneumothorax is seen. 
Endotracheal tube and nasogastric tube are present. Tip of the endotracheal tube
is approximately 2 cm above the ena. Signed: Brooke Estrella Verified 
Date/Time: 2022 12:15:53 Reading Location: [place] [id number] CT Body Reading 
Room Electronically signed by: BROOKE ESTRELLA M.D. on 2022 12:15 PM
HEMOGLOBIN [id number] 00:00:00





             Test Item    Value        Reference Range Interpretation Comments

 

             HEMOGLOBIN A1c (test code = 74343) 4.8 %                           

       



[id number] 00:00:00





             Test Item    Value        Reference Range Interpretation Comments

 

             TSH, THIRD GENERATION (test code 3.010 UIU/ML                      

     



             = 2821)                                             



[id number] 00:00:00





             Test Item    Value        Reference Range Interpretation Comments

 

             TSH, THIRD GENERATION (test code 3.010 UIU/ML                      

     



             = 2821)                                             



[id number] 00:00:00





             Test Item    Value        Reference Range Interpretation Comments

 

             TSH, THIRD GENERATION (test code 3.010 UIU/ML                      

     



             = 2821)                                             



COMPREHENSIVE METABOLIC [id number] 00:00:00





             Test Item    Value        Reference Range Interpretation Comments

 

             GLUCOSE (test code = 2217) 97 MG/DL                               

 

             BUN (test code = 2208) 9 MG/DL                                

 

             CREATININE (test code = 2214) 0.70 MG/DL                           

  

 

             eGFR  AMER. (test code 120 ML/MIN/1.73                      

     



             = 81534)                                            

 

             eGFR NON- AMER. (test 103 ML/MIN/1.73                       

    



             code = 71112)                                        

 

             CALC BUN/CREAT (test code = 13 RATIO                               



             2235)                                               

 

             SODIUM (test code = 2231) 139 MEQ/L                              

 

             POTASSIUM (test code = 2228) 5.1 MEQ/L                             

 

 

             CHLORIDE (test code = 2215) 101 MEQ/L                              

 

             CARBON DIOXIDE (test code = 27 MEQ/L                               



             220)                                               

 

             CALCIUM (test code = 2209) 9.1 MG/DL                              

 

             PROTEIN, TOTAL (test code = 8.3 G/DL                               



             )                                               

 

             ALBUMIN (test code = 2201) 3.9 G/DL                               

 

             CALC GLOBULIN (test code = 4.4 G/DL                               



             2240)                                               

 

             CALC A/G RATIO (test code = 0.9 RATIO                              



             223)                                               

 

             BILIRUBIN, TOTAL (test code = 0.4 MG/DL                            

  



             )                                               

 

             ALKALINE PHOSPHATASE (test 126 U/L                                



             code = 2204)                                        

 

             AST (test code = 2218) 43 U/L                                 

 

             ALT (test code = 2219) 33 U/L                                 



COMPREHENSIVE METABOLIC [id number] 00:00:00





             Test Item    Value        Reference Range Interpretation Comments

 

             GLUCOSE (test code = 2217) 97 MG/DL                               

 

             BUN (test code = 2208) 9 MG/DL                                

 

             CREATININE (test code = 2214) 0.70 MG/DL                           

  

 

             eGFR  AMER. (test code 120 ML/MIN/1.73                      

     



             = 39039)                                            

 

             eGFR NON- AMER. (test 103 ML/MIN/1.73                       

    



             code = 55044)                                        

 

             CALC BUN/CREAT (test code = 13 RATIO                               



             2235)                                               

 

             SODIUM (test code = 2231) 139 MEQ/L                              

 

             POTASSIUM (test code = 2228) 5.1 MEQ/L                             

 

 

             CHLORIDE (test code = 2215) 101 MEQ/L                              

 

             CARBON DIOXIDE (test code = 27 MEQ/L                               



             2206)                                               

 

             CALCIUM (test code = 2209) 9.1 MG/DL                              

 

             PROTEIN, TOTAL (test code = 8.3 G/DL                               



             )                                               

 

             ALBUMIN (test code = 2201) 3.9 G/DL                               

 

             CALC GLOBULIN (test code = 4.4 G/DL                               



             2240)                                               

 

             CALC A/G RATIO (test code = 0.9 RATIO                              



             2234)                                               

 

             BILIRUBIN, TOTAL (test code = 0.4 MG/DL                            

  



             )                                               

 

             ALKALINE PHOSPHATASE (test 126 U/L                                



             code = 2204)                                        

 

             AST (test code = 2218) 43 U/L                                 

 

             ALT (test code = 2219) 33 U/L                                 



LIPID [id number] 00:00:00





             Test Item    Value        Reference Range Interpretation Comments

 

             CHOLESTEROL (test code = 2210) 209 MG/DL                           

   

 

             TRIGLYCERIDES (test code = 2232) 100 MG/DL                         

     

 

             HDL CHOLESTEROL (test code = 2220) 88 MG/DL                        

       

 

             CALC LDL CHOL (test code = 2237) 101 MG/DL                         

     

 

             RISK RATIO LDL/HDL (test code = 1.15 RATIO                         

    



             2238)                                               



LIPID [id number] 00:00:00





             Test Item    Value        Reference Range Interpretation Comments

 

             CHOLESTEROL (test code = 2210) 209 MG/DL                           

   

 

             TRIGLYCERIDES (test code = 2232) 100 MG/DL                         

     

 

             HDL CHOLESTEROL (test code = 2220) 88 MG/DL                        

       

 

             CALC LDL CHOL (test code = 2237) 101 MG/DL                         

     

 

             RISK RATIO LDL/HDL (test code = 1.15 RATIO                         

    



             2238)                                               



HEMOGLOBIN [id number] 00:00:00





             Test Item    Value        Reference Range Interpretation Comments

 

             HEMOGLOBIN A1c (test code = 49620) 4.8 %                           

       



HEMOGLOBIN [id number] 00:00:00





             Test Item    Value        Reference Range Interpretation Comments

 

             HEMOGLOBIN A1c (test code = 35820) 4.8 %                           

       



HEMOGLOBIN [id number] 00:00:00





             Test Item    Value        Reference Range Interpretation Comments

 

             HEMOGLOBIN A1c (test code = 45677) 4.8 %                           

       



[id number] 00:00:00





             Test Item    Value        Reference Range Interpretation Comments

 

             TSH, THIRD GENERATION (test code 3.010 UIU/ML                      

     



             = 2821)                                             



[id number] 00:00:00





             Test Item    Value        Reference Range Interpretation Comments

 

             TSH, THIRD GENERATION (test code 3.010 UIU/ML                      

     



             = 2821)                                             



[id number] 00:00:00





             Test Item    Value        Reference Range Interpretation Comments

 

             TSH, THIRD GENERATION (test code 3.010 UIU/ML                      

     



             = 2821)                                             



COMPREHENSIVE METABOLIC [id number] 00:00:00





             Test Item    Value        Reference Range Interpretation Comments

 

             GLUCOSE (test code = 2217) 97 MG/DL                               

 

             BUN (test code = 2208) 9 MG/DL                                

 

             CREATININE (test code = 2214) 0.70 MG/DL                           

  

 

             eGFR  AMER. (test code 120 ML/MIN/1.73                      

     



             = 93807)                                            

 

             eGFR NON- AMER. (test 103 ML/MIN/1.73                       

    



             code = 58113)                                        

 

             CALC BUN/CREAT (test code = 13 RATIO                               



             2235)                                               

 

             SODIUM (test code = 2231) 139 MEQ/L                              

 

             POTASSIUM (test code = 2228) 5.1 MEQ/L                             

 

 

             CHLORIDE (test code = 2215) 101 MEQ/L                              

 

             CARBON DIOXIDE (test code = 27 MEQ/L                               



             2206)                                               

 

             CALCIUM (test code = 2209) 9.1 MG/DL                              

 

             PROTEIN, TOTAL (test code = 8.3 G/DL                               



             )                                               

 

             ALBUMIN (test code = 2201) 3.9 G/DL                               

 

             CALC GLOBULIN (test code = 4.4 G/DL                               



             2240)                                               

 

             CALC A/G RATIO (test code = 0.9 RATIO                              



             2234)                                               

 

             BILIRUBIN, TOTAL (test code = 0.4 MG/DL                            

  



             )                                               

 

             ALKALINE PHOSPHATASE (test 126 U/L                                



             code = 2204)                                        

 

             AST (test code = 2218) 43 U/L                                 

 

             ALT (test code = 2219) 33 U/L                                 



COMPREHENSIVE METABOLIC [id number] 00:00:00





             Test Item    Value        Reference Range Interpretation Comments

 

             GLUCOSE (test code = 2217) 97 MG/DL                               

 

             BUN (test code = 2208) 9 MG/DL                                

 

             CREATININE (test code = 2214) 0.70 MG/DL                           

  

 

             eGFR  AMER. (test code 120 ML/MIN/1.73                      

     



             = 24190)                                            

 

             eGFR NON- AMER. (test 103 ML/MIN/1.73                       

    



             code = 88489)                                        

 

             CALC BUN/CREAT (test code = 13 RATIO                               



             2235)                                               

 

             SODIUM (test code = 2231) 139 MEQ/L                              

 

             POTASSIUM (test code = 2228) 5.1 MEQ/L                             

 

 

             CHLORIDE (test code = 2215) 101 MEQ/L                              

 

             CARBON DIOXIDE (test code = 27 MEQ/L                               



             2206)                                               

 

             CALCIUM (test code = 2209) 9.1 MG/DL                              

 

             PROTEIN, TOTAL (test code = 8.3 G/DL                               



             )                                               

 

             ALBUMIN (test code = 2201) 3.9 G/DL                               

 

             CALC GLOBULIN (test code = 4.4 G/DL                               



             2240)                                               

 

             CALC A/G RATIO (test code = 0.9 RATIO                              



             2234)                                               

 

             BILIRUBIN, TOTAL (test code = 0.4 MG/DL                            

  



             2207)                                               

 

             ALKALINE PHOSPHATASE (test 126 U/L                                



             code = 2204)                                        

 

             AST (test code = 2218) 43 U/L                                 

 

             ALT (test code = 2219) 33 U/L                                 



LIPID [id number] 00:00:00





             Test Item    Value        Reference Range Interpretation Comments

 

             CHOLESTEROL (test code = 2210) 209 MG/DL                           

   

 

             TRIGLYCERIDES (test code = 2232) 100 MG/DL                         

     

 

             HDL CHOLESTEROL (test code = 2220) 88 MG/DL                        

       

 

             CALC LDL CHOL (test code = 2237) 101 MG/DL                         

     

 

             RISK RATIO LDL/HDL (test code = 1.15 RATIO                         

    



             2238)                                               



LIPID [id number] 00:00:00





             Test Item    Value        Reference Range Interpretation Comments

 

             CHOLESTEROL (test code = 2210) 209 MG/DL                           

   

 

             TRIGLYCERIDES (test code = 2232) 100 MG/DL                         

     

 

             HDL CHOLESTEROL (test code = 2220) 88 MG/DL                        

       

 

             CALC LDL CHOL (test code = 2237) 101 MG/DL                         

     

 

             RISK RATIO LDL/HDL (test code = 1.15 RATIO                         

    



             2238)                                               



HEMOGLOBIN [id number] 00:00:00





             Test Item    Value        Reference Range Interpretation Comments

 

             HEMOGLOBIN A1c (test code = 42366) 4.8 %                           

       



HEMOGLOBIN [id number] 00:00:00





             Test Item    Value        Reference Range Interpretation Comments

 

             HEMOGLOBIN A1c (test code = 54061) 4.8 %                           

       



HEMOGLOBIN [id number] 00:00:00





             Test Item    Value        Reference Range Interpretation Comments

 

             HEMOGLOBIN A1c (test code = 53894) 4.8 %                           

       



[id number] 00:00:00





             Test Item    Value        Reference Range Interpretation Comments

 

             TSH, THIRD GENERATION (test code 3.010 UIU/ML                      

     



             = 2821)                                             



[id number] 00:00:00





             Test Item    Value        Reference Range Interpretation Comments

 

             TSH, THIRD GENERATION (test code 3.010 UIU/ML                      

     



             = 2821)                                             



[id number] 00:00:00





             Test Item    Value        Reference Range Interpretation Comments

 

             TSH, THIRD GENERATION (test code 3.010 UIU/ML                      

     



             = 2821)                                             



COMPREHENSIVE METABOLIC [id number] 00:00:00





             Test Item    Value        Reference Range Interpretation Comments

 

             GLUCOSE (test code = 2217) 97 MG/DL                               

 

             BUN (test code = 2208) 9 MG/DL                                

 

             CREATININE (test code = 2214) 0.70 MG/DL                           

  

 

             eGFR  AMER. (test code 120 ML/MIN/1.73                      

     



             = 93768)                                            

 

             eGFR NON- AMER. (test 103 ML/MIN/1.73                       

    



             code = 84029)                                        

 

             CALC BUN/CREAT (test code = 13 RATIO                               



             2235)                                               

 

             SODIUM (test code = 2231) 139 MEQ/L                              

 

             POTASSIUM (test code = 2228) 5.1 MEQ/L                             

 

 

             CHLORIDE (test code = 2215) 101 MEQ/L                              

 

             CARBON DIOXIDE (test code = 27 MEQ/L                               



             2206)                                               

 

             CALCIUM (test code = 2209) 9.1 MG/DL                              

 

             PROTEIN, TOTAL (test code = 8.3 G/DL                               



             )                                               

 

             ALBUMIN (test code = 2201) 3.9 G/DL                               

 

             CALC GLOBULIN (test code = 4.4 G/DL                               



             2240)                                               

 

             CALC A/G RATIO (test code = 0.9 RATIO                              



             2234)                                               

 

             BILIRUBIN, TOTAL (test code = 0.4 MG/DL                            

  



             )                                               

 

             ALKALINE PHOSPHATASE (test 126 U/L                                



             code = 220)                                        

 

             AST (test code = 2218) 43 U/L                                 

 

             ALT (test code = 2219) 33 U/L                                 



COMPREHENSIVE METABOLIC [id number] 00:00:00





             Test Item    Value        Reference Range Interpretation Comments

 

             GLUCOSE (test code = 2217) 97 MG/DL                               

 

             BUN (test code = 2208) 9 MG/DL                                

 

             CREATININE (test code = 2214) 0.70 MG/DL                           

  

 

             eGFR  AMER. (test code 120 ML/MIN/1.73                      

     



             = 71006)                                            

 

             eGFR NON- AMER. (test 103 ML/MIN/1.73                       

    



             code = 56855)                                        

 

             CALC BUN/CREAT (test code = 13 RATIO                               



             2235)                                               

 

             SODIUM (test code = 2231) 139 MEQ/L                              

 

             POTASSIUM (test code = 2228) 5.1 MEQ/L                             

 

 

             CHLORIDE (test code = 2215) 101 MEQ/L                              

 

             CARBON DIOXIDE (test code = 27 MEQ/L                               



             2206)                                               

 

             CALCIUM (test code = 2209) 9.1 MG/DL                              

 

             PROTEIN, TOTAL (test code = 8.3 G/DL                               



             )                                               

 

             ALBUMIN (test code = 2201) 3.9 G/DL                               

 

             CALC GLOBULIN (test code = 4.4 G/DL                               



             2240)                                               

 

             CALC A/G RATIO (test code = 0.9 RATIO                              



             2234)                                               

 

             BILIRUBIN, TOTAL (test code = 0.4 MG/DL                            

  



             )                                               

 

             ALKALINE PHOSPHATASE (test 126 U/L                                



             code = 2204)                                        

 

             AST (test code = 2218) 43 U/L                                 

 

             ALT (test code = 2219) 33 U/L                                 



LIPID [id number] 00:00:00





             Test Item    Value        Reference Range Interpretation Comments

 

             CHOLESTEROL (test code = 2210) 209 MG/DL                           

   

 

             TRIGLYCERIDES (test code = 2232) 100 MG/DL                         

     

 

             HDL CHOLESTEROL (test code = 2220) 88 MG/DL                        

       

 

             CALC LDL CHOL (test code = 2237) 101 MG/DL                         

     

 

             RISK RATIO LDL/HDL (test code = 1.15 RATIO                         

    



             2238)                                               



LIPID [id number] 00:00:00





             Test Item    Value        Reference Range Interpretation Comments

 

             CHOLESTEROL (test code = 2210) 209 MG/DL                           

   

 

             TRIGLYCERIDES (test code = 2232) 100 MG/DL                         

     

 

             HDL CHOLESTEROL (test code = 2220) 88 MG/DL                        

       

 

             CALC LDL CHOL (test code = 2237) 101 MG/DL                         

     

 

             RISK RATIO LDL/HDL (test code = 1.15 RATIO                         

    



             2238)                                               



HEMOGLOBIN [id number] 00:00:00





             Test Item    Value        Reference Range Interpretation Comments

 

             HEMOGLOBIN A1c (test code = 66831) 4.8 %                           

       



HEMOGLOBIN [id number] 00:00:00





             Test Item    Value        Reference Range Interpretation Comments

 

             HEMOGLOBIN A1c (test code = 98658) 4.8 %                           

       



HEMOGLOBIN [id number] 00:00:00





             Test Item    Value        Reference Range Interpretation Comments

 

             HEMOGLOBIN A1c (test code = 71057) 4.8 %                           

       



[id number] 00:00:00





             Test Item    Value        Reference Range Interpretation Comments

 

             TSH, THIRD GENERATION (test code 3.010 UIU/ML                      

     



             = 2821)                                             



[id number] 00:00:00





             Test Item    Value        Reference Range Interpretation Comments

 

             TSH, THIRD GENERATION (test code 3.010 UIU/ML                      

     



             = 2821)                                             



[id number] 00:00:00





             Test Item    Value        Reference Range Interpretation Comments

 

             TSH, THIRD GENERATION (test code 3.010 UIU/ML                      

     



             = 2821)                                             



COMPREHENSIVE METABOLIC [id number] 00:00:00





             Test Item    Value        Reference Range Interpretation Comments

 

             GLUCOSE (test code = 2217) 97 MG/DL                               

 

             BUN (test code = 2208) 9 MG/DL                                

 

             CREATININE (test code = 2214) 0.70 MG/DL                           

  

 

             eGFR  AMER. (test code 120 ML/MIN/1.73                      

     



             = 04401)                                            

 

             eGFR NON- AMER. (test 103 ML/MIN/1.73                       

    



             code = 11068)                                        

 

             CALC BUN/CREAT (test code = 13 RATIO                               



             2235)                                               

 

             SODIUM (test code = 2231) 139 MEQ/L                              

 

             POTASSIUM (test code = 2228) 5.1 MEQ/L                             

 

 

             CHLORIDE (test code = 2215) 101 MEQ/L                              

 

             CARBON DIOXIDE (test code = 27 MEQ/L                               



             2206)                                               

 

             CALCIUM (test code = 2209) 9.1 MG/DL                              

 

             PROTEIN, TOTAL (test code = 8.3 G/DL                               



             2229)                                               

 

             ALBUMIN (test code = 2201) 3.9 G/DL                               

 

             CALC GLOBULIN (test code = 4.4 G/DL                               



             2240)                                               

 

             CALC A/G RATIO (test code = 0.9 RATIO                              



             2234)                                               

 

             BILIRUBIN, TOTAL (test code = 0.4 MG/DL                            

  



             220)                                               

 

             ALKALINE PHOSPHATASE (test 126 U/L                                



             code = 2204)                                        

 

             AST (test code = 2218) 43 U/L                                 

 

             ALT (test code = 2219) 33 U/L                                 



COMPREHENSIVE METABOLIC [id number] 00:00:00





             Test Item    Value        Reference Range Interpretation Comments

 

             GLUCOSE (test code = 2217) 97 MG/DL                               

 

             BUN (test code = 2208) 9 MG/DL                                

 

             CREATININE (test code = 2214) 0.70 MG/DL                           

  

 

             eGFR  AMER. (test code 120 ML/MIN/1.73                      

     



             = 68171)                                            

 

             eGFR NON- AMER. (test 103 ML/MIN/1.73                       

    



             code = 07696)                                        

 

             CALC BUN/CREAT (test code = 13 RATIO                               



             2235)                                               

 

             SODIUM (test code = 2231) 139 MEQ/L                              

 

             POTASSIUM (test code = 2228) 5.1 MEQ/L                             

 

 

             CHLORIDE (test code = 2215) 101 MEQ/L                              

 

             CARBON DIOXIDE (test code = 27 MEQ/L                               



             )                                               

 

             CALCIUM (test code = 2209) 9.1 MG/DL                              

 

             PROTEIN, TOTAL (test code = 8.3 G/DL                               



             2229)                                               

 

             ALBUMIN (test code = 2201) 3.9 G/DL                               

 

             CALC GLOBULIN (test code = 4.4 G/DL                               



             2240)                                               

 

             CALC A/G RATIO (test code = 0.9 RATIO                              



             2234)                                               

 

             BILIRUBIN, TOTAL (test code = 0.4 MG/DL                            

  



             2207)                                               

 

             ALKALINE PHOSPHATASE (test 126 U/L                                



             code = 2204)                                        

 

             AST (test code = 2218) 43 U/L                                 

 

             ALT (test code = 2219) 33 U/L                                 



LIPID [id number] 00:00:00





             Test Item    Value        Reference Range Interpretation Comments

 

             CHOLESTEROL (test code = 2210) 209 MG/DL                           

   

 

             TRIGLYCERIDES (test code = 2232) 100 MG/DL                         

     

 

             HDL CHOLESTEROL (test code = 2220) 88 MG/DL                        

       

 

             CALC LDL CHOL (test code = 2237) 101 MG/DL                         

     

 

             RISK RATIO LDL/HDL (test code = 1.15 RATIO                         

    



             2238)                                               



LIPID [id number] 00:00:00





             Test Item    Value        Reference Range Interpretation Comments

 

             CHOLESTEROL (test code = 2210) 209 MG/DL                           

   

 

             TRIGLYCERIDES (test code = 2232) 100 MG/DL                         

     

 

             HDL CHOLESTEROL (test code = 2220) 88 MG/DL                        

       

 

             CALC LDL CHOL (test code = 2237) 101 MG/DL                         

     

 

             RISK RATIO LDL/HDL (test code = 1.15 RATIO                         

    



             2238)                                               



HEMOGLOBIN [id number] 00:00:00





             Test Item    Value        Reference Range Interpretation Comments

 

             HEMOGLOBIN A1c (test code = 28946) 4.8 %                           

       



HEMOGLOBIN [id number] 00:00:00





             Test Item    Value        Reference Range Interpretation Comments

 

             HEMOGLOBIN A1c (test code = 60569) 4.8 %                           

       



VALPROIC [id number] 00:00:00





             Test Item    Value        Reference Range Interpretation Comments

 

             VALPROIC ACID (test code = 3025) 60.2 UG/ML                        

     



VALPROIC [id number] 00:00:00





             Test Item    Value        Reference Range Interpretation Comments

 

             VALPROIC ACID (test code = 3025) 60.2 UG/ML                        

     



VALPROIC [id number] 00:00:00





             Test Item    Value        Reference Range Interpretation Comments

 

             VALPROIC ACID (test code = 3025) 60.2 UG/ML                        

     



VALPROIC [id number] 00:00:00





             Test Item    Value        Reference Range Interpretation Comments

 

             VALPROIC ACID (test code = 3025) 60.2 UG/ML                        

     



VALPROIC [id number] 00:00:00





             Test Item    Value        Reference Range Interpretation Comments

 

             VALPROIC ACID (test code = 3025) 60.2 UG/ML                        

     



VALPROIC [id number] 00:00:00





             Test Item    Value        Reference Range Interpretation Comments

 

             VALPROIC ACID (test code = 3025) 60.2 UG/ML                        

     



VALPROIC [id number] 00:00:00





             Test Item    Value        Reference Range Interpretation Comments

 

             VALPROIC ACID (test code = 3025) 60.2 UG/ML                        

     



VALPROIC [id number] 00:00:00





             Test Item    Value        Reference Range Interpretation Comments

 

             VALPROIC ACID (test code = 3025) 60.2 UG/ML                        

     



COMPREHENSIVE METABOLIC PANEL [ADDED]2021-03-10 00:00:00





             Test Item    Value        Reference Range Interpretation Comments

 

             GLUCOSE (test code = 2217) 87 MG/DL                               

 

             BUN (test code = 2208) 9 MG/DL                                

 

             CREATININE (test code = 2214) 0.71 MG/DL                           

  

 

             eGFR  AMER. (test code 118 ML/MIN/1.73                      

     



             = 87218)                                            

 

             eGFR NON- AMER. (test 102 ML/MIN/1.73                       

    



             code = 44331)                                        

 

             CALC BUN/CREAT (test code = 13 RATIO                               



             2235)                                               

 

             SODIUM (test code = 2231) 140 MEQ/L                              

 

             POTASSIUM (test code = 2228) 5.7 MEQ/L                             

 

 

             CHLORIDE (test code = 2215) 98 MEQ/L                               

 

             CARBON DIOXIDE (test code = 30 MEQ/L                               



             )                                               

 

             CALCIUM (test code = 2209) 9.7 MG/DL                              

 

             PROTEIN, TOTAL (test code = 9.1 G/DL                               



             )                                               

 

             ALBUMIN (test code = 2201) 3.9 G/DL                               

 

             CALC GLOBULIN (test code = 5.2 G/DL                               



             2240)                                               

 

             CALC A/G RATIO (test code = 0.8 RATIO                              



             4)                                               

 

             BILIRUBIN, TOTAL (test code = 0.5 MG/DL                            

  



             )                                               

 

             ALKALINE PHOSPHATASE (test 91 U/L                                 



             code = 2204)                                        

 

             AST (test code = 2218) 52 U/L                                 

 

             ALT (test code = 2219) 30 U/L                                 



COMPREHENSIVE METABOLIC PANEL [ADDED]2021-03-10 00:00:00





             Test Item    Value        Reference Range Interpretation Comments

 

             GLUCOSE (test code = 2217) 87 MG/DL                               

 

             BUN (test code = 2208) 9 MG/DL                                

 

             CREATININE (test code = 2214) 0.71 MG/DL                           

  

 

             eGFR  AMER. (test code 118 ML/MIN/1.73                      

     



             = 97727)                                            

 

             eGFR NON- AMER. (test 102 ML/MIN/1.73                       

    



             code = 94177)                                        

 

             CALC BUN/CREAT (test code = 13 RATIO                               



             2235)                                               

 

             SODIUM (test code = 2231) 140 MEQ/L                              

 

             POTASSIUM (test code = 2228) 5.7 MEQ/L                             

 

 

             CHLORIDE (test code = 2215) 98 MEQ/L                               

 

             CARBON DIOXIDE (test code = 30 MEQ/L                               



             220)                                               

 

             CALCIUM (test code = 2209) 9.7 MG/DL                              

 

             PROTEIN, TOTAL (test code = 9.1 G/DL                               



             )                                               

 

             ALBUMIN (test code = 2201) 3.9 G/DL                               

 

             CALC GLOBULIN (test code = 5.2 G/DL                               



             2240)                                               

 

             CALC A/G RATIO (test code = 0.8 RATIO                              



             2234)                                               

 

             BILIRUBIN, TOTAL (test code = 0.5 MG/DL                            

  



             )                                               

 

             ALKALINE PHOSPHATASE (test 91 U/L                                 



             code = 2204)                                        

 

             AST (test code = 2218) 52 U/L                                 

 

             ALT (test code = 2219) 30 U/L                                 



LIPID PANEL [ADDED]2021-03-10 00:00:00





             Test Item    Value        Reference Range Interpretation Comments

 

             CHOLESTEROL (test code = 2210) 178 MG/DL                           

   

 

             TRIGLYCERIDES (test code = 2232) 79 MG/DL                          

     

 

             HDL CHOLESTEROL (test code = 2220) 61 MG/DL                        

       

 

             CALC LDL CHOL (test code = 2237) 100 MG/DL                         

     

 

             RISK RATIO LDL/HDL (test code = 1.64 RATIO                         

    



             2238)                                               



LIPID PANEL [ADDED]2021-03-10 00:00:00





             Test Item    Value        Reference Range Interpretation Comments

 

             CHOLESTEROL (test code = 2210) 178 MG/DL                           

   

 

             TRIGLYCERIDES (test code = 2232) 79 MG/DL                          

     

 

             HDL CHOLESTEROL (test code = 2220) 61 MG/DL                        

       

 

             CALC LDL CHOL (test code = 2237) 100 MG/DL                         

     

 

             RISK RATIO LDL/HDL (test code = 1.64 RATIO                         

    



             2238)                                               



TSH, THIRD GENERATION [ADDED]2021-03-10 00:00:00





             Test Item    Value        Reference Range Interpretation Comments

 

             TSH, THIRD GENERATION (test code 1.330 UIU/ML                      

     



             = 2821)                                             



TSH, THIRD GENERATION [ADDED]2021-03-10 00:00:00





             Test Item    Value        Reference Range Interpretation Comments

 

             TSH, THIRD GENERATION (test code 1.330 UIU/ML                      

     



             = 2821)                                             



TSH, THIRD GENERATION [ADDED]2021-03-10 00:00:00





             Test Item    Value        Reference Range Interpretation Comments

 

             TSH, THIRD GENERATION (test code 1.330 UIU/ML                      

     



             = 2821)                                             



CBC W/AUTO DIFF2021-03-10 00:00:00





             Test Item    Value        Reference Range Interpretation Comments

 

             WBC (test code = 1001) 4.1 K/UL                               

 

             RBC (test code = 1002) 5.05 M/UL                              

 

             HEMOGLOBIN (test code = 1003) 11.7 G/DL                            

  

 

             HEMATOCRIT (test code = 1004) 38.0 %                               

  

 

             MCV (test code = 1005) 75.2 fL                                

 

             MCH (test code = 1006) 23.2 PG                                

 

             MCHC (test code = 1007) 30.8 G/DL                              

 

             RDW (test code = 1038) 16.0 %                                 

 

             NEUTROPHILS (test code = 1008) 35.6 %                              

   

 

             LYMPHOCYTES (test code = 1010) 49.8 %                              

   

 

             MONOCYTES (test code = 1011) 10.0 %                                

 

 

             EOSINOPHILS (test code = 1012) 4.1 %                               

   

 

             BASOPHILS (test code = 1013) 0.5 %                                 

 

 

             PLATELET COUNT (test code = 1015) 335 K/UL                         

      



CBC W/AUTO DIFF2021-03-10 00:00:00





             Test Item    Value        Reference Range Interpretation Comments

 

             WBC (test code = 1001) 4.1 K/UL                               

 

             RBC (test code = 1002) 5.05 M/UL                              

 

             HEMOGLOBIN (test code = 1003) 11.7 G/DL                            

  

 

             HEMATOCRIT (test code = 1004) 38.0 %                               

  

 

             MCV (test code = 1005) 75.2 fL                                

 

             MCH (test code = 1006) 23.2 PG                                

 

             MCHC (test code = 1007) 30.8 G/DL                              

 

             RDW (test code = 1038) 16.0 %                                 

 

             NEUTROPHILS (test code = 1008) 35.6 %                              

   

 

             LYMPHOCYTES (test code = 1010) 49.8 %                              

   

 

             MONOCYTES (test code = 1011) 10.0 %                                

 

 

             EOSINOPHILS (test code = 1012) 4.1 %                               

   

 

             BASOPHILS (test code = 1013) 0.5 %                                 

 

 

             PLATELET COUNT (test code = 1015) 335 K/UL                         

      



CBC W/AUTO DIFF2021-03-10 00:00:00





             Test Item    Value        Reference Range Interpretation Comments

 

             WBC (test code = 1001) 4.1 K/UL                               

 

             RBC (test code = 1002) 5.05 M/UL                              

 

             HEMOGLOBIN (test code = 1003) 11.7 G/DL                            

  

 

             HEMATOCRIT (test code = 1004) 38.0 %                               

  

 

             MCV (test code = 1005) 75.2 fL                                

 

             MCH (test code = 1006) 23.2 PG                                

 

             MCHC (test code = 1007) 30.8 G/DL                              

 

             RDW (test code = 1038) 16.0 %                                 

 

             NEUTROPHILS (test code = 1008) 35.6 %                              

   

 

             LYMPHOCYTES (test code = 1010) 49.8 %                              

   

 

             MONOCYTES (test code = 1011) 10.0 %                                

 

 

             EOSINOPHILS (test code = 1012) 4.1 %                               

   

 

             BASOPHILS (test code = 1013) 0.5 %                                 

 

 

             PLATELET COUNT (test code = 1015) 335 K/UL                         

      



HEMOGLOBIN A1c [ADDED]2021-03-10 00:00:00





             Test Item    Value        Reference Range Interpretation Comments

 

             HEMOGLOBIN A1c (test code = 08215) 4.8 %                           

       



HEMOGLOBIN A1c [ADDED]2021-03-10 00:00:00





             Test Item    Value        Reference Range Interpretation Comments

 

             HEMOGLOBIN A1c (test code = 52190) 4.8 %                           

       



HEMOGLOBIN A1c [ADDED]2021-03-10 00:00:00





             Test Item    Value        Reference Range Interpretation Comments

 

             HEMOGLOBIN A1c (test code = 02210) 4.8 %                           

       



COMPREHENSIVE METABOLIC PANEL [ADDED]2021-03-10 00:00:00





             Test Item    Value        Reference Range Interpretation Comments

 

             GLUCOSE (test code = 2217) 87 MG/DL                               

 

             BUN (test code = 2208) 9 MG/DL                                

 

             CREATININE (test code = 2214) 0.71 MG/DL                           

  

 

             eGFR  AMER. (test code 118 ML/MIN/1.73                      

     



             = 68552)                                            

 

             eGFR NON- AMER. (test 102 ML/MIN/1.73                       

    



             code = 08942)                                        

 

             CALC BUN/CREAT (test code = 13 RATIO                               



             2235)                                               

 

             SODIUM (test code = 2231) 140 MEQ/L                              

 

             POTASSIUM (test code = 2228) 5.7 MEQ/L                             

 

 

             CHLORIDE (test code = 2215) 98 MEQ/L                               

 

             CARBON DIOXIDE (test code = 30 MEQ/L                               



             2206)                                               

 

             CALCIUM (test code = 2209) 9.7 MG/DL                              

 

             PROTEIN, TOTAL (test code = 9.1 G/DL                               



             222)                                               

 

             ALBUMIN (test code = 2201) 3.9 G/DL                               

 

             CALC GLOBULIN (test code = 5.2 G/DL                               



             2240)                                               

 

             CALC A/G RATIO (test code = 0.8 RATIO                              



             2234)                                               

 

             BILIRUBIN, TOTAL (test code = 0.5 MG/DL                            

  



             2207)                                               

 

             ALKALINE PHOSPHATASE (test 91 U/L                                 



             code = 2204)                                        

 

             AST (test code = 2218) 52 U/L                                 

 

             ALT (test code = 2219) 30 U/L                                 



COMPREHENSIVE METABOLIC PANEL [ADDED]2021-03-10 00:00:00





             Test Item    Value        Reference Range Interpretation Comments

 

             GLUCOSE (test code = 2217) 87 MG/DL                               

 

             BUN (test code = 2208) 9 MG/DL                                

 

             CREATININE (test code = 2214) 0.71 MG/DL                           

  

 

             eGFR  AMER. (test code 118 ML/MIN/1.73                      

     



             = 82710)                                            

 

             eGFR NON- AMER. (test 102 ML/MIN/1.73                       

    



             code = 83127)                                        

 

             CALC BUN/CREAT (test code = 13 RATIO                               



             2235)                                               

 

             SODIUM (test code = 2231) 140 MEQ/L                              

 

             POTASSIUM (test code = 2228) 5.7 MEQ/L                             

 

 

             CHLORIDE (test code = 2215) 98 MEQ/L                               

 

             CARBON DIOXIDE (test code = 30 MEQ/L                               



             220)                                               

 

             CALCIUM (test code = 2209) 9.7 MG/DL                              

 

             PROTEIN, TOTAL (test code = 9.1 G/DL                               



             )                                               

 

             ALBUMIN (test code = 2201) 3.9 G/DL                               

 

             CALC GLOBULIN (test code = 5.2 G/DL                               



             224)                                               

 

             CALC A/G RATIO (test code = 0.8 RATIO                              



             )                                               

 

             BILIRUBIN, TOTAL (test code = 0.5 MG/DL                            

  



             )                                               

 

             ALKALINE PHOSPHATASE (test 91 U/L                                 



             code = 2204)                                        

 

             AST (test code = 2218) 52 U/L                                 

 

             ALT (test code = 2219) 30 U/L                                 



LIPID PANEL [ADDED]2021-03-10 00:00:00





             Test Item    Value        Reference Range Interpretation Comments

 

             CHOLESTEROL (test code = 2210) 178 MG/DL                           

   

 

             TRIGLYCERIDES (test code = 2232) 79 MG/DL                          

     

 

             HDL CHOLESTEROL (test code = 2220) 61 MG/DL                        

       

 

             CALC LDL CHOL (test code = 2237) 100 MG/DL                         

     

 

             RISK RATIO LDL/HDL (test code = 1.64 RATIO                         

    



             2238)                                               



LIPID PANEL [ADDED]2021-03-10 00:00:00





             Test Item    Value        Reference Range Interpretation Comments

 

             CHOLESTEROL (test code = 2210) 178 MG/DL                           

   

 

             TRIGLYCERIDES (test code = 2232) 79 MG/DL                          

     

 

             HDL CHOLESTEROL (test code = 2220) 61 MG/DL                        

       

 

             CALC LDL CHOL (test code = 2237) 100 MG/DL                         

     

 

             RISK RATIO LDL/HDL (test code = 1.64 RATIO                         

    



             2238)                                               



TSH, THIRD GENERATION [ADDED]2021-03-10 00:00:00





             Test Item    Value        Reference Range Interpretation Comments

 

             TSH, THIRD GENERATION (test code 1.330 UIU/ML                      

     



             = 2821)                                             



TSH, THIRD GENERATION [ADDED]2021-03-10 00:00:00





             Test Item    Value        Reference Range Interpretation Comments

 

             TSH, THIRD GENERATION (test code 1.330 UIU/ML                      

     



             = 2821)                                             



TSH, THIRD GENERATION [ADDED]2021-03-10 00:00:00





             Test Item    Value        Reference Range Interpretation Comments

 

             TSH, THIRD GENERATION (test code 1.330 UIU/ML                      

     



             = 2821)                                             



CBC W/AUTO DIFF2021-03-10 00:00:00





             Test Item    Value        Reference Range Interpretation Comments

 

             WBC (test code = 1001) 4.1 K/UL                               

 

             RBC (test code = 1002) 5.05 M/UL                              

 

             HEMOGLOBIN (test code = 1003) 11.7 G/DL                            

  

 

             HEMATOCRIT (test code = 1004) 38.0 %                               

  

 

             MCV (test code = 1005) 75.2 fL                                

 

             MCH (test code = 1006) 23.2 PG                                

 

             MCHC (test code = 1007) 30.8 G/DL                              

 

             RDW (test code = 1038) 16.0 %                                 

 

             NEUTROPHILS (test code = 1008) 35.6 %                              

   

 

             LYMPHOCYTES (test code = 1010) 49.8 %                              

   

 

             MONOCYTES (test code = 1011) 10.0 %                                

 

 

             EOSINOPHILS (test code = 1012) 4.1 %                               

   

 

             BASOPHILS (test code = 1013) 0.5 %                                 

 

 

             PLATELET COUNT (test code = 1015) 335 K/UL                         

      



CBC W/AUTO DIFF2021-03-10 00:00:00





             Test Item    Value        Reference Range Interpretation Comments

 

             WBC (test code = 1001) 4.1 K/UL                               

 

             RBC (test code = 1002) 5.05 M/UL                              

 

             HEMOGLOBIN (test code = 1003) 11.7 G/DL                            

  

 

             HEMATOCRIT (test code = 1004) 38.0 %                               

  

 

             MCV (test code = 1005) 75.2 fL                                

 

             MCH (test code = 1006) 23.2 PG                                

 

             MCHC (test code = 1007) 30.8 G/DL                              

 

             RDW (test code = 1038) 16.0 %                                 

 

             NEUTROPHILS (test code = 1008) 35.6 %                              

   

 

             LYMPHOCYTES (test code = 1010) 49.8 %                              

   

 

             MONOCYTES (test code = 1011) 10.0 %                                

 

 

             EOSINOPHILS (test code = 1012) 4.1 %                               

   

 

             BASOPHILS (test code = 1013) 0.5 %                                 

 

 

             PLATELET COUNT (test code = 1015) 335 K/UL                         

      



CBC W/AUTO DIFF2021-03-10 00:00:00





             Test Item    Value        Reference Range Interpretation Comments

 

             WBC (test code = 1001) 4.1 K/UL                               

 

             RBC (test code = 1002) 5.05 M/UL                              

 

             HEMOGLOBIN (test code = 1003) 11.7 G/DL                            

  

 

             HEMATOCRIT (test code = 1004) 38.0 %                               

  

 

             MCV (test code = 1005) 75.2 fL                                

 

             MCH (test code = 1006) 23.2 PG                                

 

             MCHC (test code = 1007) 30.8 G/DL                              

 

             RDW (test code = 1038) 16.0 %                                 

 

             NEUTROPHILS (test code = 1008) 35.6 %                              

   

 

             LYMPHOCYTES (test code = 1010) 49.8 %                              

   

 

             MONOCYTES (test code = 1011) 10.0 %                                

 

 

             EOSINOPHILS (test code = 1012) 4.1 %                               

   

 

             BASOPHILS (test code = 1013) 0.5 %                                 

 

 

             PLATELET COUNT (test code = 1015) 335 K/UL                         

      



HEMOGLOBIN A1c [ADDED]2021-03-10 00:00:00





             Test Item    Value        Reference Range Interpretation Comments

 

             HEMOGLOBIN A1c (test code = 24684) 4.8 %                           

       



HEMOGLOBIN A1c [ADDED]2021-03-10 00:00:00





             Test Item    Value        Reference Range Interpretation Comments

 

             HEMOGLOBIN A1c (test code = 38143) 4.8 %                           

       



HEMOGLOBIN A1c [ADDED]2021-03-10 00:00:00





             Test Item    Value        Reference Range Interpretation Comments

 

             HEMOGLOBIN A1c (test code = 56435) 4.8 %                           

       



COMPREHENSIVE METABOLIC PANEL [ADDED]2021-03-10 00:00:00





             Test Item    Value        Reference Range Interpretation Comments

 

             GLUCOSE (test code = 2217) 87 MG/DL                               

 

             BUN (test code = 2208) 9 MG/DL                                

 

             CREATININE (test code = 2214) 0.71 MG/DL                           

  

 

             eGFR  AMER. (test code 118 ML/MIN/1.73                      

     



             = 23358)                                            

 

             eGFR NON- AMER. (test 102 ML/MIN/1.73                       

    



             code = 12259)                                        

 

             CALC BUN/CREAT (test code = 13 RATIO                               



             223)                                               

 

             SODIUM (test code = 2231) 140 MEQ/L                              

 

             POTASSIUM (test code = 2228) 5.7 MEQ/L                             

 

 

             CHLORIDE (test code = 2215) 98 MEQ/L                               

 

             CARBON DIOXIDE (test code = 30 MEQ/L                               



             )                                               

 

             CALCIUM (test code = 2209) 9.7 MG/DL                              

 

             PROTEIN, TOTAL (test code = 9.1 G/DL                               



             )                                               

 

             ALBUMIN (test code = 2201) 3.9 G/DL                               

 

             CALC GLOBULIN (test code = 5.2 G/DL                               



             0)                                               

 

             CALC A/G RATIO (test code = 0.8 RATIO                              



             )                                               

 

             BILIRUBIN, TOTAL (test code = 0.5 MG/DL                            

  



             2207)                                               

 

             ALKALINE PHOSPHATASE (test 91 U/L                                 



             code = 2204)                                        

 

             AST (test code = 2218) 52 U/L                                 

 

             ALT (test code = 2219) 30 U/L                                 



COMPREHENSIVE METABOLIC PANEL [ADDED]2021-03-10 00:00:00





             Test Item    Value        Reference Range Interpretation Comments

 

             GLUCOSE (test code = 2217) 87 MG/DL                               

 

             BUN (test code = 2208) 9 MG/DL                                

 

             CREATININE (test code = 2214) 0.71 MG/DL                           

  

 

             eGFR  AMER. (test code 118 ML/MIN/1.73                      

     



             = 73647)                                            

 

             eGFR NON- AMER. (test 102 ML/MIN/1.73                       

    



             code = 11326)                                        

 

             CALC BUN/CREAT (test code = 13 RATIO                               



             2235)                                               

 

             SODIUM (test code = 2231) 140 MEQ/L                              

 

             POTASSIUM (test code = 2228) 5.7 MEQ/L                             

 

 

             CHLORIDE (test code = 2215) 98 MEQ/L                               

 

             CARBON DIOXIDE (test code = 30 MEQ/L                               



             220)                                               

 

             CALCIUM (test code = 2209) 9.7 MG/DL                              

 

             PROTEIN, TOTAL (test code = 9.1 G/DL                               



             )                                               

 

             ALBUMIN (test code = 2201) 3.9 G/DL                               

 

             CALC GLOBULIN (test code = 5.2 G/DL                               



             2240)                                               

 

             CALC A/G RATIO (test code = 0.8 RATIO                              



             2234)                                               

 

             BILIRUBIN, TOTAL (test code = 0.5 MG/DL                            

  



             )                                               

 

             ALKALINE PHOSPHATASE (test 91 U/L                                 



             code = 2204)                                        

 

             AST (test code = 2218) 52 U/L                                 

 

             ALT (test code = 2219) 30 U/L                                 



LIPID PANEL [ADDED]2021-03-10 00:00:00





             Test Item    Value        Reference Range Interpretation Comments

 

             CHOLESTEROL (test code = 2210) 178 MG/DL                           

   

 

             TRIGLYCERIDES (test code = 2232) 79 MG/DL                          

     

 

             HDL CHOLESTEROL (test code = 2220) 61 MG/DL                        

       

 

             CALC LDL CHOL (test code = 2237) 100 MG/DL                         

     

 

             RISK RATIO LDL/HDL (test code = 1.64 RATIO                         

    



             2238)                                               



LIPID PANEL [ADDED]2021-03-10 00:00:00





             Test Item    Value        Reference Range Interpretation Comments

 

             CHOLESTEROL (test code = 2210) 178 MG/DL                           

   

 

             TRIGLYCERIDES (test code = 2232) 79 MG/DL                          

     

 

             HDL CHOLESTEROL (test code = 2220) 61 MG/DL                        

       

 

             CALC LDL CHOL (test code = 2237) 100 MG/DL                         

     

 

             RISK RATIO LDL/HDL (test code = 1.64 RATIO                         

    



             2238)                                               



TSH, THIRD GENERATION [ADDED]2021-03-10 00:00:00





             Test Item    Value        Reference Range Interpretation Comments

 

             TSH, THIRD GENERATION (test code 1.330 UIU/ML                      

     



             = 2821)                                             



TSH, THIRD GENERATION [ADDED]2021-03-10 00:00:00





             Test Item    Value        Reference Range Interpretation Comments

 

             TSH, THIRD GENERATION (test code 1.330 UIU/ML                      

     



             = 2821)                                             



TSH, THIRD GENERATION [ADDED]2021-03-10 00:00:00





             Test Item    Value        Reference Range Interpretation Comments

 

             TSH, THIRD GENERATION (test code 1.330 UIU/ML                      

     



             = 2821)                                             



CBC W/AUTO DIFF2021-03-10 00:00:00





             Test Item    Value        Reference Range Interpretation Comments

 

             WBC (test code = 1001) 4.1 K/UL                               

 

             RBC (test code = 1002) 5.05 M/UL                              

 

             HEMOGLOBIN (test code = 1003) 11.7 G/DL                            

  

 

             HEMATOCRIT (test code = 1004) 38.0 %                               

  

 

             MCV (test code = 1005) 75.2 fL                                

 

             MCH (test code = 1006) 23.2 PG                                

 

             MCHC (test code = 1007) 30.8 G/DL                              

 

             RDW (test code = 1038) 16.0 %                                 

 

             NEUTROPHILS (test code = 1008) 35.6 %                              

   

 

             LYMPHOCYTES (test code = 1010) 49.8 %                              

   

 

             MONOCYTES (test code = 1011) 10.0 %                                

 

 

             EOSINOPHILS (test code = 1012) 4.1 %                               

   

 

             BASOPHILS (test code = 1013) 0.5 %                                 

 

 

             PLATELET COUNT (test code = 1015) 335 K/UL                         

      



CBC W/AUTO DIFF2021-03-10 00:00:00





             Test Item    Value        Reference Range Interpretation Comments

 

             WBC (test code = 1001) 4.1 K/UL                               

 

             RBC (test code = 1002) 5.05 M/UL                              

 

             HEMOGLOBIN (test code = 1003) 11.7 G/DL                            

  

 

             HEMATOCRIT (test code = 1004) 38.0 %                               

  

 

             MCV (test code = 1005) 75.2 fL                                

 

             MCH (test code = 1006) 23.2 PG                                

 

             MCHC (test code = 1007) 30.8 G/DL                              

 

             RDW (test code = 1038) 16.0 %                                 

 

             NEUTROPHILS (test code = 1008) 35.6 %                              

   

 

             LYMPHOCYTES (test code = 1010) 49.8 %                              

   

 

             MONOCYTES (test code = 1011) 10.0 %                                

 

 

             EOSINOPHILS (test code = 1012) 4.1 %                               

   

 

             BASOPHILS (test code = 1013) 0.5 %                                 

 

 

             PLATELET COUNT (test code = 1015) 335 K/UL                         

      



CBC W/AUTO DIFF2021-03-10 00:00:00





             Test Item    Value        Reference Range Interpretation Comments

 

             WBC (test code = 1001) 4.1 K/UL                               

 

             RBC (test code = 1002) 5.05 M/UL                              

 

             HEMOGLOBIN (test code = 1003) 11.7 G/DL                            

  

 

             HEMATOCRIT (test code = 1004) 38.0 %                               

  

 

             MCV (test code = 1005) 75.2 fL                                

 

             MCH (test code = 1006) 23.2 PG                                

 

             MCHC (test code = 1007) 30.8 G/DL                              

 

             RDW (test code = 1038) 16.0 %                                 

 

             NEUTROPHILS (test code = 1008) 35.6 %                              

   

 

             LYMPHOCYTES (test code = 1010) 49.8 %                              

   

 

             MONOCYTES (test code = 1011) 10.0 %                                

 

 

             EOSINOPHILS (test code = 1012) 4.1 %                               

   

 

             BASOPHILS (test code = 1013) 0.5 %                                 

 

 

             PLATELET COUNT (test code = 1015) 335 K/UL                         

      



HEMOGLOBIN A1c [ADDED]2021-03-10 00:00:00





             Test Item    Value        Reference Range Interpretation Comments

 

             HEMOGLOBIN A1c (test code = 95531) 4.8 %                           

       



HEMOGLOBIN A1c [ADDED]2021-03-10 00:00:00





             Test Item    Value        Reference Range Interpretation Comments

 

             HEMOGLOBIN A1c (test code = 35146) 4.8 %                           

       



HEMOGLOBIN A1c [ADDED]2021-03-10 00:00:00





             Test Item    Value        Reference Range Interpretation Comments

 

             HEMOGLOBIN A1c (test code = 05581) 4.8 %                           

       



COMPREHENSIVE METABOLIC PANEL [ADDED]2021-03-10 00:00:00





             Test Item    Value        Reference Range Interpretation Comments

 

             GLUCOSE (test code = 2217) 87 MG/DL                               

 

             BUN (test code = 2208) 9 MG/DL                                

 

             CREATININE (test code = 2214) 0.71 MG/DL                           

  

 

             eGFR  AMER. (test code 118 ML/MIN/1.73                      

     



             = 58506)                                            

 

             eGFR NON- AMER. (test 102 ML/MIN/1.73                       

    



             code = 52874)                                        

 

             CALC BUN/CREAT (test code = 13 RATIO                               



             2235)                                               

 

             SODIUM (test code = 2231) 140 MEQ/L                              

 

             POTASSIUM (test code = 2228) 5.7 MEQ/L                             

 

 

             CHLORIDE (test code = 2215) 98 MEQ/L                               

 

             CARBON DIOXIDE (test code = 30 MEQ/L                               



             220)                                               

 

             CALCIUM (test code = 2209) 9.7 MG/DL                              

 

             PROTEIN, TOTAL (test code = 9.1 G/DL                               



             222)                                               

 

             ALBUMIN (test code = 2201) 3.9 G/DL                               

 

             CALC GLOBULIN (test code = 5.2 G/DL                               



             2240)                                               

 

             CALC A/G RATIO (test code = 0.8 RATIO                              



             2234)                                               

 

             BILIRUBIN, TOTAL (test code = 0.5 MG/DL                            

  



             220)                                               

 

             ALKALINE PHOSPHATASE (test 91 U/L                                 



             code = 2204)                                        

 

             AST (test code = 2218) 52 U/L                                 

 

             ALT (test code = 2219) 30 U/L                                 



COMPREHENSIVE METABOLIC PANEL [ADDED]2021-03-10 00:00:00





             Test Item    Value        Reference Range Interpretation Comments

 

             GLUCOSE (test code = 2217) 87 MG/DL                               

 

             BUN (test code = 2208) 9 MG/DL                                

 

             CREATININE (test code = 2214) 0.71 MG/DL                           

  

 

             eGFR  AMER. (test code 118 ML/MIN/1.73                      

     



             = 85565)                                            

 

             eGFR NON- AMER. (test 102 ML/MIN/1.73                       

    



             code = 44046)                                        

 

             CALC BUN/CREAT (test code = 13 RATIO                               



             2235)                                               

 

             SODIUM (test code = 2231) 140 MEQ/L                              

 

             POTASSIUM (test code = 2228) 5.7 MEQ/L                             

 

 

             CHLORIDE (test code = 2215) 98 MEQ/L                               

 

             CARBON DIOXIDE (test code = 30 MEQ/L                               



             )                                               

 

             CALCIUM (test code = 2209) 9.7 MG/DL                              

 

             PROTEIN, TOTAL (test code = 9.1 G/DL                               



             222)                                               

 

             ALBUMIN (test code = 2201) 3.9 G/DL                               

 

             CALC GLOBULIN (test code = 5.2 G/DL                               



             2240)                                               

 

             CALC A/G RATIO (test code = 0.8 RATIO                              



             2234)                                               

 

             BILIRUBIN, TOTAL (test code = 0.5 MG/DL                            

  



             2207)                                               

 

             ALKALINE PHOSPHATASE (test 91 U/L                                 



             code = 2204)                                        

 

             AST (test code = 2218) 52 U/L                                 

 

             ALT (test code = 2219) 30 U/L                                 



LIPID PANEL [ADDED]2021-03-10 00:00:00





             Test Item    Value        Reference Range Interpretation Comments

 

             CHOLESTEROL (test code = 2210) 178 MG/DL                           

   

 

             TRIGLYCERIDES (test code = 2232) 79 MG/DL                          

     

 

             HDL CHOLESTEROL (test code = 2220) 61 MG/DL                        

       

 

             CALC LDL CHOL (test code = 2237) 100 MG/DL                         

     

 

             RISK RATIO LDL/HDL (test code = 1.64 RATIO                         

    



             2238)                                               



LIPID PANEL [ADDED]2021-03-10 00:00:00





             Test Item    Value        Reference Range Interpretation Comments

 

             CHOLESTEROL (test code = 2210) 178 MG/DL                           

   

 

             TRIGLYCERIDES (test code = 2232) 79 MG/DL                          

     

 

             HDL CHOLESTEROL (test code = 2220) 61 MG/DL                        

       

 

             CALC LDL CHOL (test code = 2237) 100 MG/DL                         

     

 

             RISK RATIO LDL/HDL (test code = 1.64 RATIO                         

    



             2238)                                               



TSH, THIRD GENERATION [ADDED]2021-03-10 00:00:00





             Test Item    Value        Reference Range Interpretation Comments

 

             TSH, THIRD GENERATION (test code 1.330 UIU/ML                      

     



             = 2821)                                             



TSH, THIRD GENERATION [ADDED]2021-03-10 00:00:00





             Test Item    Value        Reference Range Interpretation Comments

 

             TSH, THIRD GENERATION (test code 1.330 UIU/ML                      

     



             = 2821)                                             



TSH, THIRD GENERATION [ADDED]2021-03-10 00:00:00





             Test Item    Value        Reference Range Interpretation Comments

 

             TSH, THIRD GENERATION (test code 1.330 UIU/ML                      

     



             = 2821)                                             



CBC W/AUTO DIFF2021-03-10 00:00:00





             Test Item    Value        Reference Range Interpretation Comments

 

             WBC (test code = 1001) 4.1 K/UL                               

 

             RBC (test code = 1002) 5.05 M/UL                              

 

             HEMOGLOBIN (test code = 1003) 11.7 G/DL                            

  

 

             HEMATOCRIT (test code = 1004) 38.0 %                               

  

 

             MCV (test code = 1005) 75.2 fL                                

 

             MCH (test code = 1006) 23.2 PG                                

 

             MCHC (test code = 1007) 30.8 G/DL                              

 

             RDW (test code = 1038) 16.0 %                                 

 

             NEUTROPHILS (test code = 1008) 35.6 %                              

   

 

             LYMPHOCYTES (test code = 1010) 49.8 %                              

   

 

             MONOCYTES (test code = 1011) 10.0 %                                

 

 

             EOSINOPHILS (test code = 1012) 4.1 %                               

   

 

             BASOPHILS (test code = 1013) 0.5 %                                 

 

 

             PLATELET COUNT (test code = 1015) 335 K/UL                         

      



CBC W/AUTO DIFF2021-03-10 00:00:00





             Test Item    Value        Reference Range Interpretation Comments

 

             WBC (test code = 1001) 4.1 K/UL                               

 

             RBC (test code = 1002) 5.05 M/UL                              

 

             HEMOGLOBIN (test code = 1003) 11.7 G/DL                            

  

 

             HEMATOCRIT (test code = 1004) 38.0 %                               

  

 

             MCV (test code = 1005) 75.2 fL                                

 

             MCH (test code = 1006) 23.2 PG                                

 

             MCHC (test code = 1007) 30.8 G/DL                              

 

             RDW (test code = 1038) 16.0 %                                 

 

             NEUTROPHILS (test code = 1008) 35.6 %                              

   

 

             LYMPHOCYTES (test code = 1010) 49.8 %                              

   

 

             MONOCYTES (test code = 1011) 10.0 %                                

 

 

             EOSINOPHILS (test code = 1012) 4.1 %                               

   

 

             BASOPHILS (test code = 1013) 0.5 %                                 

 

 

             PLATELET COUNT (test code = 1015) 335 K/UL                         

      



CBC W/AUTO DIFF2021-03-10 00:00:00





             Test Item    Value        Reference Range Interpretation Comments

 

             WBC (test code = 1001) 4.1 K/UL                               

 

             RBC (test code = 1002) 5.05 M/UL                              

 

             HEMOGLOBIN (test code = 1003) 11.7 G/DL                            

  

 

             HEMATOCRIT (test code = 1004) 38.0 %                               

  

 

             MCV (test code = 1005) 75.2 fL                                

 

             MCH (test code = 1006) 23.2 PG                                

 

             MCHC (test code = 1007) 30.8 G/DL                              

 

             RDW (test code = 1038) 16.0 %                                 

 

             NEUTROPHILS (test code = 1008) 35.6 %                              

   

 

             LYMPHOCYTES (test code = 1010) 49.8 %                              

   

 

             MONOCYTES (test code = 1011) 10.0 %                                

 

 

             EOSINOPHILS (test code = 1012) 4.1 %                               

   

 

             BASOPHILS (test code = 1013) 0.5 %                                 

 

 

             PLATELET COUNT (test code = 1015) 335 K/UL                         

      



HEMOGLOBIN A1c [ADDED]2021-03-10 00:00:00





             Test Item    Value        Reference Range Interpretation Comments

 

             HEMOGLOBIN A1c (test code = 46159) 4.8 %                           

       



HEMOGLOBIN A1c [ADDED]2021-03-10 00:00:00





             Test Item    Value        Reference Range Interpretation Comments

 

             HEMOGLOBIN A1c (test code = 79845) 4.8 %                           

       



HEMOGLOBIN A1c [ADDED]2021-03-10 00:00:00





             Test Item    Value        Reference Range Interpretation Comments

 

             HEMOGLOBIN A1c (test code = 95879) 4.8 %                           

       



LIPID [id number] 00:00:00





             Test Item    Value        Reference Range Interpretation Comments

 

             CHOLESTEROL (test code = 2210) 210 MG/DL                           

   

 

             TRIGLYCERIDES (test code = 2232) 80 MG/DL                          

     

 

             HDL CHOLESTEROL (test code = 2220) 94 MG/DL                        

       

 

             CALC LDL CHOL (test code = 2237) 99 MG/DL                          

     

 

             RISK RATIO LDL/HDL (test code = 1.05 RATIO                         

    



             2238)                                               



LIPID [id number] 00:00:00





             Test Item    Value        Reference Range Interpretation Comments

 

             CHOLESTEROL (test code = 2210) 210 MG/DL                           

   

 

             TRIGLYCERIDES (test code = 2232) 80 MG/DL                          

     

 

             HDL CHOLESTEROL (test code = 2220) 94 MG/DL                        

       

 

             CALC LDL CHOL (test code = 2237) 99 MG/DL                          

     

 

             RISK RATIO LDL/HDL (test code = 1.05 RATIO                         

    



             2238)                                               



COMPREHENSIVE METABOLIC [id number] 00:00:00





             Test Item    Value        Reference Range Interpretation Comments

 

             GLUCOSE (test code = 2217) 89 MG/DL                               

 

             BUN (test code = 2208) 6 MG/DL                                

 

             CREATININE (test code = 2214) 0.58 MG/DL                           

  

 

             eGFR  AMER. (test code 128 ML/MIN/1.73                      

     



             = 81024)                                            

 

             eGFR NON- AMER. (test 111 ML/MIN/1.73                       

    



             code = 10507)                                        

 

             CALC BUN/CREAT (test code = 10 RATIO                               



             2235)                                               

 

             SODIUM (test code = 2231) 136 MEQ/L                              

 

             POTASSIUM (test code = 2228) 3.8 MEQ/L                             

 

 

             CHLORIDE (test code = 2215) 99 MEQ/L                               

 

             CARBON DIOXIDE (test code = 27 MEQ/L                               



             )                                               

 

             CALCIUM (test code = 2209) 9.4 MG/DL                              

 

             PROTEIN, TOTAL (test code = 8.6 G/DL                               



             )                                               

 

             ALBUMIN (test code = 2201) 4.1 G/DL                               

 

             CALC GLOBULIN (test code = 4.5 G/DL                               



             2240)                                               

 

             CALC A/G RATIO (test code = 0.9 RATIO                              



             2234)                                               

 

             BILIRUBIN, TOTAL (test code = 0.3 MG/DL                            

  



             )                                               

 

             ALKALINE PHOSPHATASE (test 117 U/L                                



             code = 2204)                                        

 

             AST (test code = 2218) 55 U/L                                 

 

             ALT (test code = 2219) 47 U/L                                 



COMPREHENSIVE METABOLIC [id number] 00:00:00





             Test Item    Value        Reference Range Interpretation Comments

 

             GLUCOSE (test code = 2217) 89 MG/DL                               

 

             BUN (test code = 2208) 6 MG/DL                                

 

             CREATININE (test code = 2214) 0.58 MG/DL                           

  

 

             eGFR  AMER. (test code 128 ML/MIN/1.73                      

     



             = 38350)                                            

 

             eGFR NON- AMER. (test 111 ML/MIN/1.73                       

    



             code = 42067)                                        

 

             CALC BUN/CREAT (test code = 10 RATIO                               



             2235)                                               

 

             SODIUM (test code = 2231) 136 MEQ/L                              

 

             POTASSIUM (test code = 2228) 3.8 MEQ/L                             

 

 

             CHLORIDE (test code = 2215) 99 MEQ/L                               

 

             CARBON DIOXIDE (test code = 27 MEQ/L                               



             )                                               

 

             CALCIUM (test code = 2209) 9.4 MG/DL                              

 

             PROTEIN, TOTAL (test code = 8.6 G/DL                               



             )                                               

 

             ALBUMIN (test code = 2201) 4.1 G/DL                               

 

             CALC GLOBULIN (test code = 4.5 G/DL                               



             )                                               

 

             CALC A/G RATIO (test code = 0.9 RATIO                              



             )                                               

 

             BILIRUBIN, TOTAL (test code = 0.3 MG/DL                            

  



             )                                               

 

             ALKALINE PHOSPHATASE (test 117 U/L                                



             code = 2204)                                        

 

             AST (test code = 2218) 55 U/L                                 

 

             ALT (test code = 2219) 47 U/L                                 



[id number] 00:00:00





             Test Item    Value        Reference Range Interpretation Comments

 

             TSH, THIRD GENERATION (test code 1.530 UIU/ML                      

     



             = 2821)                                             



[id number] 00:00:00





             Test Item    Value        Reference Range Interpretation Comments

 

             TSH, THIRD GENERATION (test code 1.530 UIU/ML                      

     



             = 2821)                                             



[id number] 00:00:00





             Test Item    Value        Reference Range Interpretation Comments

 

             TSH, THIRD GENERATION (test code 1.530 UIU/ML                      

     



             = 2821)                                             



ACUTE HEPATITIS [id number] 00:00:00





             Test Item    Value        Reference Range Interpretation Comments

 

             HEPATITIS A IgM (test code = NON-REACTIVE                          

 



             78908)                                              

 

             HEPATITIS B CORE IgM (test code NON-REACTIVE                       

    



             = 4644)                                             

 

             HEPATITIS B SURF AG (test code = NON-REACTIVE                      

     



             2739)                                               

 

             HEPATITIS C ANTIBODY (test code REACTIVE                           

    



             = 4675)                                             

 

             INTERPRETATION HEPATITIS A: (NOTE)                                 



             (test code = 2552)                                        

 

             INTERPRETATION HEPATITIS B: (NOTE)                                 



             (test code = 82079)                                        

 

             INTERPRETATION HEPATITIS C: (NOTE)                                 



             (test code = 09546)                                        



ACUTE HEPATITIS [id number] 00:00:00





             Test Item    Value        Reference Range Interpretation Comments

 

             HEPATITIS A IgM (test code = NON-REACTIVE                          

 



             53407)                                              

 

             HEPATITIS B CORE IgM (test code NON-REACTIVE                       

    



             = 4644)                                             

 

             HEPATITIS B SURF AG (test code = NON-REACTIVE                      

     



             2739)                                               

 

             HEPATITIS C ANTIBODY (test code REACTIVE                           

    



             = 4675)                                             

 

             INTERPRETATION HEPATITIS A: (NOTE)                                 



             (test code = 2552)                                        

 

             INTERPRETATION HEPATITIS B: (NOTE)                                 



             (test code = 12901)                                        

 

             INTERPRETATION HEPATITIS C: (NOTE)                                 



             (test code = 33576)                                        



CBC W/AUTO [id number] 00:00:00





             Test Item    Value        Reference Range Interpretation Comments

 

             WBC (test code = 1001) 4.7 K/UL                               

 

             RBC (test code = 1002) 5.15 M/UL                              

 

             HEMOGLOBIN (test code = 1003) 12.0 G/DL                            

  

 

             HEMATOCRIT (test code = 1004) 38.7 %                               

  

 

             MCV (test code = 1005) 75.1 fL                                

 

             MCH (test code = 1006) 23.3 PG                                

 

             MCHC (test code = 1007) 31.0 G/DL                              

 

             RDW (test code = 1038) 15.7 %                                 

 

             NEUTROPHILS (test code = 1008) 57.0 %                              

   

 

             LYMPHOCYTES (test code = 1010) 31.6 %                              

   

 

             MONOCYTES (test code = 1011) 8.5 %                                 

 

 

             EOSINOPHILS (test code = 1012) 2.3 %                               

   

 

             BASOPHILS (test code = 1013) 0.6 %                                 

 

 

             PLATELET COUNT (test code = 1015) 344 K/UL                         

      



CBC W/AUTO [id number] 00:00:00





             Test Item    Value        Reference Range Interpretation Comments

 

             WBC (test code = 1001) 4.7 K/UL                               

 

             RBC (test code = 1002) 5.15 M/UL                              

 

             HEMOGLOBIN (test code = 1003) 12.0 G/DL                            

  

 

             HEMATOCRIT (test code = 1004) 38.7 %                               

  

 

             MCV (test code = 1005) 75.1 fL                                

 

             MCH (test code = 1006) 23.3 PG                                

 

             MCHC (test code = 1007) 31.0 G/DL                              

 

             RDW (test code = 1038) 15.7 %                                 

 

             NEUTROPHILS (test code = 1008) 57.0 %                              

   

 

             LYMPHOCYTES (test code = 1010) 31.6 %                              

   

 

             MONOCYTES (test code = 1011) 8.5 %                                 

 

 

             EOSINOPHILS (test code = 1012) 2.3 %                               

   

 

             BASOPHILS (test code = 1013) 0.6 %                                 

 

 

             PLATELET COUNT (test code = 1015) 344 K/UL                         

      



CBC W/AUTO [id number] 00:00:00





             Test Item    Value        Reference Range Interpretation Comments

 

             WBC (test code = 1001) 4.7 K/UL                               

 

             RBC (test code = 1002) 5.15 M/UL                              

 

             HEMOGLOBIN (test code = 1003) 12.0 G/DL                            

  

 

             HEMATOCRIT (test code = 1004) 38.7 %                               

  

 

             MCV (test code = 1005) 75.1 fL                                

 

             MCH (test code = 1006) 23.3 PG                                

 

             MCHC (test code = 1007) 31.0 G/DL                              

 

             RDW (test code = 1038) 15.7 %                                 

 

             NEUTROPHILS (test code = 1008) 57.0 %                              

   

 

             LYMPHOCYTES (test code = 1010) 31.6 %                              

   

 

             MONOCYTES (test code = 1011) 8.5 %                                 

 

 

             EOSINOPHILS (test code = 1012) 2.3 %                               

   

 

             BASOPHILS (test code = 1013) 0.6 %                                 

 

 

             PLATELET COUNT (test code = 1015) 344 K/UL                         

      



HEMOGLOBIN [id number] 00:00:00





             Test Item    Value        Reference Range Interpretation Comments

 

             HEMOGLOBIN A1c (test code = 21048) 4.9 %                           

       



HEMOGLOBIN [id number] 00:00:00





             Test Item    Value        Reference Range Interpretation Comments

 

             HEMOGLOBIN A1c (test code = 55689) 4.9 %                           

       



HEMOGLOBIN [id number] 00:00:00





             Test Item    Value        Reference Range Interpretation Comments

 

             HEMOGLOBIN A1c (test code = 77118) 4.9 %                           

       



LIPID [id number] 00:00:00





             Test Item    Value        Reference Range Interpretation Comments

 

             CHOLESTEROL (test code = 2210) 210 MG/DL                           

   

 

             TRIGLYCERIDES (test code = 2232) 80 MG/DL                          

     

 

             HDL CHOLESTEROL (test code = 2220) 94 MG/DL                        

       

 

             CALC LDL CHOL (test code = 2237) 99 MG/DL                          

     

 

             RISK RATIO LDL/HDL (test code = 1.05 RATIO                         

    



             2238)                                               



LIPID [id number] 00:00:00





             Test Item    Value        Reference Range Interpretation Comments

 

             CHOLESTEROL (test code = 2210) 210 MG/DL                           

   

 

             TRIGLYCERIDES (test code = 2232) 80 MG/DL                          

     

 

             HDL CHOLESTEROL (test code = 2220) 94 MG/DL                        

       

 

             CALC LDL CHOL (test code = 2237) 99 MG/DL                          

     

 

             RISK RATIO LDL/HDL (test code = 1.05 RATIO                         

    



             2238)                                               



COMPREHENSIVE METABOLIC [id number] 00:00:00





             Test Item    Value        Reference Range Interpretation Comments

 

             GLUCOSE (test code = 2217) 89 MG/DL                               

 

             BUN (test code = 2208) 6 MG/DL                                

 

             CREATININE (test code = 2214) 0.58 MG/DL                           

  

 

             eGFR  AMER. (test code 128 ML/MIN/1.73                      

     



             = 59410)                                            

 

             eGFR NON- AMER. (test 111 ML/MIN/1.73                       

    



             code = 24091)                                        

 

             CALC BUN/CREAT (test code = 10 RATIO                               



             2235)                                               

 

             SODIUM (test code = 2231) 136 MEQ/L                              

 

             POTASSIUM (test code = 2228) 3.8 MEQ/L                             

 

 

             CHLORIDE (test code = 2215) 99 MEQ/L                               

 

             CARBON DIOXIDE (test code = 27 MEQ/L                               



             2206)                                               

 

             CALCIUM (test code = 2209) 9.4 MG/DL                              

 

             PROTEIN, TOTAL (test code = 8.6 G/DL                               



             )                                               

 

             ALBUMIN (test code = 2201) 4.1 G/DL                               

 

             CALC GLOBULIN (test code = 4.5 G/DL                               



             2240)                                               

 

             CALC A/G RATIO (test code = 0.9 RATIO                              



             2234)                                               

 

             BILIRUBIN, TOTAL (test code = 0.3 MG/DL                            

  



             )                                               

 

             ALKALINE PHOSPHATASE (test 117 U/L                                



             code = 220)                                        

 

             AST (test code = 2218) 55 U/L                                 

 

             ALT (test code = 2219) 47 U/L                                 



COMPREHENSIVE METABOLIC [id number] 00:00:00





             Test Item    Value        Reference Range Interpretation Comments

 

             GLUCOSE (test code = 2217) 89 MG/DL                               

 

             BUN (test code = 2208) 6 MG/DL                                

 

             CREATININE (test code = 2214) 0.58 MG/DL                           

  

 

             eGFR  AMER. (test code 128 ML/MIN/1.73                      

     



             = 25070)                                            

 

             eGFR NON- AMER. (test 111 ML/MIN/1.73                       

    



             code = 77682)                                        

 

             CALC BUN/CREAT (test code = 10 RATIO                               



             2235)                                               

 

             SODIUM (test code = 2231) 136 MEQ/L                              

 

             POTASSIUM (test code = 2228) 3.8 MEQ/L                             

 

 

             CHLORIDE (test code = 2215) 99 MEQ/L                               

 

             CARBON DIOXIDE (test code = 27 MEQ/L                               



             2206)                                               

 

             CALCIUM (test code = 2209) 9.4 MG/DL                              

 

             PROTEIN, TOTAL (test code = 8.6 G/DL                               



             )                                               

 

             ALBUMIN (test code = 2201) 4.1 G/DL                               

 

             CALC GLOBULIN (test code = 4.5 G/DL                               



             2240)                                               

 

             CALC A/G RATIO (test code = 0.9 RATIO                              



             2234)                                               

 

             BILIRUBIN, TOTAL (test code = 0.3 MG/DL                            

  



             )                                               

 

             ALKALINE PHOSPHATASE (test 117 U/L                                



             code = 2204)                                        

 

             AST (test code = 2218) 55 U/L                                 

 

             ALT (test code = 2219) 47 U/L                                 



[id number] 00:00:00





             Test Item    Value        Reference Range Interpretation Comments

 

             TSH, THIRD GENERATION (test code 1.530 UIU/ML                      

     



             = 2821)                                             



[id number] 00:00:00





             Test Item    Value        Reference Range Interpretation Comments

 

             TSH, THIRD GENERATION (test code 1.530 UIU/ML                      

     



             = 2821)                                             



[id number] 00:00:00





             Test Item    Value        Reference Range Interpretation Comments

 

             TSH, THIRD GENERATION (test code 1.530 UIU/ML                      

     



             = 2821)                                             



ACUTE HEPATITIS [id number] 00:00:00





             Test Item    Value        Reference Range Interpretation Comments

 

             HEPATITIS A IgM (test code = NON-REACTIVE                          

 



             59237)                                              

 

             HEPATITIS B CORE IgM (test code NON-REACTIVE                       

    



             = 4644)                                             

 

             HEPATITIS B SURF AG (test code = NON-REACTIVE                      

     



             2739)                                               

 

             HEPATITIS C ANTIBODY (test code REACTIVE                           

    



             = 4675)                                             

 

             INTERPRETATION HEPATITIS A: (NOTE)                                 



             (test code = 2552)                                        

 

             INTERPRETATION HEPATITIS B: (NOTE)                                 



             (test code = 00445)                                        

 

             INTERPRETATION HEPATITIS C: (NOTE)                                 



             (test code = 17201)                                        



ACUTE HEPATITIS [id number] 00:00:00





             Test Item    Value        Reference Range Interpretation Comments

 

             HEPATITIS A IgM (test code = NON-REACTIVE                          

 



             97084)                                              

 

             HEPATITIS B CORE IgM (test code NON-REACTIVE                       

    



             = 4644)                                             

 

             HEPATITIS B SURF AG (test code = NON-REACTIVE                      

     



             2739)                                               

 

             HEPATITIS C ANTIBODY (test code REACTIVE                           

    



             = 4675)                                             

 

             INTERPRETATION HEPATITIS A: (NOTE)                                 



             (test code = 2552)                                        

 

             INTERPRETATION HEPATITIS B: (NOTE)                                 



             (test code = 75013)                                        

 

             INTERPRETATION HEPATITIS C: (NOTE)                                 



             (test code = 29238)                                        



CBC W/AUTO [id number] 00:00:00





             Test Item    Value        Reference Range Interpretation Comments

 

             WBC (test code = 1001) 4.7 K/UL                               

 

             RBC (test code = 1002) 5.15 M/UL                              

 

             HEMOGLOBIN (test code = 1003) 12.0 G/DL                            

  

 

             HEMATOCRIT (test code = 1004) 38.7 %                               

  

 

             MCV (test code = 1005) 75.1 fL                                

 

             MCH (test code = 1006) 23.3 PG                                

 

             MCHC (test code = 1007) 31.0 G/DL                              

 

             RDW (test code = 1038) 15.7 %                                 

 

             NEUTROPHILS (test code = 1008) 57.0 %                              

   

 

             LYMPHOCYTES (test code = 1010) 31.6 %                              

   

 

             MONOCYTES (test code = 1011) 8.5 %                                 

 

 

             EOSINOPHILS (test code = 1012) 2.3 %                               

   

 

             BASOPHILS (test code = 1013) 0.6 %                                 

 

 

             PLATELET COUNT (test code = 1015) 344 K/UL                         

      



CBC W/AUTO [id number] 00:00:00





             Test Item    Value        Reference Range Interpretation Comments

 

             WBC (test code = 1001) 4.7 K/UL                               

 

             RBC (test code = 1002) 5.15 M/UL                              

 

             HEMOGLOBIN (test code = 1003) 12.0 G/DL                            

  

 

             HEMATOCRIT (test code = 1004) 38.7 %                               

  

 

             MCV (test code = 1005) 75.1 fL                                

 

             MCH (test code = 1006) 23.3 PG                                

 

             MCHC (test code = 1007) 31.0 G/DL                              

 

             RDW (test code = 1038) 15.7 %                                 

 

             NEUTROPHILS (test code = 1008) 57.0 %                              

   

 

             LYMPHOCYTES (test code = 1010) 31.6 %                              

   

 

             MONOCYTES (test code = 1011) 8.5 %                                 

 

 

             EOSINOPHILS (test code = 1012) 2.3 %                               

   

 

             BASOPHILS (test code = 1013) 0.6 %                                 

 

 

             PLATELET COUNT (test code = 1015) 344 K/UL                         

      



CBC W/AUTO [id number] 00:00:00





             Test Item    Value        Reference Range Interpretation Comments

 

             WBC (test code = 1001) 4.7 K/UL                               

 

             RBC (test code = 1002) 5.15 M/UL                              

 

             HEMOGLOBIN (test code = 1003) 12.0 G/DL                            

  

 

             HEMATOCRIT (test code = 1004) 38.7 %                               

  

 

             MCV (test code = 1005) 75.1 fL                                

 

             MCH (test code = 1006) 23.3 PG                                

 

             MCHC (test code = 1007) 31.0 G/DL                              

 

             RDW (test code = 1038) 15.7 %                                 

 

             NEUTROPHILS (test code = 1008) 57.0 %                              

   

 

             LYMPHOCYTES (test code = 1010) 31.6 %                              

   

 

             MONOCYTES (test code = 1011) 8.5 %                                 

 

 

             EOSINOPHILS (test code = 1012) 2.3 %                               

   

 

             BASOPHILS (test code = 1013) 0.6 %                                 

 

 

             PLATELET COUNT (test code = 1015) 344 K/UL                         

      



HEMOGLOBIN [id number] 00:00:00





             Test Item    Value        Reference Range Interpretation Comments

 

             HEMOGLOBIN A1c (test code = 99921) 4.9 %                           

       



HEMOGLOBIN [id number] 00:00:00





             Test Item    Value        Reference Range Interpretation Comments

 

             HEMOGLOBIN A1c (test code = 19194) 4.9 %                           

       



HEMOGLOBIN [id number] 00:00:00





             Test Item    Value        Reference Range Interpretation Comments

 

             HEMOGLOBIN A1c (test code = 10724) 4.9 %                           

       



LIPID [id number] 00:00:00





             Test Item    Value        Reference Range Interpretation Comments

 

             CHOLESTEROL (test code = 2210) 210 MG/DL                           

   

 

             TRIGLYCERIDES (test code = 2232) 80 MG/DL                          

     

 

             HDL CHOLESTEROL (test code = 2220) 94 MG/DL                        

       

 

             CALC LDL CHOL (test code = 2237) 99 MG/DL                          

     

 

             RISK RATIO LDL/HDL (test code = 1.05 RATIO                         

    



             2238)                                               



LIPID [id number] 00:00:00





             Test Item    Value        Reference Range Interpretation Comments

 

             CHOLESTEROL (test code = 2210) 210 MG/DL                           

   

 

             TRIGLYCERIDES (test code = 2232) 80 MG/DL                          

     

 

             HDL CHOLESTEROL (test code = 2220) 94 MG/DL                        

       

 

             CALC LDL CHOL (test code = 2237) 99 MG/DL                          

     

 

             RISK RATIO LDL/HDL (test code = 1.05 RATIO                         

    



             2238)                                               



COMPREHENSIVE METABOLIC [id number] 00:00:00





             Test Item    Value        Reference Range Interpretation Comments

 

             GLUCOSE (test code = 2217) 89 MG/DL                               

 

             BUN (test code = 2208) 6 MG/DL                                

 

             CREATININE (test code = 2214) 0.58 MG/DL                           

  

 

             eGFR  AMER. (test code 128 ML/MIN/1.73                      

     



             = 52284)                                            

 

             eGFR NON- AMER. (test 111 ML/MIN/1.73                       

    



             code = 70038)                                        

 

             CALC BUN/CREAT (test code = 10 RATIO                               



             2235)                                               

 

             SODIUM (test code = 2231) 136 MEQ/L                              

 

             POTASSIUM (test code = 2228) 3.8 MEQ/L                             

 

 

             CHLORIDE (test code = 2215) 99 MEQ/L                               

 

             CARBON DIOXIDE (test code = 27 MEQ/L                               



             )                                               

 

             CALCIUM (test code = 2209) 9.4 MG/DL                              

 

             PROTEIN, TOTAL (test code = 8.6 G/DL                               



             )                                               

 

             ALBUMIN (test code = 220) 4.1 G/DL                               

 

             CALC GLOBULIN (test code = 4.5 G/DL                               



             )                                               

 

             CALC A/G RATIO (test code = 0.9 RATIO                              



             2234)                                               

 

             BILIRUBIN, TOTAL (test code = 0.3 MG/DL                            

  



             )                                               

 

             ALKALINE PHOSPHATASE (test 117 U/L                                



             code = 2204)                                        

 

             AST (test code = 2218) 55 U/L                                 

 

             ALT (test code = 2219) 47 U/L                                 



COMPREHENSIVE METABOLIC [id number] 00:00:00





             Test Item    Value        Reference Range Interpretation Comments

 

             GLUCOSE (test code = 2217) 89 MG/DL                               

 

             BUN (test code = 2208) 6 MG/DL                                

 

             CREATININE (test code = 2214) 0.58 MG/DL                           

  

 

             eGFR  AMER. (test code 128 ML/MIN/1.73                      

     



             = 86389)                                            

 

             eGFR NON- AMER. (test 111 ML/MIN/1.73                       

    



             code = 00033)                                        

 

             CALC BUN/CREAT (test code = 10 RATIO                               



             2235)                                               

 

             SODIUM (test code = 2231) 136 MEQ/L                              

 

             POTASSIUM (test code = 2228) 3.8 MEQ/L                             

 

 

             CHLORIDE (test code = 2215) 99 MEQ/L                               

 

             CARBON DIOXIDE (test code = 27 MEQ/L                               



             )                                               

 

             CALCIUM (test code = 2209) 9.4 MG/DL                              

 

             PROTEIN, TOTAL (test code = 8.6 G/DL                               



             222)                                               

 

             ALBUMIN (test code = 2201) 4.1 G/DL                               

 

             CALC GLOBULIN (test code = 4.5 G/DL                               



             2240)                                               

 

             CALC A/G RATIO (test code = 0.9 RATIO                              



             2234)                                               

 

             BILIRUBIN, TOTAL (test code = 0.3 MG/DL                            

  



             220)                                               

 

             ALKALINE PHOSPHATASE (test 117 U/L                                



             code = 2204)                                        

 

             AST (test code = 2218) 55 U/L                                 

 

             ALT (test code = 2219) 47 U/L                                 



[id number] 00:00:00





             Test Item    Value        Reference Range Interpretation Comments

 

             TSH, THIRD GENERATION (test code 1.530 UIU/ML                      

     



             = 2821)                                             



 00:00:00





             Test Item    Value        Reference Range Interpretation Comments

 

             TSH, THIRD GENERATION (test code 1.530 UIU/ML                      

     



             = 2821)                                             



[id number] 00:00:00





             Test Item    Value        Reference Range Interpretation Comments

 

             TSH, THIRD GENERATION (test code 1.530 UIU/ML                      

     



             = 2821)                                             



ACUTE HEPATITIS [id number] 00:00:00





             Test Item    Value        Reference Range Interpretation Comments

 

             HEPATITIS A IgM (test code = NON-REACTIVE                          

 



             08191)                                              

 

             HEPATITIS B CORE IgM (test code NON-REACTIVE                       

    



             = 9729)                                             

 

             HEPATITIS B SURF AG (test code = NON-REACTIVE                      

     



             1166)                                               

 

             HEPATITIS C ANTIBODY (test code REACTIVE                           

    



             = 4675)                                             

 

             INTERPRETATION HEPATITIS A: (NOTE)                                 



             (test code = 2552)                                        

 

             INTERPRETATION HEPATITIS B: (NOTE)                                 



             (test code = 31808)                                        

 

             INTERPRETATION HEPATITIS C: (NOTE)                                 



             (test code = 24477)                                        



ACUTE HEPATITIS [id number] 00:00:00





             Test Item    Value        Reference Range Interpretation Comments

 

             HEPATITIS A IgM (test code = NON-REACTIVE                          

 



             84666)                                              

 

             HEPATITIS B CORE IgM (test code NON-REACTIVE                       

    



             = 4644)                                             

 

             HEPATITIS B SURF AG (test code = NON-REACTIVE                      

     



             2739)                                               

 

             HEPATITIS C ANTIBODY (test code REACTIVE                           

    



             = 4675)                                             

 

             INTERPRETATION HEPATITIS A: (NOTE)                                 



             (test code = 2552)                                        

 

             INTERPRETATION HEPATITIS B: (NOTE)                                 



             (test code = 30224)                                        

 

             INTERPRETATION HEPATITIS C: (NOTE)                                 



             (test code = 64275)                                        



CBC W/AUTO [id number] 00:00:00





             Test Item    Value        Reference Range Interpretation Comments

 

             WBC (test code = 1001) 4.7 K/UL                               

 

             RBC (test code = 1002) 5.15 M/UL                              

 

             HEMOGLOBIN (test code = 1003) 12.0 G/DL                            

  

 

             HEMATOCRIT (test code = 1004) 38.7 %                               

  

 

             MCV (test code = 1005) 75.1 fL                                

 

             MCH (test code = 1006) 23.3 PG                                

 

             MCHC (test code = 1007) 31.0 G/DL                              

 

             RDW (test code = 1038) 15.7 %                                 

 

             NEUTROPHILS (test code = 1008) 57.0 %                              

   

 

             LYMPHOCYTES (test code = 1010) 31.6 %                              

   

 

             MONOCYTES (test code = 1011) 8.5 %                                 

 

 

             EOSINOPHILS (test code = 1012) 2.3 %                               

   

 

             BASOPHILS (test code = 1013) 0.6 %                                 

 

 

             PLATELET COUNT (test code = 1015) 344 K/UL                         

      



CBC W/AUTO [id number] 00:00:00





             Test Item    Value        Reference Range Interpretation Comments

 

             WBC (test code = 1001) 4.7 K/UL                               

 

             RBC (test code = 1002) 5.15 M/UL                              

 

             HEMOGLOBIN (test code = 1003) 12.0 G/DL                            

  

 

             HEMATOCRIT (test code = 1004) 38.7 %                               

  

 

             MCV (test code = 1005) 75.1 fL                                

 

             MCH (test code = 1006) 23.3 PG                                

 

             MCHC (test code = 1007) 31.0 G/DL                              

 

             RDW (test code = 1038) 15.7 %                                 

 

             NEUTROPHILS (test code = 1008) 57.0 %                              

   

 

             LYMPHOCYTES (test code = 1010) 31.6 %                              

   

 

             MONOCYTES (test code = 1011) 8.5 %                                 

 

 

             EOSINOPHILS (test code = 1012) 2.3 %                               

   

 

             BASOPHILS (test code = 1013) 0.6 %                                 

 

 

             PLATELET COUNT (test code = 1015) 344 K/UL                         

      



CBC W/AUTO [id number] 00:00:00





             Test Item    Value        Reference Range Interpretation Comments

 

             WBC (test code = 1001) 4.7 K/UL                               

 

             RBC (test code = 1002) 5.15 M/UL                              

 

             HEMOGLOBIN (test code = 1003) 12.0 G/DL                            

  

 

             HEMATOCRIT (test code = 1004) 38.7 %                               

  

 

             MCV (test code = 1005) 75.1 fL                                

 

             MCH (test code = 1006) 23.3 PG                                

 

             MCHC (test code = 1007) 31.0 G/DL                              

 

             RDW (test code = 1038) 15.7 %                                 

 

             NEUTROPHILS (test code = 1008) 57.0 %                              

   

 

             LYMPHOCYTES (test code = 1010) 31.6 %                              

   

 

             MONOCYTES (test code = 1011) 8.5 %                                 

 

 

             EOSINOPHILS (test code = 1012) 2.3 %                               

   

 

             BASOPHILS (test code = 1013) 0.6 %                                 

 

 

             PLATELET COUNT (test code = 1015) 344 K/UL                         

      



HEMOGLOBIN [id number] 00:00:00





             Test Item    Value        Reference Range Interpretation Comments

 

             HEMOGLOBIN A1c (test code = 86179) 4.9 %                           

       



HEMOGLOBIN [id number] 00:00:00





             Test Item    Value        Reference Range Interpretation Comments

 

             HEMOGLOBIN A1c (test code = 77013) 4.9 %                           

       



HEMOGLOBIN [id number] 00:00:00





             Test Item    Value        Reference Range Interpretation Comments

 

             HEMOGLOBIN A1c (test code = 15322) 4.9 %                           

       



LIPID [id number] 00:00:00





             Test Item    Value        Reference Range Interpretation Comments

 

             CHOLESTEROL (test code = 2210) 210 MG/DL                           

   

 

             TRIGLYCERIDES (test code = 2232) 80 MG/DL                          

     

 

             HDL CHOLESTEROL (test code = 2220) 94 MG/DL                        

       

 

             CALC LDL CHOL (test code = 2237) 99 MG/DL                          

     

 

             RISK RATIO LDL/HDL (test code = 1.05 RATIO                         

    



             2238)                                               



LIPID [id number] 00:00:00





             Test Item    Value        Reference Range Interpretation Comments

 

             CHOLESTEROL (test code = 2210) 210 MG/DL                           

   

 

             TRIGLYCERIDES (test code = 2232) 80 MG/DL                          

     

 

             HDL CHOLESTEROL (test code = 2220) 94 MG/DL                        

       

 

             CALC LDL CHOL (test code = 2237) 99 MG/DL                          

     

 

             RISK RATIO LDL/HDL (test code = 1.05 RATIO                         

    



             2238)                                               



COMPREHENSIVE METABOLIC [id number] 00:00:00





             Test Item    Value        Reference Range Interpretation Comments

 

             GLUCOSE (test code = 2217) 89 MG/DL                               

 

             BUN (test code = 2208) 6 MG/DL                                

 

             CREATININE (test code = 2214) 0.58 MG/DL                           

  

 

             eGFR  AMER. (test code 128 ML/MIN/1.73                      

     



             = 00531)                                            

 

             eGFR NON- AMER. (test 111 ML/MIN/1.73                       

    



             code = 65050)                                        

 

             CALC BUN/CREAT (test code = 10 RATIO                               



             2235)                                               

 

             SODIUM (test code = 2231) 136 MEQ/L                              

 

             POTASSIUM (test code = 2228) 3.8 MEQ/L                             

 

 

             CHLORIDE (test code = 2215) 99 MEQ/L                               

 

             CARBON DIOXIDE (test code = 27 MEQ/L                               



             220)                                               

 

             CALCIUM (test code = 2209) 9.4 MG/DL                              

 

             PROTEIN, TOTAL (test code = 8.6 G/DL                               



             )                                               

 

             ALBUMIN (test code = 2201) 4.1 G/DL                               

 

             CALC GLOBULIN (test code = 4.5 G/DL                               



             )                                               

 

             CALC A/G RATIO (test code = 0.9 RATIO                              



             )                                               

 

             BILIRUBIN, TOTAL (test code = 0.3 MG/DL                            

  



             )                                               

 

             ALKALINE PHOSPHATASE (test 117 U/L                                



             code = 2204)                                        

 

             AST (test code = 2218) 55 U/L                                 

 

             ALT (test code = 2219) 47 U/L                                 



COMPREHENSIVE METABOLIC [id number] 00:00:00





             Test Item    Value        Reference Range Interpretation Comments

 

             GLUCOSE (test code = 2217) 89 MG/DL                               

 

             BUN (test code = 2208) 6 MG/DL                                

 

             CREATININE (test code = 2214) 0.58 MG/DL                           

  

 

             eGFR  AMER. (test code 128 ML/MIN/1.73                      

     



             = 52789)                                            

 

             eGFR NON- AMER. (test 111 ML/MIN/1.73                       

    



             code = 50676)                                        

 

             CALC BUN/CREAT (test code = 10 RATIO                               



             2235)                                               

 

             SODIUM (test code = 2231) 136 MEQ/L                              

 

             POTASSIUM (test code = 2228) 3.8 MEQ/L                             

 

 

             CHLORIDE (test code = 2215) 99 MEQ/L                               

 

             CARBON DIOXIDE (test code = 27 MEQ/L                               



             220)                                               

 

             CALCIUM (test code = 2209) 9.4 MG/DL                              

 

             PROTEIN, TOTAL (test code = 8.6 G/DL                               



             )                                               

 

             ALBUMIN (test code = 2201) 4.1 G/DL                               

 

             CALC GLOBULIN (test code = 4.5 G/DL                               



             2240)                                               

 

             CALC A/G RATIO (test code = 0.9 RATIO                              



             4)                                               

 

             BILIRUBIN, TOTAL (test code = 0.3 MG/DL                            

  



             220)                                               

 

             ALKALINE PHOSPHATASE (test 117 U/L                                



             code = 2204)                                        

 

             AST (test code = 2218) 55 U/L                                 

 

             ALT (test code = 2219) 47 U/L                                 



[id number] 00:00:00





             Test Item    Value        Reference Range Interpretation Comments

 

             TSH, THIRD GENERATION (test code 1.530 UIU/ML                      

     



             = 2821)                                             



[id number] 00:00:00





             Test Item    Value        Reference Range Interpretation Comments

 

             TSH, THIRD GENERATION (test code 1.530 UIU/ML                      

     



             = 2821)                                             



 00:00:00





             Test Item    Value        Reference Range Interpretation Comments

 

             TSH, THIRD GENERATION (test code 1.530 UIU/ML                      

     



             = 2821)                                             



ACUTE HEPATITIS [id number] 00:00:00





             Test Item    Value        Reference Range Interpretation Comments

 

             HEPATITIS A IgM (test code = NON-REACTIVE                          

 



             43847)                                              

 

             HEPATITIS B CORE IgM (test code NON-REACTIVE                       

    



             = 4644)                                             

 

             HEPATITIS B SURF AG (test code = NON-REACTIVE                      

     



             2739)                                               

 

             HEPATITIS C ANTIBODY (test code REACTIVE                           

    



             = 4675)                                             

 

             INTERPRETATION HEPATITIS A: (NOTE)                                 



             (test code = 2552)                                        

 

             INTERPRETATION HEPATITIS B: (NOTE)                                 



             (test code = 63685)                                        

 

             INTERPRETATION HEPATITIS C: (NOTE)                                 



             (test code = 08628)                                        



ACUTE HEPATITIS [id number] 00:00:00





             Test Item    Value        Reference Range Interpretation Comments

 

             HEPATITIS A IgM (test code = NON-REACTIVE                          

 



             84903)                                              

 

             HEPATITIS B CORE IgM (test code NON-REACTIVE                       

    



             = 4644)                                             

 

             HEPATITIS B SURF AG (test code = NON-REACTIVE                      

     



             2739)                                               

 

             HEPATITIS C ANTIBODY (test code REACTIVE                           

    



             = 4675)                                             

 

             INTERPRETATION HEPATITIS A: (NOTE)                                 



             (test code = 2552)                                        

 

             INTERPRETATION HEPATITIS B: (NOTE)                                 



             (test code = 35018)                                        

 

             INTERPRETATION HEPATITIS C: (NOTE)                                 



             (test code = 40324)                                        



CBC W/AUTO [id number] 00:00:00





             Test Item    Value        Reference Range Interpretation Comments

 

             WBC (test code = 1001) 4.7 K/UL                               

 

             RBC (test code = 1002) 5.15 M/UL                              

 

             HEMOGLOBIN (test code = 1003) 12.0 G/DL                            

  

 

             HEMATOCRIT (test code = 1004) 38.7 %                               

  

 

             MCV (test code = 1005) 75.1 fL                                

 

             MCH (test code = 1006) 23.3 PG                                

 

             MCHC (test code = 1007) 31.0 G/DL                              

 

             RDW (test code = 1038) 15.7 %                                 

 

             NEUTROPHILS (test code = 1008) 57.0 %                              

   

 

             LYMPHOCYTES (test code = 1010) 31.6 %                              

   

 

             MONOCYTES (test code = 1011) 8.5 %                                 

 

 

             EOSINOPHILS (test code = 1012) 2.3 %                               

   

 

             BASOPHILS (test code = 1013) 0.6 %                                 

 

 

             PLATELET COUNT (test code = 1015) 344 K/UL                         

      



CBC W/AUTO [id number] 00:00:00





             Test Item    Value        Reference Range Interpretation Comments

 

             WBC (test code = 1001) 4.7 K/UL                               

 

             RBC (test code = 1002) 5.15 M/UL                              

 

             HEMOGLOBIN (test code = 1003) 12.0 G/DL                            

  

 

             HEMATOCRIT (test code = 1004) 38.7 %                               

  

 

             MCV (test code = 1005) 75.1 fL                                

 

             MCH (test code = 1006) 23.3 PG                                

 

             MCHC (test code = 1007) 31.0 G/DL                              

 

             RDW (test code = 1038) 15.7 %                                 

 

             NEUTROPHILS (test code = 1008) 57.0 %                              

   

 

             LYMPHOCYTES (test code = 1010) 31.6 %                              

   

 

             MONOCYTES (test code = 1011) 8.5 %                                 

 

 

             EOSINOPHILS (test code = 1012) 2.3 %                               

   

 

             BASOPHILS (test code = 1013) 0.6 %                                 

 

 

             PLATELET COUNT (test code = 1015) 344 K/UL                         

      



CBC W/AUTO [id number] 00:00:00





             Test Item    Value        Reference Range Interpretation Comments

 

             WBC (test code = 1001) 4.7 K/UL                               

 

             RBC (test code = 1002) 5.15 M/UL                              

 

             HEMOGLOBIN (test code = 1003) 12.0 G/DL                            

  

 

             HEMATOCRIT (test code = 1004) 38.7 %                               

  

 

             MCV (test code = 1005) 75.1 fL                                

 

             MCH (test code = 1006) 23.3 PG                                

 

             MCHC (test code = 1007) 31.0 G/DL                              

 

             RDW (test code = 1038) 15.7 %                                 

 

             NEUTROPHILS (test code = 1008) 57.0 %                              

   

 

             LYMPHOCYTES (test code = 1010) 31.6 %                              

   

 

             MONOCYTES (test code = 1011) 8.5 %                                 

 

 

             EOSINOPHILS (test code = 1012) 2.3 %                               

   

 

             BASOPHILS (test code = 1013) 0.6 %                                 

 

 

             PLATELET COUNT (test code = 1015) 344 K/UL                         

      



HEMOGLOBIN [id number] 00:00:00





             Test Item    Value        Reference Range Interpretation Comments

 

             HEMOGLOBIN A1c (test code = 67171) 4.9 %                           

       



HEMOGLOBIN [id number] 00:00:00





             Test Item    Value        Reference Range Interpretation Comments

 

             HEMOGLOBIN A1c (test code = 90232) 4.9 %                           

       



HEMOGLOBIN [id number] 00:00:00





             Test Item    Value        Reference Range Interpretation Comments

 

             HEMOGLOBIN A1c (test code = 95052) 4.9 %                           

       



LIPID PANEL2020-01-15 00:00:00





             Test Item    Value        Reference Range Interpretation Comments

 

             CHOLESTEROL (test code = TEST NOT PERFORMED                        

   



             2210)        MG/DL                                  

 

             TRIGLYCERIDES (test code TEST NOT PERFORMED                        

   



             = 2232)      MG/DL                                  

 

             HDL CHOLESTEROL (test TEST NOT PERFORMED                           



             code = 2220) MG/DL                                  

 

             CALC LDL CHOL (test code TEST NOT PERFORMED                        

   



             = 2237)      MG/DL                                  

 

             RISK RATIO LDL/HDL (test TEST NOT PERFORMED                        

   



             code = 2238) RATIO                                  



LIPID PANEL2020-01-15 00:00:00





             Test Item    Value        Reference Range Interpretation Comments

 

             CHOLESTEROL (test code = TEST NOT PERFORMED                        

   



             2210)        MG/DL                                  

 

             TRIGLYCERIDES (test code TEST NOT PERFORMED                        

   



             = 2232)      MG/DL                                  

 

             HDL CHOLESTEROL (test TEST NOT PERFORMED                           



             code = 2220) MG/DL                                  

 

             CALC LDL CHOL (test code TEST NOT PERFORMED                        

   



             = 2237)      MG/DL                                  

 

             RISK RATIO LDL/HDL (test TEST NOT PERFORMED                        

   



             code = 2238) RATIO                                  



COMPREHENSIVE METABOLIC PANEL2020-01-15 00:00:00





             Test Item    Value        Reference Range Interpretation Comments

 

             GLUCOSE (test code = TEST NOT PERFORMED                           



             2217)        MG/DL                                  

 

             BUN (test code = 2208) TEST NOT PERFORMED                          

 



                          MG/DL                                  

 

             CREATININE (test code = TEST NOT PERFORMED                         

  



             2214)        MG/DL                                  

 

             eGFR  AMER. (test TEST NOT PERFORMED                        

   



             code = 59552) ML/MIN/1.73                            

 

             eGFR NON- AMER. TEST NOT PERFORMED                          

 



             (test code = 05812) ML/MIN/1.73                            

 

             CALC BUN/CREAT (test TEST NOT PERFORMED                           



             code = 2235) RATIO                                  

 

             SODIUM (test code = TEST NOT PERFORMED                           



             2231)        MEQ/L                                  

 

             POTASSIUM (test code = TEST NOT PERFORMED                          

 



             2228)        MEQ/L                                  

 

             CHLORIDE (test code = TEST NOT PERFORMED                           



             2215)        MEQ/L                                  

 

             CARBON DIOXIDE (test TEST NOT PERFORMED                           



             code = ) MEQ/L                                  

 

             CALCIUM (test code = TEST NOT PERFORMED                           



             )        MG/DL                                  

 

             PROTEIN, TOTAL (test TEST NOT PERFORMED                           



             code = ) G/DL                                   

 

             ALBUMIN (test code = TEST NOT PERFORMED                           



             )        G/DL                                   

 

             CALC GLOBULIN (test code TEST NOT PERFORMED                        

   



             = 2240)      G/DL                                   

 

             CALC A/G RATIO (test TEST NOT PERFORMED                           



             code = 2234) RATIO                                  

 

             BILIRUBIN, TOTAL (test TEST NOT PERFORMED                          

 



             code = ) MG/DL                                  

 

             ALKALINE PHOSPHATASE TEST NOT PERFORMED                           



             (test code = ) U/L                                    

 

             AST (test code = ) TEST NOT PERFORMED                          

 



                          U/L                                    

 

             ALT (test code = 221) TEST NOT PERFORMED                          

 



                          U/L                                    



COMPREHENSIVE METABOLIC PANEL2020-01-15 00:00:00





             Test Item    Value        Reference Range Interpretation Comments

 

             GLUCOSE (test code = TEST NOT PERFORMED                           



             )        MG/DL                                  

 

             BUN (test code = ) TEST NOT PERFORMED                          

 



                          MG/DL                                  

 

             CREATININE (test code = TEST NOT PERFORMED                         

  



             )        MG/DL                                  

 

             eGFR  AMER. (test TEST NOT PERFORMED                        

   



             code = 34905) ML/MIN/1.73                            

 

             eGFR NON- AMER. TEST NOT PERFORMED                          

 



             (test code = 48405) ML/MIN/1.73                            

 

             CALC BUN/CREAT (test TEST NOT PERFORMED                           



             code = ) RATIO                                  

 

             SODIUM (test code = TEST NOT PERFORMED                           



             )        MEQ/L                                  

 

             POTASSIUM (test code = TEST NOT PERFORMED                          

 



             )        MEQ/L                                  

 

             CHLORIDE (test code = TEST NOT PERFORMED                           



             )        MEQ/L                                  

 

             CARBON DIOXIDE (test TEST NOT PERFORMED                           



             code = ) MEQ/L                                  

 

             CALCIUM (test code = TEST NOT PERFORMED                           



             )        MG/DL                                  

 

             PROTEIN, TOTAL (test TEST NOT PERFORMED                           



             code = ) G/DL                                   

 

             ALBUMIN (test code = TEST NOT PERFORMED                           



             )        G/DL                                   

 

             CALC GLOBULIN (test code TEST NOT PERFORMED                        

   



             = 2240)      G/DL                                   

 

             CALC A/G RATIO (test TEST NOT PERFORMED                           



             code = 2234) RATIO                                  

 

             BILIRUBIN, TOTAL (test TEST NOT PERFORMED                          

 



             code = ) MG/DL                                  

 

             ALKALINE PHOSPHATASE TEST NOT PERFORMED                           



             (test code = ) U/L                                    

 

             AST (test code = 8) TEST NOT PERFORMED                          

 



                          U/L                                    

 

             ALT (test code = 2219) TEST NOT PERFORMED                          

 



                          U/L                                    



TSH2020-01-15 00:00:00





             Test Item    Value        Reference Range Interpretation Comments

 

             TSH, THIRD GENERATION TEST NOT PERFORMED                           



             (test code = 2821) UIU/ML                                 



TSH2020-01-15 00:00:00





             Test Item    Value        Reference Range Interpretation Comments

 

             TSH, THIRD GENERATION TEST NOT PERFORMED                           



             (test code = 2821) UIU/ML                                 



TSH2020-01-15 00:00:00





             Test Item    Value        Reference Range Interpretation Comments

 

             TSH, THIRD GENERATION TEST NOT PERFORMED                           



             (test code = 2821) UIU/ML                                 



HEPATITIS PROFILE (A,B,C)2020-01-15 00:00:00





             Test Item    Value        Reference Range Interpretation Comments

 

             HEPATITIS A TOTAL AB (test TEST NOT PERFORMED                      

     



             code = 2725)                                        

 

             HEPATITIS B SURF AG (test TEST NOT PERFORMED                       

    



             code = 2739)                                        

 

             HBSAG CONFIRMATION TEST TEST NOT PERFORMED                         

  



             (test code = 2741)                                        

 

             HEP B CORE TOTAL AB (test TEST NOT PERFORMED                       

    



             code = 2729)                                        

 

             HEPATITIS B SURFACE AB TEST NOT PERFORMED                          

 



             (test code = 2737)                                        

 

             HEPATITIS C ANTIBODY (test TEST NOT PERFORMED                      

     



             code = 4675)                                        

 

             INTERPRETATION HEPATITIS TEST NOT PERFORMED                        

   



             A: (test code = 2552)                                        

 

             INTERPRETATION HEPATITIS TEST NOT PERFORMED                        

   



             B: (test code = 54978)                                        

 

             INTERPRETATION HEPATITIS TEST NOT PERFORMED                        

   



             C: (test code = 70947)                                        



HEPATITIS PROFILE (A,B,C)2020-01-15 00:00:00





             Test Item    Value        Reference Range Interpretation Comments

 

             HEPATITIS A TOTAL AB (test TEST NOT PERFORMED                      

     



             code = 2725)                                        

 

             HEPATITIS B SURF AG (test TEST NOT PERFORMED                       

    



             code = 2739)                                        

 

             HBSAG CONFIRMATION TEST TEST NOT PERFORMED                         

  



             (test code = 2741)                                        

 

             HEP B CORE TOTAL AB (test TEST NOT PERFORMED                       

    



             code = 2729)                                        

 

             HEPATITIS B SURFACE AB TEST NOT PERFORMED                          

 



             (test code = 2737)                                        

 

             HEPATITIS C ANTIBODY (test TEST NOT PERFORMED                      

     



             code = 4675)                                        

 

             INTERPRETATION HEPATITIS TEST NOT PERFORMED                        

   



             A: (test code = 2552)                                        

 

             INTERPRETATION HEPATITIS TEST NOT PERFORMED                        

   



             B: (test code = 60836)                                        

 

             INTERPRETATION HEPATITIS TEST NOT PERFORMED                        

   



             C: (test code = 86444)                                        



LIPID PANEL2020-01-15 00:00:00





             Test Item    Value        Reference Range Interpretation Comments

 

             CHOLESTEROL (test code = TEST NOT PERFORMED                        

   



             2210)        MG/DL                                  

 

             TRIGLYCERIDES (test code TEST NOT PERFORMED                        

   



             = 2232)      MG/DL                                  

 

             HDL CHOLESTEROL (test TEST NOT PERFORMED                           



             code = 2220) MG/DL                                  

 

             CALC LDL CHOL (test code TEST NOT PERFORMED                        

   



             = 2237)      MG/DL                                  

 

             RISK RATIO LDL/HDL (test TEST NOT PERFORMED                        

   



             code = 2238) RATIO                                  



LIPID PANEL2020-01-15 00:00:00





             Test Item    Value        Reference Range Interpretation Comments

 

             CHOLESTEROL (test code = TEST NOT PERFORMED                        

   



             )        MG/DL                                  

 

             TRIGLYCERIDES (test code TEST NOT PERFORMED                        

   



             = 2232)      MG/DL                                  

 

             HDL CHOLESTEROL (test TEST NOT PERFORMED                           



             code = 222) MG/DL                                  

 

             CALC LDL CHOL (test code TEST NOT PERFORMED                        

   



             = )      MG/DL                                  

 

             RISK RATIO LDL/HDL (test TEST NOT PERFORMED                        

   



             code = 2238) RATIO                                  



COMPREHENSIVE METABOLIC PANEL2020-01-15 00:00:00





             Test Item    Value        Reference Range Interpretation Comments

 

             GLUCOSE (test code = TEST NOT PERFORMED                           



             )        MG/DL                                  

 

             BUN (test code = ) TEST NOT PERFORMED                          

 



                          MG/DL                                  

 

             CREATININE (test code = TEST NOT PERFORMED                         

  



             )        MG/DL                                  

 

             eGFR  AMER. (test TEST NOT PERFORMED                        

   



             code = 78487) ML/MIN/1.73                            

 

             eGFR NON- AMER. TEST NOT PERFORMED                          

 



             (test code = 84267) ML/MIN/1.73                            

 

             CALC BUN/CREAT (test TEST NOT PERFORMED                           



             code = 2235) RATIO                                  

 

             SODIUM (test code = TEST NOT PERFORMED                           



             )        MEQ/L                                  

 

             POTASSIUM (test code = TEST NOT PERFORMED                          

 



             )        MEQ/L                                  

 

             CHLORIDE (test code = TEST NOT PERFORMED                           



             )        MEQ/L                                  

 

             CARBON DIOXIDE (test TEST NOT PERFORMED                           



             code = ) MEQ/L                                  

 

             CALCIUM (test code = TEST NOT PERFORMED                           



             )        MG/DL                                  

 

             PROTEIN, TOTAL (test TEST NOT PERFORMED                           



             code = 222) G/DL                                   

 

             ALBUMIN (test code = TEST NOT PERFORMED                           



             )        G/DL                                   

 

             CALC GLOBULIN (test code TEST NOT PERFORMED                        

   



             = 2240)      G/DL                                   

 

             CALC A/G RATIO (test TEST NOT PERFORMED                           



             code = 2234) RATIO                                  

 

             BILIRUBIN, TOTAL (test TEST NOT PERFORMED                          

 



             code = ) MG/DL                                  

 

             ALKALINE PHOSPHATASE TEST NOT PERFORMED                           



             (test code = ) U/L                                    

 

             AST (test code = 2218) TEST NOT PERFORMED                          

 



                          U/L                                    

 

             ALT (test code = 2219) TEST NOT PERFORMED                          

 



                          U/L                                    



COMPREHENSIVE METABOLIC PANEL2020-01-15 00:00:00





             Test Item    Value        Reference Range Interpretation Comments

 

             GLUCOSE (test code = TEST NOT PERFORMED                           



             )        MG/DL                                  

 

             BUN (test code = ) TEST NOT PERFORMED                          

 



                          MG/DL                                  

 

             CREATININE (test code = TEST NOT PERFORMED                         

  



             4)        MG/DL                                  

 

             eGFR  AMER. (test TEST NOT PERFORMED                        

   



             code = 85956) ML/MIN/1.73                            

 

             eGFR NON- AMER. TEST NOT PERFORMED                          

 



             (test code = 75810) ML/MIN/1.73                            

 

             CALC BUN/CREAT (test TEST NOT PERFORMED                           



             code = ) RATIO                                  

 

             SODIUM (test code = TEST NOT PERFORMED                           



             )        MEQ/L                                  

 

             POTASSIUM (test code = TEST NOT PERFORMED                          

 



             )        MEQ/L                                  

 

             CHLORIDE (test code = TEST NOT PERFORMED                           



             )        MEQ/L                                  

 

             CARBON DIOXIDE (test TEST NOT PERFORMED                           



             code = ) MEQ/L                                  

 

             CALCIUM (test code = TEST NOT PERFORMED                           



             )        MG/DL                                  

 

             PROTEIN, TOTAL (test TEST NOT PERFORMED                           



             code = ) G/DL                                   

 

             ALBUMIN (test code = TEST NOT PERFORMED                           



             )        G/DL                                   

 

             CALC GLOBULIN (test code TEST NOT PERFORMED                        

   



             = 0)      G/DL                                   

 

             CALC A/G RATIO (test TEST NOT PERFORMED                           



             code = 223) RATIO                                  

 

             BILIRUBIN, TOTAL (test TEST NOT PERFORMED                          

 



             code = ) MG/DL                                  

 

             ALKALINE PHOSPHATASE TEST NOT PERFORMED                           



             (test code = ) U/L                                    

 

             AST (test code = ) TEST NOT PERFORMED                          

 



                          U/L                                    

 

             ALT (test code = ) TEST NOT PERFORMED                          

 



                          U/L                                    



TSH2020-01-15 00:00:00





             Test Item    Value        Reference Range Interpretation Comments

 

             TSH, THIRD GENERATION TEST NOT PERFORMED                           



             (test code = 2821) UIU/ML                                 



TSH2020-01-15 00:00:00





             Test Item    Value        Reference Range Interpretation Comments

 

             TSH, THIRD GENERATION TEST NOT PERFORMED                           



             (test code = 2821) UIU/ML                                 



TSH2020-01-15 00:00:00





             Test Item    Value        Reference Range Interpretation Comments

 

             TSH, THIRD GENERATION TEST NOT PERFORMED                           



             (test code = 2821) UIU/ML                                 



HEPATITIS PROFILE (A,B,C)2020-01-15 00:00:00





             Test Item    Value        Reference Range Interpretation Comments

 

             HEPATITIS A TOTAL AB (test TEST NOT PERFORMED                      

     



             code = 2725)                                        

 

             HEPATITIS B SURF AG (test TEST NOT PERFORMED                       

    



             code = 2739)                                        

 

             HBSAG CONFIRMATION TEST TEST NOT PERFORMED                         

  



             (test code = 2741)                                        

 

             HEP B CORE TOTAL AB (test TEST NOT PERFORMED                       

    



             code = 2729)                                        

 

             HEPATITIS B SURFACE AB TEST NOT PERFORMED                          

 



             (test code = 2737)                                        

 

             HEPATITIS C ANTIBODY (test TEST NOT PERFORMED                      

     



             code = 4675)                                        

 

             INTERPRETATION HEPATITIS TEST NOT PERFORMED                        

   



             A: (test code = 2552)                                        

 

             INTERPRETATION HEPATITIS TEST NOT PERFORMED                        

   



             B: (test code = 70935)                                        

 

             INTERPRETATION HEPATITIS TEST NOT PERFORMED                        

   



             C: (test code = 48034)                                        



HEPATITIS PROFILE (A,B,C)2020-01-15 00:00:00





             Test Item    Value        Reference Range Interpretation Comments

 

             HEPATITIS A TOTAL AB (test TEST NOT PERFORMED                      

     



             code = 2725)                                        

 

             HEPATITIS B SURF AG (test TEST NOT PERFORMED                       

    



             code = 3727)                                        

 

             HBSAG CONFIRMATION TEST TEST NOT PERFORMED                         

  



             (test code = 2743)                                        

 

             HEP B CORE TOTAL AB (test TEST NOT PERFORMED                       

    



             code = 7983)                                        

 

             HEPATITIS B SURFACE AB TEST NOT PERFORMED                          

 



             (test code = 5599)                                        

 

             HEPATITIS C ANTIBODY (test TEST NOT PERFORMED                      

     



             code = 4675)                                        

 

             INTERPRETATION HEPATITIS TEST NOT PERFORMED                        

   



             A: (test code = 2552)                                        

 

             INTERPRETATION HEPATITIS TEST NOT PERFORMED                        

   



             B: (test code = 77518)                                        

 

             INTERPRETATION HEPATITIS TEST NOT PERFORMED                        

   



             C: (test code = 29802)                                        



LIPID PANEL2020-01-15 00:00:00





             Test Item    Value        Reference Range Interpretation Comments

 

             CHOLESTEROL (test code = TEST NOT PERFORMED                        

   



             2210)        MG/DL                                  

 

             TRIGLYCERIDES (test code TEST NOT PERFORMED                        

   



             = 2232)      MG/DL                                  

 

             HDL CHOLESTEROL (test TEST NOT PERFORMED                           



             code = 2220) MG/DL                                  

 

             CALC LDL CHOL (test code TEST NOT PERFORMED                        

   



             = 2237)      MG/DL                                  

 

             RISK RATIO LDL/HDL (test TEST NOT PERFORMED                        

   



             code = 2238) RATIO                                  



LIPID PANEL2020-01-15 00:00:00





             Test Item    Value        Reference Range Interpretation Comments

 

             CHOLESTEROL (test code = TEST NOT PERFORMED                        

   



             2210)        MG/DL                                  

 

             TRIGLYCERIDES (test code TEST NOT PERFORMED                        

   



             = 2232)      MG/DL                                  

 

             HDL CHOLESTEROL (test TEST NOT PERFORMED                           



             code = 2220) MG/DL                                  

 

             CALC LDL CHOL (test code TEST NOT PERFORMED                        

   



             = 2237)      MG/DL                                  

 

             RISK RATIO LDL/HDL (test TEST NOT PERFORMED                        

   



             code = 2238) RATIO                                  



COMPREHENSIVE METABOLIC PANEL2020-01-15 00:00:00





             Test Item    Value        Reference Range Interpretation Comments

 

             GLUCOSE (test code = TEST NOT PERFORMED                           



             )        MG/DL                                  

 

             BUN (test code = ) TEST NOT PERFORMED                          

 



                          MG/DL                                  

 

             CREATININE (test code = TEST NOT PERFORMED                         

  



             )        MG/DL                                  

 

             eGFR  AMER. (test TEST NOT PERFORMED                        

   



             code = 03168) ML/MIN/1.73                            

 

             eGFR NON- AMER. TEST NOT PERFORMED                          

 



             (test code = 54404) ML/MIN/1.73                            

 

             CALC BUN/CREAT (test TEST NOT PERFORMED                           



             code = ) RATIO                                  

 

             SODIUM (test code = TEST NOT PERFORMED                           



             )        MEQ/L                                  

 

             POTASSIUM (test code = TEST NOT PERFORMED                          

 



             )        MEQ/L                                  

 

             CHLORIDE (test code = TEST NOT PERFORMED                           



             )        MEQ/L                                  

 

             CARBON DIOXIDE (test TEST NOT PERFORMED                           



             code = ) MEQ/L                                  

 

             CALCIUM (test code = TEST NOT PERFORMED                           



             )        MG/DL                                  

 

             PROTEIN, TOTAL (test TEST NOT PERFORMED                           



             code = ) G/DL                                   

 

             ALBUMIN (test code = TEST NOT PERFORMED                           



             )        G/DL                                   

 

             CALC GLOBULIN (test code TEST NOT PERFORMED                        

   



             = 2240)      G/DL                                   

 

             CALC A/G RATIO (test TEST NOT PERFORMED                           



             code = 2234) RATIO                                  

 

             BILIRUBIN, TOTAL (test TEST NOT PERFORMED                          

 



             code = ) MG/DL                                  

 

             ALKALINE PHOSPHATASE TEST NOT PERFORMED                           



             (test code = ) U/L                                    

 

             AST (test code = ) TEST NOT PERFORMED                          

 



                          U/L                                    

 

             ALT (test code = ) TEST NOT PERFORMED                          

 



                          U/L                                    



COMPREHENSIVE METABOLIC PANEL2020-01-15 00:00:00





             Test Item    Value        Reference Range Interpretation Comments

 

             GLUCOSE (test code = TEST NOT PERFORMED                           



             )        MG/DL                                  

 

             BUN (test code = ) TEST NOT PERFORMED                          

 



                          MG/DL                                  

 

             CREATININE (test code = TEST NOT PERFORMED                         

  



             )        MG/DL                                  

 

             eGFR  AMER. (test TEST NOT PERFORMED                        

   



             code = 28655) ML/MIN/1.73                            

 

             eGFR NON- AMER. TEST NOT PERFORMED                          

 



             (test code = 56805) ML/MIN/1.73                            

 

             CALC BUN/CREAT (test TEST NOT PERFORMED                           



             code = ) RATIO                                  

 

             SODIUM (test code = TEST NOT PERFORMED                           



             )        MEQ/L                                  

 

             POTASSIUM (test code = TEST NOT PERFORMED                          

 



             )        MEQ/L                                  

 

             CHLORIDE (test code = TEST NOT PERFORMED                           



             )        MEQ/L                                  

 

             CARBON DIOXIDE (test TEST NOT PERFORMED                           



             code = ) MEQ/L                                  

 

             CALCIUM (test code = TEST NOT PERFORMED                           



             )        MG/DL                                  

 

             PROTEIN, TOTAL (test TEST NOT PERFORMED                           



             code = ) G/DL                                   

 

             ALBUMIN (test code = TEST NOT PERFORMED                           



             )        G/DL                                   

 

             CALC GLOBULIN (test code TEST NOT PERFORMED                        

   



             = 2240)      G/DL                                   

 

             CALC A/G RATIO (test TEST NOT PERFORMED                           



             code = 2234) RATIO                                  

 

             BILIRUBIN, TOTAL (test TEST NOT PERFORMED                          

 



             code = 7) MG/DL                                  

 

             ALKALINE PHOSPHATASE TEST NOT PERFORMED                           



             (test code = ) U/L                                    

 

             AST (test code = ) TEST NOT PERFORMED                          

 



                          U/L                                    

 

             ALT (test code = ) TEST NOT PERFORMED                          

 



                          U/L                                    



TSH2020-01-15 00:00:00





             Test Item    Value        Reference Range Interpretation Comments

 

             TSH, THIRD GENERATION TEST NOT PERFORMED                           



             (test code = 2821) UIU/ML                                 



TSH2020-01-15 00:00:00





             Test Item    Value        Reference Range Interpretation Comments

 

             TSH, THIRD GENERATION TEST NOT PERFORMED                           



             (test code = 2821) UIU/ML                                 



TSH2020-01-15 00:00:00





             Test Item    Value        Reference Range Interpretation Comments

 

             TSH, THIRD GENERATION TEST NOT PERFORMED                           



             (test code = 2821) UIU/ML                                 



HEPATITIS PROFILE (A,B,C)2020-01-15 00:00:00





             Test Item    Value        Reference Range Interpretation Comments

 

             HEPATITIS A TOTAL AB (test TEST NOT PERFORMED                      

     



             code = 2725)                                        

 

             HEPATITIS B SURF AG (test TEST NOT PERFORMED                       

    



             code = 2739)                                        

 

             HBSAG CONFIRMATION TEST TEST NOT PERFORMED                         

  



             (test code = 2741)                                        

 

             HEP B CORE TOTAL AB (test TEST NOT PERFORMED                       

    



             code = 2729)                                        

 

             HEPATITIS B SURFACE AB TEST NOT PERFORMED                          

 



             (test code = 2737)                                        

 

             HEPATITIS C ANTIBODY (test TEST NOT PERFORMED                      

     



             code = 4675)                                        

 

             INTERPRETATION HEPATITIS TEST NOT PERFORMED                        

   



             A: (test code = 2552)                                        

 

             INTERPRETATION HEPATITIS TEST NOT PERFORMED                        

   



             B: (test code = 85905)                                        

 

             INTERPRETATION HEPATITIS TEST NOT PERFORMED                        

   



             C: (test code = 28219)                                        



HEPATITIS PROFILE (A,B,C)2020-01-15 00:00:00





             Test Item    Value        Reference Range Interpretation Comments

 

             HEPATITIS A TOTAL AB (test TEST NOT PERFORMED                      

     



             code = 2725)                                        

 

             HEPATITIS B SURF AG (test TEST NOT PERFORMED                       

    



             code = 2739)                                        

 

             HBSAG CONFIRMATION TEST TEST NOT PERFORMED                         

  



             (test code = 2741)                                        

 

             HEP B CORE TOTAL AB (test TEST NOT PERFORMED                       

    



             code = 2729)                                        

 

             HEPATITIS B SURFACE AB TEST NOT PERFORMED                          

 



             (test code = 2737)                                        

 

             HEPATITIS C ANTIBODY (test TEST NOT PERFORMED                      

     



             code = 4675)                                        

 

             INTERPRETATION HEPATITIS TEST NOT PERFORMED                        

   



             A: (test code = 2552)                                        

 

             INTERPRETATION HEPATITIS TEST NOT PERFORMED                        

   



             B: (test code = 93415)                                        

 

             INTERPRETATION HEPATITIS TEST NOT PERFORMED                        

   



             C: (test code = 30713)                                        



LIPID PANEL2020-01-15 00:00:00





             Test Item    Value        Reference Range Interpretation Comments

 

             CHOLESTEROL (test code = TEST NOT PERFORMED                        

   



             2210)        MG/DL                                  

 

             TRIGLYCERIDES (test code TEST NOT PERFORMED                        

   



             = 2232)      MG/DL                                  

 

             HDL CHOLESTEROL (test TEST NOT PERFORMED                           



             code = 2220) MG/DL                                  

 

             CALC LDL CHOL (test code TEST NOT PERFORMED                        

   



             = 2237)      MG/DL                                  

 

             RISK RATIO LDL/HDL (test TEST NOT PERFORMED                        

   



             code = 2238) RATIO                                  



LIPID PANEL2020-01-15 00:00:00





             Test Item    Value        Reference Range Interpretation Comments

 

             CHOLESTEROL (test code = TEST NOT PERFORMED                        

   



             2210)        MG/DL                                  

 

             TRIGLYCERIDES (test code TEST NOT PERFORMED                        

   



             = 2232)      MG/DL                                  

 

             HDL CHOLESTEROL (test TEST NOT PERFORMED                           



             code = 0) MG/DL                                  

 

             CALC LDL CHOL (test code TEST NOT PERFORMED                        

   



             = )      MG/DL                                  

 

             RISK RATIO LDL/HDL (test TEST NOT PERFORMED                        

   



             code = 2238) RATIO                                  



COMPREHENSIVE METABOLIC PANEL2020-01-15 00:00:00





             Test Item    Value        Reference Range Interpretation Comments

 

             GLUCOSE (test code = TEST NOT PERFORMED                           



             )        MG/DL                                  

 

             BUN (test code = ) TEST NOT PERFORMED                          

 



                          MG/DL                                  

 

             CREATININE (test code = TEST NOT PERFORMED                         

  



             )        MG/DL                                  

 

             eGFR  AMER. (test TEST NOT PERFORMED                        

   



             code = 73746) ML/MIN/1.73                            

 

             eGFR NON- AMER. TEST NOT PERFORMED                          

 



             (test code = 55345) ML/MIN/1.73                            

 

             CALC BUN/CREAT (test TEST NOT PERFORMED                           



             code = ) RATIO                                  

 

             SODIUM (test code = TEST NOT PERFORMED                           



             )        MEQ/L                                  

 

             POTASSIUM (test code = TEST NOT PERFORMED                          

 



             )        MEQ/L                                  

 

             CHLORIDE (test code = TEST NOT PERFORMED                           



             )        MEQ/L                                  

 

             CARBON DIOXIDE (test TEST NOT PERFORMED                           



             code = ) MEQ/L                                  

 

             CALCIUM (test code = TEST NOT PERFORMED                           



             )        MG/DL                                  

 

             PROTEIN, TOTAL (test TEST NOT PERFORMED                           



             code = ) G/DL                                   

 

             ALBUMIN (test code = TEST NOT PERFORMED                           



             )        G/DL                                   

 

             CALC GLOBULIN (test code TEST NOT PERFORMED                        

   



             = 2240)      G/DL                                   

 

             CALC A/G RATIO (test TEST NOT PERFORMED                           



             code = 2234) RATIO                                  

 

             BILIRUBIN, TOTAL (test TEST NOT PERFORMED                          

 



             code = ) MG/DL                                  

 

             ALKALINE PHOSPHATASE TEST NOT PERFORMED                           



             (test code = ) U/L                                    

 

             AST (test code = 2218) TEST NOT PERFORMED                          

 



                          U/L                                    

 

             ALT (test code = 2219) TEST NOT PERFORMED                          

 



                          U/L                                    



COMPREHENSIVE METABOLIC PANEL2020-01-15 00:00:00





             Test Item    Value        Reference Range Interpretation Comments

 

             GLUCOSE (test code = TEST NOT PERFORMED                           



             )        MG/DL                                  

 

             BUN (test code = ) TEST NOT PERFORMED                          

 



                          MG/DL                                  

 

             CREATININE (test code = TEST NOT PERFORMED                         

  



             )        MG/DL                                  

 

             eGFR  AMER. (test TEST NOT PERFORMED                        

   



             code = 98323) ML/MIN/1.73                            

 

             eGFR NON- AMER. TEST NOT PERFORMED                          

 



             (test code = 53115) ML/MIN/1.73                            

 

             CALC BUN/CREAT (test TEST NOT PERFORMED                           



             code = 223) RATIO                                  

 

             SODIUM (test code = TEST NOT PERFORMED                           



             2231)        MEQ/L                                  

 

             POTASSIUM (test code = TEST NOT PERFORMED                          

 



             )        MEQ/L                                  

 

             CHLORIDE (test code = TEST NOT PERFORMED                           



             )        MEQ/L                                  

 

             CARBON DIOXIDE (test TEST NOT PERFORMED                           



             code = ) MEQ/L                                  

 

             CALCIUM (test code = TEST NOT PERFORMED                           



             )        MG/DL                                  

 

             PROTEIN, TOTAL (test TEST NOT PERFORMED                           



             code = ) G/DL                                   

 

             ALBUMIN (test code = TEST NOT PERFORMED                           



             )        G/DL                                   

 

             CALC GLOBULIN (test code TEST NOT PERFORMED                        

   



             = 2240)      G/DL                                   

 

             CALC A/G RATIO (test TEST NOT PERFORMED                           



             code = 2234) RATIO                                  

 

             BILIRUBIN, TOTAL (test TEST NOT PERFORMED                          

 



             code = ) MG/DL                                  

 

             ALKALINE PHOSPHATASE TEST NOT PERFORMED                           



             (test code = ) U/L                                    

 

             AST (test code = ) TEST NOT PERFORMED                          

 



                          U/L                                    

 

             ALT (test code = ) TEST NOT PERFORMED                          

 



                          U/L                                    



TSH2020-01-15 00:00:00





             Test Item    Value        Reference Range Interpretation Comments

 

             TSH, THIRD GENERATION TEST NOT PERFORMED                           



             (test code = 2821) UIU/ML                                 



TSH2020-01-15 00:00:00





             Test Item    Value        Reference Range Interpretation Comments

 

             TSH, THIRD GENERATION TEST NOT PERFORMED                           



             (test code = 2821) UIU/ML                                 



TSH2020-01-15 00:00:00





             Test Item    Value        Reference Range Interpretation Comments

 

             TSH, THIRD GENERATION TEST NOT PERFORMED                           



             (test code = 2821) UIU/ML                                 



HEPATITIS PROFILE (A,B,C)2020-01-15 00:00:00





             Test Item    Value        Reference Range Interpretation Comments

 

             HEPATITIS A TOTAL AB (test TEST NOT PERFORMED                      

     



             code = 2725)                                        

 

             HEPATITIS B SURF AG (test TEST NOT PERFORMED                       

    



             code = 273)                                        

 

             HBSAG CONFIRMATION TEST TEST NOT PERFORMED                         

  



             (test code = 2741)                                        

 

             HEP B CORE TOTAL AB (test TEST NOT PERFORMED                       

    



             code = 272)                                        

 

             HEPATITIS B SURFACE AB TEST NOT PERFORMED                          

 



             (test code = 2737)                                        

 

             HEPATITIS C ANTIBODY (test TEST NOT PERFORMED                      

     



             code = 4675)                                        

 

             INTERPRETATION HEPATITIS TEST NOT PERFORMED                        

   



             A: (test code = 2552)                                        

 

             INTERPRETATION HEPATITIS TEST NOT PERFORMED                        

   



             B: (test code = 76130)                                        

 

             INTERPRETATION HEPATITIS TEST NOT PERFORMED                        

   



             C: (test code = 44745)                                        



HEPATITIS PROFILE (A,B,C)2020-01-15 00:00:00





             Test Item    Value        Reference Range Interpretation Comments

 

             HEPATITIS A TOTAL AB (test TEST NOT PERFORMED                      

     



             code = 2725)                                        

 

             HEPATITIS B SURF AG (test TEST NOT PERFORMED                       

    



             code = 2739)                                        

 

             HBSAG CONFIRMATION TEST TEST NOT PERFORMED                         

  



             (test code = 2741)                                        

 

             HEP B CORE TOTAL AB (test TEST NOT PERFORMED                       

    



             code = 2729)                                        

 

             HEPATITIS B SURFACE AB TEST NOT PERFORMED                          

 



             (test code = 2737)                                        

 

             HEPATITIS C ANTIBODY (test TEST NOT PERFORMED                      

     



             code = 4675)                                        

 

             INTERPRETATION HEPATITIS TEST NOT PERFORMED                        

   



             A: (test code = 2552)                                        

 

             INTERPRETATION HEPATITIS TEST NOT PERFORMED                        

   



             B: (test code = 31620)                                        

 

             INTERPRETATION HEPATITIS TEST NOT PERFORMED                        

   



             C: (test code = 32018)                                        



CBC W/AUTO [id number] 00:00:00





             Test Item    Value        Reference Range Interpretation Comments

 

             WBC (test code = 1001) 5.6 K/UL                               

 

             RBC (test code = 1002) 5.48 M/UL                              

 

             HEMOGLOBIN (test code = 1003) 13.6 G/DL                            

  

 

             HEMATOCRIT (test code = 1004) 44.4 %                               

  

 

             MCV (test code = 1005) 81.0 fL                                

 

             MCH (test code = 1006) 24.8 PG                                

 

             MCHC (test code = 1007) 30.6 G/DL                              

 

             RDW (test code = 1038) 17.5 %                                 

 

             NEUTROPHILS (test code = 1008) 48.9 %                              

   

 

             LYMPHOCYTES (test code = 1010) 39.9 %                              

   

 

             MONOCYTES (test code = 1011) 8.9 %                                 

 

 

             EOSINOPHILS (test code = 1012) 1.8 %                               

   

 

             BASOPHILS (test code = 1013) 0.5 %                                 

 

 

             PLATELET COUNT (test code = 1015) 312 K/UL                         

      



HCV RNA, PCR [id number] 00:00:00





             Test Item    Value        Reference Range Interpretation Comments

 

             HCV RNA, PCR QUANT (test code 883184 IU/ML                         

  



             = 4571)                                             

 

             HCV VIRAL LOG (test code = 5.146 LOGIU/ML                          

 



             79502)                                              



HCV RNA, PCR [id number] 00:00:00





             Test Item    Value        Reference Range Interpretation Comments

 

             HCV RNA, PCR QUANT (test code 625655 IU/ML                         

  



             = 4571)                                             

 

             HCV VIRAL LOG (test code = 5.146 LOGIU/ML                          

 



             70760)                                              



HCV RNA, PCR [id number] 00:00:00





             Test Item    Value        Reference Range Interpretation Comments

 

             HCV RNA, PCR QUANT (test code 892514 IU/ML                         

  



             = 4571)                                             

 

             HCV VIRAL LOG (test code = 5.146 LOGIU/ML                          

 



             67178)                                              



CBC W/AUTO [id number] 00:00:00





             Test Item    Value        Reference Range Interpretation Comments

 

             WBC (test code = 1001) 5.6 K/UL                               

 

             RBC (test code = 1002) 5.48 M/UL                              

 

             HEMOGLOBIN (test code = 1003) 13.6 G/DL                            

  

 

             HEMATOCRIT (test code = 1004) 44.4 %                               

  

 

             MCV (test code = 1005) 81.0 fL                                

 

             MCH (test code = 1006) 24.8 PG                                

 

             MCHC (test code = 1007) 30.6 G/DL                              

 

             RDW (test code = 1038) 17.5 %                                 

 

             NEUTROPHILS (test code = 1008) 48.9 %                              

   

 

             LYMPHOCYTES (test code = 1010) 39.9 %                              

   

 

             MONOCYTES (test code = 1011) 8.9 %                                 

 

 

             EOSINOPHILS (test code = 1012) 1.8 %                               

   

 

             BASOPHILS (test code = 1013) 0.5 %                                 

 

 

             PLATELET COUNT (test code = 1015) 312 K/UL                         

      



CBC W/AUTO [id number] 00:00:00





             Test Item    Value        Reference Range Interpretation Comments

 

             WBC (test code = 1001) 5.6 K/UL                               

 

             RBC (test code = 1002) 5.48 M/UL                              

 

             HEMOGLOBIN (test code = 1003) 13.6 G/DL                            

  

 

             HEMATOCRIT (test code = 1004) 44.4 %                               

  

 

             MCV (test code = 1005) 81.0 fL                                

 

             MCH (test code = 1006) 24.8 PG                                

 

             MCHC (test code = 1007) 30.6 G/DL                              

 

             RDW (test code = 1038) 17.5 %                                 

 

             NEUTROPHILS (test code = 1008) 48.9 %                              

   

 

             LYMPHOCYTES (test code = 1010) 39.9 %                              

   

 

             MONOCYTES (test code = 1011) 8.9 %                                 

 

 

             EOSINOPHILS (test code = 1012) 1.8 %                               

   

 

             BASOPHILS (test code = 1013) 0.5 %                                 

 

 

             PLATELET COUNT (test code = 1015) 312 K/UL                         

      



CBC W/AUTO [id number] 00:00:00





             Test Item    Value        Reference Range Interpretation Comments

 

             WBC (test code = 1001) 5.6 K/UL                               

 

             RBC (test code = 1002) 5.48 M/UL                              

 

             HEMOGLOBIN (test code = 1003) 13.6 G/DL                            

  

 

             HEMATOCRIT (test code = 1004) 44.4 %                               

  

 

             MCV (test code = 1005) 81.0 fL                                

 

             MCH (test code = 1006) 24.8 PG                                

 

             MCHC (test code = 1007) 30.6 G/DL                              

 

             RDW (test code = 1038) 17.5 %                                 

 

             NEUTROPHILS (test code = 1008) 48.9 %                              

   

 

             LYMPHOCYTES (test code = 1010) 39.9 %                              

   

 

             MONOCYTES (test code = 1011) 8.9 %                                 

 

 

             EOSINOPHILS (test code = 1012) 1.8 %                               

   

 

             BASOPHILS (test code = 1013) 0.5 %                                 

 

 

             PLATELET COUNT (test code = 1015) 312 K/UL                         

      



HCV RNA, PCR [id number] 00:00:00





             Test Item    Value        Reference Range Interpretation Comments

 

             HCV RNA, PCR QUANT (test code 506570 IU/ML                         

  



             = 4571)                                             

 

             HCV VIRAL LOG (test code = 5.146 LOGIU/ML                          

 



             53808)                                              



HCV RNA, PCR [id number] 00:00:00





             Test Item    Value        Reference Range Interpretation Comments

 

             HCV RNA, PCR QUANT (test code 478611 IU/ML                         

  



             = 4571)                                             

 

             HCV VIRAL LOG (test code = 5.146 LOGIU/ML                          

 



             34031)                                              



HCV RNA, PCR [id number] 00:00:00





             Test Item    Value        Reference Range Interpretation Comments

 

             HCV RNA, PCR QUANT (test code 468638 IU/ML                         

  



             = 4571)                                             

 

             HCV VIRAL LOG (test code = 5.146 LOGIU/ML                          

 



             72201)                                              



CBC W/AUTO [id number] 00:00:00





             Test Item    Value        Reference Range Interpretation Comments

 

             WBC (test code = 1001) 5.6 K/UL                               

 

             RBC (test code = 1002) 5.48 M/UL                              

 

             HEMOGLOBIN (test code = 1003) 13.6 G/DL                            

  

 

             HEMATOCRIT (test code = 1004) 44.4 %                               

  

 

             MCV (test code = 1005) 81.0 fL                                

 

             MCH (test code = 1006) 24.8 PG                                

 

             MCHC (test code = 1007) 30.6 G/DL                              

 

             RDW (test code = 1038) 17.5 %                                 

 

             NEUTROPHILS (test code = 1008) 48.9 %                              

   

 

             LYMPHOCYTES (test code = 1010) 39.9 %                              

   

 

             MONOCYTES (test code = 1011) 8.9 %                                 

 

 

             EOSINOPHILS (test code = 1012) 1.8 %                               

   

 

             BASOPHILS (test code = 1013) 0.5 %                                 

 

 

             PLATELET COUNT (test code = 1015) 312 K/UL                         

      



CBC W/AUTO [id number] 00:00:00





             Test Item    Value        Reference Range Interpretation Comments

 

             WBC (test code = 1001) 5.6 K/UL                               

 

             RBC (test code = 1002) 5.48 M/UL                              

 

             HEMOGLOBIN (test code = 1003) 13.6 G/DL                            

  

 

             HEMATOCRIT (test code = 1004) 44.4 %                               

  

 

             MCV (test code = 1005) 81.0 fL                                

 

             MCH (test code = 1006) 24.8 PG                                

 

             MCHC (test code = 1007) 30.6 G/DL                              

 

             RDW (test code = 1038) 17.5 %                                 

 

             NEUTROPHILS (test code = 1008) 48.9 %                              

   

 

             LYMPHOCYTES (test code = 1010) 39.9 %                              

   

 

             MONOCYTES (test code = 1011) 8.9 %                                 

 

 

             EOSINOPHILS (test code = 1012) 1.8 %                               

   

 

             BASOPHILS (test code = 1013) 0.5 %                                 

 

 

             PLATELET COUNT (test code = 1015) 312 K/UL                         

      



CBC W/AUTO [id number] 00:00:00





             Test Item    Value        Reference Range Interpretation Comments

 

             WBC (test code = 1001) 5.6 K/UL                               

 

             RBC (test code = 1002) 5.48 M/UL                              

 

             HEMOGLOBIN (test code = 1003) 13.6 G/DL                            

  

 

             HEMATOCRIT (test code = 1004) 44.4 %                               

  

 

             MCV (test code = 1005) 81.0 fL                                

 

             MCH (test code = 1006) 24.8 PG                                

 

             MCHC (test code = 1007) 30.6 G/DL                              

 

             RDW (test code = 1038) 17.5 %                                 

 

             NEUTROPHILS (test code = 1008) 48.9 %                              

   

 

             LYMPHOCYTES (test code = 1010) 39.9 %                              

   

 

             MONOCYTES (test code = 1011) 8.9 %                                 

 

 

             EOSINOPHILS (test code = 1012) 1.8 %                               

   

 

             BASOPHILS (test code = 1013) 0.5 %                                 

 

 

             PLATELET COUNT (test code = 1015) 312 K/UL                         

      



HCV RNA, PCR [id number] 00:00:00





             Test Item    Value        Reference Range Interpretation Comments

 

             HCV RNA, PCR QUANT (test code 444643 IU/ML                         

  



             = 4571)                                             

 

             HCV VIRAL LOG (test code = 5.146 LOGIU/ML                          

 



             40603)                                              



HCV RNA, PCR [id number] 00:00:00





             Test Item    Value        Reference Range Interpretation Comments

 

             HCV RNA, PCR QUANT (test code 168960 IU/ML                         

  



             = 4571)                                             

 

             HCV VIRAL LOG (test code = 5.146 LOGIU/ML                          

 



             59084)                                              



HCV RNA, PCR [id number] 00:00:00





             Test Item    Value        Reference Range Interpretation Comments

 

             HCV RNA, PCR QUANT (test code 284294 IU/ML                         

  



             = 4571)                                             

 

             HCV VIRAL LOG (test code = 5.146 LOGIU/ML                          

 



             82730)                                              



CBC W/AUTO [id number] 00:00:00





             Test Item    Value        Reference Range Interpretation Comments

 

             WBC (test code = 1001) 5.6 K/UL                               

 

             RBC (test code = 1002) 5.48 M/UL                              

 

             HEMOGLOBIN (test code = 1003) 13.6 G/DL                            

  

 

             HEMATOCRIT (test code = 1004) 44.4 %                               

  

 

             MCV (test code = 1005) 81.0 fL                                

 

             MCH (test code = 1006) 24.8 PG                                

 

             MCHC (test code = 1007) 30.6 G/DL                              

 

             RDW (test code = 1038) 17.5 %                                 

 

             NEUTROPHILS (test code = 1008) 48.9 %                              

   

 

             LYMPHOCYTES (test code = 1010) 39.9 %                              

   

 

             MONOCYTES (test code = 1011) 8.9 %                                 

 

 

             EOSINOPHILS (test code = 1012) 1.8 %                               

   

 

             BASOPHILS (test code = 1013) 0.5 %                                 

 

 

             PLATELET COUNT (test code = 1015) 312 K/UL                         

      



CBC W/AUTO [id number] 00:00:00





             Test Item    Value        Reference Range Interpretation Comments

 

             WBC (test code = 1001) 5.6 K/UL                               

 

             RBC (test code = 1002) 5.48 M/UL                              

 

             HEMOGLOBIN (test code = 1003) 13.6 G/DL                            

  

 

             HEMATOCRIT (test code = 1004) 44.4 %                               

  

 

             MCV (test code = 1005) 81.0 fL                                

 

             MCH (test code = 1006) 24.8 PG                                

 

             MCHC (test code = 1007) 30.6 G/DL                              

 

             RDW (test code = 1038) 17.5 %                                 

 

             NEUTROPHILS (test code = 1008) 48.9 %                              

   

 

             LYMPHOCYTES (test code = 1010) 39.9 %                              

   

 

             MONOCYTES (test code = 1011) 8.9 %                                 

 

 

             EOSINOPHILS (test code = 1012) 1.8 %                               

   

 

             BASOPHILS (test code = 1013) 0.5 %                                 

 

 

             PLATELET COUNT (test code = 1015) 312 K/UL                         

      



CBC W/AUTO [id number] 00:00:00





             Test Item    Value        Reference Range Interpretation Comments

 

             WBC (test code = 1001) 5.6 K/UL                               

 

             RBC (test code = 1002) 5.48 M/UL                              

 

             HEMOGLOBIN (test code = 1003) 13.6 G/DL                            

  

 

             HEMATOCRIT (test code = 1004) 44.4 %                               

  

 

             MCV (test code = 1005) 81.0 fL                                

 

             MCH (test code = 1006) 24.8 PG                                

 

             MCHC (test code = 1007) 30.6 G/DL                              

 

             RDW (test code = 1038) 17.5 %                                 

 

             NEUTROPHILS (test code = 1008) 48.9 %                              

   

 

             LYMPHOCYTES (test code = 1010) 39.9 %                              

   

 

             MONOCYTES (test code = 1011) 8.9 %                                 

 

 

             EOSINOPHILS (test code = 1012) 1.8 %                               

   

 

             BASOPHILS (test code = 1013) 0.5 %                                 

 

 

             PLATELET COUNT (test code = 1015) 312 K/UL                         

      



HCV RNA, PCR [id number] 00:00:00





             Test Item    Value        Reference Range Interpretation Comments

 

             HCV RNA, PCR QUANT (test code 040885 IU/ML                         

  



             = 4571)                                             

 

             HCV VIRAL LOG (test code = 5.146 LOGIU/ML                          

 



             04386)                                              



HCV RNA, PCR [id number] 00:00:00





             Test Item    Value        Reference Range Interpretation Comments

 

             HCV RNA, PCR QUANT (test code 709618 IU/ML                         

  



             = 4571)                                             

 

             HCV VIRAL LOG (test code = 5.146 LOGIU/ML                          

 



             79546)                                              



HCV RNA, PCR [id number] 00:00:00





             Test Item    Value        Reference Range Interpretation Comments

 

             HCV RNA, PCR QUANT (test code 276539 IU/ML                         

  



             = 4571)                                             

 

             HCV VIRAL LOG (test code = 5.146 LOGIU/ML                          

 



             48745)                                              



CBC W/AUTO [id number] 00:00:00





             Test Item    Value        Reference Range Interpretation Comments

 

             WBC (test code = 1001) 5.6 K/UL                               

 

             RBC (test code = 1002) 5.48 M/UL                              

 

             HEMOGLOBIN (test code = 1003) 13.6 G/DL                            

  

 

             HEMATOCRIT (test code = 1004) 44.4 %                               

  

 

             MCV (test code = 1005) 81.0 fL                                

 

             MCH (test code = 1006) 24.8 PG                                

 

             MCHC (test code = 1007) 30.6 G/DL                              

 

             RDW (test code = 1038) 17.5 %                                 

 

             NEUTROPHILS (test code = 1008) 48.9 %                              

   

 

             LYMPHOCYTES (test code = 1010) 39.9 %                              

   

 

             MONOCYTES (test code = 1011) 8.9 %                                 

 

 

             EOSINOPHILS (test code = 1012) 1.8 %                               

   

 

             BASOPHILS (test code = 1013) 0.5 %                                 

 

 

             PLATELET COUNT (test code = 1015) 312 K/UL                         

      



CBC W/AUTO [id number] 00:00:00





             Test Item    Value        Reference Range Interpretation Comments

 

             WBC (test code = 1001) 5.6 K/UL                               

 

             RBC (test code = 1002) 5.48 M/UL                              

 

             HEMOGLOBIN (test code = 1003) 13.6 G/DL                            

  

 

             HEMATOCRIT (test code = 1004) 44.4 %                               

  

 

             MCV (test code = 1005) 81.0 fL                                

 

             MCH (test code = 1006) 24.8 PG                                

 

             MCHC (test code = 1007) 30.6 G/DL                              

 

             RDW (test code = 1038) 17.5 %                                 

 

             NEUTROPHILS (test code = 1008) 48.9 %                              

   

 

             LYMPHOCYTES (test code = 1010) 39.9 %                              

   

 

             MONOCYTES (test code = 1011) 8.9 %                                 

 

 

             EOSINOPHILS (test code = 1012) 1.8 %                               

   

 

             BASOPHILS (test code = 1013) 0.5 %                                 

 

 

             PLATELET COUNT (test code = 1015) 312 K/UL                         

      



[U] XRAY FEMUR 2 VWS RIGHT [id number] 14:24:00Images acquired, not reported
 on this accession number.[facility] PREGNANCY [id number] 21:01:00





             Test Item    Value        Reference Range Interpretation Comments

 

             POCT PREG (test code = 1605) negative                              

 

 

             On board controls acceptable with present                          

      



             C Line (test code = 3574)                                        

 

             POCT PREG LOT # (test code = 3575) [id number]                      

       

 

             POCT PREG TEST  DATE (test 2020                           

     



             code = 3576)                                        

 

             Lab Interpretation (test code = Normal                             

    



             20625-4)                                            



[facility]
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
## 2023-06-22 NOTE — RAD REPORT
EXAM DESCRIPTION:  Manoj Single View6/22/2023 12:07 pm

 

CLINICAL HISTORY:  Numbness

 

COMPARISON:  2022

 

FINDINGS:   The lungs appear clear of acute infiltrate. The heart is normal size

 

IMPRESSION:  No acute abnormalities displayed

## 2023-06-22 NOTE — RAD REPORT
EXAM DESCRIPTION:  MRI - Shoulder Rt Wo Cont - 6/22/2023 8:23 pm

 

CLINICAL HISTORY:  eval rotator cuff; nerve impingement; RUE weak/numbn

Pain and swelling

 

COMPARISON:  <Comparisons>

 

FINDINGS:  The AC joint demonstrates mild osseous and capsular hypertrophy. The acromion process is m
ildly concave.

 

The rotator cuff demonstrates tendinopathy with full-thickness tear of the supraspinatus tendon criti
zack zone measuring 18 mm.

 

Partial tearing of the subscapularis. The biceps tendon lies within the bicipital groove.

 

The glenoid labrum is preserved.  There is no evidence of a labral tear.

 

No aggressive bony lesion. No fracture or dislocation.

 

No joint effusion is present.

 

 

IMPRESSION:  Full-thickness tear of the critical zone of the supraspinatus tendon with mild tendon re
traction.

## 2023-06-23 LAB
ALBUMIN SERPL BCP-MCNC: 2.9 G/DL (ref 3.4–5)
ALP SERPL-CCNC: 123 U/L (ref 45–117)
ALT SERPL W P-5'-P-CCNC: 39 U/L (ref 13–56)
AST SERPL W P-5'-P-CCNC: 41 U/L (ref 15–37)
BUN BLD-MCNC: 8 MG/DL (ref 7–18)
GLUCOSE SERPLBLD-MCNC: 90 MG/DL (ref 74–106)
HCT VFR BLD CALC: 35.5 % (ref 36–45)
LYMPHOCYTES # SPEC AUTO: 2.6 K/UL (ref 0.7–4.9)
MAGNESIUM SERPL-MCNC: 1.5 MG/DL (ref 1.6–2.4)
MCV RBC: 73.2 FL (ref 80–100)
METHAMPHET UR QL SCN: NEGATIVE
PMV BLD: 8.3 FL (ref 7.6–11.3)
POTASSIUM SERPL-SCNC: 3.6 MEQ/L (ref 3.5–5.1)
RBC # BLD: 4.84 M/UL (ref 3.86–4.86)
THC SERPL-MCNC: POSITIVE NG/ML
TSH SERPL DL<=0.05 MIU/L-ACNC: 2.83 UIU/ML (ref 0.36–3.74)

## 2023-06-23 RX ADMIN — Medication SCH ML: at 21:33

## 2023-06-23 RX ADMIN — OXYCODONE AND ACETAMINOPHEN PRN TAB: 5; 325 TABLET ORAL at 03:35

## 2023-06-23 RX ADMIN — CYCLOBENZAPRINE HYDROCHLORIDE PRN MG: 10 TABLET, FILM COATED ORAL at 21:39

## 2023-06-23 RX ADMIN — ENOXAPARIN SODIUM SCH MG: 40 INJECTION SUBCUTANEOUS at 09:07

## 2023-06-23 RX ADMIN — NICOTINE SCH MG: 21 PATCH TRANSDERMAL at 09:07

## 2023-06-23 RX ADMIN — OXYCODONE AND ACETAMINOPHEN PRN TAB: 5; 325 TABLET ORAL at 16:24

## 2023-06-23 RX ADMIN — QUETIAPINE FUMARATE SCH MG: 100 TABLET, FILM COATED ORAL at 21:32

## 2023-06-23 RX ADMIN — CYCLOBENZAPRINE HYDROCHLORIDE PRN MG: 10 TABLET, FILM COATED ORAL at 10:03

## 2023-06-23 RX ADMIN — Medication SCH ML: at 09:07

## 2023-06-23 RX ADMIN — SERTRALINE HYDROCHLORIDE SCH MG: 100 TABLET ORAL at 09:11

## 2023-06-23 RX ADMIN — PREGABALIN SCH MG: 150 CAPSULE ORAL at 09:07

## 2023-06-23 RX ADMIN — QUETIAPINE FUMARATE SCH MG: 100 TABLET, FILM COATED ORAL at 09:11

## 2023-06-23 RX ADMIN — PREGABALIN SCH MG: 150 CAPSULE ORAL at 21:32

## 2023-06-23 RX ADMIN — OXYCODONE AND ACETAMINOPHEN PRN TAB: 5; 325 TABLET ORAL at 10:05

## 2023-06-23 RX ADMIN — PREGABALIN SCH MG: 150 CAPSULE ORAL at 14:20

## 2023-06-23 NOTE — RAD REPORT
EXAM DESCRIPTION:  MRI - Brain W/Wo Cont - 6/23/2023 8:34 am

 

CLINICAL HISTORY:  CVA, RUE weak, paresthesia, RLE numbness

 

COMPARISON:  06/22/2023 head CT

 

TECHNIQUE:  Multiplanar multisequence MRI of the brain performed before and after intravenous adminis
tration of 20 mL MultiHance.

 

FINDINGS:

No evidence of acute infarct or other diffusion signal abnormality.

 

No evidence of acute intracranial hemorrhage or abnormal extra-axial fluid collections.

Ventricular caliber within normal for age. Midline structures are unremarkable.

 

No white matter signal abnormalities.

 

No mass effect or midline shift.

 

Major vascular flow voids are preserved.

 

Mastoid air cells and paranasal sinuses are clear.

 

 

IMPRESSION:  No acute intracranial process. No evidence of ventriculomegaly, abnormal enhancement, ma
ss effect.

## 2023-06-23 NOTE — RAD REPORT
EXAM DESCRIPTION:  MRI - MRA Head Wo Cont - 6/23/2023 8:33 am

 

CLINICAL HISTORY:  CVA, RUE weak, paresthesia, RLE numbness

CVA

 

COMPARISON:  No comparisons

 

TECHNIQUE:    Multiplanar 3D time-of-flight angiographic MRI of the brain, obtained without IV contra
st.

 

FINDINGS:  3D noncontrast time-of-flight MR angiography of the [place] was performed.

 

No evidence of large vessel occlusion. No aneurysm, flow-limiting stenosis or vascular malformation i
s seen. Forward flow seen in codominant vertebral arteries.

 

The visualized dural venous sinuses appear patent.

 

IMPRESSION:  No significant flow abnormality of the [place] is identified.

## 2023-06-23 NOTE — EKG
Test Date:    2023-06-22               Test Time:    11:04:24

Technician:   ARMOND                                     

                                                     

MEASUREMENT RESULTS:                                       

Intervals:                                           

Rate:         108                                    

KS:           134                                    

QRSD:         68                                     

QT:           364                                    

QTc:          487                                    

Axis:                                                

P:            33                                     

KS:           134                                    

QRS:          59                                     

T:            43                                     

                                                     

INTERPRETIVE STATEMENTS:                                       

                                                     

Sinus tachycardia

Possible Left atrial enlargement

Borderline ECG

Compared to ECG 02/12/2022 03:12:34

Atrial premature complex(es) no longer present

Aberrant conduction of supraventricular beat(s) no longer present

ST (T wave) deviation no longer present



Electronically Signed On 06-23-23 16:28:02 CDT by Fredi Kovacs

## 2023-06-23 NOTE — RAD REPORT
EXAM DESCRIPTION:  MRI - MRA Neck W/Wo Cont - 6/23/2023 8:33 am

 

CLINICAL HISTORY:  CVA, RUE weak, paresthesia, RLE numbness

 

COMPARISON:  Brain W/Wo Cont dated 6/23/2023; MRA Head Wo Cont dated 6/23/2023

 

TECHNIQUE:   Contrast-enhanced 3D MR angiography of the neck vessels was performed, following intrave
nous administration of 20 mL MultiHance. Multiplanar reformats and MIP reconstructions were generated
 and reviewed.

 

FINDINGS:  No evidence of dissection.

 

Common carotid arteries are patent.

 

Internal carotid arteries are patent to the skullbase.

 

Vertebral arteries are patent.

 

 

IMPRESSION:  No evidence of obstruction or flow-limiting stenosis of the cervical carotid or vertebra
l arteries.
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
## 2023-06-24 LAB
BUN BLD-MCNC: 6 MG/DL (ref 7–18)
GLUCOSE SERPLBLD-MCNC: 88 MG/DL (ref 74–106)
POTASSIUM SERPL-SCNC: 3.8 MEQ/L (ref 3.5–5.1)

## 2023-06-24 RX ADMIN — Medication SCH ML: at 08:47

## 2023-06-24 RX ADMIN — QUETIAPINE FUMARATE SCH MG: 100 TABLET, FILM COATED ORAL at 20:46

## 2023-06-24 RX ADMIN — Medication SCH ML: at 21:00

## 2023-06-24 RX ADMIN — QUETIAPINE FUMARATE SCH MG: 100 TABLET, FILM COATED ORAL at 08:45

## 2023-06-24 RX ADMIN — PREGABALIN SCH MG: 150 CAPSULE ORAL at 13:57

## 2023-06-24 RX ADMIN — ENOXAPARIN SODIUM SCH MG: 40 INJECTION SUBCUTANEOUS at 08:47

## 2023-06-24 RX ADMIN — PREGABALIN SCH MG: 150 CAPSULE ORAL at 20:46

## 2023-06-24 RX ADMIN — OXYCODONE AND ACETAMINOPHEN PRN TAB: 5; 325 TABLET ORAL at 09:58

## 2023-06-24 RX ADMIN — NICOTINE SCH MG: 21 PATCH TRANSDERMAL at 08:46

## 2023-06-24 RX ADMIN — OXYCODONE AND ACETAMINOPHEN PRN TAB: 5; 325 TABLET ORAL at 16:31

## 2023-06-24 RX ADMIN — OXYCODONE AND ACETAMINOPHEN PRN TAB: 5; 325 TABLET ORAL at 03:31

## 2023-06-24 RX ADMIN — CYCLOBENZAPRINE HYDROCHLORIDE PRN MG: 10 TABLET, FILM COATED ORAL at 09:58

## 2023-06-24 RX ADMIN — PREGABALIN SCH MG: 150 CAPSULE ORAL at 08:45

## 2023-06-24 RX ADMIN — SERTRALINE HYDROCHLORIDE SCH MG: 100 TABLET ORAL at 08:45
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
## 2023-06-25 LAB
BUN BLD-MCNC: 8 MG/DL (ref 7–18)
GLUCOSE SERPLBLD-MCNC: 95 MG/DL (ref 74–106)
POTASSIUM SERPL-SCNC: 3.7 MEQ/L (ref 3.5–5.1)

## 2023-06-25 RX ADMIN — QUETIAPINE FUMARATE SCH MG: 100 TABLET, FILM COATED ORAL at 08:17

## 2023-06-25 RX ADMIN — PREGABALIN SCH MG: 150 CAPSULE ORAL at 13:37

## 2023-06-25 RX ADMIN — SERTRALINE HYDROCHLORIDE SCH MG: 100 TABLET ORAL at 08:16

## 2023-06-25 RX ADMIN — PREGABALIN SCH MG: 150 CAPSULE ORAL at 20:31

## 2023-06-25 RX ADMIN — PREGABALIN SCH MG: 150 CAPSULE ORAL at 08:17

## 2023-06-25 RX ADMIN — KETOROLAC TROMETHAMINE SCH MG: 30 INJECTION, SOLUTION INTRAMUSCULAR at 11:49

## 2023-06-25 RX ADMIN — CYCLOBENZAPRINE HYDROCHLORIDE PRN MG: 10 TABLET, FILM COATED ORAL at 17:46

## 2023-06-25 RX ADMIN — OXYCODONE AND ACETAMINOPHEN PRN TAB: 5; 325 TABLET ORAL at 13:24

## 2023-06-25 RX ADMIN — Medication SCH ML: at 08:18

## 2023-06-25 RX ADMIN — QUETIAPINE FUMARATE SCH MG: 100 TABLET, FILM COATED ORAL at 20:31

## 2023-06-25 RX ADMIN — CYCLOBENZAPRINE HYDROCHLORIDE PRN MG: 10 TABLET, FILM COATED ORAL at 04:57

## 2023-06-25 RX ADMIN — NICOTINE SCH MG: 21 PATCH TRANSDERMAL at 08:17

## 2023-06-25 RX ADMIN — Medication SCH ML: at 20:32

## 2023-06-25 RX ADMIN — OXYCODONE AND ACETAMINOPHEN PRN TAB: 5; 325 TABLET ORAL at 04:57

## 2023-06-25 RX ADMIN — ENOXAPARIN SODIUM SCH MG: 40 INJECTION SUBCUTANEOUS at 08:17

## 2023-06-25 RX ADMIN — OXYCODONE AND ACETAMINOPHEN PRN TAB: 5; 325 TABLET ORAL at 20:46
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
## 2023-06-26 RX ADMIN — Medication SCH ML: at 20:09

## 2023-06-26 RX ADMIN — KETOROLAC TROMETHAMINE SCH MG: 30 INJECTION, SOLUTION INTRAMUSCULAR at 12:01

## 2023-06-26 RX ADMIN — Medication SCH ML: at 08:26

## 2023-06-26 RX ADMIN — OXYCODONE AND ACETAMINOPHEN PRN TAB: 5; 325 TABLET ORAL at 10:19

## 2023-06-26 RX ADMIN — PREGABALIN SCH MG: 150 CAPSULE ORAL at 20:09

## 2023-06-26 RX ADMIN — QUETIAPINE FUMARATE SCH MG: 100 TABLET, FILM COATED ORAL at 20:08

## 2023-06-26 RX ADMIN — SERTRALINE HYDROCHLORIDE SCH MG: 100 TABLET ORAL at 08:24

## 2023-06-26 RX ADMIN — KETOROLAC TROMETHAMINE SCH MG: 30 INJECTION, SOLUTION INTRAMUSCULAR at 00:11

## 2023-06-26 RX ADMIN — ENOXAPARIN SODIUM SCH MG: 40 INJECTION SUBCUTANEOUS at 08:25

## 2023-06-26 RX ADMIN — NICOTINE SCH MG: 21 PATCH TRANSDERMAL at 08:25

## 2023-06-26 RX ADMIN — PREGABALIN SCH MG: 150 CAPSULE ORAL at 14:33

## 2023-06-26 RX ADMIN — QUETIAPINE FUMARATE SCH MG: 100 TABLET, FILM COATED ORAL at 08:24

## 2023-06-26 RX ADMIN — OXYCODONE AND ACETAMINOPHEN PRN TAB: 5; 325 TABLET ORAL at 15:46

## 2023-06-26 RX ADMIN — OXYCODONE AND ACETAMINOPHEN PRN TAB: 5; 325 TABLET ORAL at 04:05

## 2023-06-26 RX ADMIN — PREGABALIN SCH MG: 150 CAPSULE ORAL at 08:24

## 2023-06-26 RX ADMIN — OXYCODONE AND ACETAMINOPHEN PRN TAB: 5; 325 TABLET ORAL at 21:55
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
## 2023-06-27 RX ADMIN — OXYCODONE AND ACETAMINOPHEN PRN TAB: 5; 325 TABLET ORAL at 16:14

## 2023-06-27 RX ADMIN — Medication SCH ML: at 08:24

## 2023-06-27 RX ADMIN — SERTRALINE HYDROCHLORIDE SCH MG: 100 TABLET ORAL at 08:23

## 2023-06-27 RX ADMIN — QUETIAPINE FUMARATE SCH MG: 100 TABLET, FILM COATED ORAL at 20:39

## 2023-06-27 RX ADMIN — PREGABALIN SCH MG: 150 CAPSULE ORAL at 14:43

## 2023-06-27 RX ADMIN — PREGABALIN SCH MG: 150 CAPSULE ORAL at 08:23

## 2023-06-27 RX ADMIN — KETOROLAC TROMETHAMINE SCH MG: 30 INJECTION, SOLUTION INTRAMUSCULAR at 00:05

## 2023-06-27 RX ADMIN — OXYCODONE AND ACETAMINOPHEN PRN TAB: 5; 325 TABLET ORAL at 22:25

## 2023-06-27 RX ADMIN — Medication SCH ML: at 20:40

## 2023-06-27 RX ADMIN — PREGABALIN SCH MG: 150 CAPSULE ORAL at 20:40

## 2023-06-27 RX ADMIN — KETOROLAC TROMETHAMINE SCH MG: 30 INJECTION, SOLUTION INTRAMUSCULAR at 11:47

## 2023-06-27 RX ADMIN — ENOXAPARIN SODIUM SCH MG: 40 INJECTION SUBCUTANEOUS at 08:20

## 2023-06-27 RX ADMIN — QUETIAPINE FUMARATE SCH MG: 100 TABLET, FILM COATED ORAL at 08:23

## 2023-06-27 RX ADMIN — NICOTINE SCH MG: 21 PATCH TRANSDERMAL at 08:21

## 2023-06-27 RX ADMIN — OXYCODONE AND ACETAMINOPHEN PRN TAB: 5; 325 TABLET ORAL at 10:00

## 2023-06-27 RX ADMIN — OXYCODONE AND ACETAMINOPHEN PRN TAB: 5; 325 TABLET ORAL at 03:41
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
## 2023-06-28 VITALS — SYSTOLIC BLOOD PRESSURE: 100 MMHG | TEMPERATURE: 97.3 F | DIASTOLIC BLOOD PRESSURE: 63 MMHG

## 2023-06-28 VITALS — OXYGEN SATURATION: 97 %

## 2023-06-28 RX ADMIN — OXYCODONE AND ACETAMINOPHEN PRN TAB: 5; 325 TABLET ORAL at 06:23

## 2023-06-28 RX ADMIN — Medication SCH ML: at 19:37

## 2023-06-28 RX ADMIN — PREGABALIN SCH MG: 150 CAPSULE ORAL at 19:37

## 2023-06-28 RX ADMIN — Medication SCH ML: at 08:33

## 2023-06-28 RX ADMIN — PREGABALIN SCH MG: 150 CAPSULE ORAL at 14:36

## 2023-06-28 RX ADMIN — PREGABALIN SCH MG: 150 CAPSULE ORAL at 08:33

## 2023-06-28 RX ADMIN — QUETIAPINE FUMARATE SCH MG: 100 TABLET, FILM COATED ORAL at 19:37

## 2023-06-28 RX ADMIN — POLYETHYLENE GLYCOL 3350 SCH GM: 17 POWDER, FOR SOLUTION ORAL at 11:50

## 2023-06-28 RX ADMIN — SENNOSIDES SCH MG: 8.6 TABLET, FILM COATED ORAL at 19:37

## 2023-06-28 RX ADMIN — NICOTINE SCH MG: 21 PATCH TRANSDERMAL at 08:33

## 2023-06-28 RX ADMIN — SENNOSIDES SCH: 8.6 TABLET, FILM COATED ORAL at 11:52

## 2023-06-28 RX ADMIN — QUETIAPINE FUMARATE SCH MG: 100 TABLET, FILM COATED ORAL at 08:32

## 2023-06-28 RX ADMIN — ENOXAPARIN SODIUM SCH MG: 40 INJECTION SUBCUTANEOUS at 08:34

## 2023-06-28 RX ADMIN — POLYETHYLENE GLYCOL 3350 SCH: 17 POWDER, FOR SOLUTION ORAL at 19:37

## 2023-06-28 RX ADMIN — SENNOSIDES SCH MG: 8.6 TABLET, FILM COATED ORAL at 11:50

## 2023-06-28 RX ADMIN — SERTRALINE HYDROCHLORIDE SCH MG: 100 TABLET ORAL at 08:33
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
